# Patient Record
Sex: FEMALE | Race: WHITE
[De-identification: names, ages, dates, MRNs, and addresses within clinical notes are randomized per-mention and may not be internally consistent; named-entity substitution may affect disease eponyms.]

---

## 2019-04-06 ENCOUNTER — HOSPITAL ENCOUNTER (INPATIENT)
Dept: HOSPITAL 97 - ER | Age: 79
LOS: 12 days | Discharge: HOME HEALTH | DRG: 177 | End: 2019-04-18
Attending: FAMILY MEDICINE | Admitting: FAMILY MEDICINE
Payer: COMMERCIAL

## 2019-04-06 VITALS — BODY MASS INDEX: 35.9 KG/M2

## 2019-04-06 DIAGNOSIS — J96.01: ICD-10-CM

## 2019-04-06 DIAGNOSIS — N30.00: ICD-10-CM

## 2019-04-06 DIAGNOSIS — G72.89: ICD-10-CM

## 2019-04-06 DIAGNOSIS — Z74.01: ICD-10-CM

## 2019-04-06 DIAGNOSIS — I27.20: ICD-10-CM

## 2019-04-06 DIAGNOSIS — E11.65: ICD-10-CM

## 2019-04-06 DIAGNOSIS — I11.0: ICD-10-CM

## 2019-04-06 DIAGNOSIS — J69.0: Primary | ICD-10-CM

## 2019-04-06 DIAGNOSIS — Z79.4: ICD-10-CM

## 2019-04-06 DIAGNOSIS — J96.02: ICD-10-CM

## 2019-04-06 DIAGNOSIS — M15.9: ICD-10-CM

## 2019-04-06 DIAGNOSIS — B96.1: ICD-10-CM

## 2019-04-06 DIAGNOSIS — I50.32: ICD-10-CM

## 2019-04-06 DIAGNOSIS — E87.70: ICD-10-CM

## 2019-04-06 DIAGNOSIS — Z66: ICD-10-CM

## 2019-04-06 DIAGNOSIS — E78.2: ICD-10-CM

## 2019-04-06 LAB
ALBUMIN SERPL BCP-MCNC: 3.1 G/DL (ref 3.4–5)
ALP SERPL-CCNC: 76 U/L (ref 45–117)
ALT SERPL W P-5'-P-CCNC: 16 U/L (ref 12–78)
AST SERPL W P-5'-P-CCNC: 16 U/L (ref 15–37)
BUN BLD-MCNC: 26 MG/DL (ref 7–18)
GLUCOSE SERPLBLD-MCNC: 171 MG/DL (ref 74–106)
HCT VFR BLD CALC: 28.5 % (ref 36–45)
INR BLD: 1.22
LYMPHOCYTES # SPEC AUTO: 0.7 K/UL (ref 0.7–4.9)
MAGNESIUM SERPL-MCNC: 1.8 MG/DL (ref 1.8–2.4)
NT-PROBNP SERPL-MCNC: 3869 PG/ML (ref ?–450)
PMV BLD: 9 FL (ref 7.6–11.3)
POTASSIUM SERPL-SCNC: 4.5 MMOL/L (ref 3.5–5.1)
RBC # BLD: 3.4 M/UL (ref 3.86–4.86)
TROPONIN (EMERG DEPT USE ONLY): < 0.02 NG/ML (ref 0–0.04)

## 2019-04-06 PROCEDURE — 87077 CULTURE AEROBIC IDENTIFY: CPT

## 2019-04-06 PROCEDURE — 82805 BLOOD GASES W/O2 SATURATION: CPT

## 2019-04-06 PROCEDURE — 82962 GLUCOSE BLOOD TEST: CPT

## 2019-04-06 PROCEDURE — 96365 THER/PROPH/DIAG IV INF INIT: CPT

## 2019-04-06 PROCEDURE — 5A09557 ASSISTANCE WITH RESPIRATORY VENTILATION, GREATER THAN 96 CONSECUTIVE HOURS, CONTINUOUS POSITIVE AIRWAY PRESSURE: ICD-10-PCS

## 2019-04-06 PROCEDURE — 94640 AIRWAY INHALATION TREATMENT: CPT

## 2019-04-06 PROCEDURE — 87088 URINE BACTERIA CULTURE: CPT

## 2019-04-06 PROCEDURE — 87186 SC STD MICRODIL/AGAR DIL: CPT

## 2019-04-06 PROCEDURE — 85610 PROTHROMBIN TIME: CPT

## 2019-04-06 PROCEDURE — 83735 ASSAY OF MAGNESIUM: CPT

## 2019-04-06 PROCEDURE — 94760 N-INVAS EAR/PLS OXIMETRY 1: CPT

## 2019-04-06 PROCEDURE — 84484 ASSAY OF TROPONIN QUANT: CPT

## 2019-04-06 PROCEDURE — 97166 OT EVAL MOD COMPLEX 45 MIN: CPT

## 2019-04-06 PROCEDURE — 71045 X-RAY EXAM CHEST 1 VIEW: CPT

## 2019-04-06 PROCEDURE — 80048 BASIC METABOLIC PNL TOTAL CA: CPT

## 2019-04-06 PROCEDURE — 93306 TTE W/DOPPLER COMPLETE: CPT

## 2019-04-06 PROCEDURE — 83605 ASSAY OF LACTIC ACID: CPT

## 2019-04-06 PROCEDURE — 90670 PCV13 VACCINE IM: CPT

## 2019-04-06 PROCEDURE — 87804 INFLUENZA ASSAY W/OPTIC: CPT

## 2019-04-06 PROCEDURE — 87040 BLOOD CULTURE FOR BACTERIA: CPT

## 2019-04-06 PROCEDURE — 93005 ELECTROCARDIOGRAM TRACING: CPT

## 2019-04-06 PROCEDURE — 87086 URINE CULTURE/COLONY COUNT: CPT

## 2019-04-06 PROCEDURE — 96366 THER/PROPH/DIAG IV INF ADDON: CPT

## 2019-04-06 PROCEDURE — 99285 EMERGENCY DEPT VISIT HI MDM: CPT

## 2019-04-06 PROCEDURE — 84145 PROCALCITONIN (PCT): CPT

## 2019-04-06 PROCEDURE — 97530 THERAPEUTIC ACTIVITIES: CPT

## 2019-04-06 PROCEDURE — 80053 COMPREHEN METABOLIC PANEL: CPT

## 2019-04-06 PROCEDURE — 96375 TX/PRO/DX INJ NEW DRUG ADDON: CPT

## 2019-04-06 PROCEDURE — 87081 CULTURE SCREEN ONLY: CPT

## 2019-04-06 PROCEDURE — 80076 HEPATIC FUNCTION PANEL: CPT

## 2019-04-06 PROCEDURE — 81015 MICROSCOPIC EXAM OF URINE: CPT

## 2019-04-06 PROCEDURE — 87070 CULTURE OTHR SPECIMN AEROBIC: CPT

## 2019-04-06 PROCEDURE — 83880 ASSAY OF NATRIURETIC PEPTIDE: CPT

## 2019-04-06 PROCEDURE — 94660 CPAP INITIATION&MGMT: CPT

## 2019-04-06 PROCEDURE — 97162 PT EVAL MOD COMPLEX 30 MIN: CPT

## 2019-04-06 PROCEDURE — 85025 COMPLETE CBC W/AUTO DIFF WBC: CPT

## 2019-04-06 PROCEDURE — 36415 COLL VENOUS BLD VENIPUNCTURE: CPT

## 2019-04-06 PROCEDURE — 81003 URINALYSIS AUTO W/O SCOPE: CPT

## 2019-04-06 RX ADMIN — IPRATROPIUM BROMIDE PRN MG: 0.5 SOLUTION RESPIRATORY (INHALATION) at 23:55

## 2019-04-06 RX ADMIN — HUMAN INSULIN SCH UNIT: 100 INJECTION, SOLUTION SUBCUTANEOUS at 21:00

## 2019-04-06 RX ADMIN — Medication SCH ML: at 21:00

## 2019-04-06 NOTE — XMS REPORT
Clinical Summary

 Created on:2019



Patient:Gladys Ramos

Sex:Female

:1940

External Reference #:[id number]





Demographics







 Address  [address]



   [address]



   [place] [address]

 

 Home Phone  [phone]

 

 Mobile Phone  [phone]

 

 Home Phone  [phone]

 

 Mobile Phone  [phone]

 

 Email Address  [email or web address]

 

 Preferred Language  English

 

 Marital Status  

 

 Jewish Affiliation  Unknown

 

 Race  White

 

 Ethnic Group  Not  or 









Author







 Organization  [facility]

 

 Address  [address] [place] [address]









Support







 Name  Relationship  Address  Phone

 

 Ирина Banda  Child  Unavailable  [phone]









Care Team Providers







 Name  Role  Phone

 

 DONATO Dennis DO  Primary Care Provider  [phone]









Allergies







 Active Allergy  Reactions  Severity  Noted Date  Comments

 

 No Known Drug Allergies  Other (See Comments)    2016  







Medications







 Medication  Sig  Dispensed  Refills  Start Date  End Date  Status

 

 blood sugar  Check four  400 strip  10  2017    Active



 diagnostic strips  strips daily          



 (FREESTYLE LITE            



 STRIPS) strip test            



 strips            

 

 lancets 28 gauge  Check 4 times  400 each  10  2017    Active



 misc  daily          

 

 insulin  Inject 30  10 Syringe  1  2017    Active



 glargine-lixisenati  Units under          



 de (SOLIQUA 100/33)  the skin          



 100 unit-33 mcg/mL  daily.          



 insulin pen            

 

 pen needle,  Use once daily  100 each  10  2017    Active



 diabetic (BD            



 INSULIN PEN NEEDLE            



 UF SHORT) 31 gauge            



 x 5/16" needle            

 

 metFORMIN  Take 500 mg by    0      Active



 (GLUCOPHAGE) 500 mg  mouth 3          



 tablet  (three) times          



   a day.          

 

 DULoxetine  Take 20 mg by    0      Active



 (CYMBALTA) 20 MG  mouth daily.          



 capsule            

 

 benazepril  Take 1 tablet  90 tablet  0  2018    Active



 (LOTENSIN) 40 MG  by mouth daily          



 tablet            

 

 carvedilol (COREG)  Take 1 tablet  180 tablet  0  2018    Active



 6.25 MG tablet  by mouth twice          



   a day with          



   meals          

 

 amLODIPine  Take 1 tablet  90 tablet  0  2018    Active



 (NORVASC) 10 mg  by mouth daily          



 tablet  -hold if sbp          



   less than 100          



   and dbp is          



   less than 70          

 

 atorvastatin  Take 1 tablet  90 tablet  0  2018    Active



 (LIPITOR) 10 MG  by mouth daily          



 tablet            

 

 hydrALAZINE  Take 2 tablets  540 tablet  0  2019    Active



 (APRESOLINE) 10 MG  by mouth three          



 tablet  times daily          

 

 benazepril  Take 1 tablet  90 tablet  1  2019    Active



 (LOTENSIN) 40 MG  by mouth daily          



 tablet            

 

 atorvastatin  Take 1 tablet  90 tablet  1  2019    Active



 (LIPITOR) 10 MG  by mouth daily          



 tablet            

 

 acetaminophen-codei  Take 1 tablet  90 tablet  0  2019  Active



 ne (TYLENOL WITH  by mouth every        9  



 CODEINE #3) 300-30  8 hours as          



 mg per tablet  needed for          



   MODERATE PAIN          

 

 carvedilol (COREG)  Take 1 tablet  180 tablet  1  2019    Active



 6.25 MG tablet  by mouth twice          



   a day with          



   meals          

 

 amLODIPine  Take 1 tablet  90 tablet  1  2019    Active



 (NORVASC) 10 mg  by mouth daily          



 tablet  -hold if sbp          



   is less than          



   100 and dbp is          



   less than 70          

 

 gabapentin  Take 1 capsule  270 capsule  1  2019    Active



 (NEURONTIN) 400 mg  by mouth three          



 capsule  times daily          

 

 carvedilol (COREG)  Take 1 tablet  180 tablet  1  2017  
Discontinued



 6.25 MG tablet  (6.25 mg        8  



   total) by          



   mouth 2 (two)          



   times a day          



   with meals.          

 

 amLODIPine  Take 1 tablet  90 tablet  1  2017  Discontinued



 (NORVASC) 10 mg  (10 mg total)        8  



 tablet  by mouth          



   daily. Take 1          



   talbet po QD          



   Hold if SBP is          



   left than 100          



   and DBP is          



   less than 70.          

 

 gabapentin  Take 1 capsule  270 capsule  1  2017  Discontinued



 (NEURONTIN) 400 mg  (400 mg total)        8  



 capsule  by mouth 3          



   (three) times          



   a day.          

 

 hydrALAZINE  Take 2 tablets  540 tablet  1  2017  Discontinued



 (APRESOLINE) 10 MG  (20 mg total)        8  



 tablet  by mouth 3          



   (three) times          



   a day.          

 

 benazepril  Take 1 tablet  90 tablet  1  2017  Discontinued



 (LOTENSIN) 40 MG  (40 mg total)        8  



 tablet  by mouth          



   daily.          

 

 citalopram (CeleXA)  Take 1 tablet  90 tablet  1  2017  
Discontinued



 20 MG tablet  (20 mg total)        8  



   by mouth          



   daily.          

 

 atorvastatin  Take 1 tablet  90 tablet  0  2018  Discontinued



 (LIPITOR) 10 MG  by mouth daily        8  



 tablet            

 

 acetaminophen-codei  Take 1 tablet  90 tablet  0  2018  
Discontinued



 ne (TYLENOL WITH  by mouth every        8  



 CODEINE #3) 300-30  8 hours as          



 mg per tablet  needed for          



   MODERATE PAIN          

 

 nitrofurantoin,  Take 1 capsule  14 capsule  0  2018  10/03/201  
Discontinued



 macrocrystal-monohy  (100 mg total)        8  



 drate, (MACROBID)  by mouth 2          



 100 MG capsule  (two) times a          



   day for 7          



   days.          

 

 ciprofloxacin  Take 1 tablet  6 tablet  0  2018  



 (CIPRO) 500 MG  (500 mg total)        8  



 tablet  by mouth 2          



   (two) times a          



   day for 3          



   days.          

 

 gabapentin  Take 1 capsule  270 capsule  0  2018  Discontinued



 (NEURONTIN) 400 mg  by mouth three        8  



 capsule  times a day          

 

 hydrALAZINE  Take 2 tablets  540 tablet  0  2018  Discontinued



 (APRESOLINE) 10 MG  by mouth three        8  



 tablet  times daily          

 

 acetaminophen-codei  Take 1 tablet  90 tablet  0  2018  
Discontinued



 ne (TYLENOL WITH  by mouth every        8  



 CODEINE #3) 300-30  8 hours as          



 mg per tablet  needed for          



   MODERATE PAIN          

 

 carvedilol (COREG)  Take 1 tablet  180 tablet  0  2018  
Discontinued



 6.25 MG tablet  by mouth twice        8  



   a day with          



   meals          

 

 amLODIPine  Take 1 tablet  90 tablet  0  2018  Discontinued



 (NORVASC) 10 mg  by mouth daily        8  



 tablet  -hold if sbp          



   less than 100          



   and dbp is          



   less than 70          

 

 citalopram (CeleXA)  Take 1 tablet  90 tablet  0  2018  
Discontinued



 20 MG tablet  by mouth daily        8  

 

 benazepril  Take 1 tablet  90 tablet  0  2018  Discontinued



 (LOTENSIN) 40 MG  by mouth daily        8  



 tablet            

 

 atorvastatin  Take 1 tablet  90 tablet  0  2018  Discontinued



 (LIPITOR) 10 MG  by mouth daily        8  



 tablet            

 

 levoFLOXacin  Take 1 tablet  10 tablet  0  2018  



 (LEVAQUIN) 500 MG  (500 mg total)        8  



 tablet  by mouth daily          



   for 7 days.          

 

 DULoxetine  Take 1 capsule  90 capsule  11  2018  Discontinued



 (CYMBALTA) 30 MG  (30 mg total)        8  



 capsule  by mouth          



   daily.          

 

 hydrALAZINE  Take 2 tablets  540 tablet  0  2018  Discontinued



 (APRESOLINE) 10 MG  by mouth three        8  



 tablet  times daily          

 

 gabapentin  Take 1 capsule  270 capsule  0  2018  Discontinued



 (NEURONTIN) 400 mg  by mouth three        8  



 capsule  times a day          

 

 acetaminophen-codei  Take 1 tablet  90 tablet  0  2018  




 ne (TYLENOL WITH  by mouth every        8  



 CODEINE #3) 300-30  8 hours as          



 mg per tablet  needed for          



   MODERATE PAIN          

 

 nitrofurantoin,  Take 1 capsule  14 capsule  0  10/03/2018  10/10/201  



 macrocrystal-monohy  (100 mg total)        8  



 drate, (MACROBID)  by mouth 2          



 100 MG capsule  (two) times a          



   day for 7          



   days.          

 

 atorvastatin  Take 1 tablet  90 tablet  0  2018  Discontinued



 (LIPITOR) 10 MG  by mouth daily        9  



 tablet            

 

 benazepril  Take 1 tablet  90 tablet  0  2018  Discontinued



 (LOTENSIN) 40 MG  by mouth daily        9  



 tablet            

 

 gabapentin  Take 1 capsule  270 capsule  0  2018  Discontinued



 (NEURONTIN) 400 mg  by mouth three        9  



 capsule  times daily          

 

 hydrALAZINE  Take 2 tablets  540 tablet  0  2018  Discontinued



 (APRESOLINE) 10 MG  by mouth three        9  



 tablet  times daily          

 

 acetaminophen-codei  Take 1 tablet  90 tablet  0  2018  




 ne (TYLENOL WITH  by mouth every        8  



 CODEINE #3) 300-30  8 hours as          



 mg per tablet  needed for          



   MODERATE PAIN          

 

 carvedilol (COREG)  Take 1 tablet  180 tablet  0  2018  
Discontinued



 6.25 MG tablet  by mouth twice        9  



   a day with          



   meals          

 

 amLODIPine  Take 1 tablet  90 tablet  0  2018  Discontinued



 (NORVASC) 10 mg  by mouth daily        9  



 tablet  -hold if sbp          



   less than 100          



   and dbp is          



   less than 70          

 

 nitrofurantoin,  Take 1 capsule  14 capsule  0  2019  



 macrocrystal-monohy  (100 mg total)        9  



 drate, (MACROBID)  by mouth 2          



 100 MG capsule  (two) times a          



   day for 7          



   days.          









 Hospital, Clinic, or Other  Ordered Dose  Route  Frequency  Start Date  End 
Date  Status



 Facility Administered            



 Medication            

 

 lidocaine (XYLOCAINE) 10  30 mg  inj  once  2018  Ended



 mg/mL (1 %) injection 30            



 mgIndications:            



 Osteoarthritis of both            



 knees, unspecified            



 osteoarthritis type            

 

 bupivacaine (MARCAINE) 0.5  3 mL  inj  once  2018  Ended



 % (5 mg/mL) injection 3            



 mLIndications:            



 Osteoarthritis of both            



 knees, unspecified            



 osteoarthritis type            

 

 hylan g-f 20 (SYNVISC-ONE)  48 mg  IAtc  once  2018  Ended



 injection 48 mgIndications:            



 Osteoarthritis of both            



 knees, unspecified            



 osteoarthritis type            

 

 hylan g-f 20 (SYNVISC-ONE)  48 mg  IAtc  once  2018  Ended



 injection 48 mgIndications:            



 Osteoarthritis of both            



 knees, unspecified            



 osteoarthritis type            

 

 lidocaine (XYLOCAINE) 10  30 mg  inj  once  2018  Ended



 mg/mL (1 %) injection 30            



 mgIndications:            



 Osteoarthritis of both            



 knees, unspecified            



 osteoarthritis type            

 

 bupivacaine (MARCAINE) 0.5  3 mL  inj  once  2018  Ended



 % (5 mg/mL) injection 3            



 mLIndications:            



 Osteoarthritis of both            



 knees, unspecified            



 osteoarthritis type            







Active Problems







 Problem  Noted Date

 

 Hallucinations  2018

 

 Tremor of both hands  2018

 

 Administrative encounter  2017

 

 Fall  2016

 

 Leg injuries  2016

 

 Abrasion  2016

 

 Cellulitis of left lower extremity  2016

 

 Acute cystitis without hematuria  2016

 

 Urinary tract infection associated with catheterization of urinary tract  

 

 Anxiety  2016

 

 Arthritis of knee  2016

 

 Dysuria  2016

 

 Edema of lower extremity  2016

 

 Hip pain  2016

 

 Hypertension  2016

 

 Knee pain  2016

 

 Neuropathy  2016

 

 Polyuria  2016

 

 Toothache  2016

 

 Type 2 diabetes mellitus with hypoglycemia without coma, with long-term  2016



 current use of insulin  

 

 Unsteady gait  2016

 

 Vitamin D deficiency  2016

 

 Murmur  2016









 Overview: 







 "aortic aneurysm"







Encounters







 Date  Type  Specialty  Care Team  Description

 

 03/15/2019  Orders Only  Family Medicine  Lingamfelter, R  Recurrent UTI



       Juan Carlos, DO  

 

 02/15/2019  Orders Only  Family Medicine  Lingamfelter, R  Recurrent UTI



       Juan Carlos, DO  

 

 2019  Orders Only  Family Medicine  Lingamfelter, R  



       Juan Carlos, DO  

 

 2019  Orders Only  Family Medicine  Lingamfelter, R  



       Juan Carlos, DO  

 

 2019  Telephone  [facility] Medicine  Koki Leggett,  



       N  

 

 2019  Refill  Endocrinology  Aniyah Sanchez MD  

 

 2019  Refill  Family Medicine  Lingamfelter, R  



       Juan Carlos, DO  

 

 2019  Orders Only  Family Medicine  Lingamfelter, R  Recurrent UTI



       Juan Carlos, DO  

 

 2018  Orders Only  Family Medicine  Lingamfelter, R  Recurrent UTI



       Juan Carlos, DO  

 

 2018  Telephone  [facility] Medicine  Koki Leggett,  



       N  

 

 2018  Patient Outreach  Quality  Jaren, Linda  

 

 2018  Orders Only  Family Medicine  Lingamfelter, R  Recurrent UTI



       Juan Carlos, DO  

 

 2018  Refill  Endocrinology  Aniyah Sanchez MD  

 

 2018  Refill  Family Medicine  Lingamfelter, R  



       Juan Carlos, DO  

 

 2018  Refill  Family Medicine  Lingamfelter, R  



       Juan Carlos, DO  

 

 2018  Patient Outreach  Quality  Jaren, Linda  

 

 10/26/2018  Orders Only  Family Medicine  Lingamfelter, R  Recurrent UTI



       Juan Carlos, DO  

 

 10/25/2018  Telephone  Orthopedic Surgery  Renay Vargas MA  

 

 10/17/2018  Orders Only  Family Medicine  Lingamfelter, R  



       Juan Carlos, DO  

 

 10/03/2018  Orders Only  Family Medicine  Lingamfelter, R  



       Juan Carlos, DO  

 

 2018  Orders Only  Family Medicine  Lingamfelter, R  Recurrent UTI



       Juan Carlos, DO  

 

 2018  Telephone  [facility] Medicine  Koki Leggett  



       LVN  

 

 2018  Orders Only  Family Medicine  Lingamfelter, R  



       Juan Carlos, DO  

 

 2018  Telephone  Internal Medicine  Lingamfelter, R  



       Juan Carlos, DO  

 

 2018  Refill  Family Medicine  Lingamfelter, R  



       Juan Carlos, DO  

 

 2018  Telephone  Family Medicine  Leggett, Koki,  



       LVN  

 

 2018  Telephone  Family Medicine  Leggett, Koki,  



       LVN  

 

 08/15/2018  Orders Only  Family Medicine  Lingamfelter, R  Arthralgia of hip,



       Juan Carlos, DO  unspecified laterality



         (Primary Dx)

 

 2018  Telephone  Family Medicine  Leggett, Koki,  



       LVN  

 

 2018  Patient Outreach  Quality  Linda Eastman  

 

 2018  Orders Only  Family Medicine  Lingamfelter, R  Recurrent UTI (
Primary



       Juan Carlos, DO  Dx)

 

 2018  Telephone  Family Medicine  Leggett, Koki,  



       LVN  

 

 2018  Telephone  Family Medicine  Leggett, Koki,  



       LVN  

 

 2018  Patient Outreach  Quality  Adal, Carolann  

 

 2018  Telephone  Family Medicine  Lingamfelter, R  



       Juan Carlos, DO  

 

 2018  Office Visit  Family Medicine  Lingamfelter, R  Hallucinations (
Primary Dx);



       Juan Carlos DO  Tremor of both hands;



         Type 2 diabetes mellitus with hypoglycemia without coma, with long-
term current use of insulin

 

 2018  Patient Outreach  Quality  Caitlyn Lopez RN  

 

 2018  Patient Outreach  Quality  Nadiya Mittal  

 

 2018  Patient Outreach  Quality  Adal, Carolann  

 

 2018  Telephone  Family Medicine  Leggett, Koki,  



       LVN  

 

 2018  Telephone  Internal Medicine  Keiryamfelter, R  



       Juan Carlos, DO  

 

 2018  Telephone  Family Medicine  Leggett, Koki,  



       LVN  

 

 2018  Telephone  Family Medicine  Leggett, Koki,  



       LVN  

 

 2018  Office Visit  Family Medicine  Lingamfelter, R  General weakness (
Primary Dx);



       Juan Carlos, DO  Leg weakness, bilateral

 

 2018  Orders Only  Family Medicine  Lingamfelter, R  



       Juan Carlos, DO  

 

 2018  Orders Only  Family Medicine  Lingamfelter, R  Pain of both hip



       Juan Carlos DO  joints (Primary Dx)

 

 2018  Telephone  Family Medicine  Leggett, Koki,  



       LVN  

 

 2018  Telephone  Family Medicine  Leggett, Koki,  



       LVN  

 

 2018  Orders Only  Family Medicine  Lingamfelter, R  Chronic pain of 
both knees (Primary Dx);



       Juan Carlos DO  Arthritis of knee

 

 2018  Telephone  Internal Medicine  Samm Patel MA  

 

 2018  Telephone  Family Medicine  Lingamfelter, R  



       Juan Carlos, DO  

 

 2018  Refill  Family Medicine  DONATO Dennis  



       Juan Carlos, DO  

 

 2018  Orders Only  Orthopedic Surgery  Renay Vargas,  Pain in both knees
,



       MA  unspecified chronicity



         (Primary Dx)

 

 2018  Telephone  Orthopedic Surgery  Renay Vargas,  



       MA  

 

 2018  [place]  [place]  

 

 2018  Orders Only  Family Medicine  DONATO Dennis  



       Juan Carlos, DO  

 

 2018  Office Visit  Orthopedic Surgery  [place],  Osteoarthritis of 
both knees, unspecified osteoarthritis type (Primary Dx);



       Lalo  Chronic acquired lymphedema;



       MD Geovanni  Venous stasis dermatitis of both lower extremities

 

 2018  Orders Only  Family DONATO Malik  Increased urinary



       Juan Carlos, DO  frequency (Primary Dx)

 

 2018  Telephone  Family Medicine  Koki Leggett,  



       LVN  

 

 2018  Office Visit  Orthopedic Surgery  [place],  Lymphedema (Primary Dx
);



       Lalo  Primary osteoarthritis of both knees



       MD Geovanni  

 

 2018  Hospital Encounter  Radiology  [place],  Pain in both knees,



       Lalo  unspecified chronicity



       MD Geovanni  

 

 2018  Orders Only  Orthopedic Surgery  Renay Vargas,  



       MA  

 

 2018  Orders Only  Family Medicine  DONATO Dennis  Frequency of 
urination



       Juan Carlos, DO  (Primary Dx)

 

 2018  Telephone  Family Medicine  Koki Leggett,  



       LVN  

 

 2018  Office Visit  Orthopedic Surgery  [place],  Pain in both knees, 
unspecified chronicity (Primary Dx);



       Lalo  Primary osteoarthritis of both knees



       MD Geovanni  

 

 2018  Orders Only  Orthopedic Surgery  Renay Vargas,  Pain in both knees
,



       MA  unspecified chronicity



         (Primary Dx)



after 2018



Immunizations







 Name  Dates Previously Given  Next Due

 

 FLUZONE HIGH-DOSE PF  10/06/2016, 10/15/2015  

 

 Influenza (IM) Preservative Free  10/01/2017  







Family History







 Medical History  Relation  Name  Comments

 

 Heart disease  Father    

 

 Hypertension  Father    

 

 Diabetes  Maternal Grandmother    









 Relation  Name  Status  Comments

 

 Father      

 

 Maternal Grandmother      







Social History







 Tobacco Use  Types  Packs/Day  Years Used  Date

 

 Never Smoker        









 Smokeless Tobacco: Never Used      









 Alcohol Use  Drinks/Week  oz/Week  Comments

 

 No      









 Sex Assigned at Birth  Date Recorded

 

 Not on file  









 Job Start Date  Occupation  Industry

 

 Not on file  Not on file  Not on file









 Travel History  Travel Start  Travel End









 No recent travel history available.







Last Filed Vital Signs







 Vital Sign  Reading  Time Taken

 

 Blood Pressure  128/74  2018  1:46 PM CDT

 

 Pulse  95  2018  1:46 PM CDT

 

 Temperature  36.6 C (97.8 F)  2018  1:46 PM CDT

 

 Respiratory Rate  18  2018  1:46 PM CDT

 

 Oxygen Saturation  93%  2018  1:46 PM CDT

 

 Inhaled Oxygen Concentration  -  -

 

 Weight  -  -

 

 Height  160 cm (5' 3")  2018  1:46 PM CDT

 

 Body Mass Index  -  -







Plan of Treatment







 Health Maintenance  Due Date  Last Done  Comments

 

 DIABETIC RETINAL EYE EXAM  1940    

 

 DIABETIC FOOT EXAM  1949    

 

 SHINGLES VACCINES (#1)  1989    

 

 65+ PNEUMOCOCCAL VACCINE (1 of 2 - PCV13)  2004    

 

 PNEUMOCOCCAL POLYSACCHARIDE VACCINE AGE 65  2004    



 AND OVER      

 

 URINE MICROALBUMIN  08/15/2018  08/15/2017  

 

 INFLUENZA VACCINE  2019  10/06/2016, 10/15/2015  







Procedures







 Procedure Name  Priority  Date/Time  Associated Diagnosis  Comments

 

 [place] ARTHROCENTESIS  Routine  2018  9:15  Osteoarthritis of  Results for 
this



 ASPIR&/INJ MAJOR    AM CDT  both knees,  procedure are in



 JT/BURSA W/O US      unspecified  the results



       osteoarthritis type  section.

 

 XR KNEE 1 OR 2 VW  Routine  2018  2:30  Pain in both knees,  Results for 
this



 BILATERAL    PM CDT  unspecified  procedure are in



       chronicity  the results



         section.



after 2018



Results

Large Joint Arthrocentesis (2018  9:15 AM CDT)





 Narrative  Performed At

 

 Lalo Ceron MD 20181:38 PM



  



 Large Joint Arthrocentesis



  



 Consent given by: patient



  



 Timeout: Immediately prior to procedure a time out was called to verify 



  



 the correct patient, procedure, equipment, support staff and site/side 



  



 marked as required 



  



 Supporting Documentation



  



 Indications: pain and joint swelling 



  



 Procedure Details



  



 Preparation: Patient was prepped and draped in the usual sterile fashion



  



 Ultrasound guided: no



  



 Medications provided by specialty pharmacy: yes



  



  



  



 Platelet Rich Plasma Used: no PRP Used Location: knee - Bilateral  



 knee



  



  



  



 Right side:



  



 Needle size: 20 G



  



 Approach: lateral



  



 Right knee medications administered: 2 mL bupivacaine 0.25 % (2.5  



 mg/mL); 



  



 3 mL lidocaine 10 mg/mL (1 %); 48 mg hylan g-f 20 48 mg/6 mL



  



 Aspirate amount: 10 mL



  



 Aspirate: clear and serous



  



 Patient tolerance: patient tolerated the procedure well with no  



 immediate 



  



 complications



  



  



  



  



  



 Left side:



  



 Needle size: 20 G



  



 Approach: lateral



  



 Left knee medications administered: 2 mL bupivacaine 0.25 % (2.5 mg/mL);  



 3 



  



 mL lidocaine 10 mg/mL (1 %); 48 mg hylan g-f 20 48 mg/6 mL



  



 Aspirate amount: 10 mL



  



 Aspirate: clear and serous



  



 Patient tolerance: patient tolerated the procedure well with no  



 immediate 



  



 complications



  



  



  



  



  



   



XR Knee 1 Or 2 Vw Bilateral (2018  2:30 PM CDT)





 Narrative  Performed At

 

 EXAMINATION:XR KNEE 1 OR 2 VW BILATERAL



  [facility]



  



  



 CLINICAL HISTORY:M25.561 Pain in right knee, M25.562 Pain in left  



 knee, BONE PAINKNEE



  



  



  



 COMPARISON:None.



  



  



  



 FINDINGS: The bones are osteopenic. There are extensive arthritic  



 changes with narrowing in the patellofemoral compartment and lateral  



 compartments in particular. Abundant chondrocalcinosis is present. This  



 may be seen in gout, pseudogout as well as 



  



 osteoarthritis. There is a small volume of fluid noted in the  



 suprapatellar bursa There is minimal fluid about the joint. Loose bodies  



 about the joint posteriorly are noted



  



  



  



 IMPRESSION: Severe arthritic changes as described with obliteration of  



 the lateral compartment a small joint effusion is also noted



  



  



  



  



  



  



  



  



  



  



  



  [id number]



  



   









 Procedure Note

 

 Hm Interface, Radiology Results [facility] - 2018  3:50 PM CDT



EXAMINATION:  XR KNEE 1 OR 2 VW BILATERAL



 



 CLINICAL HISTORY:  M25.561 Pain in right knee, M25.562 Pain in left knee, BONE 
PAIN  KNEE



 



 COMPARISON:  None.



 



 FINDINGS:   The bones are osteopenic. There are extensive arthritic changes 
with narrowing in the patellofemoral compartment and lateral compartments in 
particular. Abundant chondrocalcinosis is present. This



 may be seen in gout, pseudogout as well as



 osteoarthritis. There is a small volume of fluid noted in the suprapatellar 
bursa There is minimal fluid about the joint. Loose bodies about the joint 
posteriorly are noted



 



 IMPRESSION: Severe arthritic changes as described with obliteration of the 
lateral compartment a small joint effusion is also noted



 



 



 



 



 



  [id number]









 Performing Organization  Address  City/State/Zipcode  Phone Number

 

 [facility]  [address] [place] [address]  



after 2018



Insurance







 Payer  Benefit Plan / Group  Subscriber ID  Type  Phone  Address

 

 [facility]  xxxxxxxxxxx      

 

 MEDICARE  MEDICARE PART A AND B  xxxxxxxxxx  Medicare    [place], [place]









 Guarantor Name  Account Type  Relation to  Date of Birth  Phone  Billing



     Patient      Address

 

 Gladys Ramos SHEREEN  Personal/Family  Self  1940  [phone]  [address] [place] [address]



         (Home)  [address]







           [id number]







           [place] [address]







Advance Directives

Patient has advance care planning documents on file. For more information, 
please contact:Rodney [id number] [place] [address]

## 2019-04-06 NOTE — EDPHYS
Physician Documentation                                                                           

 [facility]                                                                 

Name: Gladys Ramos                                                                                    

Age: 79 yrs                                                                                       

Sex: Female                                                                                       

: 1940                                                                                   

MRN: [id number]                                                                                   

Arrival Date: 2019                                                                          

Time: 14:39                                                                                       

Account#: [id number]                                                                            

Bed 24                                                                                            

Private MD:                                                                                       

ED Physician Reyes Markham                                                                      

HPI:                                                                                              

                                                                                             

15:32 This 79 yrs old  Female presents to ER via EMS with complaints of Shortness Of pm1 

      Breath.                                                                                     

15:32 The patient has shortness of breath at rest. Onset: The symptoms/episode began/occurred pm1 

      3 day(s) ago. Duration: The symptoms are continuous. The patient's shortness of breath      

      is aggravated by nothing, is alleviated by nothing. Associated signs and symptoms:          

      Pertinent positives: productive cough, sore throat yesterday, Pertinent negatives:          

      chest pain, diaphoresis, dizziness, fever, nausea, vomiting. Severity of symptoms: in       

      the emergency department the symptoms are worse Pain is currently a 0 / 10. The patient     

      has experienced a previous episode, about 1 year ago positive for the flu. The patient      

      has not recently seen a physician.                                                          

15:32 Patient with 70% saturation on room air on EMS arrival. Patient given Solu-Medrol 125   pm1 

      IV, albuterol x 1 in route by EMS .                                                         

15:57 PCP Jeannie in [place].                                                         pm1 

                                                                                                  

Historical:                                                                                       

- Allergies:                                                                                      

15:05 ambien;                                                                                 ca1 

- Home Meds:                                                                                      

15:05 atorvastatin 10 mg Oral tab [Active]; Amlodipine Besylate 10 mg 1 tab daily [Active];   ca1 

      acetaminophen-codeine 300-30 mg Oral tab 1 tab every 8 hrs [Active]; hydralazine 10 mg      

      Oral tab 2 tab 3 times a day [Active]; duloxetine 30 mg oral cpDR 1 cap once daily          

      [Active]; carvedilol 6.25 mg Oral tab 1 tab 2 times per day [Active]; gabapentin 400 mg     

      Oral cap 1 cap 3 times per day [Active]; benazepril 40 mg Oral tab 1 tab once daily         

      [Active]; metformin 500 mg Oral tab 1 tab 3 times a day [Active]; Soliqua 100/33 20         

      units SC once at night [Active];                                                            

- PMHx:                                                                                           

15:05 Diabetes - IDDM; Hyperlipidemia; Hypertension;                                          ca1 

- PSHx:                                                                                           

15:05 Cholecystectomy; Hernia repair;                                                         ca1 

                                                                                                  

- Immunization history:: Flu vaccine is not up to date.                                           

- Social history:: Smoking status: Patient/guardian denies using tobacco.                         

- Ebola Screening: : No symptoms or risks identified at this time.                                

                                                                                                  

                                                                                                  

ROS:                                                                                              

15:20 Constitutional: Negative for fever, chills, and weight loss, Eyes: Negative for injury, pm1 

      pain, redness, and discharge, Neck: Negative for injury, pain, and swelling,                

      Cardiovascular: Negative for chest pain, palpitations, and edema.                           

15:20 Abdomen/GI: Negative for abdominal pain, nausea, vomiting, diarrhea, and constipation,      

      Back: Negative for injury and pain, : Negative for injury, bleeding, discharge, and       

      swelling, MS/Extremity: Negative for injury and deformity, Skin: Negative for injury,       

      rash, and discoloration, Neuro: Negative for headache, weakness, numbness, tingling,        

      and seizure.                                                                                

15:20 ENT: Positive for sore throat, Negative for ear pain, rhinorrhea, sinus congestion,         

      sinus pain, difficulty swallowing, difficulty handling secretions, hoarseness.              

15:20 Respiratory: Positive for cough, shortness of breath, at rest.                              

                                                                                                  

Exam:                                                                                             

15:20 Constitutional:  This is a well developed, well nourished patient who is awake, alert,  pm1 

      and in no acute distress. Head/Face:  Normocephalic, atraumatic. Eyes:  Pupils equal        

      round and reactive to light, extra-ocular motions intact.  Lids and lashes normal.          

      Conjunctiva and sclera are non-icteric and not injected.  Cornea within normal limits.      

      Periorbital areas with no swelling, redness, or edema. ENT:  Nares patent. No nasal         

      discharge, no septal abnormalities noted.  Tympanic membranes are normal and external       

      auditory canals are clear.  Oropharynx with no redness, swelling, or masses, exudates,      

      or evidence of obstruction, uvula midline.  Mucous membranes moist. Neck:  Trachea          

      midline, no thyromegaly or masses palpated, and no cervical lymphadenopathy.  Supple,       

      full range of motion without nuchal rigidity, or vertebral point tenderness.  No            

      Meningismus. Chest/axilla:  Normal chest wall appearance and motion.  Nontender with no     

      deformity.  No lesions are appreciated. Cardiovascular:  Regular rate and rhythm with a     

      normal S1 and S2.  No gallops, murmurs, or rubs.  Normal PMI, no JVD.  No pulse             

      deficits.                                                                                   

15:20 Abdomen/GI:  Soft, non-tender, with normal bowel sounds.  No distension or tympany.  No     

      guarding or rebound.  No evidence of tenderness throughout. Back:  No spinal                

      tenderness.  No costovertebral tenderness.  Full range of motion. Skin:  Warm, dry with     

      normal turgor.  Normal color with no rashes, no lesions, and no evidence of cellulitis.     

      MS/ Extremity:  Pulses equal, no cyanosis.  Neurovascular intact.  Full, normal range       

      of motion.                                                                                  

15:20 Respiratory: the patient does not display signs of respiratory distress,  Respirations:     

      normal, no use of accessory muscles, no pursed lip breathing, no retractions, no            

      shallow respirations, no splinting, no tachypnea, Breath sounds: wheezing: expiratory       

      is heard diffusely.                                                                         

15:20 Neuro: Orientation: is normal, Motor: is normal, moves all fours.                           

                                                                                                  

Vital Signs:                                                                                      

15:05  / 95; Pulse 95; Resp 20 S; Temp 98.1; Pulse Ox 88% on R/A; Weight 95.25 kg;      ca1 

      Height 5 ft. 3 in. (160.02 cm); Pain 0/10;                                                  

15:08  / 61; Pulse 92; Resp 19; Pulse Ox 92% on 4 lpm NC;                               ca1 

15:59  / 64; Pulse 89; Resp 18 S; Pulse Ox 96% on Nebulizer Mask;                       ca1 

16:26  / 51; Pulse 89; Resp 19 S; Pulse Ox 93% on 4 lpm NC;                             ca1 

17:03  / 75; Pulse 89; Resp 17 S; Pulse Ox 93% on 4 lpm NC;                             ca1 

17:30  / 55; Pulse 87; Resp 19; Pulse Ox 97% on 5 lpm NC;                               ca1 

17:30  / 55; Pulse 91; Resp 17 S; Pulse Ox 96% on 5 lpm NC;                             ca1 

18:35  / 60; Pulse 88; Resp 16 S; Pulse Ox 96% on 5 lpm NC;                             ca1 

15:05 Body Mass Index 37.20 (95.25 kg, 160.02 cm)                                             ca1 

                                                                                                  

MDM:                                                                                              

15:08 Patient medically screened.                                                             pm1 

15:44 Data reviewed: vital signs. Data interpreted: Pulse oximetry: on room air 4L(s) per     pm1 

      nasal canula, is 92 %.                                                                      

16:08 Counseling: I had a detailed discussion with the patient and/or guardian regarding: the pm1 

      historical points, exam findings, and any diagnostic results supporting the                 

      discharge/admit diagnosis, lab results, radiology results, the need for further work-up     

      and treatment in the hospital.                                                              

16:58 Physician consultation: Adalberto Brambila MD was contacted at 16:59, regarding admission,    pm1 

      patient's condition, and will see patient in ED.                                            

                                                                                                  

                                                                                             

15:14 Order name: Basic Metabolic Panel; Complete Time: 16:07                                 pm                                                                                             

15:14 Order name: CBC with Diff; Complete Time: 16:08                                         pm                                                                                             

15:14 Order name: LFT's; Complete Time: 16:07                                                 pm1 

                                                                                             

15:14 Order name: Magnesium; Complete Time: 16:07                                             pm1 

                                                                                             

15:14 Order name: NT PRO-BNP; Complete Time: 16:07                                            pm                                                                                             

15:14 Order name: PT-INR; Complete Time: 16:07                                                pm1 

                                                                                             

15:14 Order name: Troponin (emerg Dept Use Only); Complete Time: 16:07                        pm1 

                                                                                             

15:14 Order name: XRAY Chest (1 view); Complete Time: 16:25                                   pm1 

                                                                                             

15:14 Order name: Flu; Complete Time: 16:27                                                   pm1 

                                                                                             

15:14 Order name: Strep; Complete Time: 16:27                                                 pm1 

                                                                                             

15:14 Order name: Blood Culture Adult (2)                                                     pm1 

                                                                                             

15:14 Order name: Procalcitonin; Complete Time: 18:21                                         pm1 

                                                                                             

16:28 Order name: Throat Culture                                                              [facility]

                                                                                             

15:14 Order name: EKG; Complete Time: 15:16                                                   pm                                                                                             

15:14 Order name: Cardiac monitoring; Complete Time: 15:45                                    pm1 

                                                                                             

15:14 Order name: EKG - Nurse/Tech; Complete Time: 15:45                                      pm1 

                                                                                             

15:14 Order name: IV Saline Lock; Complete Time: 15:45                                        pm1 

                                                                                             

15:14 Order name: Labs collected and sent; Complete Time: 15:45                               pm1 

                                                                                             

15:14 Order name: O2 Per Protocol; Complete Time: 15:45                                       pm1 

                                                                                             

15:14 Order name: O2 Sat Monitoring; Complete Time: 15:45                                     pm1 

                                                                                                  

Administered Medications:                                                                         

15:45 Drug: Albuterol 2.5 mg Route: Inhalation;                                               ca1 

15:45 Drug: AtroVENT Aerosol 0.5 mg Route: Inhalation;                                        ca1 

16:05 Drug: Rocephin 1 grams Route: IV; Rate: calculated rate; Site: left antecubital;        ca1 

17:06 Follow up: Response: No adverse reaction; IV Status: Completed infusion; IVP per        ca1 

      pharmacy protocol                                                                           

16:10 Drug: AZITHromycin 500 mg Route: IVPB; Infused Over: 1 hrs; Site: left antecubital;     ca1 

18:37 Follow up: Response: No adverse reaction; IV Status: Completed infusion                 ca1 

16:54 Drug: Lasix 20 mg Route: IVP; Site: left antecubital;                                   mg2 

18:37 Follow up: Response: No adverse reaction                                                ca1 

                                                                                                  

                                                                                                  

Disposition:                                                                                      

19 16:29 Hospitalization ordered by Adalberto Brambila for Inpatient Admission. Preliminary      

  diagnosis are Pneumonia, unspecified organism, Hypoxia.                                         

- Bed requested for Telemetry/MedSurg (Inpatient).                                                

- Status is Inpatient Admission.                                                              ca1 

- Condition is Stable.                                                                            

- Problem is new.                                                                                 

- Symptoms have improved.                                                                         

UTI on Admission? No                                                                              

                                                                                                  

                                                                                                  

                                                                                                  

Addendum:                                                                                         

2019                                                                                        

     07:57 Co-signature as Attending Physician, Reyes Markham MD I agree with the assessment and  c
ha

           plan of care.                                                                          

                                                                                                  

Signatures:                                                                                       

Dispatcher [employer]                           EDMS                                                 

Reyes Markham MD MD cha Marinas, Patrick, NP                    NP   pm1                                                  

Rupa Alvarado Michele, KEVYN                    RN   mg2                                                  

Libby Parisi RN RN   ca1                                                  

                                                                                                  

Corrections: (The following items were deleted from the chart)                                    

                                                                                             

16:59 16:29 Hospitalization Ordered by Adalberto Brambila MD for Inpatient Admission. Preliminary   pm1 

      diagnosis is Pneumonia, unspecified organism. Bed requested for Telemetry/MedSurg           

      (Inpatient). Status is Inpatient Admission. Condition is Stable. Problem is new.            

      Symptoms have improved. UTI on Admission? No. pm1                                           

17:33 16:59 2019 16:29 Hospitalization Ordered by Adalberto Brambila MD for Inpatient         eb  

      Admission. Preliminary diagnosis is Pneumonia, unspecified organism; Hypoxia. Bed           

      requested for Telemetry/MedSurg (Inpatient). Status is Inpatient Admission. Condition       

      is Stable. Problem is new. Symptoms have improved. UTI on Admission? No. pm1                

18:05 17:33 2019 16:29 Hospitalization Ordered by Adalberto Brambila MD for Inpatient         eb  

      Admission. Preliminary diagnosis is Pneumonia, unspecified organism; Hypoxia. Bed           

      requested for Telemetry/MedSurg (Inpatient). Status is Inpatient Admission. Condition       

      is Stable. Problem is new. Symptoms have improved. UTI on Admission? No. eb                 

18:43 18:05 2019 16:29 Hospitalization Ordered by Adalberto Brambila MD for Inpatient         ca1 

      Admission. Preliminary diagnosis is Pneumonia, unspecified organism; Hypoxia. Bed           

      requested for Telemetry/MedSurg (Inpatient). Status is Inpatient Admission. Condition       

      is Stable. Problem is new. Symptoms have improved. UTI on Admission? No. eb                 

                                                                                                  

**************************************************************************************************

## 2019-04-06 NOTE — ER
Nurse's Notes                                                                                     

 [facility]                                                                 

Name: Gladys Ramos                                                                                    

Age: 79 yrs                                                                                       

Sex: Female                                                                                       

: 1940                                                                                   

MRN: [id number]                                                                                   

Arrival Date: 2019                                                                          

Time: 14:39                                                                                       

Account#: [id number]                                                                            

Bed 24                                                                                            

Private MD:                                                                                       

Diagnosis: Pneumonia, unspecified organism;Hypoxia                                                

                                                                                                  

Presentation:                                                                                     

                                                                                             

14:51 Presenting complaint: EMS states: productive Cough and SOB started Thursday. SOB worsen ca1 

      today. SPO2 70% on RA. No Hx of respiratory problem. Transition of care: patient was        

      not received from another setting of care. Onset of symptoms was 2019. Risk       

      Assessment: Do you want to hurt yourself or someone else? Patient reports no desire to      

      harm self or others. Initial Sepsis Screen: Does the patient meet any 2 criteria? No.       

      Patient's initial sepsis screen is negative. Does the patient have a suspected source       

      of infection? Yes: Productive cough/pneumonia. Care prior to arrival: Medication(s)         

      given: Albuterol Neb x 1, Solu- Medrol 125mg IV IV initiated. 18 GA, in the left            

      antecubital area, Glucose check: 187 Oxygen administered. via a non-rebreather mask.        

14:51 Method Of Arrival: EMS: [facility]                                                ca1 

14:51 Acuity: ANATOLY 3                                                                           ca1 

                                                                                                  

Triage Assessment:                                                                                

15:05 General: Appears in no apparent distress. uncomfortable, ill, Behavior is calm,         ca1 

      cooperative, appropriate for age. Pain: Denies pain. Cardiovascular:. Respiratory:          

      Reports shortness of breath cough that is productive, Airway is patent Respiratory          

      effort is even, unlabored, Respiratory pattern is regular, symmetrical, Breath sounds       

      with wheezes bilaterally.                                                                   

                                                                                                  

Historical:                                                                                       

- Allergies:                                                                                      

15:05 ambien;                                                                                 ca1 

- Home Meds:                                                                                      

15:05 atorvastatin 10 mg Oral tab [Active]; Amlodipine Besylate 10 mg 1 tab daily [Active];   ca1 

      acetaminophen-codeine 300-30 mg Oral tab 1 tab every 8 hrs [Active]; hydralazine 10 mg      

      Oral tab 2 tab 3 times a day [Active]; duloxetine 30 mg oral cpDR 1 cap once daily          

      [Active]; carvedilol 6.25 mg Oral tab 1 tab 2 times per day [Active]; gabapentin 400 mg     

      Oral cap 1 cap 3 times per day [Active]; benazepril 40 mg Oral tab 1 tab once daily         

      [Active]; metformin 500 mg Oral tab 1 tab 3 times a day [Active]; Soliqua 100/33 20         

      units SC once at night [Active];                                                            

- PMHx:                                                                                           

15:05 Diabetes - IDDM; Hyperlipidemia; Hypertension;                                          ca1 

- PSHx:                                                                                           

15:05 Cholecystectomy; Hernia repair;                                                         ca1 

                                                                                                  

- Immunization history:: Flu vaccine is not up to date.                                           

- Social history:: Smoking status: Patient/guardian denies using tobacco.                         

- Ebola Screening: : No symptoms or risks identified at this time.                                

                                                                                                  

                                                                                                  

Screening:                                                                                        

15:08 Abuse screen: Denies threats or abuse. Denies injuries from another. Nutritional        ca1 

      screening: No deficits noted. Tuberculosis screening: No symptoms or risk factors           

      identified. Fall Risk IV access (20 points).                                                

                                                                                                  

Assessment:                                                                                       

15:08 General: Appears in no apparent distress. uncomfortable, ill, Behavior is calm,         ca1 

      cooperative, appropriate for age. Pain: Denies pain. Neuro: Level of Consciousness is       

      awake, alert, obeys commands, Oriented to person, place, time, situation.                   

      Cardiovascular: Heart tones S1 S2 present Capillary refill < 3 seconds Patient's skin       

      is warm and dry. Cardiovascular: Edema pitting to left knee, left midcalf, left ankle,      

      left foot, left toes, right knee, right midcalf, right ankle, right foot and right          

      toes. Respiratory: Reports shortness of breath cough that is productive, Airway is          

      patent Respiratory effort is even, unlabored, Respiratory pattern is regular,               

      symmetrical, Breath sounds with wheezes bilaterally. GI: GI: Abdomen is round               

      non-distended, Bowel sounds present X 4 quads. Abd is soft and non tender X 4 quads.        

      : No deficits noted. No signs and/or symptoms were reported regarding the                 

      genitourinary system. EENT: No deficits noted. No signs and/or symptoms were reported       

      regarding the EENT system. Derm: Skin is intact, is healthy with good turgor, Skin is       

      dry, Skin is pale, Skin temperature is warm. Musculoskeletal: Circulation, motion, and      

      sensation intact. Capillary refill < 3 seconds.                                             

15:59 Reassessment: Patient appears in no apparent distress at this time. Patient and/or      ca1 

      family updated on plan of care and expected duration. Pain level reassessed. Patient is     

      alert, oriented x 3, equal unlabored respirations, skin warm/dry/pink.                      

16:26 Reassessment: Patient appears in no apparent distress at this time. Patient is alert,   ca1 

      oriented x 3, equal unlabored respirations, skin warm/dry/pink.                             

17:03 Reassessment: Patient appears in no apparent distress at this time. Patient is alert,   ca1 

      oriented x 3, equal unlabored respirations, skin warm/dry/pink. Family at bedside.          

      Awaiting room assignment.                                                                   

18:00 Reassessment: Patient appears in no apparent distress at this time. Patient and/or      ca1 

      family updated on plan of care and expected duration. Pain level reassessed. Patient is     

      alert, oriented x 3, equal unlabored respirations, skin warm/dry/pink.                      

                                                                                                  

Vital Signs:                                                                                      

15:05  / 95; Pulse 95; Resp 20 S; Temp 98.1; Pulse Ox 88% on R/A; Weight 95.25 kg;      ca1 

      Height 5 ft. 3 in. (160.02 cm); Pain 0/10;                                                  

15:08  / 61; Pulse 92; Resp 19; Pulse Ox 92% on 4 lpm NC;                               ca1 

15:59  / 64; Pulse 89; Resp 18 S; Pulse Ox 96% on Nebulizer Mask;                       ca1 

16:26  / 51; Pulse 89; Resp 19 S; Pulse Ox 93% on 4 lpm NC;                             ca1 

17:03  / 75; Pulse 89; Resp 17 S; Pulse Ox 93% on 4 lpm NC;                             ca1 

17:30  / 55; Pulse 87; Resp 19; Pulse Ox 97% on 5 lpm NC;                               ca1 

17:30  / 55; Pulse 91; Resp 17 S; Pulse Ox 96% on 5 lpm NC;                             ca1 

18:35  / 60; Pulse 88; Resp 16 S; Pulse Ox 96% on 5 lpm NC;                             ca1 

15:05 Body Mass Index 37.20 (95.25 kg, 160.02 cm)                                             ca1 

                                                                                                  

ED Course:                                                                                        

14:39 Patient arrived in ED.                                                                  hb  

14:51 Libby Parisi, RN is Primary Nurse.                                                      ca1 

14:56 Triage completed.                                                                       ca1 

15:05 Arm band placed on right wrist.                                                         ca1 

15:07 Kenn Vences NP is PHCP.                                                           pm1 

15:07 Reyes Markham MD is Attending Physician.                                             pm1 

15:08 Patient has correct armband on for positive identification. Placed in gown. Bed in low  ca1 

      position. Call light in reach. Side rails up X 1. Cardiac monitor on. Pulse ox on. NIBP     

      on. Warm blanket given.                                                                     

15:10 Maintain EMS IV. Dressing intact. Good blood return noted. Site clean \T\ dry. Gauge \T\    ca
1

      site: G18 at LAC.                                                                           

15:35 Initial lab(s) drawn, by me, sent to lab.                                               ca1 

15:37 XRAY Chest (1 view) In Process Unspecified.                                             EDMS

15:40 First set of blood cultures drawn by me.                                                ca1 

16:00 Second set of blood cultures drawn by me.                                               ca1 

16:28 Adalberto Brambila MD is Hospitalizing Provider.                                            pm1 

18:35 No provider procedures requiring assistance completed. Patient admitted, IV remains in  ca1 

      place.                                                                                      

                                                                                                  

Administered Medications:                                                                         

15:45 Drug: Albuterol 2.5 mg Route: Inhalation;                                               ca1 

15:45 Drug: AtroVENT Aerosol 0.5 mg Route: Inhalation;                                        ca1 

16:05 Drug: Rocephin 1 grams Route: IV; Rate: calculated rate; Site: left antecubital;        ca1 

17:06 Follow up: Response: No adverse reaction; IV Status: Completed infusion; IVP per        ca1 

      pharmacy protocol                                                                           

16:10 Drug: AZITHromycin 500 mg Route: IVPB; Infused Over: 1 hrs; Site: left antecubital;     ca1 

18:37 Follow up: Response: No adverse reaction; IV Status: Completed infusion                 ca1 

16:54 Drug: Lasix 20 mg Route: IVP; Site: left antecubital;                                   mg2 

18:37 Follow up: Response: No adverse reaction                                                ca1 

                                                                                                  

                                                                                                  

Outcome:                                                                                          

16:29 Decision to Hospitalize by Provider.                                                    pm1 

18:35 Admitted to Tele accompanied by tech, family with patient, via stretcher, room 408,     ca1 

      with oxygen, with chart, Report called to  Butch Carranza RN                                

18:35 Condition: stable                                                                           

18:35 Instructed on the need for admit.                                                           

18:43 Patient left the ED.                                                                    ca1 

                                                                                                  

Signatures:                                                                                       

Dispatcher [employer]                           EDMS                                                 

Kenn Vences, MONO                    NP   pm1                                                  

Raissa Villanueva, KEVYN                     RN   hb                                                   

Tay Kirk RN                    RN   mg2                                                  

Libby Parisi RN RN   ca1                                                  

                                                                                                  

Corrections: (The following items were deleted from the chart)                                    

15:59 15:08 Derm: Skin is intact, is healthy with good turgor, Skin is pink, warm \T\ dry. ca1  ca1

                                                                                                  

**************************************************************************************************

## 2019-04-06 NOTE — RAD REPORT
EXAM DESCRIPTION:  RAD - Chest Single View - 4/6/2019 3:39 pm

 

CLINICAL HISTORY:  Shortness of breath, cough

 

COMPARISON:  January 2018

 

TECHNIQUE:  AP portable chest image was obtained 1535 hours .

 

FINDINGS:  Lung volumes are low. Interstitial opacification is present. Chronic left hemidiaphragm el
evation is present with left base atelectasis. Bilateral suprahilar parenchymal opacification present
 new from comparison. Heart size within normal limits. Trachea is midline. No pneumothorax or large p
leural effusion. No acute bony abnormality seen. No acute aortic findings suspected.

 

IMPRESSION:  Prominent perihilar vascular and interstitial prominence. Failure/ volume overload is meneses
spected.

## 2019-04-07 LAB
ALBUMIN SERPL BCP-MCNC: 3.1 G/DL (ref 3.4–5)
ALP SERPL-CCNC: 77 U/L (ref 45–117)
ALT SERPL W P-5'-P-CCNC: 14 U/L (ref 12–78)
AST SERPL W P-5'-P-CCNC: 10 U/L (ref 15–37)
BUN BLD-MCNC: 29 MG/DL (ref 7–18)
GLUCOSE SERPLBLD-MCNC: 229 MG/DL (ref 74–106)
HCT VFR BLD CALC: 28.2 % (ref 36–45)
LYMPHOCYTES # SPEC AUTO: 0.5 K/UL (ref 0.7–4.9)
MAGNESIUM SERPL-MCNC: 2 MG/DL (ref 1.8–2.4)
PMV BLD: 8.6 FL (ref 7.6–11.3)
POTASSIUM SERPL-SCNC: 4.2 MMOL/L (ref 3.5–5.1)
RBC # BLD: 3.35 M/UL (ref 3.86–4.86)
UA COMPLETE W REFLEX CULTURE PNL UR: (no result)
UA DIPSTICK W REFLEX MICRO PNL UR: (no result)

## 2019-04-07 RX ADMIN — ACETAMINOPHEN AND CODEINE PHOSPHATE SCH: 300; 30 TABLET ORAL at 09:47

## 2019-04-07 RX ADMIN — ALBUTEROL SULFATE PRN MG: 2.5 SOLUTION RESPIRATORY (INHALATION) at 18:05

## 2019-04-07 RX ADMIN — IPRATROPIUM BROMIDE PRN MG: 0.5 SOLUTION RESPIRATORY (INHALATION) at 05:10

## 2019-04-07 RX ADMIN — ENOXAPARIN SODIUM SCH MG: 40 INJECTION SUBCUTANEOUS at 08:45

## 2019-04-07 RX ADMIN — IPRATROPIUM BROMIDE PRN MG: 0.5 SOLUTION RESPIRATORY (INHALATION) at 08:58

## 2019-04-07 RX ADMIN — Medication SCH: at 21:00

## 2019-04-07 RX ADMIN — Medication SCH ML: at 22:40

## 2019-04-07 RX ADMIN — HYDRALAZINE HYDROCHLORIDE SCH MG: 10 TABLET, FILM COATED ORAL at 22:38

## 2019-04-07 RX ADMIN — ACETAMINOPHEN AND CODEINE PHOSPHATE SCH TAB: 300; 30 TABLET ORAL at 22:56

## 2019-04-07 RX ADMIN — ATORVASTATIN CALCIUM SCH MG: 10 TABLET, FILM COATED ORAL at 22:38

## 2019-04-07 RX ADMIN — HUMAN INSULIN SCH UNIT: 100 INJECTION, SOLUTION SUBCUTANEOUS at 22:39

## 2019-04-07 RX ADMIN — CARVEDILOL SCH MG: 6.25 TABLET, FILM COATED ORAL at 17:01

## 2019-04-07 RX ADMIN — Medication SCH ML: at 08:46

## 2019-04-07 RX ADMIN — HUMAN INSULIN SCH UNIT: 100 INJECTION, SOLUTION SUBCUTANEOUS at 17:00

## 2019-04-07 RX ADMIN — IPRATROPIUM BROMIDE PRN MG: 0.5 SOLUTION RESPIRATORY (INHALATION) at 20:35

## 2019-04-07 RX ADMIN — GABAPENTIN SCH MG: 400 CAPSULE ORAL at 22:38

## 2019-04-07 RX ADMIN — SODIUM CHLORIDE SCH MLS: 0.9 INJECTION, SOLUTION INTRAVENOUS at 09:28

## 2019-04-07 RX ADMIN — GABAPENTIN SCH MG: 400 CAPSULE ORAL at 13:33

## 2019-04-07 RX ADMIN — IPRATROPIUM BROMIDE PRN MG: 0.5 SOLUTION RESPIRATORY (INHALATION) at 18:05

## 2019-04-07 RX ADMIN — HUMAN INSULIN SCH UNIT: 100 INJECTION, SOLUTION SUBCUTANEOUS at 08:46

## 2019-04-07 RX ADMIN — ACETAMINOPHEN AND CODEINE PHOSPHATE SCH: 300; 30 TABLET ORAL at 17:03

## 2019-04-07 RX ADMIN — CEFTRIAXONE SCH MLS: 1 INJECTION, SOLUTION INTRAVENOUS at 22:38

## 2019-04-07 RX ADMIN — ALBUTEROL SULFATE PRN MG: 2.5 SOLUTION RESPIRATORY (INHALATION) at 05:10

## 2019-04-07 RX ADMIN — HYDRALAZINE HYDROCHLORIDE SCH MG: 10 TABLET, FILM COATED ORAL at 13:33

## 2019-04-07 RX ADMIN — ALBUTEROL SULFATE PRN MG: 2.5 SOLUTION RESPIRATORY (INHALATION) at 20:35

## 2019-04-07 RX ADMIN — HUMAN INSULIN SCH UNIT: 100 INJECTION, SOLUTION SUBCUTANEOUS at 11:47

## 2019-04-07 RX ADMIN — CEFTRIAXONE SCH MLS: 1 INJECTION, SOLUTION INTRAVENOUS at 08:45

## 2019-04-07 NOTE — PN
Date of Progress Note:  04/07/2019



Subjective:  Patient is seen and examined.  Chart reviewed and case discussed 
with RN.  The patient states she did not get much sleep last night.  Continues 
to have elevated blood sugar levels.



Medications List:  Reviewed.



Physical Examination:

Vital Signs:  Temperature 98.1, heart rate 102, blood pressure 140/77, 
respirations 20, O2 97% on 2 L via nasal cannula. 

General:  Awake, alert, oriented x3.  Elderly female, obese, BMI 37.2.  Ill-
appearing. 

CV:  S1 and S2.  Sinus tachycardia.  Peripheral pulses weak. 

Respiratory:  Diminished breath sounds.  No wheezing or stridor. 

Gastrointestinal:  Abdomen is soft, nontender, nondistended.  Positive bowel 
sounds. 

Extremities:  No clubbing or cyanosis.  3+ lower extremity edema. 

NEURO:  Cranial nerves 2 through 12 intact grossly.  No focal neurological 
deficit.  The patient does have generalized weakness of the lower extremities. 

SKIN:  Rashes.



Laboratory Data:  Sodium 139, potassium 4.2, chloride 105, CO2 24, BUN 29, 
creatinine 1.07, glucose 229, calcium 8.7, magnesium 2.  WBC 13.7, H and H 8.8, 
28.2, MCV 84.3, MCH 26.4, platelets 342, neutrophils 94%.  Blood cultures 
pending.  Sputum culture is also pending.  Throat culture shows normal upper 
respiratory tract leonardo.



Assessment And Plan:  An 79-year-old female with,

1.   Acute respiratory distress with hypoxia.  The patient now on 2 L via nasal 
cannula, down from 5.  Continue to wean off as tolerated secondary to pneumonia.

2.   Bilateral upper lobe pneumonia, likely aspiration.  Continue IV 
antibiotics.  Follow up on culture results.  WBC count is trending down.  The 
patient is afebrile.  No signs of sepsis.

3.   Volume overload.  No history of congestive heart failure.  We will follow 
up on echocardiogram.  Repeat chest x-ray in a.m.  Monitor I's and O's. 
Diuretics

4.   Essential hypertension, stable, on home medications.

5.   Diabetes mellitus type 2, insulin requiring.  Continue sliding scale 
insulin and monitor Accu-Cheks.  We will start home insulin dose.

6.   Mixed hyperlipidemia.  Continue statin.

7.   Obesity, BMI 37.2.

8.   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  We will 
continue with PT, OT.

9.   Osteoarthritis generalized.  GI and DVT prophylaxis with Lovenox.



Plan:  

1.   Resume home medications.  Follow up on cultures, likely discharge in the 24
-48 hours depending on clinical response.





/RAMYA

DD:  04/07/2019 10:04:54   Voice ID:  [id number]

DT:  04/07/2019 14:50:07   Report ID:  [id number]

MTDRIKI

## 2019-04-07 NOTE — HP
Date of Admission:  2019



Primary Care Physician:  Out of town.



Consultants:  Dr. Flores with Pulmonology.



Code Status:  Full.



History Of Present Illness:  The patient is an 79-year-old female with past 
medical history of diabetes, hypertension, who is bed bound, lives at home, 
comes in with progressive cough, shortness of breath, and wheezing for the past 
2 days.  The patient does report some ill contacts at home.  The patient denies 
any fevers or chills.  The patient's symptoms are constant, moderate, 
progressively worsening.  No chest pain, nausea, vomiting, diarrhea, or 
palpitations.  The patient came into the ER for further evaluation of her 
symptoms.  Her [place]up [place] [address].  Chest x-ray showed 
pneumonia and possible superimposed pulmonary congestion.  The patient was 
hypoxic and was requiring 4 L of oxygen via nasal cannula.  She did receive 
multiple breathing treatments, which improved her condition.  The patient was 
started on IV antibiotics and referred for admission.



Past Medical History:  Hypertension, insulin-dependent diabetes, cardiac 
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



Allergies:  TO AMBIEN.



Medications:  List reviewed.



Surgical History:  Cholecystectomy, , hernia repair, cataract surgery.



Family History:  Father had heart disease and MI.  Mother had kidney disease.



Social History:  The patient denies any tobacco use.  No regular alcohol use or 
illicit drug use.  The patient lives at home, is completely dependent on her 
family for activities of daily living.  She is bed-bound for the past 2 years 
since [place] after a fall.  The patient is unable to transfer to 
wheelchair.



Review of Systems:

An 11-point system reviewed negative except as per HPI.



Physical Examination:

Vital Signs:  Blood pressure 147/95, pulse 85, respirations 20, temperature 98.1
, O2 88% on room air as low as 70% on room air when EMS arrived, currently on 4 
L, saturating 92%. 

General:  Awake, alert, and oriented x3.  Elderly female, ill-appearing, in 
mild respiratory distress, obese. 

HEENT:  Normocephalic, atraumatic.  PERRLA.  EOMI.  Moist mucous membranes.  
Oropharynx is clear.  Conjunctivae anicteric. 

Neck:  Supple, no JVD.  Trachea midline. 

CV:  S1, S2, regular rate and rhythm.  Peripheral pulses are present. 

Respiratory:  Diminished breath sounds, minimal wheezing heard.  No crackles.  
No use of accessory muscles. 

Gastrointestinal:  Abdomen is soft, nontender, nondistended.  Positive bowel 
sounds.  No guarding or rigidity. 

Extremities:  No clubbing or cyanosis.  The patient has 3+ pitting edema 
bilateral lower extremities.  No calf tenderness. 

Skin:  The patient has healing dry ulcer on the left lower extremity from a cat 
scratch. 

Psych:  Mood is okay.  Affect is flat.  Insight and judgment are fair.



Laboratory Data:  Sodium 140, potassium 4.5, chloride 105, CO2 25, BUN 26, 
creatinine 1.11, glucose 171, calcium 8.5, magnesium 1.8.  Troponin less than 
0.02.  BNP 3869, albumin 3.1.  Procalcitonin 0.18.  INR 1.22.  WBC 18.2, H and 
H 928.5, platelets 333, neutrophils 93%.  Influenza screen is negative.  Group 
A strep screen is also negative.



Imaging Studies:  Chest x-ray personally reviewed, shows prominent perihilar, 
vascular, and interstitial prominence.  Failure of volume overload is suspected.



Assessment And Plan:  An 79-year-old female with,

1.   Acute respiratory distress with hypoxia.  The patient currently on 5 L of 
O2 via nasal cannula secondary to volume overload as well as pneumonia.  We 
will place on continuous pulse ox.

2.   Bilateral upper lobe pneumonia, possible aspiration pneumonia.  We will 
place on IV antibiotics and follow up on cultures and obtain sputum cultures.

3.   Volume overload, unclear etiology.  No previous echocardiogram available 
in records.  The patient has no history of CHF.  BNP is elevated.  We will 
obtain echocardiogram to check for any abnormalities related to low ejection 
fraction.  The patient has received Lasix.  We will monitor I's and O's for 
now.  Repeat chest x-ray if not clinically improving.

4.   Hypertension, stable.  We will resume home medications as appropriate.

5.   Diabetes mellitus type 2, insulin requiring.  We will continue on sliding 
scale insulin and monitor glucose levels.

6.   Mixed hyperlipidemia.  Continue statin.

7.   Obesity.

8.   Disuse myopathy.  The patient is bed bound.  Continue with PT and OT.

9.   Osteoarthritis of the knees and hips.



Plan:  Admit the patient to Med-Surg, place as inpatient. 



Length of stay greater than 2 midnights.





GERSON

DD:  2019 18:30:36   Voice ID:  [id number]

[facility]

## 2019-04-07 NOTE — RAD REPORT
EXAM DESCRIPTION:  RAD - Chest Single View - 4/7/2019 6:50 pm

 

CLINICAL HISTORY:  shortness of breath

Chest pain.

 

COMPARISON:  Chest Single View dated 4/6/2019; Chest Single View dated 1/5/2018; Chest Single View da
onofre 1/4/2018; Chest Single View dated 9/30/2017

 

FINDINGS:  Portable technique limits examination quality.

 

Little overall change is seen in the moderate bilateral pulmonary opacities since the recent comparat
tricia study. Chronic left hemidiaphragm elevation persists. Heart size is mildly enlarged. No displaced
 fractures.

 

IMPRESSION:  Stable chest since 04/06/2019 study.

## 2019-04-08 LAB
ALBUMIN SERPL BCP-MCNC: 3.3 G/DL (ref 3.4–5)
ALP SERPL-CCNC: 66 U/L (ref 45–117)
ALT SERPL W P-5'-P-CCNC: 14 U/L (ref 12–78)
AST SERPL W P-5'-P-CCNC: 11 U/L (ref 15–37)
BUN BLD-MCNC: 28 MG/DL (ref 7–18)
COHGB MFR BLDA: 1.3 % (ref 0–1.5)
GLUCOSE SERPLBLD-MCNC: 179 MG/DL (ref 74–106)
HCT VFR BLD CALC: 28.2 % (ref 36–45)
LYMPHOCYTES # SPEC AUTO: 0.7 K/UL (ref 0.7–4.9)
OXYHGB MFR BLDA: 83.7 % (ref 94–97)
PMV BLD: 8.6 FL (ref 7.6–11.3)
POTASSIUM SERPL-SCNC: 3.7 MMOL/L (ref 3.5–5.1)
RBC # BLD: 3.35 M/UL (ref 3.86–4.86)
SAO2 % BLDA: 85.6 % (ref 92–98.5)

## 2019-04-08 RX ADMIN — CEFTRIAXONE SCH MLS: 1 INJECTION, SOLUTION INTRAVENOUS at 10:28

## 2019-04-08 RX ADMIN — Medication SCH ML: at 10:29

## 2019-04-08 RX ADMIN — SODIUM CHLORIDE SCH MLS: 0.9 INJECTION, SOLUTION INTRAVENOUS at 10:27

## 2019-04-08 RX ADMIN — CEFTRIAXONE SCH MLS: 1 INJECTION, SOLUTION INTRAVENOUS at 22:21

## 2019-04-08 RX ADMIN — ACETAMINOPHEN AND CODEINE PHOSPHATE SCH: 300; 30 TABLET ORAL at 18:00

## 2019-04-08 RX ADMIN — CARVEDILOL SCH MG: 6.25 TABLET, FILM COATED ORAL at 05:34

## 2019-04-08 RX ADMIN — HUMAN INSULIN SCH: 100 INJECTION, SOLUTION SUBCUTANEOUS at 21:00

## 2019-04-08 RX ADMIN — Medication SCH ML: at 21:00

## 2019-04-08 RX ADMIN — HUMAN INSULIN SCH UNIT: 100 INJECTION, SOLUTION SUBCUTANEOUS at 11:30

## 2019-04-08 RX ADMIN — HYDRALAZINE HYDROCHLORIDE PRN MG: 20 INJECTION INTRAMUSCULAR; INTRAVENOUS at 05:34

## 2019-04-08 RX ADMIN — BENAZEPRIL HYDROCHLORIDE SCH MG: 20 TABLET, FILM COATED ORAL at 10:28

## 2019-04-08 RX ADMIN — SPIRONOLACTONE SCH MG: 25 TABLET, FILM COATED ORAL at 22:21

## 2019-04-08 RX ADMIN — IPRATROPIUM BROMIDE PRN MG: 0.5 SOLUTION RESPIRATORY (INHALATION) at 00:00

## 2019-04-08 RX ADMIN — AMLODIPINE BESYLATE SCH MG: 10 TABLET ORAL at 10:29

## 2019-04-08 RX ADMIN — DULOXETINE HYDROCHLORIDE SCH MG: 30 CAPSULE, DELAYED RELEASE ORAL at 10:28

## 2019-04-08 RX ADMIN — GABAPENTIN SCH MG: 400 CAPSULE ORAL at 22:21

## 2019-04-08 RX ADMIN — Medication SCH UNITS: at 22:22

## 2019-04-08 RX ADMIN — IPRATROPIUM BROMIDE PRN MG: 0.5 SOLUTION RESPIRATORY (INHALATION) at 14:12

## 2019-04-08 RX ADMIN — HYDRALAZINE HYDROCHLORIDE SCH MG: 10 TABLET, FILM COATED ORAL at 22:21

## 2019-04-08 RX ADMIN — ALBUTEROL SULFATE PRN MG: 2.5 SOLUTION RESPIRATORY (INHALATION) at 06:05

## 2019-04-08 RX ADMIN — GABAPENTIN SCH MG: 400 CAPSULE ORAL at 12:56

## 2019-04-08 RX ADMIN — ACETAMINOPHEN AND CODEINE PHOSPHATE SCH: 300; 30 TABLET ORAL at 10:00

## 2019-04-08 RX ADMIN — HUMAN INSULIN SCH: 100 INJECTION, SOLUTION SUBCUTANEOUS at 16:30

## 2019-04-08 RX ADMIN — CARVEDILOL SCH MG: 6.25 TABLET, FILM COATED ORAL at 16:45

## 2019-04-08 RX ADMIN — HYDRALAZINE HYDROCHLORIDE SCH MG: 10 TABLET, FILM COATED ORAL at 12:56

## 2019-04-08 RX ADMIN — GABAPENTIN SCH MG: 400 CAPSULE ORAL at 10:29

## 2019-04-08 RX ADMIN — HUMAN INSULIN SCH: 100 INJECTION, SOLUTION SUBCUTANEOUS at 07:30

## 2019-04-08 RX ADMIN — ATORVASTATIN CALCIUM SCH MG: 10 TABLET, FILM COATED ORAL at 22:21

## 2019-04-08 RX ADMIN — SPIRONOLACTONE SCH MG: 25 TABLET, FILM COATED ORAL at 12:53

## 2019-04-08 RX ADMIN — ENOXAPARIN SODIUM SCH MG: 40 INJECTION SUBCUTANEOUS at 10:28

## 2019-04-08 RX ADMIN — HYDRALAZINE HYDROCHLORIDE SCH MG: 10 TABLET, FILM COATED ORAL at 10:28

## 2019-04-08 NOTE — P.CNS
Date of Consult: 19


Reason for Consult: Respiratory failure


Chief Complaint: Shortness of breath


History of Present Illness: 





Patient is 79 years of age developed acute onset of shortness of breath about a 

week ago as some lower extremity edema no prior history of pulmonary complaints 

she does have a cardiac history denies any fever chills cough sputum in mom 

became acutely worse ended up in the ER was hypoxic hypercapnic and placed on 

BiPAP patient has hypertension diabetes


Allergies





zolpidem [From Ambien] Allergy (Verified 10/01/17 03:02)


 Unknown





Home Medications: 








Amlodipine [Norvasc*] 10 mg PO DAILY #30 tab 10/10/17 


Atorvastatin Calcium [Lipitor*] 10 mg PO BEDTIME #30 tab 10/10/17 


Carvedilol [Coreg*] 6.25 mg PO BID 6AM 6PM #60 tab 10/10/17 


Hydralazine [Apresoline*] 20 mg PO TID #180 tab 10/10/17 


Benazepril HCl [Lotensin] 40 mg PO DAILY 19 


Codeine/APAP [Tylenol #3*] 1 tab PO Q8HP PRN 19 


Duloxetine HCl [Cymbalta] 30 mg PO DAILY 19 


Gabapentin [Neurontin*] 400 mg PO TID 19 


Insulin Glargine/Lixisenatide [Soliqua 100 Unit-33 Mcg/ml Pen] 20 units SQ 

BEDTIME 19 


Metformin HCl [Glucophage] 500 mg PO TID 19 








- Past Medical/Surgical History


Diabetic: Yes


-: HTN


-: IDDM


-: Cardiac aneurysm


-: Status post fall with severe osteoarthritis of the knees and hips


-: cholecystectomy


-: 


-: hernia repair


-: cataract surgery





- Family History


  ** Father


Medical History: Heart disease


Notes: MI





  ** Mother


Medical History: Kidney disease





- Social History


Smoking Status: Unknown if ever smoked


Alcohol use: No


CD- Drugs: No


Caffeine use: Yes


Place of Residence: Home





Review of Systems


10-point ROS is otherwise unremarkable


General: Weakness


Respiratory: Shortness of Breath


Cardiovascular: Edema





Physical Examination














Temp Pulse Resp BP Pulse Ox


 


 98.6 F   108 H  20   177/80 H  89 L


 


 19 08:00  19 10:29  19 08:00  19 10:29  04/08/19 08:00








General: Alert, Oriented x3


HEENT: Atraumatic


Neck: Supple


Respiratory: Crackles/rales, Expiratory wheezes


Cardiovascular: Regular rate/rhythm, Normal S1 S2, Edema (Mild edema)


Gastrointestinal: Normal bowel sounds, Soft and benign





- Problems


(1) Respiratory failure


Current Visit: Yes   Status: Acute   


Plan: 


Patient is 79 years of age admitted with acute respiratory failure hypoxia 

hypercapnia white count is elevated BNP elevated pro calcitonin level negative 

patient's chest x-rays abnormal diffuse bilateral changes possible pulmonary 

edema echocardiogram ordered continue with Lasix doubt sepsis antibiotics can 

be stopped blood pressure is little elevated I have added low-dose 

spironolactone patient is on ACE-inhibitor continue to monitor her chemistries


Qualifiers: 


   Chronicity: acute

## 2019-04-08 NOTE — ECHO
HEIGHT: 5 ft 3 in   WEIGHT: 210 lb 0 oz   DATE OF STUDY: 04/08/19   REFER DR: Adalberto Brambila MD

2-DIMENSIONAL: YES

     M.MODE: YES

 DOPPLER: YES

COLOR FLOW: YES



                    TDS:  YES

PORTABLE: 

 DEFINITY:  

BUBBLE STUDY: 





DIAGNOSIS:  CONGESTIVE HEART FAILURE



CARDIAC HISTORY:  

CATHERIZATION: 

SURGERY: 

PROSTHETIC VALVE: 

PACEMAKER: 





MEASUREMENTS (cm)

    DIASTOLIC (NORMALS)                 SYSTOLIC (NORMALS)









2 DIMENSIONAL ASSESSMENT:

RIGHT ATRIUM:                   NORMAL

LEFT ATRIUM:       DILATED



RIGHT VENTRICLE:            NORMAL

LEFT VENTRICLE: NORMAL



TRICUSPID VALVE:             NORMAL

MITRAL VALVE:     MITRAL ANNULAR CALCIFICATION



PULMONIC VALVE:             NORMAL

AORTIC VALVE:     NORMAL



PERICARDIAL EFFUSION: NONE

AORTIC ROOT:      NORMAL





LEFT VENTRICULAR WALL MOTION:     NORMAL



DOPPLER/COLOR FLOW:     MILD AORITC REGURGITATION, MITRAL REGURGITATION AND TRICUSPID 
REGURGITATION.  MODERATE PULMONARY HYPERTENSION.  ESTIMATED RIGHT VENTRICULAR SYSTOLIC 
PRESSURE 50 mmHg. 



COMMENTS:      NORMAL LEFT VENTRICULAR EJECTION FRACTION.  DILATED LEFT ATRIUM.  MITRAL 
ANNULAR CALCIFICATION.  MILD AORITC REGURGITATION, MITRAL REGURGITATION AND TRICUSPID 
REGURGITATION.  MODERATE PULMONARY HYPERTENSION.



TECHNOLOGIST:   LONNIE IQBAL

## 2019-04-08 NOTE — PN
Date of Progress Note:  04/08/2019



Subjective:  Patient is seen and examined.  Chart reviewed and case discussed with RN and Dr. Terrance pisano.  The patient has had some respiratory distress overnight as well as this morning.



Medications:  List reviewed.



Physical Examination:

Vital Signs:  Temperature 98.6, heart rate 108, blood pressure 177/80, respirations 20, O2 89% on 4 L
 via nasal cannula. 

General:  Awake, alert, and oriented x3.  Elderly female, ill-appearing, in moderate respiratory dist
ress.  Obese, BMI 37.2. 

CV:  S1 and S2.  Sinus tachycardia.  Peripheral pulses present. Respiratory:  Diminished breath sound
s.  Some crackles heard.  No wheezing.  The patient is tachypneic.  Use of accessory muscles present.
 

Gastrointestinal:  Abdomen is soft, nontender, nondistended.  Positive bowel sounds.  No guarding or 
rigidity. 

Extremities:  No clubbing or cyanosis.  2+ pedal edema. 

Neuro:  Nonfocal.  The patient does have generalized weakness overall.



Laboratory Data:  Sodium 142, potassium 3.7, chloride 104, CO2 28, BUN 28, creatinine 1.03, glucose 1
79, calcium 9.  WBC 13.8, H and H 8.7 and 28.2, platelets 383, neutrophils 85%.  Blood cultures, no g
rowth to date.  Urine culture growing out 3+ gram-negative rods.



Assessment And Plan:  An 79-year-old female with;

1.Acute respiratory failure with hypoxia.  Patient requiring 4 L of oxygen via nasal cannula, likely
 secondary to pneumonia and congestive heart failure.

2.Bilateral upper lobe pneumonia, possible aspiration.  Continue with IV antibiotics.  Appreciate Dr Amor Flores's input.  WBC is trending down.  No signs of sepsis.  Currently afebrile.  Cultures are ne
gative to date.

3.Congestive heart failure unknown EF, likely diastolic.  We will obtain echocardiogram.  Monitor I'
s and O's, strict fluid restriction.  We will continue with diuretics.

4.Urinary tract infection, acute cystitis without hematuria.  Urine cultures growing out gram-negati
ve rods.  The patient is currently covered with Rocephin.

5.Essential hypertension, stable on home medications.

6.Diabetes mellitus type 2, insulin requiring.  We will continue sliding scale insulin and monitor b
lood glucose levels.

7.Mixed hyperlipidemia, continue statin.

8.Obesity, BMI 37.

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  Continue PT and OT.

10.Generalized osteoarthritis.

11.Deep vein thrombosis prophylaxis with Lovenox.



Plan:  Obtain echocardiogram.  Adjust diuretics.





/RAMYA

DD:  04/08/2019 12:02:03Voice ID:  [id number]

DT:  04/08/2019 17:12:20Report ID:  [id number]

## 2019-04-09 LAB
ALBUMIN SERPL BCP-MCNC: 2.9 G/DL (ref 3.4–5)
ALP SERPL-CCNC: 54 U/L (ref 45–117)
ALT SERPL W P-5'-P-CCNC: 12 U/L (ref 12–78)
AST SERPL W P-5'-P-CCNC: 10 U/L (ref 15–37)
BUN BLD-MCNC: 29 MG/DL (ref 7–18)
GLUCOSE SERPLBLD-MCNC: 147 MG/DL (ref 74–106)
HCT VFR BLD CALC: 25.6 % (ref 36–45)
LYMPHOCYTES # SPEC AUTO: 0.7 K/UL (ref 0.7–4.9)
PMV BLD: 8.5 FL (ref 7.6–11.3)
POTASSIUM SERPL-SCNC: 3.6 MMOL/L (ref 3.5–5.1)
RBC # BLD: 3.07 M/UL (ref 3.86–4.86)

## 2019-04-09 RX ADMIN — IPRATROPIUM BROMIDE PRN MG: 0.5 SOLUTION RESPIRATORY (INHALATION) at 08:33

## 2019-04-09 RX ADMIN — HYDRALAZINE HYDROCHLORIDE SCH MG: 10 TABLET, FILM COATED ORAL at 13:05

## 2019-04-09 RX ADMIN — ALBUTEROL SULFATE PRN MG: 2.5 SOLUTION RESPIRATORY (INHALATION) at 13:44

## 2019-04-09 RX ADMIN — SPIRONOLACTONE SCH MG: 25 TABLET, FILM COATED ORAL at 20:28

## 2019-04-09 RX ADMIN — ACETAMINOPHEN AND CODEINE PHOSPHATE SCH: 300; 30 TABLET ORAL at 02:00

## 2019-04-09 RX ADMIN — HUMAN INSULIN SCH: 100 INJECTION, SOLUTION SUBCUTANEOUS at 11:30

## 2019-04-09 RX ADMIN — CARVEDILOL SCH MG: 6.25 TABLET, FILM COATED ORAL at 06:09

## 2019-04-09 RX ADMIN — SODIUM CHLORIDE SCH MLS: 0.9 INJECTION, SOLUTION INTRAVENOUS at 08:02

## 2019-04-09 RX ADMIN — HYDRALAZINE HYDROCHLORIDE SCH MG: 10 TABLET, FILM COATED ORAL at 20:28

## 2019-04-09 RX ADMIN — ALBUTEROL SULFATE PRN MG: 2.5 SOLUTION RESPIRATORY (INHALATION) at 08:32

## 2019-04-09 RX ADMIN — CARVEDILOL SCH MG: 6.25 TABLET, FILM COATED ORAL at 17:17

## 2019-04-09 RX ADMIN — ACETAMINOPHEN AND CODEINE PHOSPHATE SCH TAB: 300; 30 TABLET ORAL at 06:09

## 2019-04-09 RX ADMIN — Medication SCH UNITS: at 20:29

## 2019-04-09 RX ADMIN — IPRATROPIUM BROMIDE PRN MG: 0.5 SOLUTION RESPIRATORY (INHALATION) at 13:44

## 2019-04-09 RX ADMIN — AMLODIPINE BESYLATE SCH MG: 10 TABLET ORAL at 07:59

## 2019-04-09 RX ADMIN — GABAPENTIN SCH MG: 400 CAPSULE ORAL at 13:06

## 2019-04-09 RX ADMIN — BENAZEPRIL HYDROCHLORIDE SCH MG: 20 TABLET, FILM COATED ORAL at 08:18

## 2019-04-09 RX ADMIN — GABAPENTIN SCH MG: 400 CAPSULE ORAL at 08:00

## 2019-04-09 RX ADMIN — ENOXAPARIN SODIUM SCH MG: 40 INJECTION SUBCUTANEOUS at 08:02

## 2019-04-09 RX ADMIN — HUMAN INSULIN SCH UNIT: 100 INJECTION, SOLUTION SUBCUTANEOUS at 20:28

## 2019-04-09 RX ADMIN — ACETAMINOPHEN AND CODEINE PHOSPHATE SCH TAB: 300; 30 TABLET ORAL at 17:17

## 2019-04-09 RX ADMIN — ATORVASTATIN CALCIUM SCH MG: 10 TABLET, FILM COATED ORAL at 20:27

## 2019-04-09 RX ADMIN — HYDRALAZINE HYDROCHLORIDE SCH MG: 10 TABLET, FILM COATED ORAL at 07:58

## 2019-04-09 RX ADMIN — CEFTRIAXONE SCH MLS: 1 INJECTION, SOLUTION INTRAVENOUS at 20:27

## 2019-04-09 RX ADMIN — CEFTRIAXONE SCH MLS: 1 INJECTION, SOLUTION INTRAVENOUS at 08:18

## 2019-04-09 RX ADMIN — Medication SCH ML: at 20:28

## 2019-04-09 RX ADMIN — Medication SCH ML: at 08:03

## 2019-04-09 RX ADMIN — SPIRONOLACTONE SCH MG: 25 TABLET, FILM COATED ORAL at 07:59

## 2019-04-09 RX ADMIN — HUMAN INSULIN SCH: 100 INJECTION, SOLUTION SUBCUTANEOUS at 16:30

## 2019-04-09 RX ADMIN — HUMAN INSULIN SCH: 100 INJECTION, SOLUTION SUBCUTANEOUS at 07:30

## 2019-04-09 RX ADMIN — GABAPENTIN SCH MG: 400 CAPSULE ORAL at 20:27

## 2019-04-09 RX ADMIN — DULOXETINE HYDROCHLORIDE SCH MG: 30 CAPSULE, DELAYED RELEASE ORAL at 07:59

## 2019-04-09 NOTE — P.PN
Subjective


Date of Service: 04/09/19


Chief Complaint: Shortness of breath





 Patient is seen and examined at bedside, son at bedside.  Chart reviewed and 

case discussed with nursing staff.  


The patient reports improved breathing.











Review of Systems


10-point ROS is otherwise unremarkable





Physical Examination





- Vital Signs


Temperature: 97.7 F


Blood Pressure: 128/56


Pulse: 82


Respirations: 19


Pulse Ox (%): 99





- Physical Exam


General: Alert, Oriented x3, Mild distress, Other (ill appearing, elderly )


HEENT: Atraumatic, PERRLA, EOMI


Neck: Supple, JVD not distended


Respiratory: Diminished, Crackles/rales


Cardiovascular: Regular rate/rhythm, Normal S1 S2


Gastrointestinal: Normal bowel sounds, No tenderness


Musculoskeletal: No tenderness


Integumentary: No rashes


Neurological: Normal speech, Normal tone, Normal affect


Lymphatics: No axilla or inguinal lymphadenopathy





Assessment And Plan





- Plan


 An 79-year-old female with;





Acute respiratory failure with hypoxia.  


Patient requiring 4 L of oxygen via nasal cannula, likely secondary to 

pneumonia and congestive heart failure.





Bilateral upper lobe pneumonia, possible aspiration.  


Continue with IV antibiotics.  Appreciate Dr. Flores's input.  WBC is 

trending down.  No signs of sepsis.  Currently afebrile.  Cultures are negative 

to date.





Congestive heart failure with preserved EF, diastolic.  


ECHO with normal EF, dilated atria, moderate pulmonary hypertension. 


Monitor I's and O's, strict fluid restriction.  We will continue with diuretics.





Urinary tract infection, acute cystitis without hematuria.  


Urine cultures pending.  Continue Rocephin and we will adjust as needed





Essential hypertension, 


stable on home medications.





Diabetes mellitus type 2, insulin requiring.  


We will continue sliding scale insulin and monitor blood glucose levels.





Mixed hyperlipidemia


continue statin.





Obesity, BMI 37.





Disuse myopathy.  


The patient is currently bed-bound.  Continue PT and OT.





Generalized osteoarthritis.





DVT prophylaxis: Lovenox.





Plan:  Continue diuretics, pending symptomatic improvement.

## 2019-04-09 NOTE — EKG
Test Date:    2019-04-06               Test Time:    15:52:59

Technician:   MOSHE                                    

                                                     

MEASUREMENT RESULTS:                                       

Intervals:                                           

Rate:         89                                     

WY:           210                                    

QRSD:         100                                    

QT:           376                                    

QTc:          457                                    

Axis:                                                

P:            46                                     

WY:           210                                    

QRS:          48                                     

T:            117                                    

                                                     

INTERPRETIVE STATEMENTS:                                       

                                                     

Sinus rhythm with 1st degree AV block with premature atrial complexes

Inferior-posterior infarct, age undetermined

Abnormal ECG

Compared to ECG 09/30/2017 20:33:18

Atrial premature complex(es) now present

Myocardial infarct finding now present

Sinus arrhythmia no longer present



Electronically Signed On 04-07-19 10:50:16 CDT by Adis Keen

## 2019-04-09 NOTE — EKG
Test Date:    2019-04-06               Test Time:    20:31:31

Technician:   RT                                     

                                                     

MEASUREMENT RESULTS:                                       

Intervals:                                           

Rate:         92                                     

ID:           [id number]                                    

QRSD:         94                                     

QT:           390                                    

QTc:          482                                    

Axis:                                                

P:            6                                      

ID:           [id number]                                    

QRS:          47                                     

T:            78                                     

                                                     

INTERPRETIVE STATEMENTS:                                       

                                                     

Sinus rhythm with 1st degree AV block with premature atrial complexes

Nonspecific T wave abnormality

Prolonged QT

Abnormal ECG

Compared to ECG 04/06/2019 15:52:59

T-wave abnormality now present

Prolonged QT interval now present

Myocardial infarct finding no longer present



Electronically Signed On 04-08-19 10:53:33 CDT by Adis Keen

## 2019-04-10 LAB
BUN BLD-MCNC: 29 MG/DL (ref 7–18)
GLUCOSE SERPLBLD-MCNC: 149 MG/DL (ref 74–106)
POTASSIUM SERPL-SCNC: 3.9 MMOL/L (ref 3.5–5.1)

## 2019-04-10 RX ADMIN — GABAPENTIN SCH MG: 400 CAPSULE ORAL at 09:36

## 2019-04-10 RX ADMIN — GABAPENTIN SCH MG: 400 CAPSULE ORAL at 14:32

## 2019-04-10 RX ADMIN — ACETAMINOPHEN AND CODEINE PHOSPHATE SCH TAB: 300; 30 TABLET ORAL at 00:28

## 2019-04-10 RX ADMIN — HUMAN INSULIN SCH: 100 INJECTION, SOLUTION SUBCUTANEOUS at 11:30

## 2019-04-10 RX ADMIN — ALBUTEROL SULFATE PRN MG: 2.5 SOLUTION RESPIRATORY (INHALATION) at 13:04

## 2019-04-10 RX ADMIN — Medication SCH UNITS: at 21:32

## 2019-04-10 RX ADMIN — ACETAMINOPHEN AND CODEINE PHOSPHATE SCH TAB: 300; 30 TABLET ORAL at 09:37

## 2019-04-10 RX ADMIN — SPIRONOLACTONE SCH MG: 25 TABLET, FILM COATED ORAL at 09:35

## 2019-04-10 RX ADMIN — HYDRALAZINE HYDROCHLORIDE SCH MG: 10 TABLET, FILM COATED ORAL at 21:05

## 2019-04-10 RX ADMIN — ATORVASTATIN CALCIUM SCH MG: 10 TABLET, FILM COATED ORAL at 21:05

## 2019-04-10 RX ADMIN — Medication SCH ML: at 21:00

## 2019-04-10 RX ADMIN — Medication SCH ML: at 09:38

## 2019-04-10 RX ADMIN — CARVEDILOL SCH MG: 6.25 TABLET, FILM COATED ORAL at 05:17

## 2019-04-10 RX ADMIN — AMLODIPINE BESYLATE SCH MG: 10 TABLET ORAL at 09:36

## 2019-04-10 RX ADMIN — HYDRALAZINE HYDROCHLORIDE SCH MG: 10 TABLET, FILM COATED ORAL at 09:38

## 2019-04-10 RX ADMIN — SPIRONOLACTONE SCH MG: 25 TABLET, FILM COATED ORAL at 21:05

## 2019-04-10 RX ADMIN — ENOXAPARIN SODIUM SCH MG: 40 INJECTION SUBCUTANEOUS at 09:38

## 2019-04-10 RX ADMIN — HUMAN INSULIN SCH: 100 INJECTION, SOLUTION SUBCUTANEOUS at 07:30

## 2019-04-10 RX ADMIN — CEFTRIAXONE SCH MLS: 1 INJECTION, SOLUTION INTRAVENOUS at 09:38

## 2019-04-10 RX ADMIN — ALBUTEROL SULFATE PRN MG: 2.5 SOLUTION RESPIRATORY (INHALATION) at 08:18

## 2019-04-10 RX ADMIN — CARVEDILOL SCH MG: 6.25 TABLET, FILM COATED ORAL at 17:00

## 2019-04-10 RX ADMIN — BENAZEPRIL HYDROCHLORIDE SCH MG: 20 TABLET, FILM COATED ORAL at 09:36

## 2019-04-10 RX ADMIN — GABAPENTIN SCH MG: 400 CAPSULE ORAL at 21:05

## 2019-04-10 RX ADMIN — DULOXETINE HYDROCHLORIDE SCH MG: 30 CAPSULE, DELAYED RELEASE ORAL at 09:36

## 2019-04-10 RX ADMIN — ACETAMINOPHEN AND CODEINE PHOSPHATE SCH TAB: 300; 30 TABLET ORAL at 17:00

## 2019-04-10 RX ADMIN — HUMAN INSULIN SCH: 100 INJECTION, SOLUTION SUBCUTANEOUS at 21:00

## 2019-04-10 RX ADMIN — HUMAN INSULIN SCH: 100 INJECTION, SOLUTION SUBCUTANEOUS at 16:30

## 2019-04-10 RX ADMIN — HYDRALAZINE HYDROCHLORIDE SCH MG: 10 TABLET, FILM COATED ORAL at 14:32

## 2019-04-10 NOTE — P.PN
Subjective


Date of Service: 04/10/19


Chief Complaint: Shortness of breath


Subjective: Improving





 Patient is seen and examined at bedside, son at bedside.  Chart reviewed and 

case discussed with nursing staff.  


The patient reports improved breathing.











Review of Systems


10-point ROS is otherwise unremarkable





Physical Examination





- Vital Signs


Temperature: 97.4 F


Blood Pressure: 145/68


Pulse: 85


Respirations: 20


Pulse Ox (%): 92





- Physical Exam


General: Alert, In no apparent distress


HEENT: Atraumatic, PERRLA, EOMI


Neck: Supple, JVD not distended


Respiratory: Clear to auscultation bilaterally, Normal air movement


Cardiovascular: Regular rate/rhythm, Normal S1 S2


Gastrointestinal: Normal bowel sounds, No tenderness


Musculoskeletal: No tenderness


Integumentary: No rashes


Neurological: Normal speech, Normal tone, Normal affect


Lymphatics: No axilla or inguinal lymphadenopathy





Assessment And Plan





- Plan


 An 79-year-old female with;





Acute respiratory failure with hypoxia.  


Patient requiring 4 L of oxygen via nasal cannula, likely secondary to 

pneumonia and congestive heart failure.


This still requires intermittent BiPAP.





Bilateral upper lobe pneumonia, possible aspiration.  


Antibiotics adjusted.  Azithromycin discontinued.  Appreciate Dr. Flores's 

input.  No signs of sepsis.  Currently afebrile.  Cultures are negative to date.





Congestive heart failure with preserved EF, diastolic.  


ECHO with normal EF, dilated atria, moderate pulmonary hypertension. 


Monitor I's and O's, strict fluid restriction.  We will continue with diuretics.





Urinary tract infection, acute cystitis without hematuria.  


Urine cultures with Klebsiella pneumonia.  Will start patient on Levaquin oral, 

discontinue IV antibiotics.  





Essential hypertension, 


stable on home medications.





Diabetes mellitus type 2, insulin requiring.  


We will continue sliding scale insulin and monitor blood glucose levels.





Mixed hyperlipidemia


continue statin.





Obesity, BMI 37.





Disuse myopathy.  


The patient is currently bed-bound.  Continue PT and OT.





Generalized osteoarthritis.





DVT prophylaxis: Lovenox.





Plan:  Continue diuretics, pending symptomatic improvement.

## 2019-04-11 LAB
ALBUMIN SERPL BCP-MCNC: 3 G/DL (ref 3.4–5)
ALP SERPL-CCNC: 54 U/L (ref 45–117)
ALT SERPL W P-5'-P-CCNC: 11 U/L (ref 12–78)
AST SERPL W P-5'-P-CCNC: 7 U/L (ref 15–37)
BUN BLD-MCNC: 26 MG/DL (ref 7–18)
GLUCOSE SERPLBLD-MCNC: 151 MG/DL (ref 74–106)
HCT VFR BLD CALC: 26.9 % (ref 36–45)
LYMPHOCYTES # SPEC AUTO: 0.7 K/UL (ref 0.7–4.9)
PMV BLD: 7.7 FL (ref 7.6–11.3)
POTASSIUM SERPL-SCNC: 4.4 MMOL/L (ref 3.5–5.1)
RBC # BLD: 3.23 M/UL (ref 3.86–4.86)

## 2019-04-11 RX ADMIN — IPRATROPIUM BROMIDE PRN MG: 0.5 SOLUTION RESPIRATORY (INHALATION) at 13:44

## 2019-04-11 RX ADMIN — CARVEDILOL SCH MG: 6.25 TABLET, FILM COATED ORAL at 07:15

## 2019-04-11 RX ADMIN — Medication SCH ML: at 22:10

## 2019-04-11 RX ADMIN — ATORVASTATIN CALCIUM SCH MG: 10 TABLET, FILM COATED ORAL at 21:00

## 2019-04-11 RX ADMIN — HUMAN INSULIN SCH: 100 INJECTION, SOLUTION SUBCUTANEOUS at 16:30

## 2019-04-11 RX ADMIN — SPIRONOLACTONE SCH MG: 25 TABLET, FILM COATED ORAL at 09:02

## 2019-04-11 RX ADMIN — HYDRALAZINE HYDROCHLORIDE SCH MG: 10 TABLET, FILM COATED ORAL at 13:35

## 2019-04-11 RX ADMIN — AMLODIPINE BESYLATE SCH MG: 10 TABLET ORAL at 08:51

## 2019-04-11 RX ADMIN — HYDRALAZINE HYDROCHLORIDE SCH MG: 10 TABLET, FILM COATED ORAL at 08:51

## 2019-04-11 RX ADMIN — BENAZEPRIL HYDROCHLORIDE SCH MG: 20 TABLET, FILM COATED ORAL at 08:50

## 2019-04-11 RX ADMIN — ENOXAPARIN SODIUM SCH MG: 40 INJECTION SUBCUTANEOUS at 08:51

## 2019-04-11 RX ADMIN — HUMAN INSULIN SCH: 100 INJECTION, SOLUTION SUBCUTANEOUS at 07:30

## 2019-04-11 RX ADMIN — CARVEDILOL SCH MG: 6.25 TABLET, FILM COATED ORAL at 17:35

## 2019-04-11 RX ADMIN — ACETAMINOPHEN AND CODEINE PHOSPHATE SCH TAB: 300; 30 TABLET ORAL at 03:43

## 2019-04-11 RX ADMIN — DULOXETINE HYDROCHLORIDE SCH MG: 30 CAPSULE, DELAYED RELEASE ORAL at 08:51

## 2019-04-11 RX ADMIN — IPRATROPIUM BROMIDE PRN MG: 0.5 SOLUTION RESPIRATORY (INHALATION) at 06:31

## 2019-04-11 RX ADMIN — Medication SCH ML: at 08:52

## 2019-04-11 RX ADMIN — ALBUTEROL SULFATE PRN MG: 2.5 SOLUTION RESPIRATORY (INHALATION) at 13:44

## 2019-04-11 RX ADMIN — Medication SCH UNITS: at 21:00

## 2019-04-11 RX ADMIN — HUMAN INSULIN SCH: 100 INJECTION, SOLUTION SUBCUTANEOUS at 21:00

## 2019-04-11 RX ADMIN — ALBUTEROL SULFATE PRN MG: 2.5 SOLUTION RESPIRATORY (INHALATION) at 06:31

## 2019-04-11 RX ADMIN — ACETAMINOPHEN AND CODEINE PHOSPHATE SCH TAB: 300; 30 TABLET ORAL at 17:35

## 2019-04-11 RX ADMIN — GABAPENTIN SCH MG: 400 CAPSULE ORAL at 22:09

## 2019-04-11 RX ADMIN — GABAPENTIN SCH MG: 400 CAPSULE ORAL at 13:35

## 2019-04-11 RX ADMIN — HUMAN INSULIN SCH: 100 INJECTION, SOLUTION SUBCUTANEOUS at 11:30

## 2019-04-11 RX ADMIN — GABAPENTIN SCH MG: 400 CAPSULE ORAL at 08:51

## 2019-04-11 RX ADMIN — ACETAMINOPHEN AND CODEINE PHOSPHATE SCH TAB: 300; 30 TABLET ORAL at 10:29

## 2019-04-11 RX ADMIN — SPIRONOLACTONE SCH MG: 25 TABLET, FILM COATED ORAL at 21:00

## 2019-04-11 RX ADMIN — HYDRALAZINE HYDROCHLORIDE SCH MG: 10 TABLET, FILM COATED ORAL at 21:00

## 2019-04-11 NOTE — P.PN
Subjective


Date of Service: 04/11/19


Chief Complaint: Shortness of breath





 Patient is seen and examined at bedside, son at bedside.  Chart reviewed and 

case discussed with nursing staff.  


Continues to require Bi PAP overnight











Review of Systems


10-point ROS is otherwise unremarkable





Physical Examination





- Vital Signs


Temperature: 97.2 F


Blood Pressure: 120/60


Pulse: 89


Respirations: 20


Pulse Ox (%): 91





- Physical Exam


General: Alert, In no apparent distress, Oriented x3, Other (Weak, ill-appearing

)


Respiratory: Diminished, Crackles/rales, Expiratory wheezes


Cardiovascular: Regular rate/rhythm, Normal S1 S2





- Studies


Microbiology Data (last 24 hrs): 








04/06/19 16:00   Blood  - Blood   Aerobic Blood Culture - Final


                            No growth in 5 days.


04/06/19 16:00   Blood  - Blood   Anaerobic Blood Culture - Final


                            No growth in 5 days.


04/06/19 15:40   Blood  - Blood   Aerobic Blood Culture - Final


                            No growth in 5 days.


04/06/19 15:40   Blood  - Blood   Anaerobic Blood Culture - Final


                            No growth in 5 days.








Assessment And Plan





- Plan


 An 79-year-old female with;





Acute respiratory failure with hypoxia.  


Patient requiring 4-5 L of oxygen via nasal cannula and Venti mask, likely 

secondary to pneumonia and congestive heart failure.


This still requires intermittent BiPAP.





Bilateral upper lobe pneumonia, possible aspiration.  


Antibiotics adjusted.  Azithromycin discontinued.  Appreciate Dr. Flores's 

input.  No signs of sepsis.  Currently afebrile.  Cultures are negative to date.





Congestive heart failure with preserved EF, diastolic.  


ECHO with normal EF, dilated atria, moderate pulmonary hypertension. 


Monitor I's and O's, strict fluid restriction.  We will continue with diuretics.





Urinary tract infection, acute cystitis without hematuria.  


Urine cultures with Klebsiella pneumonia.  Will start patient on Levaquin oral, 

discontinue IV antibiotics.  





Essential hypertension, 


stable on home medications.





Diabetes mellitus type 2, insulin requiring.  


We will continue sliding scale insulin and monitor blood glucose levels.





Mixed hyperlipidemia


continue statin.





Obesity, BMI 37.





Disuse myopathy.  


The patient is currently bed-bound.  Continue PT and OT.





Generalized osteoarthritis.





DVT prophylaxis: Lovenox.





Plan:  Continue diuretics, pending symptomatic improvement.

## 2019-04-12 LAB
COHGB MFR BLDA: 1.4 % (ref 0–1.5)
OXYHGB MFR BLDA: 87.2 % (ref 94–97)
SAO2 % BLDA: 89.1 % (ref 92–98.5)

## 2019-04-12 RX ADMIN — SPIRONOLACTONE SCH MG: 25 TABLET, FILM COATED ORAL at 09:32

## 2019-04-12 RX ADMIN — HUMAN INSULIN SCH: 100 INJECTION, SOLUTION SUBCUTANEOUS at 11:30

## 2019-04-12 RX ADMIN — HUMAN INSULIN SCH UNIT: 100 INJECTION, SOLUTION SUBCUTANEOUS at 21:22

## 2019-04-12 RX ADMIN — ACETAMINOPHEN AND CODEINE PHOSPHATE SCH TAB: 300; 30 TABLET ORAL at 09:26

## 2019-04-12 RX ADMIN — IPRATROPIUM BROMIDE SCH MG: 0.5 SOLUTION RESPIRATORY (INHALATION) at 20:15

## 2019-04-12 RX ADMIN — HYDRALAZINE HYDROCHLORIDE SCH MG: 10 TABLET, FILM COATED ORAL at 14:24

## 2019-04-12 RX ADMIN — DULOXETINE HYDROCHLORIDE SCH MG: 30 CAPSULE, DELAYED RELEASE ORAL at 09:26

## 2019-04-12 RX ADMIN — HYDRALAZINE HYDROCHLORIDE SCH MG: 10 TABLET, FILM COATED ORAL at 09:26

## 2019-04-12 RX ADMIN — GABAPENTIN SCH MG: 400 CAPSULE ORAL at 14:24

## 2019-04-12 RX ADMIN — ARFORMOTEROL TARTRATE SCH MCG: 15 SOLUTION RESPIRATORY (INHALATION) at 20:15

## 2019-04-12 RX ADMIN — ENOXAPARIN SODIUM SCH MG: 40 INJECTION SUBCUTANEOUS at 09:25

## 2019-04-12 RX ADMIN — ATORVASTATIN CALCIUM SCH MG: 10 TABLET, FILM COATED ORAL at 21:21

## 2019-04-12 RX ADMIN — CARVEDILOL SCH MG: 6.25 TABLET, FILM COATED ORAL at 05:41

## 2019-04-12 RX ADMIN — Medication SCH ML: at 09:32

## 2019-04-12 RX ADMIN — HUMAN INSULIN SCH: 100 INJECTION, SOLUTION SUBCUTANEOUS at 07:30

## 2019-04-12 RX ADMIN — ACETAMINOPHEN AND CODEINE PHOSPHATE SCH TAB: 300; 30 TABLET ORAL at 17:06

## 2019-04-12 RX ADMIN — BENAZEPRIL HYDROCHLORIDE SCH MG: 20 TABLET, FILM COATED ORAL at 09:26

## 2019-04-12 RX ADMIN — HYDRALAZINE HYDROCHLORIDE SCH MG: 10 TABLET, FILM COATED ORAL at 21:21

## 2019-04-12 RX ADMIN — IPRATROPIUM BROMIDE SCH MG: 0.5 SOLUTION RESPIRATORY (INHALATION) at 12:46

## 2019-04-12 RX ADMIN — ARFORMOTEROL TARTRATE SCH MCG: 15 SOLUTION RESPIRATORY (INHALATION) at 12:46

## 2019-04-12 RX ADMIN — SPIRONOLACTONE SCH MG: 25 TABLET, FILM COATED ORAL at 21:21

## 2019-04-12 RX ADMIN — Medication SCH UNITS: at 21:22

## 2019-04-12 RX ADMIN — HUMAN INSULIN SCH: 100 INJECTION, SOLUTION SUBCUTANEOUS at 16:30

## 2019-04-12 RX ADMIN — AMLODIPINE BESYLATE SCH MG: 10 TABLET ORAL at 09:26

## 2019-04-12 RX ADMIN — GABAPENTIN SCH MG: 400 CAPSULE ORAL at 21:21

## 2019-04-12 RX ADMIN — CARVEDILOL SCH MG: 6.25 TABLET, FILM COATED ORAL at 17:09

## 2019-04-12 RX ADMIN — GABAPENTIN SCH MG: 400 CAPSULE ORAL at 09:27

## 2019-04-12 RX ADMIN — ACETAMINOPHEN AND CODEINE PHOSPHATE SCH TAB: 300; 30 TABLET ORAL at 02:02

## 2019-04-12 RX ADMIN — Medication SCH ML: at 21:23

## 2019-04-12 RX ADMIN — HYDRALAZINE HYDROCHLORIDE PRN MG: 20 INJECTION INTRAMUSCULAR; INTRAVENOUS at 17:09

## 2019-04-12 NOTE — P.PN
Subjective


Date of Service: 04/12/19


Chief Complaint: Shortness of breath


Subjective: No new changes





 Patient is seen and examined at bedside, son at bedside.  Chart reviewed and 

case discussed with nursing staff.  


Continues to require Bi PAP overnight.














Review of Systems


10-point ROS is otherwise unremarkable





Physical Examination





- Vital Signs


Temperature: 97.2 F


Blood Pressure: 120/60


Pulse: 89


Respirations: 20


Pulse Ox (%): 91





- Physical Exam


General: Alert, In no apparent distress, Oriented x3, Other (Ill-appearing)


HEENT: Atraumatic, PERRLA, EOMI


Neck: Supple, JVD not distended


Respiratory: Diminished, Crackles/rales, Expiratory wheezes


Cardiovascular: Regular rate/rhythm, Normal S1 S2


Gastrointestinal: Normal bowel sounds, No tenderness


Musculoskeletal: No tenderness


Integumentary: No rashes


Neurological: Normal speech, Normal tone, Normal affect





- Studies


Microbiology Data (last 24 hrs): 








04/06/19 16:00   Blood  - Blood   Aerobic Blood Culture - Final


                            No growth in 5 days.


04/06/19 16:00   Blood  - Blood   Anaerobic Blood Culture - Final


                            No growth in 5 days.


04/06/19 15:40   Blood  - Blood   Aerobic Blood Culture - Final


                            No growth in 5 days.


04/06/19 15:40   Blood  - Blood   Anaerobic Blood Culture - Final


                            No growth in 5 days.








Assessment And Plan





- Plan


 An 79-year-old female with;





Acute respiratory failure with hypoxia.  


Patient requiring 4-5 L of oxygen via nasal cannula and Venti mask, likely 

secondary to pneumonia and congestive heart failure.  We are unable to wean 

patient off of BiPAP.  Will discuss with pulmonology for further 

recommendations.


This still requires intermittent BiPAP.





Bilateral upper lobe pneumonia, possible aspiration.  


Antibiotics discontinued by pulmonology.  Appreciate Dr. Flores's input.  No 

signs of sepsis.  Currently afebrile.  Cultures are negative to date.





Congestive heart failure with preserved EF, diastolic.  


ECHO with normal EF, dilated atria, moderate pulmonary hypertension. 


Monitor I's and O's, strict fluid restriction.  We will continue with diuretics.





Urinary tract infection, acute cystitis without hematuria.  


Urine cultures with Klebsiella pneumonia.  Finished course of oral Levaquin.





Essential hypertension, 


stable on home medications.





Diabetes mellitus type 2, insulin requiring.  


We will continue sliding scale insulin and monitor blood glucose levels.





Mixed hyperlipidemia


continue statin.





Obesity, BMI 37.





Disuse myopathy.  


The patient is currently bed-bound, which is baseline for her.  Continue PT and 

OT.





Generalized osteoarthritis.





DVT prophylaxis: Lovenox.


Could status:  I did start discussion regarding code status with patient and 

son at bedside.  Patient stated she would like to think about it and will let 

me know.  She will continue to be full code until she says otherwise.





Plan:  Continue diuretics, pending symptomatic improvement.  





- Code Status/Comfort Care


Code Status Assessed: Yes (Patient contemplating and deciding still.)

## 2019-04-12 NOTE — P.PN
Subjective


Date of Service: 04/12/19


Chief Complaint: Shortness of breath





No change patient is still complaining of shortness of breath requiring high 

concentration of oxygen previous ABG shows hypoxemia with hypercapnia denies 

any symptoms of sepsis slight cough but no fever chills or sputum production no 

chest pain





Review of Systems


General: Weakness


Respiratory: Cough, Shortness of Breath





Physical Examination





- Vital Signs


Temperature: 97.1 F


Blood Pressure: 144/64


Pulse: 75


Respirations: 13


Pulse Ox (%): 95





- Physical Exam


General: Alert, Oriented x3


Neck: Supple


Respiratory: Clear to auscultation bilaterally


Cardiovascular: No edema, Normal pulses





- Studies


Microbiology Data (last 24 hrs): 








04/06/19 16:00   Blood  - Blood   Aerobic Blood Culture - Final


                            No growth in 5 days.


04/06/19 16:00   Blood  - Blood   Anaerobic Blood Culture - Final


                            No growth in 5 days.


04/06/19 15:40   Blood  - Blood   Aerobic Blood Culture - Final


                            No growth in 5 days.


04/06/19 15:40   Blood  - Blood   Anaerobic Blood Culture - Final


                            No growth in 5 days.








Assessment & Plan





- Problems (Diagnosis)


(1) Respiratory failure


Current Visit: Yes   Status: Acute   


Plan: 


Patient admitted with respiratory failure this not improving still short of 

breath requiring high concentrations of oxygen and BiPAP Klebsiella isolated in 

the urine patient was treated with antibiotics white count is now normal normal 

renal function ordered another x-ray ABGs trial of some steroids not sure why 

she is so hypoxic is probably underlying chronic hypoxemia and hypercapnia 

trial of maximum bronchodilator therapy with the Brovana and Atrovent


Qualifiers: 


   Chronicity: acute

## 2019-04-12 NOTE — RAD REPORT
EXAM DESCRIPTION:  RAD - Chest Single View - 4/12/2019 12:29 pm

 

CLINICAL HISTORY:  Respiratory failure

 

COMPARISON:  April 7

 

TECHNIQUE:  AP portable chest image was obtained 1226 hours .

 

FINDINGS:  Lungs are clear. Lung volumes are very low, reduced even further from the April 7 imaging.
 Bilateral pleural and parenchymal opacification are present. Most of the progressive appearance is d
ue to the shallow inspiration. Increasing right base infiltrate or progressive failure/ volume overlo
ad not excluded.

 

No pneumothorax. No acute bony abnormality seen. No acute aortic findings suspected.

 

IMPRESSION:  Worsening interstitial and alveolar opacification in both lung fields.

 

Progression is at least partially due to more shallow inspiratory effort. True infiltrate or edema in
 the right base is suspected in the patient may have an overall new or progressive failure/ volume ov
erload.

## 2019-04-13 RX ADMIN — CARVEDILOL SCH MG: 6.25 TABLET, FILM COATED ORAL at 06:27

## 2019-04-13 RX ADMIN — ACETAMINOPHEN AND CODEINE PHOSPHATE SCH TAB: 300; 30 TABLET ORAL at 02:50

## 2019-04-13 RX ADMIN — IPRATROPIUM BROMIDE SCH MG: 0.5 SOLUTION RESPIRATORY (INHALATION) at 13:55

## 2019-04-13 RX ADMIN — Medication SCH UNITS: at 21:48

## 2019-04-13 RX ADMIN — GABAPENTIN SCH MG: 400 CAPSULE ORAL at 21:47

## 2019-04-13 RX ADMIN — IPRATROPIUM BROMIDE SCH MG: 0.5 SOLUTION RESPIRATORY (INHALATION) at 08:00

## 2019-04-13 RX ADMIN — BENAZEPRIL HYDROCHLORIDE SCH MG: 20 TABLET, FILM COATED ORAL at 09:11

## 2019-04-13 RX ADMIN — AMLODIPINE BESYLATE SCH MG: 10 TABLET ORAL at 09:15

## 2019-04-13 RX ADMIN — SPIRONOLACTONE SCH MG: 25 TABLET, FILM COATED ORAL at 09:13

## 2019-04-13 RX ADMIN — HYDRALAZINE HYDROCHLORIDE SCH MG: 10 TABLET, FILM COATED ORAL at 14:08

## 2019-04-13 RX ADMIN — HUMAN INSULIN SCH UNIT: 100 INJECTION, SOLUTION SUBCUTANEOUS at 21:49

## 2019-04-13 RX ADMIN — GUAIFENESIN SCH MG: 600 TABLET, EXTENDED RELEASE ORAL at 18:50

## 2019-04-13 RX ADMIN — ENOXAPARIN SODIUM SCH MG: 40 INJECTION SUBCUTANEOUS at 09:11

## 2019-04-13 RX ADMIN — ARFORMOTEROL TARTRATE SCH MCG: 15 SOLUTION RESPIRATORY (INHALATION) at 08:00

## 2019-04-13 RX ADMIN — SPIRONOLACTONE SCH MG: 25 TABLET, FILM COATED ORAL at 21:48

## 2019-04-13 RX ADMIN — HUMAN INSULIN SCH UNIT: 100 INJECTION, SOLUTION SUBCUTANEOUS at 16:33

## 2019-04-13 RX ADMIN — IPRATROPIUM BROMIDE SCH MG: 0.5 SOLUTION RESPIRATORY (INHALATION) at 20:45

## 2019-04-13 RX ADMIN — DULOXETINE HYDROCHLORIDE SCH MG: 30 CAPSULE, DELAYED RELEASE ORAL at 09:14

## 2019-04-13 RX ADMIN — GABAPENTIN SCH MG: 400 CAPSULE ORAL at 09:14

## 2019-04-13 RX ADMIN — IPRATROPIUM BROMIDE SCH MG: 0.5 SOLUTION RESPIRATORY (INHALATION) at 02:10

## 2019-04-13 RX ADMIN — CARVEDILOL SCH MG: 6.25 TABLET, FILM COATED ORAL at 17:21

## 2019-04-13 RX ADMIN — ATORVASTATIN CALCIUM SCH MG: 10 TABLET, FILM COATED ORAL at 21:48

## 2019-04-13 RX ADMIN — Medication SCH ML: at 21:00

## 2019-04-13 RX ADMIN — GABAPENTIN SCH MG: 400 CAPSULE ORAL at 14:08

## 2019-04-13 RX ADMIN — ARFORMOTEROL TARTRATE SCH MCG: 15 SOLUTION RESPIRATORY (INHALATION) at 20:45

## 2019-04-13 RX ADMIN — Medication SCH ML: at 09:28

## 2019-04-13 RX ADMIN — HYDRALAZINE HYDROCHLORIDE SCH MG: 10 TABLET, FILM COATED ORAL at 09:12

## 2019-04-13 RX ADMIN — HYDRALAZINE HYDROCHLORIDE SCH MG: 10 TABLET, FILM COATED ORAL at 21:48

## 2019-04-13 RX ADMIN — HUMAN INSULIN SCH UNIT: 100 INJECTION, SOLUTION SUBCUTANEOUS at 11:37

## 2019-04-13 RX ADMIN — HUMAN INSULIN SCH: 100 INJECTION, SOLUTION SUBCUTANEOUS at 07:30

## 2019-04-13 NOTE — P.PN
Subjective


Date of Service: 04/13/19


Chief Complaint: Respiratory failure


Subjective: Improving





 Patient is seen and examined at bedside, son at bedside.  Chart reviewed and 

case discussed with nursing staff.  


Continues to require Bi PAP overnight.


Reports improvement after starting steroids and breathing treatments.


States she feels better today














Review of Systems


10-point ROS is otherwise unremarkable





Physical Examination





- Vital Signs


Temperature: 96.9 F


Blood Pressure: 173/72


Pulse: 88


Respirations: 16


Pulse Ox (%): 97





- Physical Exam


General: Alert, In no apparent distress, Oriented x3


HEENT: Atraumatic, PERRLA, EOMI


Neck: Supple, JVD not distended


Respiratory: Diminished, Dull, Crackles/rales


Cardiovascular: Regular rate/rhythm, Normal S1 S2


Gastrointestinal: Normal bowel sounds, No tenderness


Musculoskeletal: No tenderness


Integumentary: No rashes


Neurological: Normal speech, Normal tone, Normal affect


Lymphatics: No axilla or inguinal lymphadenopathy





Assessment And Plan





- Plan


 An 79-year-old female with;





Acute respiratory failure with hypoxia.  


Patient requiring 4-5 L of oxygen via nasal cannula and Venti mask, likely 

secondary to pneumonia and congestive heart failure.  We are unable to wean 

patient off of BiPAP.  Continue steroids and breathing treatments.  Pulmonology 

consulted, recommendations appreciated.





Bilateral upper lobe pneumonia, possible aspiration.  


Antibiotics discontinued by pulmonology.  Appreciate Dr. Flores's input.  No 

signs of sepsis.  Currently afebrile.  Cultures are negative to date.





Congestive heart failure with preserved EF, diastolic.  


ECHO with normal EF, dilated atria, moderate pulmonary hypertension. 


Monitor I's and O's, strict fluid restriction.  We will continue with diuretics.





Urinary tract infection, acute cystitis without hematuria.  


Urine cultures with Klebsiella pneumonia.  Finished course of oral Levaquin.





Essential hypertension, 


stable on home medications.





Diabetes mellitus type 2, insulin requiring.  


We will continue sliding scale insulin and monitor blood glucose levels.





Mixed hyperlipidemia


continue statin.





Obesity, BMI 37.





Disuse myopathy.  


The patient is currently bed-bound, which is baseline for her.  Continue PT and 

OT.





Generalized osteoarthritis.





DVT prophylaxis: Lovenox.


Code status:  Discussed with the daughter and patient at bedside.  Patient 

would like to be a DNR.  Converted to DNR in hospital.  Social work to meet 

with patient on Monday regarding all out-of-hospital DNR.





Plan:  Continue diuretics, pending symptomatic improvement.  





- Code Status/Comfort Care


Code Status Assessed: Yes


Code Status: Do Not Resuscitate

## 2019-04-13 NOTE — P.PN
Subjective


Date of Service: 04/13/19


Chief Complaint: Respiratory failure


Patient is doing much better shortness of breath has improved today probably 

has underlying obstructive airways disease and responded well to 

bronchodilators patient does not ambulate





Review of Systems


General: Weakness


Respiratory: Shortness of Breath





Physical Examination





- Vital Signs


Temperature: 97.1 F


Blood Pressure: 130/63


Pulse: 81


Respirations: 12


Pulse Ox (%): 98





- Physical Exam


General: Alert, Oriented x3


HEENT: Atraumatic


Neck: Supple


Respiratory: Clear to auscultation bilaterally, Diminished


Cardiovascular: No edema, Regular rate/rhythm





Assessment & Plan





- Problems (Diagnosis)


(1) Respiratory failure


Current Visit: Yes   Status: Acute   


Plan: 


Patient is doing much better on bronchodilators and steroids check on nasal 

cannula oxygen titrate sat to 90% continue with present bronchodilator therapy 

she may have underlying obstructive airways disease that has gone undiagnosed 

patient lives by herself vital signs stable possible discharge 1 or 2 days 

continue with low-dose prednisone 10 mg twice a day added Advair or trilogy for 

Symbicort continue with a trial of long-acting bronchodilator evaluate for home 

O2


Qualifiers: 


   Chronicity: acute

## 2019-04-14 LAB
ALBUMIN SERPL BCP-MCNC: 3.4 G/DL (ref 3.4–5)
ALP SERPL-CCNC: 58 U/L (ref 45–117)
ALT SERPL W P-5'-P-CCNC: 16 U/L (ref 12–78)
ANISOCYTOSIS BLD QL: SLIGHT
AST SERPL W P-5'-P-CCNC: 10 U/L (ref 15–37)
BUN BLD-MCNC: 30 MG/DL (ref 7–18)
GLUCOSE SERPLBLD-MCNC: 256 MG/DL (ref 74–106)
HCT VFR BLD CALC: 27 % (ref 36–45)
HYPOCHROMIA BLD QL: (no result)
LYMPHOCYTES # SPEC AUTO: 0.5 K/UL (ref 0.7–4.9)
MORPHOLOGY BLD-IMP: (no result)
PMV BLD: 8 FL (ref 7.6–11.3)
POTASSIUM SERPL-SCNC: 4.2 MMOL/L (ref 3.5–5.1)
RBC # BLD: 3.27 M/UL (ref 3.86–4.86)

## 2019-04-14 RX ADMIN — CARVEDILOL SCH MG: 6.25 TABLET, FILM COATED ORAL at 05:47

## 2019-04-14 RX ADMIN — HUMAN INSULIN SCH UNIT: 100 INJECTION, SOLUTION SUBCUTANEOUS at 12:04

## 2019-04-14 RX ADMIN — ARFORMOTEROL TARTRATE SCH MCG: 15 SOLUTION RESPIRATORY (INHALATION) at 19:53

## 2019-04-14 RX ADMIN — HUMAN INSULIN SCH: 100 INJECTION, SOLUTION SUBCUTANEOUS at 07:30

## 2019-04-14 RX ADMIN — HYDRALAZINE HYDROCHLORIDE SCH MG: 10 TABLET, FILM COATED ORAL at 14:10

## 2019-04-14 RX ADMIN — ARFORMOTEROL TARTRATE SCH MCG: 15 SOLUTION RESPIRATORY (INHALATION) at 08:02

## 2019-04-14 RX ADMIN — HYDRALAZINE HYDROCHLORIDE SCH MG: 10 TABLET, FILM COATED ORAL at 22:26

## 2019-04-14 RX ADMIN — DULOXETINE HYDROCHLORIDE SCH MG: 30 CAPSULE, DELAYED RELEASE ORAL at 08:49

## 2019-04-14 RX ADMIN — IPRATROPIUM BROMIDE SCH MG: 0.5 SOLUTION RESPIRATORY (INHALATION) at 13:37

## 2019-04-14 RX ADMIN — HYDRALAZINE HYDROCHLORIDE SCH MG: 10 TABLET, FILM COATED ORAL at 08:50

## 2019-04-14 RX ADMIN — ATORVASTATIN CALCIUM SCH MG: 10 TABLET, FILM COATED ORAL at 22:26

## 2019-04-14 RX ADMIN — SPIRONOLACTONE SCH MG: 25 TABLET, FILM COATED ORAL at 22:25

## 2019-04-14 RX ADMIN — Medication SCH ML: at 08:53

## 2019-04-14 RX ADMIN — AMLODIPINE BESYLATE SCH MG: 10 TABLET ORAL at 08:49

## 2019-04-14 RX ADMIN — GABAPENTIN SCH MG: 400 CAPSULE ORAL at 14:10

## 2019-04-14 RX ADMIN — IPRATROPIUM BROMIDE SCH MG: 0.5 SOLUTION RESPIRATORY (INHALATION) at 19:53

## 2019-04-14 RX ADMIN — SPIRONOLACTONE SCH MG: 25 TABLET, FILM COATED ORAL at 08:49

## 2019-04-14 RX ADMIN — Medication SCH UNITS: at 22:27

## 2019-04-14 RX ADMIN — GUAIFENESIN SCH MG: 600 TABLET, EXTENDED RELEASE ORAL at 22:31

## 2019-04-14 RX ADMIN — TEMAZEPAM PRN MG: 15 CAPSULE ORAL at 22:26

## 2019-04-14 RX ADMIN — IPRATROPIUM BROMIDE SCH MG: 0.5 SOLUTION RESPIRATORY (INHALATION) at 08:02

## 2019-04-14 RX ADMIN — Medication SCH ML: at 22:27

## 2019-04-14 RX ADMIN — ENOXAPARIN SODIUM SCH MG: 40 INJECTION SUBCUTANEOUS at 08:48

## 2019-04-14 RX ADMIN — HUMAN INSULIN SCH UNIT: 100 INJECTION, SOLUTION SUBCUTANEOUS at 17:02

## 2019-04-14 RX ADMIN — GUAIFENESIN SCH MG: 600 TABLET, EXTENDED RELEASE ORAL at 08:48

## 2019-04-14 RX ADMIN — GABAPENTIN SCH MG: 400 CAPSULE ORAL at 08:49

## 2019-04-14 RX ADMIN — BENAZEPRIL HYDROCHLORIDE SCH MG: 20 TABLET, FILM COATED ORAL at 08:50

## 2019-04-14 RX ADMIN — IPRATROPIUM BROMIDE SCH MG: 0.5 SOLUTION RESPIRATORY (INHALATION) at 01:20

## 2019-04-14 RX ADMIN — CARVEDILOL SCH MG: 6.25 TABLET, FILM COATED ORAL at 17:02

## 2019-04-14 RX ADMIN — GABAPENTIN SCH MG: 400 CAPSULE ORAL at 22:26

## 2019-04-14 RX ADMIN — HUMAN INSULIN SCH UNIT: 100 INJECTION, SOLUTION SUBCUTANEOUS at 22:28

## 2019-04-14 NOTE — P.PN
Subjective


Date of Service: 04/14/19


Chief Complaint: Respiratory failure


Subjective: Tolerating diet, Improving





 Patient is seen and examined at bedside, son at bedside.  Chart reviewed and 

case discussed with nursing staff.  


She did not require Bi PAP overnight.  On 3 L nasal cannula this morning, doing 

well


States she feels better today














Review of Systems


10-point ROS is otherwise unremarkable





Physical Examination





- Vital Signs


Temperature: 96.9 F


Blood Pressure: 173/72


Pulse: 88


Respirations: 16


Pulse Ox (%): 97





- Physical Exam


General: Alert, In no apparent distress, Oriented x3


HEENT: Atraumatic, PERRLA, EOMI


Neck: Supple, JVD not distended


Respiratory: Dull, Crackles/rales


Cardiovascular: Regular rate/rhythm, Normal S1 S2


Gastrointestinal: Normal bowel sounds, No tenderness


Musculoskeletal: No tenderness


Integumentary: No rashes


Neurological: Normal speech, Normal tone, Normal affect


Lymphatics: No axilla or inguinal lymphadenopathy





Assessment And Plan





- Plan


 An 79-year-old female with;





Acute respiratory failure with hypoxia.  


Patient currently on 3 L oxygen via nasal cannula, tolerating well.  Continue 

to wean as tolerated.  She did not require BiPAP overnight.  We will Continue 

steroids and breathing treatments.  Pulmonology consulted, recommendations 

appreciated.





Bilateral upper lobe pneumonia, possible aspiration.  


Antibiotics discontinued by pulmonology.  Appreciate Dr. Flores's input.  No 

signs of sepsis.  Currently afebrile.  Cultures are negative to date.





Congestive heart failure with preserved EF, diastolic.  


ECHO with normal EF, dilated atria, moderate pulmonary hypertension. 


Monitor I's and O's, strict fluid restriction.  We will continue with diuretics.





Urinary tract infection, acute cystitis without hematuria.  


Urine cultures with Klebsiella pneumonia.  Finished course of oral Levaquin.





Essential hypertension, 


stable on home medications.





Diabetes mellitus type 2, insulin requiring.  


We will continue sliding scale insulin and monitor blood glucose levels.





Mixed hyperlipidemia


continue statin.





Obesity, BMI 37.





Disuse myopathy.  


The patient is currently bed-bound, which is baseline for her.  Continue PT and 

OT.





Generalized osteoarthritis.





DVT prophylaxis: Lovenox.


Code status:  Discussed with the daughter and patient at bedside.  Patient 

would like to be a DNR.  Converted to DNR in hospital.  Social work to meet 

with patient on Monday regarding all out-of-hospital DNR.





Plan:  Continue diuretics steroids and breathing treatments. pending 

symptomatic improvement.  Possible discharge home in the next 24-48 hr if 

continues to remain on nasal cannula.  If she continues require oxygen, we will 

need to set up home oxygen.

## 2019-04-15 LAB
ALBUMIN SERPL BCP-MCNC: 3.4 G/DL (ref 3.4–5)
ALP SERPL-CCNC: 56 U/L (ref 45–117)
ALT SERPL W P-5'-P-CCNC: 15 U/L (ref 12–78)
AST SERPL W P-5'-P-CCNC: 9 U/L (ref 15–37)
BUN BLD-MCNC: 34 MG/DL (ref 7–18)
GLUCOSE SERPLBLD-MCNC: 209 MG/DL (ref 74–106)
HCT VFR BLD CALC: 26.7 % (ref 36–45)
LYMPHOCYTES # SPEC AUTO: 0.6 K/UL (ref 0.7–4.9)
PMV BLD: 8.3 FL (ref 7.6–11.3)
POTASSIUM SERPL-SCNC: 4.1 MMOL/L (ref 3.5–5.1)
RBC # BLD: 3.24 M/UL (ref 3.86–4.86)

## 2019-04-15 RX ADMIN — SPIRONOLACTONE SCH MG: 25 TABLET, FILM COATED ORAL at 20:52

## 2019-04-15 RX ADMIN — GABAPENTIN SCH MG: 400 CAPSULE ORAL at 08:53

## 2019-04-15 RX ADMIN — HYDRALAZINE HYDROCHLORIDE SCH MG: 10 TABLET, FILM COATED ORAL at 08:52

## 2019-04-15 RX ADMIN — IPRATROPIUM BROMIDE SCH MG: 0.5 SOLUTION RESPIRATORY (INHALATION) at 08:36

## 2019-04-15 RX ADMIN — ARFORMOTEROL TARTRATE SCH MCG: 15 SOLUTION RESPIRATORY (INHALATION) at 08:36

## 2019-04-15 RX ADMIN — Medication SCH UNITS: at 20:57

## 2019-04-15 RX ADMIN — DULOXETINE HYDROCHLORIDE SCH MG: 30 CAPSULE, DELAYED RELEASE ORAL at 08:52

## 2019-04-15 RX ADMIN — BENAZEPRIL HYDROCHLORIDE SCH MG: 20 TABLET, FILM COATED ORAL at 08:52

## 2019-04-15 RX ADMIN — Medication SCH ML: at 08:53

## 2019-04-15 RX ADMIN — HYDRALAZINE HYDROCHLORIDE SCH MG: 10 TABLET, FILM COATED ORAL at 21:02

## 2019-04-15 RX ADMIN — IPRATROPIUM BROMIDE SCH MG: 0.5 SOLUTION RESPIRATORY (INHALATION) at 14:24

## 2019-04-15 RX ADMIN — IPRATROPIUM BROMIDE SCH MG: 0.5 SOLUTION RESPIRATORY (INHALATION) at 02:30

## 2019-04-15 RX ADMIN — CARVEDILOL SCH MG: 6.25 TABLET, FILM COATED ORAL at 17:23

## 2019-04-15 RX ADMIN — SPIRONOLACTONE SCH MG: 25 TABLET, FILM COATED ORAL at 08:52

## 2019-04-15 RX ADMIN — ARFORMOTEROL TARTRATE SCH MCG: 15 SOLUTION RESPIRATORY (INHALATION) at 19:38

## 2019-04-15 RX ADMIN — HUMAN INSULIN SCH UNIT: 100 INJECTION, SOLUTION SUBCUTANEOUS at 08:53

## 2019-04-15 RX ADMIN — ATORVASTATIN CALCIUM SCH MG: 10 TABLET, FILM COATED ORAL at 20:52

## 2019-04-15 RX ADMIN — CARVEDILOL SCH MG: 6.25 TABLET, FILM COATED ORAL at 05:46

## 2019-04-15 RX ADMIN — HUMAN INSULIN SCH UNIT: 100 INJECTION, SOLUTION SUBCUTANEOUS at 11:58

## 2019-04-15 RX ADMIN — Medication SCH ML: at 21:02

## 2019-04-15 RX ADMIN — ALBUTEROL SULFATE PRN MG: 2.5 SOLUTION RESPIRATORY (INHALATION) at 19:38

## 2019-04-15 RX ADMIN — ENOXAPARIN SODIUM SCH MG: 40 INJECTION SUBCUTANEOUS at 08:51

## 2019-04-15 RX ADMIN — GUAIFENESIN SCH MG: 600 TABLET, EXTENDED RELEASE ORAL at 08:53

## 2019-04-15 RX ADMIN — HYDRALAZINE HYDROCHLORIDE SCH MG: 10 TABLET, FILM COATED ORAL at 13:23

## 2019-04-15 RX ADMIN — AMLODIPINE BESYLATE SCH MG: 10 TABLET ORAL at 08:52

## 2019-04-15 RX ADMIN — HUMAN INSULIN SCH UNIT: 100 INJECTION, SOLUTION SUBCUTANEOUS at 17:23

## 2019-04-15 RX ADMIN — TEMAZEPAM PRN MG: 15 CAPSULE ORAL at 22:12

## 2019-04-15 RX ADMIN — IPRATROPIUM BROMIDE SCH MG: 0.5 SOLUTION RESPIRATORY (INHALATION) at 19:41

## 2019-04-15 RX ADMIN — GABAPENTIN SCH MG: 400 CAPSULE ORAL at 20:52

## 2019-04-15 RX ADMIN — GUAIFENESIN SCH MG: 600 TABLET, EXTENDED RELEASE ORAL at 20:52

## 2019-04-15 RX ADMIN — GABAPENTIN SCH MG: 400 CAPSULE ORAL at 13:23

## 2019-04-15 RX ADMIN — HUMAN INSULIN SCH UNIT: 100 INJECTION, SOLUTION SUBCUTANEOUS at 22:21

## 2019-04-15 NOTE — P.PN
Subjective


Date of Service: 04/15/19


Chief Complaint: Respiratory failure


Subjective: Improving





 Patient is seen and examined at bedside, son at bedside.  Chart reviewed and 

case discussed with nursing staff.  


She did not require Bi PAP overnight.  On 2 L nasal cannula this morning, doing 

well


States she feels better today














Review of Systems


10-point ROS is otherwise unremarkable





Physical Examination





- Vital Signs


Temperature: 96.8 F


Blood Pressure: 112/56


Pulse: 60


Respirations: 18


Pulse Ox (%): 94





- Physical Exam


General: Alert, In no apparent distress


HEENT: Atraumatic, PERRLA, EOMI


Neck: Supple, JVD not distended


Respiratory: Dull


Cardiovascular: Regular rate/rhythm, Normal S1 S2


Gastrointestinal: Normal bowel sounds, No tenderness


Musculoskeletal: No tenderness


Integumentary: No rashes


Neurological: Normal speech, Normal tone, Normal affect


Lymphatics: No axilla or inguinal lymphadenopathy





Assessment And Plan





- Plan


 An 79-year-old female with;





Acute respiratory failure with hypoxia.  


Patient currently on 2 L oxygen via nasal cannula, tolerating well.  Continue 

to wean as tolerated.  She did not require BiPAP overnight.  We will Continue 

steroids and breathing treatments.  Pulmonology consulted, recommendations 

appreciated.


Room air status ordered, pending.  Patient may need to be set up for home 

oxygen.





Bilateral upper lobe pneumonia, possible aspiration.  


Antibiotics discontinued by pulmonology.  Appreciate Dr. Flores's input.  No 

signs of sepsis.  Currently afebrile.  Cultures are negative to date.





Congestive heart failure with preserved EF, diastolic.  


ECHO with normal EF, dilated atria, moderate pulmonary hypertension. 


Monitor I's and O's, strict fluid restriction.  We will continue with diuretics.





Urinary tract infection, acute cystitis without hematuria.  


Urine cultures with Klebsiella pneumonia.  Finished course of oral Levaquin.





Essential hypertension, 


stable on home medications.





Diabetes mellitus type 2, insulin requiring.  


We will continue sliding scale insulin and monitor blood glucose levels.





Mixed hyperlipidemia


continue statin.





Obesity, BMI 37.





Disuse myopathy.  


The patient is currently bed-bound, which is baseline for her.  Continue PT and 

OT.





Generalized osteoarthritis.





DVT prophylaxis: Lovenox.


Code status:  Discussed with the daughter and patient at bedside.  Patient 

would like to be a DNR.  Converted to DNR in hospital.  Social work to meet 

with patient on Monday regarding all out-of-hospital DNR.





Plan:  Continue diuretics steroids and breathing treatments. pending 

symptomatic improvement.  Possible discharge home in the next 24-48 hr, we will 

need to set up home oxygen.  Social work aware

## 2019-04-16 LAB
ALBUMIN SERPL BCP-MCNC: 3.5 G/DL (ref 3.4–5)
ALP SERPL-CCNC: 58 U/L (ref 45–117)
ALT SERPL W P-5'-P-CCNC: 16 U/L (ref 12–78)
AST SERPL W P-5'-P-CCNC: 10 U/L (ref 15–37)
BUN BLD-MCNC: 42 MG/DL (ref 7–18)
GLUCOSE SERPLBLD-MCNC: 245 MG/DL (ref 74–106)
HCT VFR BLD CALC: 27 % (ref 36–45)
LYMPHOCYTES # SPEC AUTO: 0.6 K/UL (ref 0.7–4.9)
PMV BLD: 8.7 FL (ref 7.6–11.3)
POTASSIUM SERPL-SCNC: 4.1 MMOL/L (ref 3.5–5.1)
RBC # BLD: 3.27 M/UL (ref 3.86–4.86)

## 2019-04-16 RX ADMIN — SPIRONOLACTONE SCH MG: 25 TABLET, FILM COATED ORAL at 08:17

## 2019-04-16 RX ADMIN — ARFORMOTEROL TARTRATE SCH MCG: 15 SOLUTION RESPIRATORY (INHALATION) at 20:32

## 2019-04-16 RX ADMIN — BENAZEPRIL HYDROCHLORIDE SCH MG: 20 TABLET, FILM COATED ORAL at 08:18

## 2019-04-16 RX ADMIN — ENOXAPARIN SODIUM SCH MG: 40 INJECTION SUBCUTANEOUS at 08:18

## 2019-04-16 RX ADMIN — Medication SCH UNITS: at 21:20

## 2019-04-16 RX ADMIN — GABAPENTIN SCH MG: 400 CAPSULE ORAL at 08:18

## 2019-04-16 RX ADMIN — ARFORMOTEROL TARTRATE SCH MCG: 15 SOLUTION RESPIRATORY (INHALATION) at 07:40

## 2019-04-16 RX ADMIN — IPRATROPIUM BROMIDE SCH MG: 0.5 SOLUTION RESPIRATORY (INHALATION) at 07:41

## 2019-04-16 RX ADMIN — GUAIFENESIN SCH MG: 600 TABLET, EXTENDED RELEASE ORAL at 08:18

## 2019-04-16 RX ADMIN — CARVEDILOL SCH MG: 6.25 TABLET, FILM COATED ORAL at 05:21

## 2019-04-16 RX ADMIN — HYDRALAZINE HYDROCHLORIDE PRN MG: 20 INJECTION INTRAMUSCULAR; INTRAVENOUS at 05:12

## 2019-04-16 RX ADMIN — IPRATROPIUM BROMIDE SCH: 0.5 SOLUTION RESPIRATORY (INHALATION) at 20:00

## 2019-04-16 RX ADMIN — AMLODIPINE BESYLATE SCH MG: 10 TABLET ORAL at 08:18

## 2019-04-16 RX ADMIN — HUMAN INSULIN SCH UNIT: 100 INJECTION, SOLUTION SUBCUTANEOUS at 08:16

## 2019-04-16 RX ADMIN — HUMAN INSULIN SCH UNIT: 100 INJECTION, SOLUTION SUBCUTANEOUS at 21:19

## 2019-04-16 RX ADMIN — IPRATROPIUM BROMIDE SCH MG: 0.5 SOLUTION RESPIRATORY (INHALATION) at 12:50

## 2019-04-16 RX ADMIN — GABAPENTIN SCH MG: 400 CAPSULE ORAL at 21:20

## 2019-04-16 RX ADMIN — CARVEDILOL SCH MG: 6.25 TABLET, FILM COATED ORAL at 17:02

## 2019-04-16 RX ADMIN — ATORVASTATIN CALCIUM SCH MG: 10 TABLET, FILM COATED ORAL at 21:20

## 2019-04-16 RX ADMIN — DULOXETINE HYDROCHLORIDE SCH MG: 30 CAPSULE, DELAYED RELEASE ORAL at 08:17

## 2019-04-16 RX ADMIN — IPRATROPIUM BROMIDE SCH MG: 0.5 SOLUTION RESPIRATORY (INHALATION) at 01:13

## 2019-04-16 RX ADMIN — HUMAN INSULIN SCH UNIT: 100 INJECTION, SOLUTION SUBCUTANEOUS at 16:32

## 2019-04-16 RX ADMIN — HYDRALAZINE HYDROCHLORIDE SCH MG: 10 TABLET, FILM COATED ORAL at 08:17

## 2019-04-16 RX ADMIN — HYDRALAZINE HYDROCHLORIDE SCH MG: 10 TABLET, FILM COATED ORAL at 21:21

## 2019-04-16 RX ADMIN — HUMAN INSULIN SCH UNIT: 100 INJECTION, SOLUTION SUBCUTANEOUS at 12:32

## 2019-04-16 RX ADMIN — Medication SCH ML: at 08:16

## 2019-04-16 RX ADMIN — GUAIFENESIN SCH MG: 600 TABLET, EXTENDED RELEASE ORAL at 21:20

## 2019-04-16 RX ADMIN — ALBUTEROL SULFATE PRN MG: 2.5 SOLUTION RESPIRATORY (INHALATION) at 01:11

## 2019-04-16 RX ADMIN — SPIRONOLACTONE SCH MG: 25 TABLET, FILM COATED ORAL at 21:20

## 2019-04-16 RX ADMIN — GABAPENTIN SCH MG: 400 CAPSULE ORAL at 13:17

## 2019-04-16 RX ADMIN — TEMAZEPAM PRN MG: 15 CAPSULE ORAL at 21:20

## 2019-04-16 RX ADMIN — HYDRALAZINE HYDROCHLORIDE SCH MG: 10 TABLET, FILM COATED ORAL at 13:17

## 2019-04-16 NOTE — PN
Date of Progress Note:  04/16/2019



Subjective:  The patient is seen and examined.  Chart reviewed and case 
discussed with RN.  The patient is still on supplemental oxygen.  No acute 
events overnight.  States she does not like the BiPAP.



Medications:  List reviewed.



Code Status:  Do not resuscitate.



Physical Examination:

Vital Signs:  Temperature 97, heart rate 85, blood pressure 166/73, 
respirations 20, O2 94% on 2 L via nasal cannula. 

General:  Awake, alert, oriented x3.  Elderly female, obese.  BMI 36. 

CV:  S1, S2.  Regular rate and rhythm.  Peripheral pulses present. 

Respiratory:  Diminished breath sounds.  No wheezing or stridor. 

Gastrointestinal:  Abdomen is soft, nontender, nondistended.  Positive bowel 
sounds. 

Extremities:  No clubbing, cyanosis.  Minimal pedal edema. 

Neuro:  Cranial nerves 2 through 12 intact grossly.  No focal neurological 
deficit.  Speech is normal.  The patient does have generalized weakness of the 
lower extremities.



Laboratory Data:  Sodium 143, potassium 4.1, chloride 105, CO2 of 31, BUN 42, 
creatinine 1, glucose 245, lactate 0.7, calcium 9.3, albumin is 3.5.  WBC 12.5, 
H and H 8.4 and 27, platelets 367, neutrophils 90%.  Blood cultures, no growth.
  Final urine culture growing out Klebsiella pneumonia.



Assessment And Plan:  An 79-year-old female with;

1.   Acute respiratory failure with hypoxia secondary to pneumonia, likely 
aspiration.  Continue to wean off oxygen as tolerated.  The patient does not 
like the BiPAP.  Pulmonology on board.  The patient will need home O2.

2.   Bilateral upper lobe pneumonia, likely aspiration.  Antibiotics 
discontinued by Pulmonology.  The patient is flagging for sepsis; however, 
lactate is negative, afebrile.  Culture is also negative to date.

3.   Congestive heart failure with preserved ejection fraction, chronic 
diastolic.  The patient does have moderate pulmonary hypertension on the echo, 
normal EF, dilated atria.  Continue fluid restriction and monitor I's and O's.  
Continue diuretics.  Stable. Currently requiring supplemental O2.

4.   Acute cystitis without hematuria secondary to Klebsiella pneumonia.  The 
patient was treated with a course of Levaquin.

5.   Essential hypertension, stable.

6.   Diabetes mellitus type 2, insulin requiring with hyperglycemia.  We will 
continue sliding scale insulin and monitor blood glucose levels.

7.   Mixed hyperlipidemia.  Continue statin.

8.   Obesity.  BMI 36.2.

9.   Disuse myopathy.  The patient is bed bound for past several years.  
Continue PT and OT.

10.   Generalized osteoarthritis.

11.   Deep venous thrombosis prophylaxis with Lovenox.



Plan:  Discharge once home oxygen set up.





GERSON

DD:  04/16/2019 11:21:18   Voice ID:  [id number]

DT:  04/16/2019 16:47:06   Report ID:  [id number]

ANGELI

## 2019-04-17 LAB
ALBUMIN SERPL BCP-MCNC: 3.4 G/DL (ref 3.4–5)
ALP SERPL-CCNC: 54 U/L (ref 45–117)
ALT SERPL W P-5'-P-CCNC: 20 U/L (ref 12–78)
AST SERPL W P-5'-P-CCNC: 14 U/L (ref 15–37)
BUN BLD-MCNC: 47 MG/DL (ref 7–18)
GLUCOSE SERPLBLD-MCNC: 185 MG/DL (ref 74–106)
HCT VFR BLD CALC: 26 % (ref 36–45)
LYMPHOCYTES # SPEC AUTO: 0.8 K/UL (ref 0.7–4.9)
PMV BLD: 8.6 FL (ref 7.6–11.3)
POTASSIUM SERPL-SCNC: 3.9 MMOL/L (ref 3.5–5.1)
RBC # BLD: 3.14 M/UL (ref 3.86–4.86)

## 2019-04-17 RX ADMIN — Medication SCH ML: at 08:39

## 2019-04-17 RX ADMIN — IPRATROPIUM BROMIDE SCH MG: 0.5 SOLUTION RESPIRATORY (INHALATION) at 06:28

## 2019-04-17 RX ADMIN — IPRATROPIUM BROMIDE SCH MG: 0.5 SOLUTION RESPIRATORY (INHALATION) at 20:00

## 2019-04-17 RX ADMIN — GUAIFENESIN SCH MG: 600 TABLET, EXTENDED RELEASE ORAL at 08:40

## 2019-04-17 RX ADMIN — CARVEDILOL SCH MG: 6.25 TABLET, FILM COATED ORAL at 06:27

## 2019-04-17 RX ADMIN — BENAZEPRIL HYDROCHLORIDE SCH MG: 20 TABLET, FILM COATED ORAL at 08:40

## 2019-04-17 RX ADMIN — ARFORMOTEROL TARTRATE SCH MCG: 15 SOLUTION RESPIRATORY (INHALATION) at 20:00

## 2019-04-17 RX ADMIN — HYDRALAZINE HYDROCHLORIDE SCH MG: 10 TABLET, FILM COATED ORAL at 20:37

## 2019-04-17 RX ADMIN — HUMAN INSULIN SCH UNIT: 100 INJECTION, SOLUTION SUBCUTANEOUS at 08:39

## 2019-04-17 RX ADMIN — IPRATROPIUM BROMIDE SCH MG: 0.5 SOLUTION RESPIRATORY (INHALATION) at 14:28

## 2019-04-17 RX ADMIN — SPIRONOLACTONE SCH MG: 25 TABLET, FILM COATED ORAL at 20:37

## 2019-04-17 RX ADMIN — GABAPENTIN SCH MG: 400 CAPSULE ORAL at 08:40

## 2019-04-17 RX ADMIN — HYDRALAZINE HYDROCHLORIDE SCH MG: 10 TABLET, FILM COATED ORAL at 13:35

## 2019-04-17 RX ADMIN — GABAPENTIN SCH MG: 400 CAPSULE ORAL at 20:38

## 2019-04-17 RX ADMIN — HUMAN INSULIN SCH UNIT: 100 INJECTION, SOLUTION SUBCUTANEOUS at 17:12

## 2019-04-17 RX ADMIN — ATORVASTATIN CALCIUM SCH MG: 10 TABLET, FILM COATED ORAL at 20:38

## 2019-04-17 RX ADMIN — HUMAN INSULIN SCH UNIT: 100 INJECTION, SOLUTION SUBCUTANEOUS at 12:25

## 2019-04-17 RX ADMIN — Medication SCH ML: at 00:28

## 2019-04-17 RX ADMIN — ARFORMOTEROL TARTRATE SCH MCG: 15 SOLUTION RESPIRATORY (INHALATION) at 06:28

## 2019-04-17 RX ADMIN — SPIRONOLACTONE SCH MG: 25 TABLET, FILM COATED ORAL at 08:41

## 2019-04-17 RX ADMIN — ENOXAPARIN SODIUM SCH MG: 40 INJECTION SUBCUTANEOUS at 08:39

## 2019-04-17 RX ADMIN — Medication SCH ML: at 20:39

## 2019-04-17 RX ADMIN — AMLODIPINE BESYLATE SCH MG: 10 TABLET ORAL at 08:40

## 2019-04-17 RX ADMIN — GUAIFENESIN SCH MG: 600 TABLET, EXTENDED RELEASE ORAL at 20:37

## 2019-04-17 RX ADMIN — HYDRALAZINE HYDROCHLORIDE SCH MG: 10 TABLET, FILM COATED ORAL at 08:40

## 2019-04-17 RX ADMIN — GABAPENTIN SCH MG: 400 CAPSULE ORAL at 13:35

## 2019-04-17 RX ADMIN — HUMAN INSULIN SCH UNIT: 100 INJECTION, SOLUTION SUBCUTANEOUS at 20:38

## 2019-04-17 RX ADMIN — CARVEDILOL SCH MG: 6.25 TABLET, FILM COATED ORAL at 17:12

## 2019-04-17 RX ADMIN — IPRATROPIUM BROMIDE SCH MG: 0.5 SOLUTION RESPIRATORY (INHALATION) at 02:47

## 2019-04-17 RX ADMIN — Medication SCH UNITS: at 20:43

## 2019-04-17 RX ADMIN — DULOXETINE HYDROCHLORIDE SCH MG: 30 CAPSULE, DELAYED RELEASE ORAL at 08:42

## 2019-04-17 NOTE — PN
Date of Progress Note:  04/17/2019



Patient is seen and examined.  Chart reviewed and case discussed with RN.  Family at the bedside.  Tr
eatment plan explained, all questions answered.  Case also discussed with Dr. Flores.



Medications:  List reviewed.



Physical Examination:

Vital Signs:  Temperature 97, heart rate 81, blood pressure 147/65, respirations 16, O2 of 95% on 3 L
 via nasal cannula. 

General:  Awake, alert, oriented x3.  Elderly female, obese, BMI 36. 

CV:  S1, S2.  Regular rate and rhythm.  Peripheral pulses present. 

Respiratory:  Moving air well bilaterally.  No wheezing. 

Gastrointestinal:  Abdomen is soft, nontender, nondistended.  Positive bowel sounds.  No guarding or 
rigidity. 

Extremities:  No clubbing, cyanosis.  The patient does have 1+ edema of the left thigh. 

Neuro:  Nonfocal.



Laboratory Data:  Sodium 145, potassium 3.9, chloride 106, CO2 of 34, BUN 47, creatinine 0.87, glucos
e 185, calcium 9.2.  WBC 10.1, H and H 8.3 and 26, platelets 300, neutrophils 85%.  Urine culture evelina
wing out Klebsiella pneumoniae.



Assessment And Plan:  An 79-year-old female with:

1.Acute respiratory failure with hypoxia secondary to aspiration pneumonia versus congestive heart f
ailure, improving, no longer requiring BiPAP.  Appreciate Pulmonology input.  Currently on 3 L via na
sal cannula.

2.Bilateral upper lobe pneumonia, likely aspiration.  Antibiotics discontinued by Pulmonology.  The 
patient no longer having any cough or sputum production.  Cultures negative.

3.Congestive heart failure, diastolic, chronic.  Continue monitoring I's and O's and strict fluid re
striction.  Continue diuretics.  The patient is still requiring supplemental O2.  Will need to be set
 up with home oxygen.

4.Acute cystitis without hematuria secondary to Klebsiella.  The patient completed course of Levaqui
n.

5.Essential hypertension, stable.

6.Diabetes mellitus, type 2, insulin requiring with hyperglycemia.  We will continue on sliding scal
e insulin and monitor blood glucose levels.

7.Mixed hyperlipidemia, continue statin.

8.Obesity, body mass index of 36.2.

9.Disuse myopathy.  The patient is bedbound for the past several years.  We will continue PT/OT.

10.Generalized osteoarthritis, stable.

11.DVT prophylaxis with Lovenox.



Plan:  Discharge home once O2 has been set up.





SA/MODL

DD:  04/17/2019 15:13:16Voice ID:  [id number]

DT:  04/17/2019 20:21:19Report ID:  [id number]

## 2019-04-18 VITALS — TEMPERATURE: 97.1 F | SYSTOLIC BLOOD PRESSURE: 156 MMHG | DIASTOLIC BLOOD PRESSURE: 59 MMHG

## 2019-04-18 VITALS — OXYGEN SATURATION: 92 %

## 2019-04-18 RX ADMIN — GABAPENTIN SCH MG: 400 CAPSULE ORAL at 13:28

## 2019-04-18 RX ADMIN — GUAIFENESIN SCH MG: 600 TABLET, EXTENDED RELEASE ORAL at 08:34

## 2019-04-18 RX ADMIN — GUAIFENESIN SCH MG: 600 TABLET, EXTENDED RELEASE ORAL at 20:52

## 2019-04-18 RX ADMIN — SPIRONOLACTONE SCH MG: 25 TABLET, FILM COATED ORAL at 08:34

## 2019-04-18 RX ADMIN — ENOXAPARIN SODIUM SCH MG: 40 INJECTION SUBCUTANEOUS at 08:33

## 2019-04-18 RX ADMIN — DULOXETINE HYDROCHLORIDE SCH MG: 30 CAPSULE, DELAYED RELEASE ORAL at 08:34

## 2019-04-18 RX ADMIN — CARVEDILOL SCH MG: 6.25 TABLET, FILM COATED ORAL at 05:28

## 2019-04-18 RX ADMIN — IPRATROPIUM BROMIDE SCH MG: 0.5 SOLUTION RESPIRATORY (INHALATION) at 13:50

## 2019-04-18 RX ADMIN — Medication SCH ML: at 20:54

## 2019-04-18 RX ADMIN — Medication SCH UNITS: at 20:53

## 2019-04-18 RX ADMIN — HYDRALAZINE HYDROCHLORIDE SCH MG: 10 TABLET, FILM COATED ORAL at 13:28

## 2019-04-18 RX ADMIN — AMLODIPINE BESYLATE SCH MG: 10 TABLET ORAL at 08:34

## 2019-04-18 RX ADMIN — GABAPENTIN SCH MG: 400 CAPSULE ORAL at 20:52

## 2019-04-18 RX ADMIN — HYDRALAZINE HYDROCHLORIDE SCH MG: 10 TABLET, FILM COATED ORAL at 20:52

## 2019-04-18 RX ADMIN — ARFORMOTEROL TARTRATE SCH MCG: 15 SOLUTION RESPIRATORY (INHALATION) at 07:50

## 2019-04-18 RX ADMIN — HUMAN INSULIN SCH UNIT: 100 INJECTION, SOLUTION SUBCUTANEOUS at 11:55

## 2019-04-18 RX ADMIN — HYDRALAZINE HYDROCHLORIDE SCH MG: 10 TABLET, FILM COATED ORAL at 08:35

## 2019-04-18 RX ADMIN — Medication SCH ML: at 08:32

## 2019-04-18 RX ADMIN — HUMAN INSULIN SCH UNIT: 100 INJECTION, SOLUTION SUBCUTANEOUS at 08:32

## 2019-04-18 RX ADMIN — HUMAN INSULIN SCH UNIT: 100 INJECTION, SOLUTION SUBCUTANEOUS at 17:20

## 2019-04-18 RX ADMIN — GABAPENTIN SCH MG: 400 CAPSULE ORAL at 08:35

## 2019-04-18 RX ADMIN — HUMAN INSULIN SCH UNIT: 100 INJECTION, SOLUTION SUBCUTANEOUS at 20:53

## 2019-04-18 RX ADMIN — CARVEDILOL SCH MG: 6.25 TABLET, FILM COATED ORAL at 17:19

## 2019-04-18 RX ADMIN — ARFORMOTEROL TARTRATE SCH MCG: 15 SOLUTION RESPIRATORY (INHALATION) at 20:00

## 2019-04-18 RX ADMIN — BENAZEPRIL HYDROCHLORIDE SCH MG: 20 TABLET, FILM COATED ORAL at 08:34

## 2019-04-18 RX ADMIN — IPRATROPIUM BROMIDE SCH MG: 0.5 SOLUTION RESPIRATORY (INHALATION) at 01:25

## 2019-04-18 RX ADMIN — IPRATROPIUM BROMIDE SCH MG: 0.5 SOLUTION RESPIRATORY (INHALATION) at 07:50

## 2019-04-18 RX ADMIN — ATORVASTATIN CALCIUM SCH MG: 10 TABLET, FILM COATED ORAL at 20:52

## 2019-04-18 RX ADMIN — SPIRONOLACTONE SCH MG: 25 TABLET, FILM COATED ORAL at 20:52

## 2019-04-18 RX ADMIN — IPRATROPIUM BROMIDE SCH MG: 0.5 SOLUTION RESPIRATORY (INHALATION) at 20:00

## 2019-04-18 NOTE — HP
Date of Admission:  04/06/2019



Addendum:  



Chief Complaint:  Shortness of breath.





/RAMYA

DD:  04/18/2019 12:42:37Voice ID:  [id number]
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
## 2019-06-13 ENCOUNTER — HOSPITAL ENCOUNTER (EMERGENCY)
Dept: HOSPITAL 97 - ER | Age: 79
Discharge: HOME | End: 2019-06-13
Payer: COMMERCIAL

## 2019-06-13 VITALS — OXYGEN SATURATION: 95 %

## 2019-06-13 VITALS — TEMPERATURE: 98 F

## 2019-06-13 VITALS — DIASTOLIC BLOOD PRESSURE: 70 MMHG | SYSTOLIC BLOOD PRESSURE: 180 MMHG

## 2019-06-13 DIAGNOSIS — E78.5: ICD-10-CM

## 2019-06-13 DIAGNOSIS — N39.0: Primary | ICD-10-CM

## 2019-06-13 DIAGNOSIS — Z79.4: ICD-10-CM

## 2019-06-13 DIAGNOSIS — I50.9: ICD-10-CM

## 2019-06-13 DIAGNOSIS — I11.0: ICD-10-CM

## 2019-06-13 DIAGNOSIS — E11.9: ICD-10-CM

## 2019-06-13 LAB
ALBUMIN SERPL BCP-MCNC: 3 G/DL (ref 3.4–5)
ALP SERPL-CCNC: 68 U/L (ref 45–117)
ALT SERPL W P-5'-P-CCNC: 8 U/L (ref 12–78)
AST SERPL W P-5'-P-CCNC: 8 U/L (ref 15–37)
BUN BLD-MCNC: 16 MG/DL (ref 7–18)
GLUCOSE SERPLBLD-MCNC: 146 MG/DL (ref 74–106)
HCT VFR BLD CALC: 33.6 % (ref 36–45)
LIPASE SERPL-CCNC: 31 U/L (ref 73–393)
LYMPHOCYTES # SPEC AUTO: 1.3 K/UL (ref 0.7–4.9)
PMV BLD: 9.4 FL (ref 7.6–11.3)
POTASSIUM SERPL-SCNC: 3.8 MMOL/L (ref 3.5–5.1)
RBC # BLD: 3.93 M/UL (ref 3.86–4.86)
UA COMPLETE W REFLEX CULTURE PNL UR: (no result)

## 2019-06-13 PROCEDURE — 76377 3D RENDER W/INTRP POSTPROCES: CPT

## 2019-06-13 PROCEDURE — 96365 THER/PROPH/DIAG IV INF INIT: CPT

## 2019-06-13 PROCEDURE — 81003 URINALYSIS AUTO W/O SCOPE: CPT

## 2019-06-13 PROCEDURE — 80076 HEPATIC FUNCTION PANEL: CPT

## 2019-06-13 PROCEDURE — 84145 PROCALCITONIN (PCT): CPT

## 2019-06-13 PROCEDURE — 74176 CT ABD & PELVIS W/O CONTRAST: CPT

## 2019-06-13 PROCEDURE — 87088 URINE BACTERIA CULTURE: CPT

## 2019-06-13 PROCEDURE — 87040 BLOOD CULTURE FOR BACTERIA: CPT

## 2019-06-13 PROCEDURE — 81015 MICROSCOPIC EXAM OF URINE: CPT

## 2019-06-13 PROCEDURE — 87077 CULTURE AEROBIC IDENTIFY: CPT

## 2019-06-13 PROCEDURE — 96375 TX/PRO/DX INJ NEW DRUG ADDON: CPT

## 2019-06-13 PROCEDURE — 85025 COMPLETE CBC W/AUTO DIFF WBC: CPT

## 2019-06-13 PROCEDURE — 99284 EMERGENCY DEPT VISIT MOD MDM: CPT

## 2019-06-13 PROCEDURE — 83605 ASSAY OF LACTIC ACID: CPT

## 2019-06-13 PROCEDURE — 36415 COLL VENOUS BLD VENIPUNCTURE: CPT

## 2019-06-13 PROCEDURE — 83690 ASSAY OF LIPASE: CPT

## 2019-06-13 PROCEDURE — 87086 URINE CULTURE/COLONY COUNT: CPT

## 2019-06-13 PROCEDURE — 51702 INSERT TEMP BLADDER CATH: CPT

## 2019-06-13 PROCEDURE — 87186 SC STD MICRODIL/AGAR DIL: CPT

## 2019-06-13 PROCEDURE — 80048 BASIC METABOLIC PNL TOTAL CA: CPT

## 2019-06-13 PROCEDURE — 93005 ELECTROCARDIOGRAM TRACING: CPT

## 2019-06-13 NOTE — EDPHYS
Physician Documentation                                                                           

 [facility]                                                                 

Name: Gladys Ramos                                                                                    

Age: 79 yrs                                                                                       

Sex: Female                                                                                       

: 1940                                                                                   

MRN: [id number]                                                                                   

Arrival Date: 2019                                                                          

Time: 16:32                                                                                       

Account#: [id number]                                                                            

Bed 6                                                                                             

Private MD:                                                                                       

ED Physician Reyes Markham                                                                      

HPI:                                                                                              

                                                                                             

17:05 This 79 yrs old  Female presents to ER via EMS with complaints of UTI.         cp  

17:05 The patient presents with urinary symptoms, dysuria, hematuria. Onset: The              cp  

      symptoms/episode began/occurred 1 month(s) ago. Associated signs and symptoms:              

      Pertinent negatives: constipation, fever, vomiting. Severity of symptoms: in the            

      emergency department the symptoms are unchanged, despite home interventions. Patient        

      reports urine sample was submitted to office of PCP but patient was never treated due       

      to sample getting lost at lab.                                                              

                                                                                                  

Historical:                                                                                       

- Allergies:                                                                                      

16:43 ambien;                                                                                 mg2 

- Home Meds:                                                                                      

17:26 acetaminophen-codeine 300-30 mg Oral tab 1 tab every 8 hrs [Active]; atorvastatin 10 mg mg2 

      Oral tab [Active]; benazepril 40 mg Oral tab 1 tab once daily [Active]; duloxetine 30       

      mg Oral cpDR 1 cap once daily [Active]; gabapentin 400 mg Oral cap 1 cap 3 times per        

      day [Active]; hydralazine 10 mg Oral tab 2 tab 3 TIMES A DAY [Active]; metformin 500 mg     

      Oral tab 1 tab 3 TIMES A DAY [Active]; Soliqua 100/33 20 units SC once at night             

      [Active]; spironolactone 25 mg Oral tab 1 tab 2 times per day [Active]; Cystex Plus         

      (methenamine-sodium salicylate) oral oral three times a day [Active];                       

- PMHx:                                                                                           

16:43 Diabetes - IDDM; Hyperlipidemia; Hypertension; AAA; CHF; OA;                            mg2 

- PSHx:                                                                                           

16:43 Cholecystectomy; Hernia repair;                                                         mg2 

                                                                                                  

- Immunization history:: Flu vaccine status is unknown.                                           

- Social history:: Smoking status: Patient/guardian denies using tobacco,                         

  Patient/guardian denies using alcohol, street drugs, IV drugs.                                  

- Ebola Screening: : No symptoms or risks identified at this time.                                

                                                                                                  

                                                                                                  

ROS:                                                                                              

17:10 Constitutional: Negative for body aches, chills, fever, poor PO intake.                 cp  

17:10 Eyes: Negative for injury, pain, redness, and discharge.                                cp  

17:10 Cardiovascular: Negative for chest pain, palpitations.                                      

17:10 Respiratory: Negative for cough, shortness of breath, wheezing.                             

17:10 Abdomen/GI: Positive for abdominal pain, of the right lower quadrant and left lower         

      quadrant, Negative for vomiting, diarrhea, constipation.                                    

17:10 : Positive for hematuria, burning with urination.                                         

17:10 Neuro: Negative for altered mental status, headache, weakness.                              

17:10 All other systems are negative.                                                             

                                                                                                  

Exam:                                                                                             

16:45 ECG was reviewed by the Attending Physician.                                            cp  

17:20 Constitutional: The patient appears in no acute distress, alert, awake,                 cp  

      non-diaphoretic, non-toxic, well developed, well nourished.                                 

17:20 Head/Face:  Normocephalic, atraumatic.                                                  cp  

17:20 Eyes: Periorbital structures: appear normal, Conjunctiva: normal, no exudate, no            

      injection, Sclera: no appreciated abnormality, Lids and lashes: appear normal,              

      bilaterally.                                                                                

17:20 ENT: External ear(s): are unremarkable, Nose: is normal, Mouth: Lips: moist, Oral           

      mucosa: pink and intact, moist, Posterior pharynx: is normal, airway is patent, no          

      erythema, no exudate.                                                                       

17:20 Chest/axilla: Inspection: normal, Palpation: is normal, no crepitus, no tenderness.         

17:20 Cardiovascular: Rate: normal, Rhythm: regular.                                              

17:20 Respiratory: the patient does not display signs of respiratory distress,  Respirations:     

      normal, no use of accessory muscles, no retractions, no splinting, no tachypnea,            

      labored breathing, is not present, Breath sounds: are clear throughout, no decreased        

      breath sounds, no stridor, no wheezing.                                                     

17:20 Abdomen/GI: Inspection: abdomen appears normal, Bowel sounds: active, all quadrants,        

      Palpation: soft, in all quadrants, moderate abdominal tenderness, in the right lower        

      quadrant and left lower quadrant, involuntary guarding, is not appreciated.                 

17:20 Back: pain, is absent.                                                                      

17:20 Neuro: Orientation: to person, place \T\ time. Mentation: is normal.                        

                                                                                                  

Vital Signs:                                                                                      

16:32  / 73; Pulse 89; Resp 14; Temp 98.1(O); Pulse Ox 95% on R/A; Weight 91.63 kg;     hb  

      Height 5 ft. 3 in. (160.02 cm); Pain 10/10;                                                 

18:00  / 88; Pulse 88; Resp 17; Temp 98; Pulse Ox 95% on R/A; Pain 0/10;                mg2 

18:00 Pulse 81; Resp 18; Temp 98; Pulse Ox 95% on R/A; Pain 0/10;                             mg2 

20:43  / 70; Pulse 80; Resp 17; Temp 98; Pulse Ox 95% on R/A; Pain 0/10;                mg2 

16:32 Body Mass Index 35.78 (91.63 kg, 160.02 cm)                                             hb  

                                                                                                  

MDM:                                                                                              

17:45 Patient medically screened.                                                             cp  

19:35 Data reviewed: vital signs, nurses notes, lab test result(s), radiologic studies, CT    cp  

      scan.                                                                                       

19:35 Counseling: I had a detailed discussion with the patient and/or guardian regarding: the cp  

      historical points, exam findings, and any diagnostic results supporting the                 

      discharge/admit diagnosis, lab results, radiology results, to return to the emergency       

      department if symptoms worsen or persist or if there are any questions or concerns that     

      arise at home. Response to treatment: the patient's symptoms have markedly improved         

      after treatment, and as a result, I will discharge patient.                                 

                                                                                                  

                                                                                             

17:23 Order name: Basic Metabolic Panel; Complete Time: 17:54                                 EDMS

                                                                                             

17:54 Interpretation: Normal except: ; GLUC 146; GFR 82.                                cp  

                                                                                             

17:23 Order name: Liver (Hepatic) Function; Complete Time: 17:54                              EDMS

                                                                                             

17:55 Interpretation: Normal except: AST 8; ALT 8; ALB 3.0; GLOB 4.2; A/G 0.7.                cp  

                                                                                             

17:23 Order name: Lipase; Complete Time: 17:54                                                EDMS

06                                                                                             

17:23 Order name: Creatinine (Radiology Only); Complete Time: 17:54                           EDMS

                                                                                             

17:24 Order name: CBC with Automated Diff; Complete Time: 17:54                               EDMS

                                                                                             

17:55 Interpretation: Normal except: WBC 12.7; HGB 10.6; HCT 33.6; MCHC 31.6; RDW 19.5; JONAS%  cp  

      83.5; LYM% 10.2; NEUT A 10.6.                                                               

                                                                                             

17:54 Order name: Procalcitonin; Complete Time: 19:07                                         cp  

                                                                                             

17:54 Order name: Lactate; Complete Time: 19:07                                               cp  

                                                                                             

17:54 Order name: Blood Culture Adult (2)                                                       

                                                                                             

17:01 Order name: IV Saline Lock; Complete Time: 17:01                                        mg2 

                                                                                             

17:01 Order name: Labs collected and sent; Complete Time: 17:01                               mg2 

                                                                                             

17:48 Order name: Cath; Complete Time: 17:48                                                  mg2 

                                                                                             

17:57 Order name: Urine Culture                                                                 

                                                                                             

17:58 Order name: Urine Dipstick--Ancillary (enter results); Complete Time: 19:07             ms  

                                                                                             

19:23 Interpretation: Normal except: UKET 1+; UBLD 3+; UPH 8.5; UPROT 3+; UESTR 3+.           cp  

                                                                                             

17:58 Order name: CT Stone Protocol; Complete Time: 19:07                                     cp  

                                                                                             

18:02 Order name: Urine Microscopic Only; Complete Time: 19:07                                EDMS

                                                                                             

19:23 Interpretation: Abnormal: UWBC >50; URBC TNTC; UBACT 20-50; SQEPI 5-10.                   

                                                                                             

18:17 Order name: EKG Electrocardiogram                                                       EDMS

                                                                                             

19:07 Order name: PO challenge; Complete Time: 19:19                                          cp  

                                                                                                  

EC:45 Rate is 89 beats/min. Rhythm is regular. AR interval is normal. QRS interval is normal. cp  

      QT interval is normal. Interpreted by me. Reviewed by me.                                   

                                                                                                  

Administered Medications:                                                                         

18:26 Drug: fentaNYL (PF) 25 mcg Route: IVP; Site: right forearm;                             mg2 

18:59 Follow up: Response: No adverse reaction; Marked relief of symptoms; Pain is decreased  mg2 

18:46 Drug: NS 0.9% 250 ml Route: IV; Rate: bolus; Site: right forearm;                       mg2 

19:00 Follow up: Response: No adverse reaction; IV Status: Completed infusion; IV Intake:     mg2 

      250ml                                                                                       

18:59 Drug: NS 0.9% 1000 ml Route: IV; Rate: 75 ml/hr; Site: right forearm;                   mg2 

20:15 Follow up: Response: No adverse reaction; IV Status: Order to discontinue infusion; IV  mg2 

      Intake: 150ml                                                                               

19:20 Drug: Rocephin 1 grams Route: IV; Rate: bolus; Site: right forearm;                     mg2 

20:15 Follow up: Response: No adverse reaction; IV Status: Completed infusion                 mg2 

20:16 Drug: LevaQUIN 500 mg Route: PO;                                                        mg2 

20:16 Follow up: Response: No adverse reaction; Medication administered at discharge.         mg2 

                                                                                                  

                                                                                                  

Disposition:                                                                                      

19 19:37 Discharged to Home. Impression: Urinary tract infection, site not specified.       

- Condition is Stable.                                                                            

- Discharge Instructions: Urinary Tract Infection, Adult.                                         

- Prescriptions for Levaquin 500 mg Oral Tablet - take 9 tablet by ORAL route once                

  daily for 10 days continue taking evening of 2019; 9 tablet.                               

- Medication Reconciliation Form, Thank You Letter, Antibiotic Education, Prescription            

  Opioid Use, SBAR form form.                                                                     

- Follow up: Private Physician; When: 2 - 3 days; Reason: Worsening of condition.                 

- Problem is new.                                                                                 

- Symptoms have improved.                                                                         

                                                                                                  

                                                                                                  

                                                                                                  

Addendum:                                                                                         

2019                                                                                        

     09:31 Co-signature as Attending Physician, Reyes Markham MD I agree with the assessment and  c
ha

           plan of care.                                                                          

                                                                                                  

Signatures:                                                                                       

Dispatcher [employer]                           Reyes Szymanski MD MD cha Page, Corey, PA                         PA   Tay Mccarthy, KEVYN                    RN   mg2                                                  

                                                                                                  

Corrections: (The following items were deleted from the chart)                                    

                                                                                             

18:00 17:55 BASIC METABOLIC PANEL+C.LAB.BRZ ordered. [facility]

18:00 17:55 HEPATIC FUNCTION+C.LAB.BRZ ordered. [facility]

18:00 17:55 LIPASE+C.LAB.BRZ ordered. [facility]

18:03 17:55 Creatinine for Radiology+C.LAB.BRZ ordered. [facility]

18:03 17:59 UA MICROSCOPIC+U.LAB.BRZ ordered. [facility]

19:27 17:55 CBC+H.LAB.BRZ ordered. [facility]

20:46 19:37 2019 19:37 Discharged to Home. Impression: Urinary tract infection, site    mg2 

      not specified. Condition is Stable. Forms are Medication Reconciliation Form, Thank You     

      Letter, Antibiotic Education, Prescription Opioid Use. Follow up: Private Physician;        

      When: 2 - 3 days; Reason: Worsening of condition. Problem is new. Symptoms have             

      improved. cp                                                                                

                                                                                                  

**************************************************************************************************

## 2019-06-13 NOTE — ER
Nurse's Notes                                                                                     

 [facility]                                                                 

Name: Gladys Ramos                                                                                    

Age: 79 yrs                                                                                       

Sex: Female                                                                                       

: 1940                                                                                   

MRN: [id number]                                                                                   

Arrival Date: 2019                                                                          

Time: 16:32                                                                                       

Account#: [id number]                                                                            

Bed 6                                                                                             

Private MD:                                                                                       

Diagnosis: Urinary tract infection, site not specified                                            

                                                                                                  

Presentation:                                                                                     

                                                                                             

16:35 Presenting complaint: EMS states: patient has been having UTI for a month now and they  mg2 

      cant figure out what antibiotic to give her. she has been having vaginal                    

      bleeding/hematuria since last night and she had 3-4 diaper change today.                    

      BP-170/90,irreuglar pulse, afebrile, BGL-197 mg/dl and saturating well at room air.         

      Transition of care: patient was not received from another setting of care. Onset of         

      symptoms was 2019. Risk Assessment: Do you want to hurt yourself or someone        

      else? Patient reports no desire to harm self or others. Initial Sepsis Screen: Does the     

      patient meet any 2 criteria? No. Patient's initial sepsis screen is negative. Does the      

      patient have a suspected source of infection? No. Patient's initial sepsis screen is        

      negative. Care prior to arrival: None.                                                      

16:35 Method Of Arrival: EMS: [facility]                                                mg2 

16:35 Acuity: ANATOLY 3                                                                           mg2 

                                                                                                  

Historical:                                                                                       

- Allergies:                                                                                      

16:43 ambien;                                                                                 mg2 

- Home Meds:                                                                                      

17:26 acetaminophen-codeine 300-30 mg Oral tab 1 tab every 8 hrs [Active]; atorvastatin 10 mg mg2 

      Oral tab [Active]; benazepril 40 mg Oral tab 1 tab once daily [Active]; duloxetine 30       

      mg Oral cpDR 1 cap once daily [Active]; gabapentin 400 mg Oral cap 1 cap 3 times per        

      day [Active]; hydralazine 10 mg Oral tab 2 tab 3 TIMES A DAY [Active]; metformin 500 mg     

      Oral tab 1 tab 3 TIMES A DAY [Active]; Soliqua 100/33 20 units SC once at night             

      [Active]; spironolactone 25 mg Oral tab 1 tab 2 times per day [Active]; Cystex Plus         

      (methenamine-sodium salicylate) oral oral three times a day [Active];                       

- PMHx:                                                                                           

16:43 Diabetes - IDDM; Hyperlipidemia; Hypertension; AAA; CHF; OA;                            mg2 

- PSHx:                                                                                           

16:43 Cholecystectomy; Hernia repair;                                                         mg2 

                                                                                                  

- Immunization history:: Flu vaccine status is unknown.                                           

- Social history:: Smoking status: Patient/guardian denies using tobacco,                         

  Patient/guardian denies using alcohol, street drugs, IV drugs.                                  

- Ebola Screening: : No symptoms or risks identified at this time.                                

                                                                                                  

                                                                                                  

Screenin:44 Abuse screen: Denies threats or abuse. Denies injuries from another. Nutritional        mg2 

      screening: No deficits noted. Tuberculosis screening: No symptoms or risk factors           

      identified. Fall Risk None identified.                                                      

                                                                                                  

Assessment:                                                                                       

17:08 General: Appears in no apparent distress. comfortable, Behavior is calm, cooperative.   mg2 

      Pain: Complains of pain in suprapubic area Pain does not radiate. Pain currently is 10      

      out of 10 on a pain scale. Quality of pain is described as aching, Pain began               

      gradually, 1 month ago. Neuro: Level of Consciousness is awake, alert, obeys commands,      

      Oriented to person, place, time, situation. Cardiovascular: Capillary refill < 3            

      seconds Patient's skin is warm and dry. Respiratory: Airway is patent Respiratory           

      effort is even, unlabored, Respiratory pattern is regular, symmetrical. GI: No signs        

      and/or symptoms were reported involving the gastrointestinal system. : Reports pain       

      in suprapubic area hematuria. EENT: No signs and/or symptoms were reported regarding        

      the EENT system. Derm: Skin is intact, is healthy with good turgor, Skin is pink, warm      

      \T\ dry. normal. Musculoskeletal: Circulation, motion, and sensation intact. Capillary      

      refill < 3 seconds.                                                                         

18:26 Reassessment: patient sent to ct via stretcher.                                         mg2 

19:22 Reassessment: Patient appears in no apparent distress at this time. Patient and/or      mg2 

      family updated on plan of care and expected duration. Pain level reassessed. Patient is     

      alert, oriented x 3, equal unlabored respirations, skin warm/dry/pink.                      

20:22 Reassessment: patient up for discharge. just waiting for the ems to come and pick her   mg2 

      up.                                                                                         

20:43 Reassessment: justa calzada came and pick her up. patient was stable, no complaints      mg2 

      noted, GCS 15/15.                                                                           

                                                                                                  

Vital Signs:                                                                                      

16:32  / 73; Pulse 89; Resp 14; Temp 98.1(O); Pulse Ox 95% on R/A; Weight 91.63 kg;     hb  

      Height 5 ft. 3 in. (160.02 cm); Pain 10/10;                                                 

18:00  / 88; Pulse 88; Resp 17; Temp 98; Pulse Ox 95% on R/A; Pain 0/10;                mg2 

18:00 Pulse 81; Resp 18; Temp 98; Pulse Ox 95% on R/A; Pain 0/10;                             mg2 

20:43  / 70; Pulse 80; Resp 17; Temp 98; Pulse Ox 95% on R/A; Pain 0/10;                mg2 

16:32 Body Mass Index 35.78 (91.63 kg, 160.02 cm)                                             hb  

                                                                                                  

ED Course:                                                                                        

16:32 Patient arrived in ED.                                                                  hb  

16:33 Arm band placed on.                                                                     hb  

16:35 Tay Kirk, RN is Primary Nurse.                                                  mg2 

16:38 Triage completed.                                                                       mg2 

17:08 No provider procedures requiring assistance completed. Inserted saline lock: 20 gauge   mg2 

      in right forearm, using aseptic technique. Blood collected.                                 

17:10 Patient has correct armband on for positive identification. Door closed. Warm blanket   mg2 

      given.                                                                                      

17:45 Reyes Nguyen PA is PHCP.                                                                cp  

17:45 Reyes Markham MD is Attending Physician.                                             cp  

17:48 Straight cath inserted, using sterile technique, Returned bloody urine. Patient         mg2 

      tolerated well.                                                                             

18:32 CT Stone Protocol In Process Unspecified.                                               EDMS

20:44 IV discontinued, intact, bleeding controlled, No redness/swelling at site. Pressure     mg2 

      dressing applied.                                                                           

                                                                                                  

Administered Medications:                                                                         

18:26 Drug: fentaNYL (PF) 25 mcg Route: IVP; Site: right forearm;                             mg2 

18:59 Follow up: Response: No adverse reaction; Marked relief of symptoms; Pain is decreased  mg2 

18:46 Drug: NS 0.9% 250 ml Route: IV; Rate: bolus; Site: right forearm;                       mg2 

19:00 Follow up: Response: No adverse reaction; IV Status: Completed infusion; IV Intake:     mg2 

      250ml                                                                                       

18:59 Drug: NS 0.9% 1000 ml Route: IV; Rate: 75 ml/hr; Site: right forearm;                   mg2 

20:15 Follow up: Response: No adverse reaction; IV Status: Order to discontinue infusion; IV  mg2 

      Intake: 150ml                                                                               

19:20 Drug: Rocephin 1 grams Route: IV; Rate: bolus; Site: right forearm;                     mg2 

20:15 Follow up: Response: No adverse reaction; IV Status: Completed infusion                 mg2 

20:16 Drug: LevaQUIN 500 mg Route: PO;                                                        mg2 

20:16 Follow up: Response: No adverse reaction; Medication administered at discharge.         mg2 

                                                                                                  

                                                                                                  

Intake:                                                                                           

19:00 IV: 250ml; Total: 250ml.                                                                mg2 

20:15 IV: 150ml; Total: 400ml.                                                                mg2 

                                                                                                  

Outcome:                                                                                          

19:37 Discharge ordered by MD.                                                                cp  

20:23 Condition: stable                                                                       mg2 

20:23 Discharge instructions given to patient, family, Instructed on discharge instructions,      

      follow up and referral plans. medication usage, Demonstrated understanding of               

      instructions, follow-up care, medications, Prescriptions given X 1.                         

20:44 Discharged to home via ambulance.                                                       mg2 

20:46 Patient left the ED.                                                                    mg2 

                                                                                                  

Signatures:                                                                                       

Dispatcher [employer]                           EDMS                                                 

Reyes Nguyen PA PA   cp                                                   

Raissa Villanueva RN                     RN                                                      

Tay Kirk RN                    RN   mg2                                                  

                                                                                                  

Corrections: (The following items were deleted from the chart)                                    

16:35 16:32  / 73; Pulse 89bpm; Resp 14bpm; Pulse Ox 95% RA; Temp 98.1F Oral; hb        hb  

20:23 20:22 Discharged to home mg2                                                            mg2 

20:46 20:43  / 70; Pulse 80bpm; Resp 8bpm; Pulse Ox 95% RA; Temp 98F; Pain 0/10; mg2    mg2 

                                                                                                  

**************************************************************************************************

## 2019-06-13 NOTE — XMS REPORT
Clinical Summary

 Created on:2019



Patient:Gladys Ramos

Sex:Female

:1940

External Reference #:[id number]





Demographics







 Address  [address] [place] [address]

 

 Home Phone  [phone]

 

 Mobile Phone  [phone]

 

 Home Phone  [phone]

 

 Mobile Phone  [phone]

 

 Email Address  [email or web address]

 

 Preferred Language  English

 

 Marital Status  

 

 [employer] Affiliation  Unknown

 

 Race  White

 

 Ethnic Group  Not  or 









Author







 Organization  [facility]

 

 Address  [address] [place] [address]









Support







 Name  Relationship  Address  Phone

 

 Ирина Banda  Child  Unavailable  [phone]









Care Team Providers







 Name  Role  Phone

 

 DONATO Dennis DO  Primary Care Provider  [phone]









Allergies







 Active Allergy  Reactions  Severity  Noted Date  Comments

 

 No Known Drug Allergies  Other (See Comments)    2016  







Medications







 Medication  Sig  Dispensed  Refills  Start Date  End Date  Status

 

 blood sugar  Check four  400 strip  10  2017    Active



 diagnostic strips  strips daily          



 (FREESTYLE LITE            



 STRIPS) strip test            



 strips            

 

 lancets 28 gauge  Check 4 times  400 each  10  2017    Active



 misc  daily          

 

 pen needle,  Use once daily  100 each  10  2017    Active



 diabetic (BD            



 INSULIN PEN NEEDLE            



 UF SHORT) 31 gauge            



 x 5/16" needle            

 

 amLODIPine  Take 1 tablet  90 tablet  0  2018    Active



 (NORVASC) 10 mg  by mouth daily          



 tablet  -hold if sbp          



   less than 100          



   and dbp is          



   less than 70          

 

 benazepril  Take 1 tablet  90 tablet  1  2019    Active



 (LOTENSIN) 40 MG  by mouth daily          



 tablet            

 

 atorvastatin  Take 1 tablet  90 tablet  1  2019    Active



 (LIPITOR) 10 MG  by mouth daily          



 tablet            

 

 carvedilol (COREG)  Take 1 tablet  180 tablet  1  2019    Active



 6.25 MG tablet  by mouth twice          



   a day with          



   meals          

 

 predniSONE  Take 10 mg by    0      Active



 (DELTASONE) 10 mg  mouth 2 (two)          



 tablet  times a day.          

 

 amLODIPine  Take 1 tablet  90 tablet  1  2019    Active



 (NORVASC) 10 mg  by mouth daily          



 tablet  -hold if sbp          



   is less than          



   100 and dbp is          



   less than 70          

 

 atorvastatin  Take 1 tablet  90 tablet  1  2019    Active



 (LIPITOR) 10 MG  (10 mg total)          



 tablet  by mouth          



   daily.          

 

 benazepril  Take 1 tablet  90 tablet  1  2019    Active



 (LOTENSIN) 40 MG  (40 mg total)          



 tablet  by mouth          



   daily.          

 

 carvedilol (COREG)  Take 1 tablet  180 tablet  1  2019    Active



 6.25 MG tablet  (6.25 mg          



   total) by          



   mouth 2 (two)          



   times a day          



   with meals.          

 

 gabapentin  Take 1 capsule  270 capsule  1  2019    Active



 (NEURONTIN) 400 mg  (400 mg total)          



 capsule  by mouth 3          



   (three) times          



   a day.          

 

 hydrALAZINE  Take 2 tablets  540 tablet  0  2019    Active



 (APRESOLINE) 10 MG  (20 mg total)          



 tablet  by mouth 3          



   (three) times          



   a day.          

 

 DULoxetine  Take 1 capsule  90 capsule  1  2019    Active



 (CYMBALTA) 20 MG  (20 mg total)          



 capsule  by mouth          



   daily.          

 

 insulin  Inject 30  10 Syringe  1  2019    Active



 glargine-lixisenati  Units under          



 de (SOLIQUA 100/33)  the skin          



 100 unit-33 mcg/mL  daily.          



 insulin pen            

 

 metFORMIN  Take 1 tablet  270 tablet  1  2019    Active



 (GLUCOPHAGE) 500 mg  (500 mg total)          



 tablet  by mouth 3          



   (three) times          



   a day.          

 

 zinc oxide 20 %  Apply  56.7 g  1  2019  Active



 ointment  topically as        0  



   needed for          



   irritation.          

 

 soft lens  20 mL daily.  300 mL  2  2019    Active



 rinse,store            



 solution (SALINE            



 SOLUTION) solution            

 

 spironolactone  Take 1 tablet  180 tablet  11  2019  Active



 (ALDACTONE) 25 MG  (25 mg total)        0  



 tablet  by mouth 2          



   (two) times a          



   day.          

 

 hydrocolloid  Apply 1  60 each  3  2019    Active



 dressing (DUODERM  Package          



 CGF BORDER  topically 2          



 DRESSING) 4 X 4 "  (two) times a          



 bandage  day.          

 

 amoxicillin  Take 1 capsule  30 capsule  0  2019  Active



 (AMOXIL) 500 MG  (500 mg total)        9  



 capsule  by mouth 3          



   (three) times          



   a day for 10          



   days.          

 

 insulin  Inject 30  10 Syringe  1  2017  Discontinued



 glargine-lixisenati  Units under        9  



 de (SOLIQUA 100/33)  the skin          



 100 unit-33 mcg/mL  daily.          



 insulin pen            

 

 nitrofurantoin,  Take 1 capsule  14 capsule  0  2018  10/03/201  
Discontinued



 macrocrystal-monohy  (100 mg total)        8  



 drate, (MACROBID)  by mouth 2          



 100 MG capsule  (two) times a          



   day for 7          



   days.          

 

 gabapentin  Take 1 capsule  270 capsule  0  2018  Discontinued



 (NEURONTIN) 400 mg  by mouth three        8  



 capsule  times a day          

 

 hydrALAZINE  Take 2 tablets  540 tablet  0  2018  Discontinued



 (APRESOLINE) 10 MG  by mouth three        8  



 tablet  times daily          

 

 acetaminophen-codei  Take 1 tablet  90 tablet  0  2018  
Discontinued



 ne (TYLENOL WITH  by mouth every        8  



 CODEINE #3) 300-30  8 hours as          



 mg per tablet  needed for          



   MODERATE PAIN          

 

 carvedilol (COREG)  Take 1 tablet  180 tablet  0  2018  
Discontinued



 6.25 MG tablet  by mouth twice        8  



   a day with          



   meals          

 

 amLODIPine  Take 1 tablet  90 tablet  0  2018  Discontinued



 (NORVASC) 10 mg  by mouth daily        8  



 tablet  -hold if sbp          



   less than 100          



   and dbp is          



   less than 70          

 

 citalopram (CeleXA)  Take 1 tablet  90 tablet  0  2018  
Discontinued



 20 MG tablet  by mouth daily        8  

 

 benazepril  Take 1 tablet  90 tablet  0  2018  Discontinued



 (LOTENSIN) 40 MG  by mouth daily        8  



 tablet            

 

 atorvastatin  Take 1 tablet  90 tablet  0  2018  Discontinued



 (LIPITOR) 10 MG  by mouth daily        8  



 tablet            

 

 levoFLOXacin  Take 1 tablet  10 tablet  0  2018  



 (LEVAQUIN) 500 MG  (500 mg total)        8  



 tablet  by mouth daily          



   for 7 days.          

 

 DULoxetine  Take 1 capsule  90 capsule  11  2018  Discontinued



 (CYMBALTA) 30 MG  (30 mg total)        8  



 capsule  by mouth          



   daily.          

 

 metFORMIN  Take 500 mg by    0      Discontinued



 (GLUCOPHAGE) 500 mg  mouth 3        9  



 tablet  (three) times          



   a day.          

 

 DULoxetine  Take 20 mg by    0      Discontinued



 (CYMBALTA) 20 MG  mouth daily.        9  



 capsule            

 

 hydrALAZINE  Take 2 tablets  540 tablet  0  2018  Discontinued



 (APRESOLINE) 10 MG  by mouth three        8  



 tablet  times daily          

 

 benazepril  Take 1 tablet  90 tablet  0  2018  Discontinued



 (LOTENSIN) 40 MG  by mouth daily        9  



 tablet            

 

 carvedilol (COREG)  Take 1 tablet  180 tablet  0  2018  
Discontinued



 6.25 MG tablet  by mouth twice        9  



   a day with          



   meals          

 

 gabapentin  Take 1 capsule  270 capsule  0  2018  Discontinued



 (NEURONTIN) 400 mg  by mouth three        8  



 capsule  times a day          

 

 acetaminophen-codei  Take 1 tablet  90 tablet  0  2018  




 ne (TYLENOL WITH  by mouth every        8  



 CODEINE #3) 300-30  8 hours as          



 mg per tablet  needed for          



   MODERATE PAIN          

 

 atorvastatin  Take 1 tablet  90 tablet  0  2018  Discontinued



 (LIPITOR) 10 MG  by mouth daily        9  



 tablet            

 

 nitrofurantoin,  Take 1 capsule  14 capsule  0  10/03/2018  10/10/201  



 macrocrystal-monohy  (100 mg total)        8  



 drate, (MACROBID)  by mouth 2          



 100 MG capsule  (two) times a          



   day for 7          



   days.          

 

 atorvastatin  Take 1 tablet  90 tablet  0  2018  Discontinued



 (LIPITOR) 10 MG  by mouth daily        9  



 tablet            

 

 benazepril  Take 1 tablet  90 tablet  0  2018  Discontinued



 (LOTENSIN) 40 MG  by mouth daily        9  



 tablet            

 

 gabapentin  Take 1 capsule  270 capsule  0  2018  Discontinued



 (NEURONTIN) 400 mg  by mouth three        9  



 capsule  times daily          

 

 hydrALAZINE  Take 2 tablets  540 tablet  0  2018  Discontinued



 (APRESOLINE) 10 MG  by mouth three        9  



 tablet  times daily          

 

 acetaminophen-codei  Take 1 tablet  90 tablet  0  2018  




 ne (TYLENOL WITH  by mouth every        8  



 CODEINE #3) 300-30  8 hours as          



 mg per tablet  needed for          



   MODERATE PAIN          

 

 carvedilol (COREG)  Take 1 tablet  180 tablet  0  2018  
Discontinued



 6.25 MG tablet  by mouth twice        9  



   a day with          



   meals          

 

 amLODIPine  Take 1 tablet  90 tablet  0  2018  Discontinued



 (NORVASC) 10 mg  by mouth daily        9  



 tablet  -hold if sbp          



   less than 100          



   and dbp is          



   less than 70          

 

 hydrALAZINE  Take 2 tablets  540 tablet  0  2019  Discontinued



 (APRESOLINE) 10 MG  by mouth three        9  



 tablet  times daily          

 

 acetaminophen-codei  Take 1 tablet  90 tablet  0  2019  
Discontinued



 ne (TYLENOL WITH  by mouth every        9  



 CODEINE #3) 300-30  8 hours as          



 mg per tablet  needed for          



   MODERATE PAIN          

 

 amLODIPine  Take 1 tablet  90 tablet  1  2019  Discontinued



 (NORVASC) 10 mg  by mouth daily        9  



 tablet  -hold if sbp          



   is less than          



   100 and dbp is          



   less than 70          

 

 gabapentin  Take 1 capsule  270 capsule  1  2019  Discontinued



 (NEURONTIN) 400 mg  by mouth three        9  



 capsule  times daily          

 

 nitrofurantoin,  Take 1 capsule  14 capsule  0  2019  



 macrocrystal-monohy  (100 mg total)        9  



 drate, (MACROBID)  by mouth 2          



 100 MG capsule  (two) times a          



   day for 7          



   days.          

 

 SPIRONOLACTONE ORAL  Take 25 mg by    0      Discontinued



   mouth 2 (two)        9  



   times a day.          

 

 acetaminophen-codei  Take 1 tablet  90 tablet  0  2019  




 ne (TYLENOL WITH  by mouth every        9  



 CODEINE #3) 300-30  8 hours as          



 mg per tablet  needed for          



   MODERATE PAIN          

 

 spironolactone  Take 1 tablet  60 tablet  11  2019  
Discontinued



 (ALDACTONE) 25 MG  (25 mg total)        9  



 tablet  by mouth 2          



   (two) times a          



   day.          







Active Problems







 Problem  Noted Date

 

 Does not transfer from bed to wheelchair  2019

 

 Hallucinations  2018

 

 Tremor of both hands  2018

 

 Administrative encounter  2017

 

 Fall  2016

 

 Leg injuries  2016

 

 Abrasion  2016

 

 Cellulitis of left lower extremity  2016

 

 Acute cystitis without hematuria  2016

 

 Urinary tract infection associated with catheterization of urinary tract  

 

 Anxiety  2016

 

 Arthritis of knee  2016

 

 Dysuria  2016

 

 Edema of lower extremity  2016

 

 Hip pain  2016

 

 Hypertension  2016

 

 Knee pain  2016

 

 Neuropathy  2016

 

 Polyuria  2016

 

 Toothache  2016

 

 Type 2 diabetes mellitus with hypoglycemia without coma, with long-term  2016



 current use of insulin  

 

 Unsteady gait  2016

 

 Vitamin D deficiency  2016

 

 Murmur  2016









 Overview: 







 "aortic aneurysm"







Encounters







 Date  Type  Specialty  Care Team  Description

 

 2019  Telephone  Family Medicine  LeggettKoki lovett,  



       LVN  

 

 2019  Orders Only  Family Medicine  Lingamfelter, R  



       Juan Carlos, DO  

 

 2019  Telephone  Family Medicine  Leggett, Koki,  



       LVN  

 

 2019  Orders Only  Family Medicine  Lingamfelter, R  Recurrent UTI



       Juan Carlos, DO  

 

 2019  Patient Outreach  Linda Mistry  

 

 2019  Orders Only  Family Medicine  Lingamfelter, R  Recurrent UTI (
Primary



       Juan Carlos, DO  Dx)

 

 2019  Telephone  Family Medicine  Leggett, Koki,  



       LVN  

 

 2019  Telephone  Family Medicine  Keiryamfelter, R  



       Juan Carlos, DO  

 

 2019  Telephone  Family Medicine  Leggett, Koki,  



       LVN  

 

 2019  Orders Only  Family Medicine  Lingamfelter, R  



       Juan Carlos, DO  

 

 2019  Telephone  Family Medicine  Leggett, Koki,  



       LVN  

 

 2019  Telephone  Family Medicine  Leggett, Koki,  



       LVN  

 

 05/10/2019  Orders Only  Family Medicine  Lingamfelter, R  Recurrent UTI



       Juan Carlos, DO  

 

 2019  Telephone  Internal Medicine  Samm Patel MA  

 

 2019  Orders Only  Family Medicine  Lingamfelter, R  



       Juan Carlos, DO  

 

 2019  Telephone  Family Medicine  Leggett, Koki,  



       LVN  

 

 2019  Office Visit  Family Medicine  Lingamfelter, R  Congestive heart 
failure, unspecified HF chronicity, unspecified heart failure type (HCC) (
Primary Dx);



       Juan Carlos, DO  Type 2 diabetes mellitus with hypoglycemia without coma, with 
long-term current use of insulin (HCC);



         Shortness of breath;



         Depression, unspecified depression type;



         Unsteady gait

 

 2019  Telephone  Family Medicine  Leggett, Koki,  



       LVN  

 

 2019  Orders Only  Family Medicine  Lingamfelter, R  Recurrent UTI



       Juan Carlos, DO  

 

 03/15/2019  Orders Only  Family Medicine  Lingamfelter, R  Recurrent UTI



       Juan Carlos, DO  

 

 02/15/2019  Orders Only  Family Medicine  Lingamfelter, R  Recurrent UTI



       Juan Carlos, DO  

 

 2019  Orders Only  Family Medicine  Lingamfelter, R  



       Juan Carlos, DO  

 

 2019  Orders Only  Family Medicine  Lingamfelter, R  



       Juan Carlos, DO  

 

 2019  Telephone  Family Medicine  Koki Leggett,  



       LVN  

 

 2019  Refill  Endocrinology  Aniyah Sanchez MD  

 

 2019  Refill  Family Medicine  Lingamfelter, R  



       Juan Carlos, DO  

 

 2019  Orders Only  Family Medicine  Lingamfelter, R  Recurrent UTI



       Juan Carlos, DO  

 

 2018  Orders Only  Family Medicine  Lingamfelter, R  Recurrent UTI



       Juan Carlos, DO  

 

 2018  Telephone  Family Medicine  LeggettKoki lovett,  



       LVN  

 

 2018  Patient Outreach  Quality  [place], Linda  

 

 2018  Orders Only  Family Medicine  Lingamfelter, R  Recurrent UTI



       Juan Carlos, DO  

 

 2018  Refill  Endocrinology  Aniyah Sanchez MD  

 

 2018  Refill  Family Medicine  Lingamfelter, R  



       Juan Carlos, DO  

 

 2018  Refill  Family Medicine  Lingamfelter, R  



       Juan Carlos, DO  

 

 2018  Patient Outreach  Quality  Jaren, Linda  

 

 10/26/2018  Orders Only  Family Medicine  Lingamfelter, R  Recurrent UTI



       Juan Carlos, DO  

 

 10/25/2018  Telephone  Orthopedic Surgery  Renay Vargas MA  

 

 10/17/2018  Orders Only  Family Medicine  Lingamfelter, R  



       Juan Carlos, DO  

 

 10/03/2018  Orders Only  Family Medicine  Lingamfelter, R  



       Juan Carlos, DO  

 

 2018  Orders Only  Family Medicine  Lingamfelter, R  Recurrent UTI



       Juan Carlos, DO  

 

 2018  Telephone  Family Medicine  LeggettPatito lovettKoki,  



       LVN  

 

 2018  Orders Only  Family Medicine  Lingamfelter, R  



       Juan Carlos, DO  

 

 2018  Telephone  Internal Medicine  Lingamfelter, R  



       Juan Carlos, DO  

 

 2018  Refill  Family Medicine  Lingamfelter, R  



       Juan Carlos, DO  

 

 2018  Telephone  Family Medicine  Patito Leggettricia,  



       LVN  

 

 2018  Telephone  Family Medicine  Koki Leggett,  



       LVN  

 

 08/15/2018  Orders Only  Family Medicine  Lingamfelter, R  Arthralgia of hip,



       Juan Carlos, DO  unspecified laterality



         (Primary Dx)

 

 2018  Telephone  Family Medicine  Koki Leggett,  



       LVN  

 

 2018  Patient Outreach  Quality  Linda Eastman  

 

 2018  Orders Only  Family Medicine  DONATO Dennis  Recurrent UTI (
Primary



       Juan Carlos, DO  Dx)

 

 2018  Telephone  Family Medicine  Rajesh Leggettia,  



       LVN  

 

 2018  Telephone  Family Medicine  LeggettKoki lovett,  



       LVN  

 

 2018  Patient Outreach  Quality  Adal, Carolann  

 

 2018  Telephone  Family Medicine  Jeannie R  



       Juan Carlos, DO  

 

 2018  Office Visit  Family Medicine  DONATO Dennis  Hallucinations (
Primary Dx);



       DO Juan Carlos  Tremor of both hands;



         Type 2 diabetes mellitus with hypoglycemia without coma, with long-
term current use of insulin

 

 2018  Patient Outreach  Quality  Caitlyn Lopez RN  

 

 2018  Patient Outreach  Quality  Nadiya Mittal  

 

 2018  Patient Outreach  Quality  Carolann Galeas  

 

 2018  Telephone  Family Medicine  Koki Leggett,  



       LVN  

 

 2018  Telephone  Internal Medicine  DONATO Dennis, DO  

 

 2018  Telephone  Family Medicine  Patito Leggettricia,  



       LVN  

 

 2018  Telephone  Family Medicine  Koki Leggett,  



       LVN  

 

 2018  Office Visit  Family Medicine  DONATO Dennis  General weakness (
Primary Dx);



       DO Juan Carlos  Leg weakness, bilateral

 

 2018  Orders Only  Family Medicine  Rachelfedereck R  



       Juan Carlos, DO  

 

 2018  Orders Only  Family Medicine  Jeannie R  Pain of both hip



       DO Juan Carlos  joints (Primary Dx)



after 2018



Immunizations







 Name  Dates Previously Given  Next Due

 

 FLUZONE HIGH-DOSE PF  10/06/2016, 10/15/2015  

 

 Influenza (IM) Preservative Free  10/01/2017  







Family History







 Medical History  Relation  Name  Comments

 

 Heart disease  Father    

 

 Hypertension  Father    

 

 Diabetes  Maternal Grandmother    









 Relation  Name  Status  Comments

 

 Father      

 

 Maternal Grandmother      







Social History







 Tobacco Use  Types  Packs/Day  Years Used  Date

 

 Never Smoker        









 Smokeless Tobacco: Never Used      









 Alcohol Use  Drinks/Week  oz/Week  Comments

 

 No      









 Sex Assigned at Birth  Date Recorded

 

 Not on file  









 Job Start Date  Occupation  Industry

 

 Not on file  Not on file  Not on file









 Travel History  Travel Start  Travel End









 No recent travel history available.







Last Filed Vital Signs







 Vital Sign  Reading  Time Taken

 

 Blood Pressure  120/73  2019  3:40 PM CDT

 

 Pulse  76  2019  3:40 PM CDT

 

 Temperature  36.8 C (98.2 F)  2019  3:40 PM CDT

 

 Respiratory Rate  18  2018  1:46 PM CDT

 

 Oxygen Saturation  98%  2019  3:40 PM CDT

 

 Inhaled Oxygen Concentration  -  -

 

 Weight  91.6 kg (202 lb)  2019  3:40 PM CDT

 

 Height  160 cm (5' 3")  2019  3:40 PM CDT

 

 Body Mass Index  35.78  2019  3:40 PM CDT







Plan of Treatment







 Health Maintenance  Due Date  Last Done  Comments

 

 DIABETIC RETINAL EYE EXAM  1940    

 

 DIABETIC FOOT EXAM  1949    

 

 SHINGLES VACCINES (#1)  1989    

 

 65+ PNEUMOCOCCAL VACCINE (1 of 2 - PCV13)  2004    

 

 URINE MICROALBUMIN  08/15/2018  08/15/2017  

 

 INFLUENZA VACCINE  2019  10/06/2016, 10/15/2015  







Results

Not on fileafter 2018



Insurance







 Payer  Benefit Plan /  Subscriber ID  Effective Dates  Phone  Address  Type



   Group          

 

 [facility]  xxxxxxxxxxx  2004-Present      [employer]

 

 MEDICARE  MEDICARE PART A  xxxxxxxxxx  2004-Present    [place], [place]  
Medicare



   AND B          









 Guarantor Name  Account Type  Relation to  Date of Birth  Phone  Billing



     Patient      Address

 

 Gladys Ramos  Personal/Family  Self  1940  [phone]  [address] [place] [address]







         ([place])  [place] [address]







Advance Directives

Patient has advance care planning documents on file. For more information, 
please contact:[place] [address]

## 2019-06-14 NOTE — EKG
Test Date:    2019-06-13               Test Time:    16:38:02

Technician:   ELVIA                                     

                                                     

MEASUREMENT RESULTS:                                       

Intervals:                                           

Rate:         89                                     

DC:           200                                    

QRSD:         90                                     

QT:           388                                    

QTc:          472                                    

Axis:                                                

P:            90                                     

DC:           200                                    

QRS:          54                                     

T:            65                                     

                                                     

INTERPRETIVE STATEMENTS:                                       

                                                     

Sinus rhythm with premature atrial complexes

Otherwise normal ECG

Compared to ECG 04/06/2019 20:31:31

First degree AV block no longer present

T-wave abnormality no longer present

Prolonged QT interval no longer present



Electronically Signed On 06-14-19 08:03:13 CDT by Adis Keen

## 2019-06-18 ENCOUNTER — HOSPITAL ENCOUNTER (INPATIENT)
Dept: HOSPITAL 97 - ER | Age: 79
LOS: 4 days | Discharge: HOME HEALTH | DRG: 689 | End: 2019-06-22
Attending: FAMILY MEDICINE | Admitting: INTERNAL MEDICINE
Payer: COMMERCIAL

## 2019-06-18 VITALS — BODY MASS INDEX: 32.8 KG/M2

## 2019-06-18 DIAGNOSIS — I10: ICD-10-CM

## 2019-06-18 DIAGNOSIS — E83.42: ICD-10-CM

## 2019-06-18 DIAGNOSIS — J44.9: ICD-10-CM

## 2019-06-18 DIAGNOSIS — Z79.4: ICD-10-CM

## 2019-06-18 DIAGNOSIS — E66.9: ICD-10-CM

## 2019-06-18 DIAGNOSIS — E78.2: ICD-10-CM

## 2019-06-18 DIAGNOSIS — E11.9: ICD-10-CM

## 2019-06-18 DIAGNOSIS — G92: ICD-10-CM

## 2019-06-18 DIAGNOSIS — B95.2: ICD-10-CM

## 2019-06-18 DIAGNOSIS — N39.0: Primary | ICD-10-CM

## 2019-06-18 LAB
ALBUMIN SERPL BCP-MCNC: 2.8 G/DL (ref 3.4–5)
ALP SERPL-CCNC: 58 U/L (ref 45–117)
ALT SERPL W P-5'-P-CCNC: 8 U/L (ref 12–78)
AST SERPL W P-5'-P-CCNC: 7 U/L (ref 15–37)
BUN BLD-MCNC: 29 MG/DL (ref 7–18)
CKMB CREATINE KINASE MB: 1.1 NG/ML (ref 0.3–3.6)
GLUCOSE SERPLBLD-MCNC: 108 MG/DL (ref 74–106)
HCT VFR BLD CALC: 29.9 % (ref 36–45)
INR BLD: 1.11
LIPASE SERPL-CCNC: 48 U/L (ref 73–393)
LYMPHOCYTES # SPEC AUTO: 1.1 K/UL (ref 0.7–4.9)
PMV BLD: 8.8 FL (ref 7.6–11.3)
POTASSIUM SERPL-SCNC: 4.5 MMOL/L (ref 3.5–5.1)
RBC # BLD: 3.43 M/UL (ref 3.86–4.86)
TROPONIN (EMERG DEPT USE ONLY): < 0.02 NG/ML (ref 0–0.04)
UA COMPLETE W REFLEX CULTURE PNL UR: (no result)
URINE COARSE GRANULAR CASTS: (no result) /LPF

## 2019-06-18 PROCEDURE — 93005 ELECTROCARDIOGRAM TRACING: CPT

## 2019-06-18 PROCEDURE — 71045 X-RAY EXAM CHEST 1 VIEW: CPT

## 2019-06-18 PROCEDURE — 82553 CREATINE MB FRACTION: CPT

## 2019-06-18 PROCEDURE — 87086 URINE CULTURE/COLONY COUNT: CPT

## 2019-06-18 PROCEDURE — 87186 SC STD MICRODIL/AGAR DIL: CPT

## 2019-06-18 PROCEDURE — 96365 THER/PROPH/DIAG IV INF INIT: CPT

## 2019-06-18 PROCEDURE — 70450 CT HEAD/BRAIN W/O DYE: CPT

## 2019-06-18 PROCEDURE — 85730 THROMBOPLASTIN TIME PARTIAL: CPT

## 2019-06-18 PROCEDURE — 80048 BASIC METABOLIC PNL TOTAL CA: CPT

## 2019-06-18 PROCEDURE — 96366 THER/PROPH/DIAG IV INF ADDON: CPT

## 2019-06-18 PROCEDURE — 96361 HYDRATE IV INFUSION ADD-ON: CPT

## 2019-06-18 PROCEDURE — 81003 URINALYSIS AUTO W/O SCOPE: CPT

## 2019-06-18 PROCEDURE — 83605 ASSAY OF LACTIC ACID: CPT

## 2019-06-18 PROCEDURE — 99285 EMERGENCY DEPT VISIT HI MDM: CPT

## 2019-06-18 PROCEDURE — 87040 BLOOD CULTURE FOR BACTERIA: CPT

## 2019-06-18 PROCEDURE — 80076 HEPATIC FUNCTION PANEL: CPT

## 2019-06-18 PROCEDURE — 82565 ASSAY OF CREATININE: CPT

## 2019-06-18 PROCEDURE — 87088 URINE BACTERIA CULTURE: CPT

## 2019-06-18 PROCEDURE — 82550 ASSAY OF CK (CPK): CPT

## 2019-06-18 PROCEDURE — 83690 ASSAY OF LIPASE: CPT

## 2019-06-18 PROCEDURE — 85610 PROTHROMBIN TIME: CPT

## 2019-06-18 PROCEDURE — 96375 TX/PRO/DX INJ NEW DRUG ADDON: CPT

## 2019-06-18 PROCEDURE — 84145 PROCALCITONIN (PCT): CPT

## 2019-06-18 PROCEDURE — 81015 MICROSCOPIC EXAM OF URINE: CPT

## 2019-06-18 PROCEDURE — 82962 GLUCOSE BLOOD TEST: CPT

## 2019-06-18 PROCEDURE — 80202 ASSAY OF VANCOMYCIN: CPT

## 2019-06-18 PROCEDURE — 94760 N-INVAS EAR/PLS OXIMETRY 1: CPT

## 2019-06-18 PROCEDURE — 85025 COMPLETE CBC W/AUTO DIFF WBC: CPT

## 2019-06-18 PROCEDURE — 36415 COLL VENOUS BLD VENIPUNCTURE: CPT

## 2019-06-18 PROCEDURE — 84484 ASSAY OF TROPONIN QUANT: CPT

## 2019-06-18 PROCEDURE — 83735 ASSAY OF MAGNESIUM: CPT

## 2019-06-18 PROCEDURE — 87077 CULTURE AEROBIC IDENTIFY: CPT

## 2019-06-18 NOTE — XMS REPORT
Clinical Summary

 Created on:2019



Patient:Gladys Ramos

Sex:Female

:1940

External Reference #:[id number]





Demographics







 Address  [address] [place] [address]

 

 Home Phone  [phone]

 

 Mobile Phone  [phone]

 

 Home Phone  [phone]

 

 Mobile Phone  [phone]

 

 Email Address  [email or web address]

 

 Preferred Language  English

 

 Marital Status  

 

 Christian Affiliation  Unknown

 

 Race  White

 

 Ethnic Group  Not  or 









Author







 Organization  [facility]

 

 Address  [address] [place] [address]









Support







 Name  Relationship  Address  Phone

 

 Ирина Banda  Child  Unavailable  [phone]









Care Team Providers







 Name  Role  Phone

 

 DONATO Dennis DO  Primary Care Provider  [phone]









Allergies







 Active Allergy  Reactions  Severity  Noted Date  Comments

 

 No Known Drug Allergies  Other (See Comments)    2016  







Medications







 Medication  Sig  Dispensed  Refills  Start Date  End Date  Status

 

 blood sugar  Check four  400 strip  10  2017    Active



 diagnostic strips  strips daily          



 (FREESTYLE LITE            



 STRIPS) strip test            



 strips            

 

 lancets 28 gauge  Check 4 times  400 each  10  2017    Active



 misc  daily          

 

 pen needle,  Use once daily  100 each  10  2017    Active



 diabetic (BD            



 INSULIN PEN NEEDLE            



 UF SHORT) 31 gauge            



 x 5/16" needle            

 

 amLODIPine  Take 1 tablet  90 tablet  0  2018    Active



 (NORVASC) 10 mg  by mouth daily          



 tablet  -hold if sbp          



   less than 100          



   and dbp is          



   less than 70          

 

 benazepril  Take 1 tablet  90 tablet  1  2019    Active



 (LOTENSIN) 40 MG  by mouth daily          



 tablet            

 

 atorvastatin  Take 1 tablet  90 tablet  1  2019    Active



 (LIPITOR) 10 MG  by mouth daily          



 tablet            

 

 carvedilol (COREG)  Take 1 tablet  180 tablet  1  2019    Active



 6.25 MG tablet  by mouth twice          



   a day with          



   meals          

 

 predniSONE  Take 10 mg by    0      Active



 (DELTASONE) 10 mg  mouth 2 (two)          



 tablet  times a day.          

 

 amLODIPine  Take 1 tablet  90 tablet  1  2019    Active



 (NORVASC) 10 mg  by mouth daily          



 tablet  -hold if sbp          



   is less than          



   100 and dbp is          



   less than 70          

 

 atorvastatin  Take 1 tablet  90 tablet  1  2019    Active



 (LIPITOR) 10 MG  (10 mg total)          



 tablet  by mouth          



   daily.          

 

 benazepril  Take 1 tablet  90 tablet  1  2019    Active



 (LOTENSIN) 40 MG  (40 mg total)          



 tablet  by mouth          



   daily.          

 

 carvedilol (COREG)  Take 1 tablet  180 tablet  1  2019    Active



 6.25 MG tablet  (6.25 mg          



   total) by          



   mouth 2 (two)          



   times a day          



   with meals.          

 

 gabapentin  Take 1 capsule  270 capsule  1  2019    Active



 (NEURONTIN) 400 mg  (400 mg total)          



 capsule  by mouth 3          



   (three) times          



   a day.          

 

 hydrALAZINE  Take 2 tablets  540 tablet  0  2019    Active



 (APRESOLINE) 10 MG  (20 mg total)          



 tablet  by mouth 3          



   (three) times          



   a day.          

 

 DULoxetine  Take 1 capsule  90 capsule  1  2019    Active



 (CYMBALTA) 20 MG  (20 mg total)          



 capsule  by mouth          



   daily.          

 

 insulin  Inject 30  10 Syringe  1  2019    Active



 glargine-lixisenati  Units under          



 de (SOLIQUA 100/33)  the skin          



 100 unit-33 mcg/mL  daily.          



 insulin pen            

 

 metFORMIN  Take 1 tablet  270 tablet  1  2019    Active



 (GLUCOPHAGE) 500 mg  (500 mg total)          



 tablet  by mouth 3          



   (three) times          



   a day.          

 

 zinc oxide 20 %  Apply  56.7 g  1  2019  Active



 ointment  topically as        0  



   needed for          



   irritation.          

 

 soft lens  20 mL daily.  300 mL  2  2019    Active



 rinse,store            



 solution (SALINE            



 SOLUTION) solution            

 

 spironolactone  Take 1 tablet  180 tablet  11  2019  Active



 (ALDACTONE) 25 MG  (25 mg total)        0  



 tablet  by mouth 2          



   (two) times a          



   day.          

 

 hydrocolloid  Apply 1  60 each  3  2019    Active



 dressing (DUODERM  Package          



 CGF BORDER  topically 2          



 DRESSING) 4 X 4 "  (two) times a          



 bandage  day.          

 

 amoxicillin  Take 1 capsule  30 capsule  0  2019  Active



 (AMOXIL) 500 MG  (500 mg total)        9  



 capsule  by mouth 3          



   (three) times          



   a day for 10          



   days.          

 

 insulin  Inject 30  10 Syringe  1  2017  Discontinued



 glargine-lixisenati  Units under        9  



 de (SOLIQUA 100/33)  the skin          



 100 unit-33 mcg/mL  daily.          



 insulin pen            

 

 nitrofurantoin,  Take 1 capsule  14 capsule  0  2018  10/03/201  
Discontinued



 macrocrystal-monohy  (100 mg total)        8  



 drate, (MACROBID)  by mouth 2          



 100 MG capsule  (two) times a          



   day for 7          



   days.          

 

 gabapentin  Take 1 capsule  270 capsule  0  2018  Discontinued



 (NEURONTIN) 400 mg  by mouth three        8  



 capsule  times a day          

 

 hydrALAZINE  Take 2 tablets  540 tablet  0  2018  Discontinued



 (APRESOLINE) 10 MG  by mouth three        8  



 tablet  times daily          

 

 acetaminophen-codei  Take 1 tablet  90 tablet  0  2018  
Discontinued



 ne (TYLENOL WITH  by mouth every        8  



 CODEINE #3) 300-30  8 hours as          



 mg per tablet  needed for          



   MODERATE PAIN          

 

 carvedilol (COREG)  Take 1 tablet  180 tablet  0  2018  
Discontinued



 6.25 MG tablet  by mouth twice        8  



   a day with          



   meals          

 

 amLODIPine  Take 1 tablet  90 tablet  0  2018  Discontinued



 (NORVASC) 10 mg  by mouth daily        8  



 tablet  -hold if sbp          



   less than 100          



   and dbp is          



   less than 70          

 

 citalopram (CeleXA)  Take 1 tablet  90 tablet  0  2018  
Discontinued



 20 MG tablet  by mouth daily        8  

 

 benazepril  Take 1 tablet  90 tablet  0  2018  Discontinued



 (LOTENSIN) 40 MG  by mouth daily        8  



 tablet            

 

 atorvastatin  Take 1 tablet  90 tablet  0  2018  Discontinued



 (LIPITOR) 10 MG  by mouth daily        8  



 tablet            

 

 levoFLOXacin  Take 1 tablet  10 tablet  0  2018  



 (LEVAQUIN) 500 MG  (500 mg total)        8  



 tablet  by mouth daily          



   for 7 days.          

 

 DULoxetine  Take 1 capsule  90 capsule  11  2018  Discontinued



 (CYMBALTA) 30 MG  (30 mg total)        8  



 capsule  by mouth          



   daily.          

 

 metFORMIN  Take 500 mg by    0      Discontinued



 (GLUCOPHAGE) 500 mg  mouth 3        9  



 tablet  (three) times          



   a day.          

 

 DULoxetine  Take 20 mg by    0      Discontinued



 (CYMBALTA) 20 MG  mouth daily.        9  



 capsule            

 

 hydrALAZINE  Take 2 tablets  540 tablet  0  2018  Discontinued



 (APRESOLINE) 10 MG  by mouth three        8  



 tablet  times daily          

 

 benazepril  Take 1 tablet  90 tablet  0  2018  Discontinued



 (LOTENSIN) 40 MG  by mouth daily        9  



 tablet            

 

 carvedilol (COREG)  Take 1 tablet  180 tablet  0  2018  
Discontinued



 6.25 MG tablet  by mouth twice        9  



   a day with          



   meals          

 

 gabapentin  Take 1 capsule  270 capsule  0  2018  Discontinued



 (NEURONTIN) 400 mg  by mouth three        8  



 capsule  times a day          

 

 acetaminophen-codei  Take 1 tablet  90 tablet  0  2018  




 ne (TYLENOL WITH  by mouth every        8  



 CODEINE #3) 300-30  8 hours as          



 mg per tablet  needed for          



   MODERATE PAIN          

 

 atorvastatin  Take 1 tablet  90 tablet  0  2018  Discontinued



 (LIPITOR) 10 MG  by mouth daily        9  



 tablet            

 

 nitrofurantoin,  Take 1 capsule  14 capsule  0  10/03/2018  10/10/201  



 macrocrystal-monohy  (100 mg total)        8  



 drate, (MACROBID)  by mouth 2          



 100 MG capsule  (two) times a          



   day for 7          



   days.          

 

 atorvastatin  Take 1 tablet  90 tablet  0  2018  Discontinued



 (LIPITOR) 10 MG  by mouth daily        9  



 tablet            

 

 benazepril  Take 1 tablet  90 tablet  0  2018  Discontinued



 (LOTENSIN) 40 MG  by mouth daily        9  



 tablet            

 

 gabapentin  Take 1 capsule  270 capsule  0  2018  Discontinued



 (NEURONTIN) 400 mg  by mouth three        9  



 capsule  times daily          

 

 hydrALAZINE  Take 2 tablets  540 tablet  0  2018  Discontinued



 (APRESOLINE) 10 MG  by mouth three        9  



 tablet  times daily          

 

 acetaminophen-codei  Take 1 tablet  90 tablet  0  2018  




 ne (TYLENOL WITH  by mouth every        8  



 CODEINE #3) 300-30  8 hours as          



 mg per tablet  needed for          



   MODERATE PAIN          

 

 carvedilol (COREG)  Take 1 tablet  180 tablet  0  2018  
Discontinued



 6.25 MG tablet  by mouth twice        9  



   a day with          



   meals          

 

 amLODIPine  Take 1 tablet  90 tablet  0  2018  Discontinued



 (NORVASC) 10 mg  by mouth daily        9  



 tablet  -hold if sbp          



   less than 100          



   and dbp is          



   less than 70          

 

 hydrALAZINE  Take 2 tablets  540 tablet  0  2019  Discontinued



 (APRESOLINE) 10 MG  by mouth three        9  



 tablet  times daily          

 

 acetaminophen-codei  Take 1 tablet  90 tablet  0  2019  
Discontinued



 ne (TYLENOL WITH  by mouth every        9  



 CODEINE #3) 300-30  8 hours as          



 mg per tablet  needed for          



   MODERATE PAIN          

 

 amLODIPine  Take 1 tablet  90 tablet  1  2019  Discontinued



 (NORVASC) 10 mg  by mouth daily        9  



 tablet  -hold if sbp          



   is less than          



   100 and dbp is          



   less than 70          

 

 gabapentin  Take 1 capsule  270 capsule  1  2019  Discontinued



 (NEURONTIN) 400 mg  by mouth three        9  



 capsule  times daily          

 

 nitrofurantoin,  Take 1 capsule  14 capsule  0  2019  



 macrocrystal-monohy  (100 mg total)        9  



 drate, (MACROBID)  by mouth 2          



 100 MG capsule  (two) times a          



   day for 7          



   days.          

 

 SPIRONOLACTONE ORAL  Take 25 mg by    0      Discontinued



   mouth 2 (two)        9  



   times a day.          

 

 acetaminophen-codei  Take 1 tablet  90 tablet  0  2019  




 ne (TYLENOL WITH  by mouth every        9  



 CODEINE #3) 300-30  8 hours as          



 mg per tablet  needed for          



   MODERATE PAIN          

 

 spironolactone  Take 1 tablet  60 tablet  11  2019  
Discontinued



 (ALDACTONE) 25 MG  (25 mg total)        9  



 tablet  by mouth 2          



   (two) times a          



   day.          







Active Problems







 Problem  Noted Date

 

 Does not transfer from bed to wheelchair  2019

 

 Hallucinations  2018

 

 Tremor of both hands  2018

 

 Administrative encounter  2017

 

 Fall  2016

 

 Leg injuries  2016

 

 Abrasion  2016

 

 Cellulitis of left lower extremity  2016

 

 Acute cystitis without hematuria  2016

 

 Urinary tract infection associated with catheterization of urinary tract  

 

 Anxiety  2016

 

 Arthritis of knee  2016

 

 Dysuria  2016

 

 Edema of lower extremity  2016

 

 Hip pain  2016

 

 Hypertension  2016

 

 Knee pain  2016

 

 Neuropathy  2016

 

 Polyuria  2016

 

 Toothache  2016

 

 Type 2 diabetes mellitus with hypoglycemia without coma, with long-term  2016



 current use of insulin  

 

 Unsteady gait  2016

 

 Vitamin D deficiency  2016

 

 Murmur  2016









 Overview: 







 "aortic aneurysm"







Encounters







 Date  Type  Specialty  Care Team  Description

 

 2019  Telephone  Family Medicine  Leggett, Koki,  



       LVN  

 

 2019  Telephone  Family Medicine  Leggett, Koki,  



       LVN  

 

 2019  Orders Only  Family Medicine  Lingamfelter, R  



       Juan Carlos, DO  

 

 2019  Telephone  Family Medicine  Leggett, Koki,  



       LVN  

 

 2019  Orders Only  Family Medicine  Lingamfelter, R  Recurrent UTI



       Juan Carlos, DO  

 

 2019  Patient Outreach  Linda Mistry  

 

 2019  Orders Only  Family Medicine  Lingamfelter, R  Recurrent UTI (
Primary



       Juan Carlos, DO  Dx)

 

 2019  Telephone  Family Medicine  Leggett, Koki,  



       LVN  

 

 2019  Telephone  Family Medicine  Lingamfelter, R  



       Juan Carlos, DO  

 

 2019  Telephone  Family Medicine  Leggett, Koki,  



       LVN  

 

 2019  Orders Only  Family Medicine  Lingamfelter, R  



       Juan Carlos, DO  

 

 2019  Telephone  Family Medicine  Leggett, Koki,  



       LVN  

 

 2019  Telephone  Family Medicine  Leggett, Koki,  



       LVN  

 

 05/10/2019  Orders Only  Family Medicine  Lingamfelter, R  Recurrent UTI



       Juan Carlos, DO  

 

 2019  Telephone  Internal Medicine  Samm Patel MA  

 

 2019  Orders Only  Family Medicine  Lingamfelter, R  



       Juan Carlos, DO  

 

 2019  Telephone  Family Medicine  Leggett, Koki,  



       LVN  

 

 2019  Office Visit  Family Medicine  Lingamfelter, R  Congestive heart 
failure, unspecified HF chronicity, unspecified heart failure type (HCC) (
Primary Dx);



       Juan Carlos, DO  Type 2 diabetes mellitus with hypoglycemia without coma, with 
long-term current use of insulin (HCC);



         Shortness of breath;



         Depression, unspecified depression type;



         Unsteady gait

 

 2019  Telephone  Family Medicine  Leggett, Koki,  



       LVN  

 

 2019  Orders Only  Family Medicine  Lingamfelter, R  Recurrent UTI



       Juan Carlos, DO  

 

 03/15/2019  Orders Only  Family Medicine  Lingamfelter, R  Recurrent UTI



       Juan Carlos, DO  

 

 02/15/2019  Orders Only  Family Medicine  Lingamfelter, R  Recurrent UTI



       Juan Carlos, DO  

 

 2019  Orders Only  Family Medicine  Lingamfelter, R  



       Juan Carlos, DO  

 

 2019  Orders Only  Family Medicine  Lingamfelter, R  



       Juan Carlos, DO  

 

 2019  Telephone  Family Medicine  Koki Leggett,  



       LVN  

 

 2019  Refill  Endocrinology  Aniyah Sanchez MD  

 

 2019  Refill  Family Medicine  Lingamfelter, R  



       Juan Carlos, DO  

 

 2019  Orders Only  Family Medicine  Lingamfelter, R  Recurrent UTI



       Juan Carlos, DO  

 

 2018  Orders Only  Family Medicine  Lingamfelter, R  Recurrent UTI



       Juan Carlos, DO  

 

 2018  Telephone  Family Medicine  Koki Leggett,  



       LVN  

 

 2018  Patient Outreach  Quality  [place], Linda  

 

 2018  Orders Only  Family Medicine  Lingamfelter, R  Recurrent UTI



       Juan Carlos, DO  

 

 2018  Refill  Endocrinology  Aniyah Sanchez MD  

 

 2018  Refill  Family Medicine  Lingamfelter, R  



       Juan Carlos, DO  

 

 2018  Refill  Family Medicine  Lingamfelter, R  



       Juan Carlos, DO  

 

 2018  Patient Outreach  Quality  Jaren, Linda  

 

 10/26/2018  Orders Only  Family Medicine  Lingamfelter, R  Recurrent UTI



       Juan Carlos, DO  

 

 10/25/2018  Telephone  Orthopedic Surgery  Renay Vargas MA  

 

 10/17/2018  Orders Only  Family Medicine  Lingamfelter, R  



       Juan Carlos, DO  

 

 10/03/2018  Orders Only  Family Medicine  Lingamfelter, R  



       Juan Carlos, DO  

 

 2018  Orders Only  Family Medicine  Lingamfelter, R  Recurrent UTI



       Juan Carlos, DO  

 

 2018  Telephone  Family Medicine  Koki Leggett,  



       LVN  

 

 2018  Orders Only  Family Medicine  Lingamfelter, R  



       Juan Carlos, DO  

 

 2018  Telephone  Internal Medicine  Lingamfelter, R  



       Juan Carlos, DO  

 

 2018  Refill  Family Medicine  Lingamfelter, R  



       Juan Carlos, DO  

 

 2018  Telephone  Family Medicine  Koki Leggett,  



       LVN  

 

 2018  Telephone  Family Medicine  Koki Leggett,  



       LVN  

 

 08/15/2018  Orders Only  Family Medicine  Jeannie R  Arthralgia of hip,



       DO Juan Carlos  unspecified laterality



         (Primary Dx)

 

 2018  Telephone  Family Medicine  Koki Leggett,  



       LVN  

 

 2018  Patient Outreach  Quality  Linda Eastman  

 

 2018  Orders Only  Family Medicine  Jeannie R  Recurrent UTI (
Primary



       Juan Carlos, DO  Dx)

 

 2018  Telephone  Family Medicine  Koki Leggett,  



       LVN  

 

 2018  Telephone  Family Medicine  Koki Leggett,  



       N  

 

 2018  Patient Outreach  Quality  Adal, Carolann  

 

 2018  Telephone  Family Medicine  DONATO Dennis,   

 

 2018  Office Visit  Family Medicine  DONATO Dennis  Hallucinations (
Primary Dx);



       DO Juan Carlos  Tremor of both hands;



         Type 2 diabetes mellitus with hypoglycemia without coma, with long-
term current use of insulin

 

 2018  Patient Outreach  Quality  Caitlyn Lopez RN  

 

 2018  Patient Outreach  Quality  Nadiya Mittal  

 

 2018  Patient Outreach  Quality  Adal, Carolann  

 

 2018  Telephone  Family Medicine  Koki Leggett,  



       N  

 

 2018  Telephone  Internal Medicine  DONATO Dennis,   

 

 2018  Telephone  Family Medicine  Koki Leggett,  



       N  

 

 2018  Telephone  Family Medicine  Koki Leggett,  



       N  

 

 2018  Office Visit  Family Medicine  DONATO Dennis  General weakness (
Primary Dx);



       DO Juan Carlos  Leg weakness, bilateral

 

 2018  Orders Only  Family Medicine  DONATO Dennis DO  



after 2018



Immunizations







 Name  Dates Previously Given  Next Due

 

 FLUZONE HIGH-DOSE PF  10/06/2016, 10/15/2015  

 

 Influenza (IM) Preservative Free  10/01/2017  







Family History







 Medical History  Relation  Name  Comments

 

 Heart disease  Father    

 

 Hypertension  Father    

 

 Diabetes  Maternal Grandmother    









 Relation  Name  Status  Comments

 

 Father      

 

 Maternal Grandmother      







Social History







 Tobacco Use  Types  Packs/Day  Years Used  Date

 

 Never Smoker        









 Smokeless Tobacco: Never Used      









 Alcohol Use  Drinks/Week  oz/Week  Comments

 

 No      









 Sex Assigned at Birth  Date Recorded

 

 Not on file  









 Job Start Date  Occupation  Industry

 

 Not on file  Not on file  Not on file









 Travel History  Travel Start  Travel End









 No recent travel history available.







Last Filed Vital Signs







 Vital Sign  Reading  Time Taken

 

 Blood Pressure  120/73  2019  3:40 PM CDT

 

 Pulse  76  2019  3:40 PM CDT

 

 Temperature  36.8 C (98.2 F)  2019  3:40 PM CDT

 

 Respiratory Rate  18  2018  1:46 PM CDT

 

 Oxygen Saturation  98%  2019  3:40 PM CDT

 

 Inhaled Oxygen Concentration  -  -

 

 Weight  91.6 kg (202 lb)  2019  3:40 PM CDT

 

 Height  160 cm (5' 3")  2019  3:40 PM CDT

 

 Body Mass Index  35.78  2019  3:40 PM CDT







Plan of Treatment







 Health Maintenance  Due Date  Last Done  Comments

 

 DIABETIC RETINAL EYE EXAM  1940    

 

 DIABETIC FOOT EXAM  1949    

 

 SHINGLES VACCINES (#1)  1989    

 

 65+ PNEUMOCOCCAL VACCINE (1 of 2 - PCV13)  2004    

 

 URINE MICROALBUMIN  08/15/2018  08/15/2017  

 

 INFLUENZA VACCINE  2019  10/06/2016, 10/15/2015  







Results

Not on fileafter 2018



Insurance







 Payer  Benefit Plan /  Subscriber ID  Effective Dates  Phone  Address  Type



   Group          

 

 [facility]  xxxxxxxxxxx  2004-Present      [facility]

 

 MEDICARE  MEDICARE PART A  xxxxxxxxxx  2004-Present    [place], [place]  
Medicare



   AND B          









 Guarantor Name  Account Type  Relation to  Date of Birth  Phone  Billing



     Patient      Address

 

 Gladys Ramos  Personal/Family  Self  1940  [phone]  [address] [place] [address]







         ([place])  [place] [address]







Advance Directives

Patient has advance care planning documents on file. For more information, 
please contact:[place] [address]

## 2019-06-18 NOTE — RAD REPORT
EXAM DESCRIPTION:  RAD - Chest Single View - 6/18/2019 7:36 pm

 

CLINICAL HISTORY:  AMS/UTI

Chest pain.

 

COMPARISON:  Chest Single View dated 4/12/2019; Chest Single View dated 4/7/2019; Chest Single View d
ated 4/6/2019; Chest Single View dated 1/5/2018

 

FINDINGS:  Portable technique limits examination quality.

 

The lungs are underinflated with an elevated left hemidiaphragm. No focal infiltrate seen. The heart 
is normal in size. No displaced fractures.

## 2019-06-18 NOTE — ER
Nurse's Notes                                                                                     

 [facility]                                                                 

Name: Gladys Ramos                                                                                    

Age: 79 yrs                                                                                       

Sex: Female                                                                                       

: 1940                                                                                   

MRN: [id number]                                                                                   

Arrival Date: 2019                                                                          

Time: 17:37                                                                                       

Account#: [id number]                                                                            

Bed 6                                                                                             

Private MD:                                                                                       

Diagnosis: Delirium due to known physiological condition;Urinary tract infection, site not        

  specified                                                                                       

                                                                                                  

Presentation:                                                                                     

                                                                                             

17:40 Presenting complaint: Child states: She was here and dx with a UTI and given levaquin,  la1 

      the culture showed it was not sensitive and she was acting all crazy while taking it.       

      Today she was changed to amoxicillin but she is just not getting better, not able to do     

      the things she is usually does and we are very concerned about her. Transition of care:     

      patient was not received from another setting of care. Onset of symptoms was 2019. Risk Assessment: Do you want to hurt yourself or someone else? Patient reports no     

      desire to harm self or others. Initial Sepsis Screen: Does the patient meet any 2           

      criteria? No. Patient's initial sepsis screen is negative. Does the patient have a          

      suspected source of infection? No. Patient's initial sepsis screen is negative. Care        

      prior to arrival: IV initiated. 20 GA, in the left forearm.                                 

17:40 Method Of Arrival: EMS: [facility]                                                la1 

17:40 Acuity: ANATOLY 3                                                                           la1 

                                                                                                  

Historical:                                                                                       

- Allergies:                                                                                      

17:45 ambien;                                                                                 la1 

- Home Meds:                                                                                      

17:46 acetaminophen-codeine 300-30 mg Oral tab 1 tab every 8 hrs [Active]; atorvastatin 10 mg la1 

      Oral tab [Active]; benazepril 40 mg Oral tab 1 tab once daily [Active]; Cystex Plus         

      (methenamine-sodium salicylate) Oral three times a day [Active]; duloxetine 30 mg Oral      

      cpDR 1 cap once daily [Active]; gabapentin 400 mg Oral cap 1 cap 3 times per day            

      [Active]; hydralazine 10 mg Oral tab 2 tab 3 TIMES A DAY [Active]; metformin 500 mg         

      Oral tab 1 tab 3 TIMES A DAY [Active]; Soliqua 100/33 20 units SC once at night             

      [Active]; spironolactone 25 mg Oral tab 1 tab 2 times per day [Active];                     

- PMHx:                                                                                           

17:45 AAA; CHF; Diabetes - IDDM; Hyperlipidemia; Hypertension; OA;                            la1 

                                                                                                  

- Immunization history:: Adult Immunizations up to date.                                          

- Social history:: Smoking status: unknown.                                                       

- Ebola Screening: : No symptoms or risks identified at this time.                                

                                                                                                  

                                                                                                  

Screenin:49 Abuse screen: Denies threats or abuse. Nutritional screening: No deficits noted.        la1 

      Tuberculosis screening: No symptoms or risk factors identified. Fall Risk Fall in past      

      12 months (25 points). No secondary diagnosis (0 pts). IV access (20 points).               

      Ambulatory Aid- None/Bed Rest/Nurse Assist (0 pts). Gait- Weak (10 pts.). Mental            

      Status- Overestimates/Forgets Limitations (15 pts.). Total Vásquez Fall Scale indicates       

      High Risk Score (45 or more points). Family Present and informed to notify staff if the     

      need to leave the bedside.                                                                  

                                                                                                  

Assessment:                                                                                       

17:47 Reassessment: family reports that patient is unable to complete daily tasks that she    la1 

      usually can and has been saying things that don't make sense. General: Appears in no        

      apparent distress. Behavior is calm, cooperative. Pain: Denies pain. Neuro: Level of        

      Consciousness is awake, obeys commands, lethargic, Oriented to person, place, time,         

      situation. Cardiovascular: Capillary refill < 3 seconds Patient's skin is warm and dry.     

      Respiratory: Airway is patent Respiratory effort is even, unlabored, Respiratory            

      pattern is regular, symmetrical. GI: No signs and/or symptoms were reported involving       

      the gastrointestinal system. : Reports blood in urine.                                    

20:18 Reassessment: Patient appears in no apparent distress at this time. No changes from     la1 

      previously documented assessment. Patient and/or family updated on plan of care and         

      expected duration. Pain level reassessed.                                                   

21:38 Reassessment: Patient appears in no apparent distress at this time. No changes from     la1 

      previously documented assessment. Patient and/or family updated on plan of care and         

      expected duration. Pain level reassessed. Pt oriented x4, but having hallucinations,        

      seeing cats in the room etc.                                                                

22:12 General: Appears in no apparent distress. Behavior is calm, cooperative. Pain: Denies   ea  

      pain. Neuro: Level of Consciousness is awake, obeys commands, confused, Oriented to         

      person, place, time, situation. Cardiovascular: Patient's skin is warm and dry.             

      Respiratory: Airway is patent Respiratory effort is even, unlabored, Respiratory            

      pattern is regular, symmetrical. GI: No signs and/or symptoms were reported involving       

      the gastrointestinal system.                                                                

22:20 Reassessment: Patient and/or family updated on plan of care and expected duration. Pain ea  

      level reassessed. Patient is alert, oriented x 3, equal unlabored respirations, skin        

      warm/dry/pink. Pt reports she rather stay in her gown rather than put on the hospital       

      gown.                                                                                       

23:14 Reassessment: Report called to receiving nurse on fourth floor.                         ea  

23:16 Reassessment: Patient and/or family updated on plan of care and expected duration. Pain ea  

      level reassessed. Patient is alert, oriented x 3, equal unlabored respirations, skin        

      warm/dry/pink. Pt continues to hallucinate. "there is a cat on top of the fridge".          

23:39 Reassessment: Patient and/or family updated on plan of care and expected duration. Pain ea  

      level reassessed. Patient is alert, oriented x 3, equal unlabored respirations, skin        

      warm/dry/pink. Pt admitted to fourth floor, pt taken via stretcher per tech, pt             

      tolerating well.                                                                            

                                                                                                  

Vital Signs:                                                                                      

17:43  / 53; Pulse 86; Resp 16; Temp 97.4(O); Pulse Ox 93% on R/A;                      la1 

19:47  / 94; Pulse 87; Resp 16; Pulse Ox 94% ;                                          la1 

20:18  / 91; Pulse 90; Resp 16; Pulse Ox 95% on 2 lpm NC;                               la1 

21:37  / 57; Pulse 86; Resp 16; Pulse Ox 98% on 2 lpm NC;                               la1 

22:13  / 64; Pulse 88; Resp 18; Pulse Ox 100% on 2 lpm NC;                              ea  

23:19  / 58; Pulse 90; Resp 18; Temp 97.6(TE); Pulse Ox 99% on 2 lpm NC;                ea  

                                                                                                  

ED Course:                                                                                        

17:37 Patient arrived in ED.                                                                  ms  

17:39 Isaiah Gomez MD is Attending Physician.                                              kdr 

17:40 Edgar Ashraf, KEVYN is Primary Nurse.                                                       la1 

17:42 Triage completed.                                                                       la1 

17:42 Arm band placed on left wrist.                                                          la1 

17:50 Bed in low position. Call light in reach. Side rails up X 1. Pulse ox on. NIBP on.      la1 

17:50 Maintain EMS IV. IV Changed dressing on left antecubital.                               la1 

18:34 Radiology exam delayed due to NURSE IN ROOM.                                            ml  

19:15 Attending Physician role handed off by Isaiah Gomez MD                                 

19:15 Jovan Currie MD is Attending Physician.                                              gs  

19:36 Chest Single View XRAY In Process Unspecified.                                          EDMS

21:16 CT Head Brain wo Cont In Process Unspecified.                                           EDMS

21:56 Marivel Mccarthy MD is Hospitalizing Provider.                                          gs  

22:13 No provider procedures requiring assistance completed. Patient admitted, IV remains in  ea  

      place.                                                                                      

                                                                                                  

Administered Medications:                                                                         

20:00 Drug: Rocephin - (cefTRIAXone) 1 grams Route: IVPB; Infused Over: 30 mins; Site: left   la1 

      antecubital;                                                                                

22:00 Follow up: Response: No adverse reaction                                                ea  

22:00 Follow up: Response: No adverse reaction; IV Status: Completed infusion                 ea  

20:00 Drug: fentaNYL (PF) 50 mcg Route: IVP; Site: left antecubital;                          la1 

22:00 Follow up: Response: No adverse reaction; Pain is decreased                             ea  

21:00 Drug: NS 0.9% 1000 ml Route: IV; Rate: 1 bolus; Site: left antecubital;                 la1 

23:22 Follow up: Response: No adverse reaction; IV Status: Completed infusion; IV Intake:     ea  

      1000ml                                                                                      

                                                                                                  

                                                                                                  

Intake:                                                                                           

23:22 IV: 1000ml; Total: 1000ml.                                                              ea  

                                                                                                  

Outcome:                                                                                          

21:59 Decision to Hospitalize by Provider.                                                    gs  

23:12 Admitted to Med/surg accompanied by tech, via stretcher, room 415, with chart, Report   ea  

      called to  Receiving nurse on fourth floor                                                  

23:12 Condition: stable                                                                           

23:12 Instructed on the need for admit, Demonstrated understanding of instructions.               

23:41 Patient left the ED.                                                                    ea  

                                                                                                  

Signatures:                                                                                       

Dispatcher [employer]                           EDMS                                                 

Isaiah Gomez MD MD kdr Solis, Maria ms Lopez, Melissa ml Attema, Lee, RN                         RN   [facility]                                                  

Mitzy Newsome RN RN   ea                                                   

Jovan Currie MD MD                                                      

                                                                                                  

**************************************************************************************************

## 2019-06-18 NOTE — P.HP
Certification for Inpatient


Patient admitted to: Inpatient


With expected LOS: >2 Midnights


Practitioner: I am a practitioner with admitting privileges, knowledge of 

patient current condition, hospital course, and medical plan of care.


Services: Services provided to patient in accordance with Admission 

requirements found in Title 42 Section 412.3 of the Code of Federal Regulations





Patient History


Date of Service: 19


Reason for admission: complicated UTI


History of Present Illness: 





Ms Ramos is an 79 years old woman who was recently admitted to the hospital due 

to COPD exacerbation/CHF, then about 5 days ago, she started with dysuria, came 

to ED, was diagnosed with UTI and sent home with oral Levaquin. Despite this 

treatment, she did not improve her symptoms, in fact got worse. She start with 

hallucinations, no fever but chills. Today lab work remarkable for leukocytosis

, normal lactate and procalcitonin. UA significantly abnormal. Urine culture 

from last visit to ER was positive for Enterococcus Faecalis resistant to 

Levaquin. At my encounter, the patient was afebrile, confused, unable to 

provide history.


Allergies





zolpidem [From Ambien] Allergy (Verified 10/01/17 03:02)


 Unknown





Home medications list reviewed: Yes


Home Medications: 








Amlodipine [Norvasc*] 10 mg PO DAILY #30 tab 10/10/17 


Atorvastatin Calcium [Lipitor*] 10 mg PO BEDTIME #30 tab 10/10/17 


Carvedilol [Coreg*] 6.25 mg PO BID 6AM 6PM #60 tab 10/10/17 


Hydralazine [Apresoline*] 20 mg PO TID #180 tab 10/10/17 


Benazepril HCl [Lotensin] 40 mg PO DAILY 19 


Codeine/APAP [Tylenol #3*] 1 tab PO Q8HP PRN 19 


Duloxetine HCl [Cymbalta] 30 mg PO DAILY 19 


Gabapentin [Neurontin*] 400 mg PO TID 19 


Insulin Glargine/Lixisenatide [Soliqua 100 Unit-33 Mcg/ml Pen] 20 units SQ 

BEDTIME 19 


Metformin HCl [Glucophage] 500 mg PO TID 19 


Fluticasone/Salmeterol [Advair 250-50 Diskus] 1 each IH BID #60 blst.w.dev  


Spironolactone [Aldactone*] 25 mg PO BID #60 tab 19 


predniSONE [Deltasone*] 10 mg PO BID #14 tab 19 








- Past Medical/Surgical History


Diabetic: Yes


-: HTN


-: IDDM


-: Cardiac aneurysm


-: Status post fall with severe osteoarthritis of the knees and hips


-: cholecystectomy


-: 


-: hernia repair


-: cataract surgery





- Family History


  ** Father


-: Heart disease


Notes: MI





  ** Mother


-: Kidney disease





- Social History


Alcohol use: No


CD- Drugs: No


Caffeine use: Yes


Place of Residence: Home





Review of Systems


10-point ROS is otherwise unremarkable





Physical Examination





- Physical Exam


General: Alert, In no apparent distress, Confused


HEENT: Atraumatic, PERRLA, Mucous membr. moist/pink, EOMI, Sclerae nonicteric


Neck: Supple, 2+ carotid pulse no bruit, No LAD, Without JVD or thyroid 

abnormality


Respiratory: Clear to auscultation bilaterally, Normal air movement


Cardiovascular: Regular rate/rhythm, Normal S1 S2


Gastrointestinal: Normal bowel sounds, No tenderness


Musculoskeletal: No tenderness


Integumentary: No rashes


Neurological: Normal speech, Normal strength at 5/5 x4 extr, Normal tone, 

Normal affect


Lymphatics: No axilla or inguinal lymphadenopathy





- Studies


Laboratory Data (last 24 hrs)





19 18:53: PT 13.1 H, INR 1.11, APTT 32.1


19 18:53: WBC 11.2 H, Hgb 9.1 L, Hct 29.9 L, Plt Count 326


19 18:53: Sodium 138, Potassium 4.5, BUN 29 H, Creatinine 1.18, Glucose 

108 H, Total Bilirubin 0.2, AST 7 L, ALT 8 L, Alkaline Phosphatase 58, Lipase 

48 L








Assessment and Plan





- Problems (Diagnosis)


(1) Complicated UTI (urinary tract infection)


Current Visit: Yes   Status: Acute   





(2) Acute encephalopathy


Current Visit: Yes   Status: Acute   





(3) Diabetes mellitus


Onset Date: 11/30/15   Current Visit: No   Status: Chronic   


Qualifiers: 


   Diabetes mellitus type: type 2   Diabetes mellitus long term insulin use: 

with long term use   Diabetes mellitus complication status: without 

complication   Qualified Code(s): E11.9 - Type 2 diabetes mellitus without 

complications; Z79.4 - Long term (current) use of insulin   





(4) Hypertension


Onset Date: 18   Current Visit: No   Status: Chronic   


Qualifiers: 


   Hypertension type: essential hypertension   Qualified Code(s): I10 - 

Essential (primary) hypertension   





(5) Obesity (BMI 30.0-34.9)


Onset Date: 18   Current Visit: No   Status: Chronic   





- Plan








Will admit the patient to the hospital to start IV antibiotics due to failed 

outpatient therapy. Will start IV Rocephin, IV fluids, fall precaution. F/U new 

urine culture and lab works in AM.





- Advance Directives


Does patient have a Living Will: Yes


Does patient have a Durable POA for Healthcare: No





- Code Status/Comfort Care


Code Status Assessed: Yes


Code Status: Full Code

## 2019-06-18 NOTE — EDPHYS
Physician Documentation                                                                           

 [facility]                                                                 

Name: Gladys Ramos                                                                                    

Age: 79 yrs                                                                                       

Sex: Female                                                                                       

: 1940                                                                                   

MRN: [id number]                                                                                   

Arrival Date: 2019                                                                          

Time: 17:37                                                                                       

Account#: [id number]                                                                            

Bed 6                                                                                             

Private MD:                                                                                       

ED Physician Jovan Currie                                                                       

HPI:                                                                                              

                                                                                             

21:29 This 79 yrs old  Female presents to ER via EMS with complaints of Urinary      gs  

      Problem.                                                                                    

21:29 The patient presents with confusion. Onset: The symptoms/episode began/occurred         gs  

      yesterday. Possible causes: sepsis, the patient has a known UTI history. Associated         

      signs and symptoms: Pertinent negatives: abdominal pain, agitation, ataxia. The patient     

      has experienced a previous episode. The patient has been recently seen by a physician:      

      the patient's primary care provider.                                                        

                                                                                                  

Historical:                                                                                       

- Allergies:                                                                                      

17:45 ambien;                                                                                 la1 

- Home Meds:                                                                                      

17:46 acetaminophen-codeine 300-30 mg Oral tab 1 tab every 8 hrs [Active]; atorvastatin 10 mg la1 

      Oral tab [Active]; benazepril 40 mg Oral tab 1 tab once daily [Active]; Cystex Plus         

      (methenamine-sodium salicylate) Oral three times a day [Active]; duloxetine 30 mg Oral      

      cpDR 1 cap once daily [Active]; gabapentin 400 mg Oral cap 1 cap 3 times per day            

      [Active]; hydralazine 10 mg Oral tab 2 tab 3 TIMES A DAY [Active]; metformin 500 mg         

      Oral tab 1 tab 3 TIMES A DAY [Active]; Soliqua 100/33 20 units SC once at night             

      [Active]; spironolactone 25 mg Oral tab 1 tab 2 times per day [Active];                     

- PMHx:                                                                                           

17:45 AAA; CHF; Diabetes - IDDM; Hyperlipidemia; Hypertension; OA;                            la1 

                                                                                                  

- Immunization history:: Adult Immunizations up to date.                                          

- Social history:: Smoking status: unknown.                                                       

- Ebola Screening: : No symptoms or risks identified at this time.                                

                                                                                                  

                                                                                                  

ROS:                                                                                              

21:29 Unable to obtain ROS due to baseline dementia.                                          gs  

                                                                                             

13:05 Constitutional:  m                                                                      kdr 

                                                                                                  

Exam:                                                                                             

                                                                                             

21:29 Head/Face:  Normocephalic, atraumatic. Eyes:  Pupils equal round and reactive to light, gs  

      extra-ocular motions intact.  Lids and lashes normal.  Conjunctiva and sclera are           

      non-icteric and not injected.  Cornea within normal limits.  Periorbital areas with no      

      swelling, redness, or edema. ENT:  Nares patent. No nasal discharge, no septal              

      abnormalities noted.  Tympanic membranes are normal and external auditory canals are        

      clear.  Oropharynx with no redness, swelling, or masses, exudates, or evidence of           

      obstruction, uvula midline.  Mucous membranes moist. Neck:  Trachea midline, no             

      thyromegaly or masses palpated, and no cervical lymphadenopathy.  Supple, full range of     

      motion without nuchal rigidity, or vertebral point tenderness.  No Meningismus.             

      Chest/axilla:  Normal chest wall appearance and motion.  Nontender with no deformity.       

      No lesions are appreciated.                                                                 

      Respiratory:  Lungs have equal breath sounds bilaterally, clear to auscultation and         

      percussion.  No rales, rhonchi or wheezes noted.  No increased work of breathing, no        

      retractions or nasal flaring. Abdomen/GI:  Soft, non-tender, with normal bowel sounds.      

      No distension or tympany.  No guarding or rebound.  No evidence of tenderness               

      throughout. Back:  No spinal tenderness.  No costovertebral tenderness.  Full range of      

      motion.                                                                                     

      Constitutional: The patient appears alert, awake.                                           

      Cardiovascular: Rate: tachycardic, Rhythm: regular, Pulses: no pulse deficits are           

      appreciated, Heart sounds: murmur, systolic.                                                

      ECG was reviewed by the Attending Physician.                                                

21:55 MS/ Extremity:  Pulses equal, no cyanosis.  Neurovascular intact.  Full, normal range   gs  

      of motion.                                                                                  

21:55 Neuro: Mentation: confused, Cranial nerves: CN II- XII are normal as tested, Motor:         

      moves all fours, Sensation: no obvious gross deficits.                                      

                                                                                                  

Vital Signs:                                                                                      

17:43  / 53; Pulse 86; Resp 16; Temp 97.4(O); Pulse Ox 93% on R/A;                      la1 

19:47  / 94; Pulse 87; Resp 16; Pulse Ox 94% ;                                          la1 

20:18  / 91; Pulse 90; Resp 16; Pulse Ox 95% on 2 lpm NC;                               la1 

21:37  / 57; Pulse 86; Resp 16; Pulse Ox 98% on 2 lpm NC;                               la1 

22:13  / 64; Pulse 88; Resp 18; Pulse Ox 100% on 2 lpm NC;                              ea  

23:19  / 58; Pulse 90; Resp 18; Temp 97.6(TE); Pulse Ox 99% on 2 lpm NC;                ea  

                                                                                                  

MDM:                                                                                              

19:15 Patient medically screened.                                                             gs  

21:55 Differential Diagnosis: CVA, electrolyte abnormality, sepsis, UTI. Data reviewed: vital gs  

      signs, nurses notes, old medical records, lab test result(s), EKG, radiologic studies.      

      Counseling: I had a detailed discussion with the patient and/or guardian regarding: the     

      historical points, exam findings, and any diagnostic results supporting the                 

      discharge/admit diagnosis, lab results, radiology results, the need for further work-up     

      and treatment in the hospital. Response to treatment: the patient's symptoms have           

      mildly improved after treatment, and as a result, I will admit patient.                     

                                                                                                  

                                                                                             

18:21 Order name: Basic Metabolic Panel; Complete Time: 20:35                                 [facility] 

                                                                                             

18:21 Order name: Blood Culture Adult (2)                                                     kdr 

                                                                                             

18:21 Order name: CBC with Diff; Complete Time: 19:16                                         kdr 

                                                                                             

18:21 Order name: Ckmb; Complete Time: 20:35                                                  kdr 

                                                                                             

18:21 Order name: CPK; Complete Time: 20:35                                                   kdr 

                                                                                             

18:21 Order name: Lactate; Complete Time: 20:35                                               kdr 

                                                                                             

18:21 Order name: LFT's; Complete Time: 20:35                                                 kdr 

                                                                                             

18:21 Order name: Lipase; Complete Time: 20:35                                                kdr 

                                                                                             

18:21 Order name: Procalcitonin; Complete Time: 20:35                                         kdr 

                                                                                             

18:21 Order name: Protime (+inr); Complete Time: 19:16                                        kdr 

                                                                                             

18:21 Order name: Ptt, Activated; Complete Time: 19:16                                        kdr 

                                                                                             

18:21 Order name: Troponin (emerg Dept Use Only); Complete Time: 20:35                        kdr 

                                                                                             

18:21 Order name: Urine Microscopic Only; Complete Time: 19:16                                kdr 

                                                                                             

18:43 Order name: Urine Dipstick--Ancillary (enter results); Complete Time: 19:16             bd  

                                                                                             

17:59 Order name: Urine Dipstick-Ancillary (obtain specimen); Complete Time: 19:29            kdr 

                                                                                             

18:21 Order name: Chest Single View XRAY; Complete Time: 20:35                                kdr 

                                                                                             

18:21 Order name: Accucheck; Complete Time: 19:29                                             kdr 

                                                                                             

18:21 Order name: Cardiac monitoring; Complete Time: 19:29                                    kdr 

                                                                                             

18:21 Order name: EKG - Nurse/Tech; Complete Time: 19:29                                      kdr 

18                                                                                             

18:21 Order name: IV Saline Lock - Large Bore; Complete Time: 19:29                           kdr 

18                                                                                             

18:21 Order name: Labs collected and sent; Complete Time: 19:29                               kdr 

                                                                                             

18:21 Order name: O2 Per Protocol; Complete Time: 19:29                                       kdr 

                                                                                             

18:21 Order name: O2 Sat Monitoring; Complete Time: 19:30                                     kdr 

                                                                                             

19:16 Order name: Urine Culture                                                               EDMS

                                                                                             

20:38 Order name: CT Head Brain wo Cont                                                       kdr 

                                                                                                  

EC:29 Rate is 88 beats/min. Rhythm is regular. MN interval is prolonged. QRS interval is      gs  

      normal. QT interval is normal. T waves are Flattened. Clinical impression: NSR w/           

      Non-specific ST/T Changes. Interpreted by me.                                               

                                                                                                  

Administered Medications:                                                                         

20:00 Drug: Rocephin - (cefTRIAXone) 1 grams Route: IVPB; Infused Over: 30 mins; Site: left   la1 

      antecubital;                                                                                

22:00 Follow up: Response: No adverse reaction                                                ea  

22:00 Follow up: Response: No adverse reaction; IV Status: Completed infusion                 ea  

20:00 Drug: fentaNYL (PF) 50 mcg Route: IVP; Site: left antecubital;                          la1 

22:00 Follow up: Response: No adverse reaction; Pain is decreased                             ea  

21:00 Drug: NS 0.9% 1000 ml Route: IV; Rate: 1 bolus; Site: left antecubital;                 la1 

23:22 Follow up: Response: No adverse reaction; IV Status: Completed infusion; IV Intake:     ea  

      1000ml                                                                                      

                                                                                                  

                                                                                                  

Disposition:                                                                                      

21:55 Critical Care:.                                                                         gs  

                                                                                                  

Disposition:                                                                                      

19 21:59 Hospitalization ordered by Marivel Mccarthy for Inpatient Admission. Preliminary    

  diagnosis are Delirium due to known physiological condition, Urinary tract                      

  infection, site not specified.                                                                  

- Bed requested for Telemetry/MedSurg (Inpatient).                                                

- Status is Inpatient Admission.                                                              ea  

- Condition is Stable.                                                                            

- Problem is new.                                                                                 

- Symptoms have improved.                                                                         

UTI on Admission? Yes                                                                             

                                                                                                  

                                                                                                  

                                                                                                  

Critical care time excluding procedures:                                                          

21:55 Critical care time: Bedside Care: 10 minutes, Consultation: 10 minutes, Family          gs  

      Intervention: 10 minutes. Total time: 30 minutes                                            

                                                                                                  

Signatures:                                                                                       

Dispatcher [employer]                           Isaiah Velázquez MD MD kdr Attema, Lee, RN                         RN   laIrma Ha RN RN                                                      

Mitzy Newsome RN RN ea Starr, Gregory, MD MD gs                                                   

                                                                                                  

Corrections: (The following items were deleted from the chart)                                    

22:25 21:59 Hospitalization Ordered by Marivel Mccarthy MD for Inpatient Admission. Preliminary cg  

      diagnosis is Delirium due to known physiological condition; Urinary tract infection,        

      site not specified. Bed requested for Telemetry/MedSurg (Inpatient). Status is              

      Inpatient Admission. Condition is Stable. Problem is new. Symptoms have improved. UTI       

      on Admission? Yes.                                                                        

23:41 22:25 2019 21:59 Hospitalization Ordered by Marivel Mccarthy MD for Inpatient       ea  

      Admission. Preliminary diagnosis is Delirium due to known physiological condition;          

      Urinary tract infection, site not specified. Bed requested for Telemetry/MedSurg            

      (Inpatient). Status is Inpatient Admission. Condition is Stable. Problem is new.            

      Symptoms have improved. UTI on Admission? Yes. cg                                           

                                                                                                  

**************************************************************************************************

## 2019-06-19 LAB
BUN BLD-MCNC: 28 MG/DL (ref 7–18)
GLUCOSE SERPLBLD-MCNC: 85 MG/DL (ref 74–106)
HCT VFR BLD CALC: 27.8 % (ref 36–45)
LYMPHOCYTES # SPEC AUTO: 1.4 K/UL (ref 0.7–4.9)
MAGNESIUM SERPL-MCNC: 1.3 MG/DL (ref 1.8–2.4)
PMV BLD: 9.1 FL (ref 7.6–11.3)
POTASSIUM SERPL-SCNC: 4.3 MMOL/L (ref 3.5–5.1)
RBC # BLD: 3.19 M/UL (ref 3.86–4.86)

## 2019-06-19 RX ADMIN — HUMAN INSULIN SCH: 100 INJECTION, SOLUTION SUBCUTANEOUS at 11:30

## 2019-06-19 RX ADMIN — ENOXAPARIN SODIUM SCH MG: 40 INJECTION SUBCUTANEOUS at 08:28

## 2019-06-19 RX ADMIN — SODIUM CHLORIDE SCH MLS: 0.9 INJECTION, SOLUTION INTRAVENOUS at 20:34

## 2019-06-19 RX ADMIN — SPIRONOLACTONE SCH MG: 25 TABLET, FILM COATED ORAL at 20:33

## 2019-06-19 RX ADMIN — Medication SCH ML: at 08:30

## 2019-06-19 RX ADMIN — HUMAN INSULIN SCH: 100 INJECTION, SOLUTION SUBCUTANEOUS at 07:30

## 2019-06-19 RX ADMIN — HUMAN INSULIN SCH: 100 INJECTION, SOLUTION SUBCUTANEOUS at 16:30

## 2019-06-19 RX ADMIN — GABAPENTIN SCH MG: 400 CAPSULE ORAL at 13:13

## 2019-06-19 RX ADMIN — CEFTRIAXONE SCH MLS: 1 INJECTION, SOLUTION INTRAVENOUS at 08:29

## 2019-06-19 RX ADMIN — HYDRALAZINE HYDROCHLORIDE SCH MG: 10 TABLET, FILM COATED ORAL at 13:13

## 2019-06-19 RX ADMIN — SODIUM CHLORIDE SCH MLS: 0.9 INJECTION, SOLUTION INTRAVENOUS at 00:42

## 2019-06-19 RX ADMIN — HYDRALAZINE HYDROCHLORIDE SCH MG: 10 TABLET, FILM COATED ORAL at 20:34

## 2019-06-19 RX ADMIN — GABAPENTIN SCH MG: 400 CAPSULE ORAL at 20:33

## 2019-06-19 RX ADMIN — Medication SCH ML: at 20:41

## 2019-06-19 RX ADMIN — ACETAMINOPHEN PRN MG: 500 TABLET, FILM COATED ORAL at 05:07

## 2019-06-19 RX ADMIN — HUMAN INSULIN SCH: 100 INJECTION, SOLUTION SUBCUTANEOUS at 20:35

## 2019-06-19 RX ADMIN — SODIUM CHLORIDE SCH MLS: 0.9 INJECTION, SOLUTION INTRAVENOUS at 11:21

## 2019-06-19 RX ADMIN — ATORVASTATIN CALCIUM SCH MG: 10 TABLET, FILM COATED ORAL at 20:34

## 2019-06-19 NOTE — EKG
Test Date:    2019-06-18               Test Time:    19:36:38

Technician:   YUMIKO                                     

                                                     

MEASUREMENT RESULTS:                                       

Intervals:                                           

Rate:         88                                     

WI:           214                                    

QRSD:         84                                     

QT:           386                                    

QTc:          467                                    

Axis:                                                

P:            10                                     

WI:           214                                    

QRS:          50                                     

T:            58                                     

                                                     

INTERPRETIVE STATEMENTS:                                       

                                                     

Sinus rhythm with 1st degree AV block

Otherwise normal ECG

Compared to ECG 06/13/2019 16:38:02

First degree AV block now present

Atrial premature complex(es) no longer present



Electronically Signed On 06-19-19 07:54:49 CDT by Adis Keen

## 2019-06-19 NOTE — RAD REPORT
EXAM DESCRIPTION:  CT HEAD WITHOUT IV CONTRAST

 

CLINICAL HISTORY:  CONFUSED.

 

COMPARISON:  None.

 

TECHNIQUE:  CT scan of the brain without   IV contrast. This exam was performed according to our depa
rtmental dose-optimization program, which includes automated exposure control, adjustment of the mA a
nd/or kV according to patient size and/or use of iterative reconstruction technique.

 

FINDINGS:  Diffuse involutional changes are present. There are scattered areas of hypoattenuation wit
hin the periventricular white matter, which likely represent chronic microvascular ischemia. No evide
nce of acute infarction, intracranial hemorrhage, extra-axial fluid collection, or midline shift. No 
air-fluid levels are seen in the paranasal sinuses to suggest acute sinusitis. No depressed skull fra
cture.

 

IMPRESSION:  1.   No acute intracranial findings.

2.   Senescent changes with chronic microvascular ischemia.

 

Electronically signed by:   Burton Meraz MD   6/18/2019 9:30 PM CDT Workstation: [id number]

 

 

 

Due to temporary technical issues with the PACS/Fluency reporting system, reports are being signed by
 the in house radiologist as a courtesy to ensure prompt reporting. The interpreting radiologist is f
ully responsible for the content of the report.

## 2019-06-19 NOTE — P.PN
Subjective


Date of Service: 06/19/19


Chief Complaint: complicated UTI





Patient seen and examined at bedside with RN.  Chart reviewed.  Case discussed 

with family member at bedside.  Overnight no complaints to offer.  Family 

member at bedside does mention the patient has been having some visual 

hallucination overnight however this morning patient does seem to be alert and 

oriented x3.





Review of Systems


10-point ROS is otherwise unremarkable





Physical Examination





- Vital Signs


Temperature: 97.9 F


Blood Pressure: 132/60


Pulse: 86


Respirations: 19


Pulse Ox (%): 97





- Physical Exam


General: Alert, In no apparent distress, Oriented x3


Respiratory: Normal air movement, Expiratory wheezes


Cardiovascular: Regular rate/rhythm, Normal S1 S2


Gastrointestinal: Normal bowel sounds, Soft and benign, No tenderness


Integumentary: Rash(es), Skin breakdown


Neurological: Normal speech, Normal tone, Normal affect


Lymphatics: No axilla or inguinal lymphadenopathy





- Studies


Laboratory Data (last 24 hrs)





06/18/19 18:53: PT 13.1 H, INR 1.11, APTT 32.1


06/18/19 18:53: WBC 11.2 H, Hgb 9.1 L, Hct 29.9 L, Plt Count 326


06/18/19 18:53: Sodium 138, Potassium 4.5, BUN 29 H, Creatinine 1.18, Glucose 

108 H, Total Bilirubin 0.2, AST 7 L, ALT 8 L, Alkaline Phosphatase 58, Lipase 

48 L





Medications List Reviewed: Yes





Assessment And Plan





- Current Problems (Diagnosis)


(1) Acute encephalopathy


Current Visit: Yes   Status: Acute   


Plan: 


Acute toxic encephalopathy most likely secondary to urinary tract infection


-family with concerns of hallucination are minimally overnight


-this morning patient is alert and oriented x3 negative for any hallucination


-head CT initially negative for any acute abnormality


-will monitor patient closely








(2) Complicated UTI (urinary tract infection)


Current Visit: Yes   Status: Acute   


Plan: 


Complicated UTI with failed outpatient therapy


-patient failed therapy with oral Levaquin.  Urine culture from 5 days ago 

positive for enterococcus bacillus sensitive only to vancomycin and ampicillin


-urine culture recollected at this time.  Pending report


-started on IV Rocephin will continue that here in the hospital








(3) Hyperlipidemia


Onset Date: 11/30/15   Current Visit: No   Status: Chronic   


Qualifiers: 


   Hyperlipidemia type: mixed hyperlipidemia   Qualified Code(s): E78.2 - Mixed 

hyperlipidemia   





(4) Diabetes mellitus


Onset Date: 11/30/15   Current Visit: No   Status: Chronic   


Plan: 


Insulin sliding scale along with Accu-Cheks


Qualifiers: 


   Diabetes mellitus type: type 2   Diabetes mellitus long term insulin use: 

with long term use   Diabetes mellitus complication status: without 

complication   Qualified Code(s): E11.9 - Type 2 diabetes mellitus without 

complications; Z79.4 - Long term (current) use of insulin   





(5) Hypertension


Onset Date: 01/05/18   Current Visit: No   Status: Chronic   


Plan: 


Stable at this time will continue to monitor closely.  Restarted on home 

medication


Qualifiers: 


   Hypertension type: essential hypertension   Qualified Code(s): I10 - 

Essential (primary) hypertension   





(6) Obesity (BMI 30.0-34.9)


Onset Date: 01/05/18   Current Visit: No   Status: Chronic   





(7) Hypomagnesemia


Onset Date: 01/14/15   Current Visit: No   Status: Resolved   


Plan: 


Replace per protocol








- Plan





Pending clinical improvement at this time.  Will continue with IV antibiotics.  

Follow up with urine culture.


Discharge Plan: Home


Plan to discharge in: Greater than 2 days





- Code Status/Comfort Care


Code Status Assessed: Yes


Critical Care: No

## 2019-06-20 PROCEDURE — 02HV33Z INSERTION OF INFUSION DEVICE INTO SUPERIOR VENA CAVA, PERCUTANEOUS APPROACH: ICD-10-PCS

## 2019-06-20 RX ADMIN — SODIUM CHLORIDE SCH MLS: 0.9 INJECTION, SOLUTION INTRAVENOUS at 06:22

## 2019-06-20 RX ADMIN — HYDRALAZINE HYDROCHLORIDE SCH MG: 10 TABLET, FILM COATED ORAL at 15:47

## 2019-06-20 RX ADMIN — HUMAN INSULIN SCH: 100 INJECTION, SOLUTION SUBCUTANEOUS at 16:30

## 2019-06-20 RX ADMIN — BENAZEPRIL HYDROCHLORIDE SCH MG: 20 TABLET, FILM COATED ORAL at 08:38

## 2019-06-20 RX ADMIN — HUMAN INSULIN SCH: 100 INJECTION, SOLUTION SUBCUTANEOUS at 21:00

## 2019-06-20 RX ADMIN — GABAPENTIN SCH MG: 400 CAPSULE ORAL at 08:38

## 2019-06-20 RX ADMIN — SPIRONOLACTONE SCH MG: 25 TABLET, FILM COATED ORAL at 22:04

## 2019-06-20 RX ADMIN — ENOXAPARIN SODIUM SCH MG: 40 INJECTION SUBCUTANEOUS at 08:37

## 2019-06-20 RX ADMIN — CEFTRIAXONE SCH MLS: 1 INJECTION, SOLUTION INTRAVENOUS at 08:38

## 2019-06-20 RX ADMIN — HUMAN INSULIN SCH: 100 INJECTION, SOLUTION SUBCUTANEOUS at 07:30

## 2019-06-20 RX ADMIN — GABAPENTIN SCH MG: 400 CAPSULE ORAL at 22:04

## 2019-06-20 RX ADMIN — DULOXETINE HYDROCHLORIDE SCH MG: 30 CAPSULE, DELAYED RELEASE ORAL at 08:38

## 2019-06-20 RX ADMIN — HYDRALAZINE HYDROCHLORIDE SCH MG: 10 TABLET, FILM COATED ORAL at 22:04

## 2019-06-20 RX ADMIN — Medication SCH ML: at 22:05

## 2019-06-20 RX ADMIN — GABAPENTIN SCH MG: 400 CAPSULE ORAL at 15:47

## 2019-06-20 RX ADMIN — SPIRONOLACTONE SCH MG: 25 TABLET, FILM COATED ORAL at 08:38

## 2019-06-20 RX ADMIN — ACETAMINOPHEN PRN MG: 500 TABLET, FILM COATED ORAL at 22:47

## 2019-06-20 RX ADMIN — HYDRALAZINE HYDROCHLORIDE SCH MG: 10 TABLET, FILM COATED ORAL at 08:39

## 2019-06-20 RX ADMIN — ATORVASTATIN CALCIUM SCH MG: 10 TABLET, FILM COATED ORAL at 22:04

## 2019-06-20 RX ADMIN — HUMAN INSULIN SCH: 100 INJECTION, SOLUTION SUBCUTANEOUS at 11:30

## 2019-06-20 RX ADMIN — Medication SCH ML: at 08:40

## 2019-06-20 NOTE — P.PN
Subjective


Date of Service: 06/20/19


Chief Complaint: complicated UTI





Patient seen and examined at bedside with RN.  Chart reviewed.  Case discussed 

with family member at bedside.  Overnight no complaints to offer.  Currently 

alert and oriented x3.  Did have 1 episode of visual hallucination last night.  

However this morning patient is doing well overall.





Review of Systems


10-point ROS is otherwise unremarkable





Physical Examination





- Vital Signs


Temperature: 97.2 F


Blood Pressure: 138/52


Pulse: 88


Respirations: 20


Pulse Ox (%): 98





- Physical Exam


General: Alert, In no apparent distress


Respiratory: Clear to auscultation bilaterally, Normal air movement


Cardiovascular: Regular rate/rhythm, Normal S1 S2


Gastrointestinal: Normal bowel sounds, No tenderness


Musculoskeletal: No tenderness


Integumentary: Rash(es), Skin breakdown


Neurological: Normal speech, Normal tone, Normal affect


Lymphatics: No axilla or inguinal lymphadenopathy





- Studies


Medications List Reviewed: Yes





Assessment And Plan





- Current Problems (Diagnosis)


(1) Acute encephalopathy


Current Visit: Yes   Status: Acute   


Plan: 


Acute toxic encephalopathy most likely secondary to urinary tract infection


-family with concerns of hallucination overnight here in the hospital. 


-this morning patient is alert and oriented x3 negative for any hallucination


-head CT initially negative for any acute abnormality


-patient may be having sun down effect versus may have underlying dementia that 

has not been diagnosed here with primary care doctor








(2) Complicated UTI (urinary tract infection)


Current Visit: Yes   Status: Acute   


Plan: 


Complicated UTI with failed outpatient therapy


-patient failed therapy with oral Levaquin.  Urine culture from 5 days ago 

positive for enterococcus bacillus sensitive only to vancomycin and ampicillin


-urine culture recollected care and growing mixed leonardo however seems to be a 

contaminant


-Will treat patient with IV vancomycin at this time for enterococcus bacillus 

for 2 weeks


-PICC line placed


-case management consulted for home health versus nursing home placement








(3) Hyperlipidemia


Onset Date: 11/30/15   Current Visit: No   Status: Chronic   


Qualifiers: 


   Hyperlipidemia type: mixed hyperlipidemia   Qualified Code(s): E78.2 - Mixed 

hyperlipidemia   





(4) Diabetes mellitus


Onset Date: 11/30/15   Current Visit: No   Status: Chronic   


Plan: 


Insulin sliding scale along with Accu-Cheks


Qualifiers: 


   Diabetes mellitus type: type 2   Diabetes mellitus long term insulin use: 

with long term use   Diabetes mellitus complication status: without 

complication   Qualified Code(s): E11.9 - Type 2 diabetes mellitus without 

complications; Z79.4 - Long term (current) use of insulin   





(5) Hypertension


Onset Date: 01/05/18   Current Visit: No   Status: Chronic   


Plan: 


Stable at this time will continue to monitor closely.  Restarted on home 

medication


Qualifiers: 


   Hypertension type: essential hypertension   Qualified Code(s): I10 - 

Essential (primary) hypertension   





(6) Obesity (BMI 30.0-34.9)


Onset Date: 01/05/18   Current Visit: No   Status: Chronic   





(7) Hypomagnesemia


Onset Date: 01/14/15   Current Visit: No   Status: Resolved   


Plan: 


Replace per protocol








- Plan





Pending clinical improvement at this time.  Now on IV vancomycin for 2 weeks 

for UTI.  Case management consulted to arrange for home health versus nursing 

home placement.  PICC line ordered today. 


Discharge Plan: Home


Plan to discharge in: 48 Hours





- Code Status/Comfort Care


Code Status Assessed: Yes


Critical Care: No

## 2019-06-21 RX ADMIN — HUMAN INSULIN SCH: 100 INJECTION, SOLUTION SUBCUTANEOUS at 11:30

## 2019-06-21 RX ADMIN — HUMAN INSULIN SCH: 100 INJECTION, SOLUTION SUBCUTANEOUS at 07:30

## 2019-06-21 RX ADMIN — GABAPENTIN SCH MG: 400 CAPSULE ORAL at 15:24

## 2019-06-21 RX ADMIN — HUMAN INSULIN SCH: 100 INJECTION, SOLUTION SUBCUTANEOUS at 21:00

## 2019-06-21 RX ADMIN — ATORVASTATIN CALCIUM SCH MG: 10 TABLET, FILM COATED ORAL at 20:15

## 2019-06-21 RX ADMIN — GABAPENTIN SCH MG: 400 CAPSULE ORAL at 20:14

## 2019-06-21 RX ADMIN — SPIRONOLACTONE SCH MG: 25 TABLET, FILM COATED ORAL at 20:14

## 2019-06-21 RX ADMIN — HYDRALAZINE HYDROCHLORIDE SCH MG: 10 TABLET, FILM COATED ORAL at 09:27

## 2019-06-21 RX ADMIN — BENAZEPRIL HYDROCHLORIDE SCH MG: 20 TABLET, FILM COATED ORAL at 09:33

## 2019-06-21 RX ADMIN — HYDRALAZINE HYDROCHLORIDE SCH MG: 10 TABLET, FILM COATED ORAL at 15:24

## 2019-06-21 RX ADMIN — SPIRONOLACTONE SCH MG: 25 TABLET, FILM COATED ORAL at 09:27

## 2019-06-21 RX ADMIN — Medication SCH ML: at 09:29

## 2019-06-21 RX ADMIN — HYDRALAZINE HYDROCHLORIDE SCH MG: 10 TABLET, FILM COATED ORAL at 20:14

## 2019-06-21 RX ADMIN — GABAPENTIN SCH MG: 400 CAPSULE ORAL at 09:27

## 2019-06-21 RX ADMIN — DULOXETINE HYDROCHLORIDE SCH MG: 30 CAPSULE, DELAYED RELEASE ORAL at 09:28

## 2019-06-21 RX ADMIN — ENOXAPARIN SODIUM SCH MG: 40 INJECTION SUBCUTANEOUS at 09:28

## 2019-06-21 RX ADMIN — HUMAN INSULIN SCH: 100 INJECTION, SOLUTION SUBCUTANEOUS at 16:30

## 2019-06-21 RX ADMIN — VANCOMYCIN HYDROCHLORIDE SCH MLS: 1 INJECTION, POWDER, FOR SOLUTION INTRAVENOUS at 12:22

## 2019-06-21 RX ADMIN — Medication SCH ML: at 20:15

## 2019-06-21 NOTE — RAD REPORT
EXAM DESCRIPTION:     XR Chest, 1 View

 

CLINICAL HISTORY:  The patient is 79 years old and is Female; PICC Placement

 

TECHNIQUE:  Frontal view of the chest.

 

COMPARISON:  No relevant prior studies available.

 

FINDINGS:  LUNGS:   There are low lung volumes.   Bibasilar opacities are noted.

   PLEURAL SPACE:   Minimal blunting of bilateral costophrenic angles is present.   No pneumothorax.

   HEART:   Unremarkable.   No cardiomegaly.

   MEDIASTINUM:   Unremarkable.

   BONES/JOINTS:   There are degenerative changes of the bones.

   TUBES, LINES AND DEVICES:   A right upper extremity PICC is present with the tip in the region of 
the SVC.

 

IMPRESSION:  1.   A right upper extremity PICC is present with the tip in the region of the SVC.

2.   Findings suggestive of bibasilar atelectasis and likely small effusions.

 

Electronically signed by:   Maya Wei MD   6/21/2019 12:24 AM CDT Workstation: [id number]

 

 

 

 

Due to temporary technical issues with the PACS/Fluency reporting system, reports are being signed by
 the in house radiologist as a courtesy to ensure prompt reporting. The interpreting radiologist is f
ully responsible for the content of the report.

## 2019-06-22 VITALS — TEMPERATURE: 97.9 F | DIASTOLIC BLOOD PRESSURE: 80 MMHG | SYSTOLIC BLOOD PRESSURE: 154 MMHG

## 2019-06-22 VITALS — OXYGEN SATURATION: 93 %

## 2019-06-22 RX ADMIN — HYDRALAZINE HYDROCHLORIDE SCH MG: 10 TABLET, FILM COATED ORAL at 13:09

## 2019-06-22 RX ADMIN — BENAZEPRIL HYDROCHLORIDE SCH MG: 20 TABLET, FILM COATED ORAL at 07:55

## 2019-06-22 RX ADMIN — GABAPENTIN SCH MG: 400 CAPSULE ORAL at 07:54

## 2019-06-22 RX ADMIN — HUMAN INSULIN SCH: 100 INJECTION, SOLUTION SUBCUTANEOUS at 11:19

## 2019-06-22 RX ADMIN — ENOXAPARIN SODIUM SCH MG: 40 INJECTION SUBCUTANEOUS at 07:54

## 2019-06-22 RX ADMIN — SPIRONOLACTONE SCH MG: 25 TABLET, FILM COATED ORAL at 07:55

## 2019-06-22 RX ADMIN — Medication SCH ML: at 07:54

## 2019-06-22 RX ADMIN — HYDRALAZINE HYDROCHLORIDE SCH MG: 10 TABLET, FILM COATED ORAL at 07:55

## 2019-06-22 RX ADMIN — VANCOMYCIN HYDROCHLORIDE SCH MLS: 1 INJECTION, POWDER, FOR SOLUTION INTRAVENOUS at 11:59

## 2019-06-22 RX ADMIN — HUMAN INSULIN SCH: 100 INJECTION, SOLUTION SUBCUTANEOUS at 07:30

## 2019-06-22 RX ADMIN — GABAPENTIN SCH MG: 400 CAPSULE ORAL at 13:09

## 2019-06-22 RX ADMIN — DULOXETINE HYDROCHLORIDE SCH MG: 30 CAPSULE, DELAYED RELEASE ORAL at 07:54

## 2019-06-22 NOTE — P.DS
Admission Date: 06/18/19


Discharge Date: 06/22/19


Disposition: ROUTINE DISCHARGE


Discharge Condition: GOOD


Reason for Admission: complicated UTI


Consultations: 





Non





- Problems


(1) Acute encephalopathy


Current Visit: Yes   Status: Acute   





(2) Complicated UTI (urinary tract infection)


Current Visit: Yes   Status: Acute   





(3) Hyperlipidemia


Onset Date: 11/30/15   Current Visit: No   Status: Chronic   


Qualifiers: 


   Hyperlipidemia type: mixed hyperlipidemia   Qualified Code(s): E78.2 - Mixed 

hyperlipidemia   





(4) Diabetes mellitus


Onset Date: 11/30/15   Current Visit: No   Status: Chronic   


Qualifiers: 


   Diabetes mellitus type: type 2   Diabetes mellitus long term insulin use: 

with long term use   Diabetes mellitus complication status: without 

complication   Qualified Code(s): E11.9 - Type 2 diabetes mellitus without 

complications; Z79.4 - Long term (current) use of insulin   





(5) Hypertension


Onset Date: 01/05/18   Current Visit: No   Status: Chronic   


Qualifiers: 


   Hypertension type: essential hypertension   Qualified Code(s): I10 - 

Essential (primary) hypertension   





(6) Obesity (BMI 30.0-34.9)


Onset Date: 01/05/18   Current Visit: No   Status: Chronic   





(7) Hypomagnesemia


Onset Date: 01/14/15   Current Visit: No   Status: Resolved   


Brief History of Present Illness: 





jammie Ramos is an 79 years old woman who was recently admitted to the hospital due to 

COPD exacerbation/CHF, then about 5 days ago, she started with dysuria, came to 

ED, was diagnosed with UTI and sent home with oral Levaquin. Despite this 

treatment, she did not improve her symptoms, in fact got worse. She start with 

hallucinations, no fever but chills. Today lab work remarkable for leukocytosis

, normal lactate and procalcitonin. UA significantly abnormal. Urine culture 

from last visit to ER was positive for Enterococcus Faecalis resistant to 

Levaquin. At my encounter, the patient was afebrile, confused, unable to 

provide history.


Hospital Course: 





Overall during the hospital stay patient remained stable





Patient was initially admitted to the hospital for failed outpatient therapy 

complicated UTI.  Patient had urine culture done here in the hospital.  Was 

started on IV antibiotics.  Patient did well overall.  At that time patient's 

urine culture was positive for enterococcus which was sensitive to IV 

vancomycin only.  At that time.  Home health was arranged for patient to 

receive IV antibiotics at home.  Once patient was doing well overall her acute 

encephalopathy had resolved along with her hypomagnesemia she was discharged 

home under stable condition was asked to continue her IV vancomycin for total 

14 days with [facility] home health.  Patient will follow up with her primary care 

provider as well.  Vanc trough will be followed up by her primary care provider 

is well.


Vital Signs/Physical Exam: 














Temp Pulse Resp BP Pulse Ox


 


 97.4 F   93 H  19   176/79 H  94 


 


 06/22/19 08:00  06/22/19 08:00  06/22/19 08:00  06/22/19 08:00  06/22/19 08:00








General: Alert, In no apparent distress


Respiratory: Clear to auscultation bilaterally, Normal air movement


Cardiovascular: Regular rate/rhythm, Normal S1 S2


Gastrointestinal: Normal bowel sounds, No tenderness


Musculoskeletal: No tenderness


Integumentary: No rashes, Skin breakdown, Tenderness/swelling


Neurological: Normal speech, Normal tone, Normal affect


Lymphatics: No axilla or inguinal lymphadenopathy


Laboratory Data at Discharge: 














WBC  10.8 K/uL (4.3-10.9)   06/19/19  05:14    


 


Hgb  8.9 g/dL (12.0-15.0)  L  06/19/19  05:14    


 


Hct  27.8 % (36.0-45.0)  L  06/19/19  05:14    


 


Plt Count  309 K/uL (152-406)   06/19/19  05:14    


 


PT  13.1 SECONDS (9.5-12.5)  H  06/18/19  18:53    


 


INR  1.11   06/18/19  18:53    


 


APTT  32.1 SECONDS (24.3-36.9)   06/18/19  18:53    


 


Sodium  140 mmol/L (136-145)   06/19/19  05:14    


 


Potassium  4.3 mmol/L (3.5-5.1)   06/19/19  05:14    


 


BUN  28 mg/dL (7-18)  H  06/19/19  05:14    


 


Creatinine  1.06 mg/dL (0.55-1.3)   06/19/19  05:14    


 


Glucose  85 mg/dL ()   06/19/19  05:14    


 


Magnesium  2.0 mg/dL (1.8-2.4)  D 06/19/19  14:10    


 


Total Bilirubin  0.2 mg/dL (0.2-1.0)   06/18/19  18:53    


 


AST  7 U/L (15-37)  L  06/18/19  18:53    


 


ALT  8 U/L (12-78)  L  06/18/19  18:53    


 


Alkaline Phosphatase  58 U/L ()   06/18/19  18:53    


 


Lipase  48 U/L ()  L  06/18/19  18:53    








Home Medications: 








Atorvastatin Calcium [Lipitor*] 10 mg PO BEDTIME #30 tab 10/10/17 


Hydralazine [Apresoline*] 20 mg PO TID #180 tab 10/10/17 


Benazepril HCl [Lotensin] 40 mg PO DAILY 04/06/19 


Duloxetine HCl [Cymbalta] 30 mg PO DAILY 04/06/19 


Gabapentin [Neurontin*] 400 mg PO TID 04/06/19 


Insulin Glargine/Lixisenatide [Soliqua 100 Unit-33 Mcg/ml Pen] 20 units SQ 

BEDTIME 04/06/19 


Metformin HCl [Glucophage] 500 mg PO TID 04/06/19 


Spironolactone [Aldactone*] 25 mg PO BID #60 tab 04/18/19 


Acetaminophen- Codeine 300-30mg 1 tab PO Q8H 06/19/19 


Cystex Plus Methamine Sodium Salicylate 1 tab PO TID 06/19/19 


Vancomycin [Vancocin*] 1 gm IV Q24H #14 vial 06/22/19 





New Medications: 


Vancomycin [Vancocin*] 1 gm IV Q24H #14 vial


Diet: Regular


Activity: Ad saskia


Followup: 


Noe Collazo,  [ACTIVE - CAN ADMIT] -  (call to schedule appointment)

## 2019-07-02 ENCOUNTER — HOSPITAL ENCOUNTER (EMERGENCY)
Dept: HOSPITAL 97 - ER | Age: 79
Discharge: HOME | End: 2019-07-02
Payer: COMMERCIAL

## 2019-07-02 DIAGNOSIS — I50.9: ICD-10-CM

## 2019-07-02 DIAGNOSIS — N39.0: Primary | ICD-10-CM

## 2019-07-02 DIAGNOSIS — I10: ICD-10-CM

## 2019-07-02 DIAGNOSIS — Z88.1: ICD-10-CM

## 2019-07-02 DIAGNOSIS — E78.5: ICD-10-CM

## 2019-07-02 DIAGNOSIS — E11.9: ICD-10-CM

## 2019-07-02 DIAGNOSIS — Z88.8: ICD-10-CM

## 2019-07-02 DIAGNOSIS — E83.42: ICD-10-CM

## 2019-07-02 LAB
BUN BLD-MCNC: 14 MG/DL (ref 7–18)
GLUCOSE SERPLBLD-MCNC: 144 MG/DL (ref 74–106)
HCT VFR BLD CALC: 30 % (ref 36–45)
LYMPHOCYTES # SPEC AUTO: 1.1 K/UL (ref 0.7–4.9)
MAGNESIUM SERPL-MCNC: 1.5 MG/DL (ref 1.8–2.4)
PMV BLD: 8.9 FL (ref 7.6–11.3)
POTASSIUM SERPL-SCNC: 4.5 MMOL/L (ref 3.5–5.1)
RBC # BLD: 3.44 M/UL (ref 3.86–4.86)
UA COMPLETE W REFLEX CULTURE PNL UR: (no result)

## 2019-07-02 PROCEDURE — 85025 COMPLETE CBC W/AUTO DIFF WBC: CPT

## 2019-07-02 PROCEDURE — 86900 BLOOD TYPING SEROLOGIC ABO: CPT

## 2019-07-02 PROCEDURE — 51702 INSERT TEMP BLADDER CATH: CPT

## 2019-07-02 PROCEDURE — 83735 ASSAY OF MAGNESIUM: CPT

## 2019-07-02 PROCEDURE — 36415 COLL VENOUS BLD VENIPUNCTURE: CPT

## 2019-07-02 PROCEDURE — 71045 X-RAY EXAM CHEST 1 VIEW: CPT

## 2019-07-02 PROCEDURE — 99284 EMERGENCY DEPT VISIT MOD MDM: CPT

## 2019-07-02 PROCEDURE — 87088 URINE BACTERIA CULTURE: CPT

## 2019-07-02 PROCEDURE — 96365 THER/PROPH/DIAG IV INF INIT: CPT

## 2019-07-02 PROCEDURE — 81003 URINALYSIS AUTO W/O SCOPE: CPT

## 2019-07-02 PROCEDURE — 87040 BLOOD CULTURE FOR BACTERIA: CPT

## 2019-07-02 PROCEDURE — 86901 BLOOD TYPING SEROLOGIC RH(D): CPT

## 2019-07-02 PROCEDURE — 86850 RBC ANTIBODY SCREEN: CPT

## 2019-07-02 PROCEDURE — 93005 ELECTROCARDIOGRAM TRACING: CPT

## 2019-07-02 PROCEDURE — 93970 EXTREMITY STUDY: CPT

## 2019-07-02 PROCEDURE — 87086 URINE CULTURE/COLONY COUNT: CPT

## 2019-07-02 PROCEDURE — 80048 BASIC METABOLIC PNL TOTAL CA: CPT

## 2019-07-02 PROCEDURE — 81015 MICROSCOPIC EXAM OF URINE: CPT

## 2019-07-02 NOTE — RAD REPORT
EXAM DESCRIPTION:  RAD - Chest Single View - 7/2/2019 5:35 pm

 

CLINICAL HISTORY:  Anemia, shortness of breath

 

COMPARISON:  June 20

 

TECHNIQUE:  AP portable chest image was obtained 1725 hours .

 

FINDINGS:  Lung volumes are low. PICC line remains in place. Left hemidiaphragm elevation again noted
. Pleural and parenchymal opacification in each lung base similar to comparison. Heart size is normal
 range. No pneumothorax. No acute bony abnormality seen. No acute aortic findings suspected.

 

IMPRESSION:  Bilateral lung base pleural and parenchymal opacification similar to June 20.

## 2019-07-02 NOTE — EDPHYS
Physician Documentation                                                                           

 [facility]                                                                 

Name: Gladys Ramos                                                                                    

Age: 79 yrs                                                                                       

Sex: Female                                                                                       

: 1940                                                                                   

MRN: [id number]                                                                                   

Arrival Date: 2019                                                                          

Time: 16:17                                                                                       

Account#: [id number]                                                                            

Bed 15                                                                                            

Private MD:                                                                                       

ED Physician Earline Jiménez                                                                    

HPI:                                                                                              

                                                                                             

17:00 This 79 yrs old  Female presents to ER via EMS with complaints of Abnormal Lab snw 

      Results.                                                                                    

17:00 Pt had Vanco levels and labs done per PCP in another city. Labs done yest and pt was    snw 

      notified today of low H/H and need to come to ED. Onset: The symptoms/episode               

      began/occurred gradually, and became worse. Severity of symptoms: At their worst the        

      symptoms were very mild. It is unknown whether or not the patient has had similar           

      symptoms in the past. as noted.                                                             

                                                                                                  

Historical:                                                                                       

- Allergies:                                                                                      

16:28 ambien;                                                                                 ca1 

16:28 Levaquin;                                                                               ca1 

- Home Meds:                                                                                      

16:28 vancomycin intravenous intravenous [Active]; acetaminophen-codeine 300-30 mg Oral tab 1 ca1 

      tab every 8 hrs [Active]; atorvastatin 10 mg Oral tab [Active]; benazepril 40 mg Oral       

      tab 1 tab once daily [Active]; Cystex Plus (methenamine-sodium salicylate) Oral three       

      times a day [Active]; duloxetine 30 mg Oral cpDR 1 cap once daily [Active]; gabapentin      

      400 mg Oral cap 1 cap 3 times per day [Active]; hydralazine 10 mg Oral tab 2 tab 3          

      TIMES A DAY [Active]; metformin 500 mg Oral tab 1 tab 3 TIMES A DAY [Active]; Soliqua       

      100/33 20 units SC once at night [Active]; spironolactone 25 mg Oral tab 1 tab 2 times      

      per day [Active];                                                                           

- PMHx:                                                                                           

16:28 AAA; CHF; Diabetes - IDDM; Hyperlipidemia; Hypertension; OA;                            ca1 

- PSHx:                                                                                           

16:28 Hernia repair; Cholecystectomy;                                                         ca1 

                                                                                                  

- Immunization history:: Pneumococcal vaccine is up to date, Flu vaccine is not up to             

  date. It has been more than one year since last vaccine.                                        

- Social history:: Smoking status: Patient/guardian denies using tobacco.                         

- Ebola Screening: : Patient negative for fever greater than or equal to 101.5 degrees            

  Fahrenheit, and additional compatible Ebola Virus Disease symptoms Patient denies               

  exposure to infectious person Patient denies travel to an Ebola-affected area in the            

  21 days before illness onset.                                                                   

                                                                                                  

                                                                                                  

ROS:                                                                                              

16:58 Eyes: Negative for injury, pain, redness, and discharge, ENT: Negative for injury,      snw 

      pain, and discharge, Neck: Negative for injury, pain, and swelling, Cardiovascular:         

      Negative for chest pain, palpitations, positive for lower extremity and abdominal           

      edema, Respiratory: Negative for shortness of breath, cough, wheezing, and pleuritic        

      chest pain.                                                                                 

16:58 Back: Negative for injury and pain, : Negative for injury, bleeding, discharge, and       

      swelling, MS/Extremity: Negative for injury and deformity, Skin: Negative for injury,       

      rash, and discoloration, Neuro: Negative for headache, weakness, numbness, tingling,        

      and seizure.                                                                                

16:58 Constitutional: Positive for malaise, lower ext edema.                                      

16:58 Abdomen/GI: Positive for abdominal distension.                                              

                                                                                                  

Exam:                                                                                             

16:56 Head/Face:  Normocephalic, atraumatic. Eyes:  Pupils equal round and reactive to light, snw 

      extra-ocular motions intact.  Lids and lashes normal.  Conjunctiva and sclera are pale,     

      non-icteric, and not injected.  Cornea within normal limits.  Periorbital areas with no     

      swelling, redness, or edema. ENT:  Nares patent. No nasal discharge, no septal              

      abnormalities noted.  Tympanic membranes are normal and external auditory canals are        

      clear.  Oropharynx with no redness, swelling, or masses, exudates, or evidence of           

      obstruction, uvula midline.  Mucous membranes moist. Chest/axilla:  Normal chest wall       

      appearance and motion.  Nontender with no deformity.  No lesions are appreciated.           

16:56 Respiratory:  Lungs have equal breath sounds bilaterally, clear to auscultation and         

      percussion.  No rales, rhonchi or wheezes noted.  No increased work of breathing, no        

      retractions or nasal flaring.                                                               

16:56 Back:  No spinal tenderness.  No costovertebral tenderness.  Full range of motion. MS/      

      Extremity:  Pulses equal, no cyanosis.  Neurovascular intact.  Full, normal range of        

      motion. Neuro:  Awake and alert, GCS 15, oriented to person, place, time, and               

      situation.  Cranial nerves II-XII grossly intact.  Motor strength 5/5 in all                

      extremities.  Sensory grossly intact.  Cerebellar exam normal.  Normal gait. Psych:         

      Awake, alert, with orientation to person, place and time.  Behavior, mood, and affect       

      are within normal limits.                                                                   

16:56 Constitutional: The patient appears alert, awake, pale.                                     

16:56 Cardiovascular: Rate: normal, Rhythm: irregular, Pulses: no pulse deficits are              

      appreciated, Heart sounds: murmur, systolic, heard in the mitral area, Edema: 2+ edema      

      to level of left ankle, left foot, right ankle and right foot.                              

16:56 Abdomen/GI: Inspection: distension, that is mild, Bowel sounds: normal, Palpation:          

      abdomen is soft and non-tender.                                                             

16:56 Skin: Appearance: Color: pale, Temperature: warm, Moisture: normal moisture, petechiae,     

      not noted, swelling, noted on the right leg and left leg, mild erythema to bilateral        

      shin areas, left lower ext with scabbing from old skin graft..                              

17:00 ECG was reviewed by the Attending Physician.                                            snw 

                                                                                                  

Vital Signs:                                                                                      

16:28  / 75; Pulse 90; Resp 16 S; Temp 98.7(O); Pulse Ox 96% on R/A; Weight 91.63 kg    ca1 

      (R); Height 5 ft. 3 in. (160.02 cm); Pain 0/10;                                             

17:38  / 72; Pulse 85; Resp 17; Temp 98.5(O); Pulse Ox 97% on R/A;                      mh5 

18:16  / 79; Pulse 84; Resp 16 S; Pulse Ox 96% on R/A;                                  ca1 

19:39  / 77; Pulse 79; Resp 17; Temp 98.5; Pulse Ox 96% on R/A;                         rv  

16:28 Body Mass Index 35.78 (91.63 kg, 160.02 cm)                                             ca1 

                                                                                                  

MDM:                                                                                              

16:25 Patient medically screened.                                                             snw 

19:09 Data reviewed: vital signs, nurses notes. Data interpreted: Pulse oximetry: on room air snw 

      is 96 %. Interpretation: acceptable. Counseling: I had a detailed discussion with the       

      patient and/or guardian regarding: the historical points, exam findings, and any            

      diagnostic results supporting the discharge/admit diagnosis, the presence of at least       

      one elevated blood pressure reading (>120/80) during this emergency department visit,       

      lab results, radiology results, the need for outpatient follow up, to return to the         

      emergency department if symptoms worsen or persist or if there are any questions or         

      concerns that arise at home. Special discussion: I have referred the patient to see his     

      PCP for further evaluation of high blood pressure. Based on the history and exam            

      findings, there is no indication for further emergent testing or inpatient evaluation.      

      I discussed with the patient/guardian the need to see the primary care provider for         

      further evaluation of the symptoms.                                                         

                                                                                                  

                                                                                             

16:33 Order name: Urine Microscopic Only; Complete Time: 18:00                                snw 

                                                                                             

16:33 Order name: Urine Culture                                                               snw 

                                                                                             

16:33 Order name: TS; Complete Time: 17:49                                                    snw 

                                                                                             

16:33 Order name: CBC with Diff; Complete Time: 17:29                                         snw 

                                                                                             

16:33 Order name: Chem 7; Complete Time: 17:48                                                snw 

                                                                                             

16:33 Order name: Blood Culture Adult (2)                                                     snw 

                                                                                             

16:33 Order name: Cath; Complete Time: 17:26                                                  snw 

                                                                                             

16:33 Order name: SL; Complete Time: 17:01                                                    snw 

                                                                                             

16:33 Order name: Magnesium; Complete Time: 17:48                                             snw 

                                                                                             

16:47 Order name: EKG; Complete Time: 16:47                                                   snw 

                                                                                             

16:47 Order name: US Extremity Venous W Compression Gerhard; Complete Time: 17:49                 snw 

                                                                                             

16:47 Order name: Chest Single View XRAY; Complete Time: 18:06                                snw 

                                                                                             

17:30 Order name: Urine Dipstick--Ancillary (enter results); Complete Time: 19:23             em1 

                                                                                             

16:47 Order name: EKG - Nurse/Tech; Complete Time: 17:25                                      snw 

                                                                                                  

Administered Medications:                                                                         

18:00 Drug: Magnesium Sulfate 1 grams Route: IVPB; Infused Over: 1 hrs; Site: left            ca1 

      antecubital;                                                                                

19:02 Follow up: Response: No adverse reaction; IV Status: Completed infusion                 ca1 

                                                                                                  

                                                                                                  

Disposition:                                                                                      

21:23 Co-signature as Attending Physician, Earline Jiménez MD.                               ma2 

                                                                                                  

Disposition:                                                                                      

19 19:08 Discharged to Home. Impression: Urinary tract infection, site not specified -      

  tx in progress, Hypomagnesemia.                                                                 

- Condition is Stable.                                                                            

- Discharge Instructions: Edema, Hypomagnesemia, Acute Urinary Retention, Female,                 

  Urinary Tract Infection, Adult, Rehydration, Elderly.                                           

                                                                                                  

- Medication Reconciliation Form, Thank You Letter, Antibiotic Education, Prescription            

  Opioid Use form.                                                                                

- Follow up: Private Physician; When: 2 - 3 days; Reason: Recheck today's complaints,             

  Continuance of care, Re-evaluation by your physician. Follow up: Emergency                      

  Department; When: As needed; Reason: Worsening of condition.                                    

                                                                                                  

                                                                                                  

                                                                                                  

Signatures:                                                                                       

Dispatcher [employer]                           EDMS                                                 

Gregoria Vo, EDWIGE-C                 FNP-Csnw                                                  

Earline Jiménez MD MD   ma2                                                  

Leo Dueñas, RN                    RN   rv                                                   

Acob, Libby RN                        RN   ca1                                                  

                                                                                                  

Corrections: (The following items were deleted from the chart)                                    

21:13 19:08 2019 19:08 Discharged to Home. Impression: Urinary tract infection, site    rv  

      not specified - tx in progress; Hypomagnesemia. Condition is Stable. Forms are              

      Medication Reconciliation Form, Thank You Letter, Antibiotic Education, Prescription        

      Opioid Use. Follow up: Private Physician; When: 2 - 3 days; Reason: Recheck today's         

      complaints, Continuance of care, Re-evaluation by your physician. Follow up: Emergency      

      Department; When: As needed; Reason: Worsening of condition. snw                            

                                                                                                  

**************************************************************************************************

## 2019-07-02 NOTE — ER
Nurse's Notes                                                                                     

 [facility]                                                                 

Name: Gladys Ramos                                                                                    

Age: 79 yrs                                                                                       

Sex: Female                                                                                       

: 1940                                                                                   

MRN: [id number]                                                                                   

Arrival Date: 2019                                                                          

Time: 16:17                                                                                       

Account#: [id number]                                                                            

Bed 15                                                                                            

Private MD:                                                                                       

Diagnosis: Urinary tract infection, site not specified-tx in progress;Hypomagnesemia              

                                                                                                  

Presentation:                                                                                     

                                                                                             

16:17 Presenting complaint: EMS states: Blood was drawn from PICC line yesterday with results ca1 

      of: Hgb 7.8, Hct 25.7, and RBC 2.92. PT is bed bound due to a fall some time ago. Pt is     

      well taken care of by daughter and son-in-law. PICC line is used for antibiotic             

      treatment for UTI. Pt's SBP is at 200s. Last /70 manually taken. Transition of        

      care: patient was not received from another setting of care. Onset of symptoms was 2019. Risk Assessment: Do you want to hurt yourself or someone else? Patient            

      reports no desire to harm self or others. Initial Sepsis Screen: Does the patient meet      

      any 2 criteria? No. Patient's initial sepsis screen is negative. Does the patient have      

      a suspected source of infection? No. Patient's initial sepsis screen is negative. Care      

      prior to arrival: Glucose check: 130.                                                       

16:17 Method Of Arrival: EMS: [facility]                                                ca1 

16:17 Acuity: ANATOLY 3                                                                           ca1 

                                                                                                  

Triage Assessment:                                                                                

16:28 General: Appears in no apparent distress. comfortable, Behavior is calm, cooperative,   ca1 

      appropriate for age. Pain: Denies pain.                                                     

                                                                                                  

Historical:                                                                                       

- Allergies:                                                                                      

16:28 ambien;                                                                                 ca1 

16:28 Levaquin;                                                                               ca1 

- Home Meds:                                                                                      

16:28 vancomycin intravenous intravenous [Active]; acetaminophen-codeine 300-30 mg Oral tab 1 ca1 

      tab every 8 hrs [Active]; atorvastatin 10 mg Oral tab [Active]; benazepril 40 mg Oral       

      tab 1 tab once daily [Active]; Cystex Plus (methenamine-sodium salicylate) Oral three       

      times a day [Active]; duloxetine 30 mg Oral cpDR 1 cap once daily [Active]; gabapentin      

      400 mg Oral cap 1 cap 3 times per day [Active]; hydralazine 10 mg Oral tab 2 tab 3          

      TIMES A DAY [Active]; metformin 500 mg Oral tab 1 tab 3 TIMES A DAY [Active]; Soliqua       

      100/33 20 units SC once at night [Active]; spironolactone 25 mg Oral tab 1 tab 2 times      

      per day [Active];                                                                           

- PMHx:                                                                                           

16:28 AAA; CHF; Diabetes - IDDM; Hyperlipidemia; Hypertension; OA;                            ca1 

- PSHx:                                                                                           

16:28 Hernia repair; Cholecystectomy;                                                         ca1 

                                                                                                  

- Immunization history:: Pneumococcal vaccine is up to date, Flu vaccine is not up to             

  date. It has been more than one year since last vaccine.                                        

- Social history:: Smoking status: Patient/guardian denies using tobacco.                         

- Ebola Screening: : Patient negative for fever greater than or equal to 101.5 degrees            

  Fahrenheit, and additional compatible Ebola Virus Disease symptoms Patient denies               

  exposure to infectious person Patient denies travel to an Ebola-affected area in the            

  21 days before illness onset.                                                                   

                                                                                                  

                                                                                                  

Screenin:30 Abuse screen: Denies threats or abuse. Denies injuries from another. Nutritional        ca1 

      screening: No deficits noted. Tuberculosis screening: No symptoms or risk factors           

      identified. Fall Risk IV access (20 points). Ambulatory Aid- None/Bed Rest/Nurse Assist     

      (0 pts).                                                                                    

                                                                                                  

Assessment:                                                                                       

16:30 General: Appears in no apparent distress. comfortable, Behavior is calm, cooperative,   ca1 

      appropriate for age. Pain: Denies pain. Neuro: Level of Consciousness is awake, alert,      

      obeys commands, Oriented to person, place, time, situation. Cardiovascular: Heart tones     

      S1 S2 present Capillary refill < 3 seconds Patient's skin is warm and dry. Respiratory:     

      Airway is patent Respiratory effort is even, unlabored, Respiratory pattern is regular,     

      symmetrical, Breath sounds are clear bilaterally. GI: Abdomen is round non-distended,       

      Bowel sounds present X 4 quads. Abd is soft and non tender X 4 quads. : No deficits       

      noted. No signs and/or symptoms were reported regarding the genitourinary system. EENT:     

      No deficits noted. No signs and/or symptoms were reported regarding the EENT system.        

      Derm: Skin is intact, is healthy with good turgor, Skin is pink, warm \T\ dry.              

      Musculoskeletal: Circulation, motion, and sensation intact. Capillary refill < 3            

      seconds, Range of motion: limited in left hip, left knee, left ankle, right hip, right      

      knee and right ankle.                                                                       

17:30 Reassessment: PCP's Fax number: [phone]. Daughter states, PCP wants to be faxed    ca1 

      with diagnostic tests results.                                                              

17:30 Reassessment: Patient appears in no apparent distress at this time. Patient and/or      ca1 

      family updated on plan of care and expected duration. Pain level reassessed. Patient is     

      alert, oriented x 3, equal unlabored respirations, skin warm/dry/pink.                      

18:15 Reassessment: Patient appears in no apparent distress at this time. Patient and/or      ca1 

      family updated on plan of care and expected duration. Pain level reassessed. Patient is     

      alert, oriented x 3, equal unlabored respirations, skin warm/dry/pink. Family at            

      bedside.                                                                                    

19:15 Reassessment: Gregoria NP talked to the patient and family at bedside. status and plan of rv  

      care explained. patient and family agreed.                                                  

19:39 Reassessment: awaiting transport for the patient.                                       rv  

                                                                                                  

Vital Signs:                                                                                      

16:28  / 75; Pulse 90; Resp 16 S; Temp 98.7(O); Pulse Ox 96% on R/A; Weight 91.63 kg    ca1 

      (R); Height 5 ft. 3 in. (160.02 cm); Pain 0/10;                                             

17:38  / 72; Pulse 85; Resp 17; Temp 98.5(O); Pulse Ox 97% on R/A;                      mh5 

18:16  / 79; Pulse 84; Resp 16 S; Pulse Ox 96% on R/A;                                  ca1 

19:39  / 77; Pulse 79; Resp 17; Temp 98.5; Pulse Ox 96% on R/A;                         rv  

16:28 Body Mass Index 35.78 (91.63 kg, 160.02 cm)                                             ca1 

                                                                                                  

ED Course:                                                                                        

16:17 Patient arrived in ED.                                                                  ca1 

16:22 Triage completed.                                                                       ca1 

16:23 Gregoria Vo FNP-C is PHCP.                                                        snw 

16:23 Earline Jiménez MD is Attending Physician.                                           snw 

16:28 Arm band placed on right wrist.                                                         ca1 

16:30 Patient has correct armband on for positive identification. Placed in gown. Bed in low  ca1 

      position. Call light in reach. Side rails up X2. Pulse ox on. NIBP on. Warm blanket         

      given.                                                                                      

16:46 Acob, Libby, RN is Primary Nurse.                                                      ca1 

16:57 No provider procedures requiring assistance completed. Inserted saline lock: 20 gauge   ca1 

      in left antecubital area, using aseptic technique. Blood collected.                         

16:57 First set of blood cultures drawn by me, Second set of blood cultures drawn by me.      ca1 

17:11 EKG done, by EKG tech. reviewed by Gregoria PINEDO.                               at1 

17:30 Straight cath inserted, using sterile technique, 16 Fr. Specimen obtained. Returned     ca1 

      clear yellow urine. Patient tolerated well.                                                 

17:35 Chest Single View XRAY In Process Unspecified.                                          EDMS

17:41 US Extremity Venous W Compression Gerhard In Process Unspecified.                           EDMS

17:47 Second set of blood cultures drawn by me.                                               ca1 

21:13 IV discontinued, intact, bleeding controlled, No redness/swelling at site. Pressure     rv  

      dressing applied.                                                                           

                                                                                                  

Administered Medications:                                                                         

18:00 Drug: Magnesium Sulfate 1 grams Route: IVPB; Infused Over: 1 hrs; Site: left            ca1 

      antecubital;                                                                                

19:02 Follow up: Response: No adverse reaction; IV Status: Completed infusion                 ca1 

                                                                                                  

                                                                                                  

Output:                                                                                           

17:30 Urine: 600ml (Straight Cath); Total: 600ml.                                             ca1 

                                                                                                  

Outcome:                                                                                          

19:08 Discharge ordered by MD. dodd 

19:43 Condition: good                                                                         rv  

21:12 Discharged to home via ambulance.                                                       rv  

21:12 Discharge instructions given to patient, family, Instructed on discharge instructions,      

      follow up and referral plans. Demonstrated understanding of instructions, follow-up         

      care.                                                                                       

21:13 Patient left the ED.                                                                    rv  

                                                                                                  

Signatures:                                                                                       

Dispatcher [employer]                           EDGregoria Andrews, SHAHIDA                 FNP-Csnw                                                  

Karol Vargas, EKG Tech              EKG [facility]                                                  

Cherry Bonner                              [facility]                                                  

Leo Dueñas, RN                    RN   rv                                                   

Libby Parisi RN                        RN   ca1                                                  

                                                                                                  

Corrections: (The following items were deleted from the chart)                                    

18:10 17:30 First set of blood cultures drawn by me, Second set of blood cultures drawn by    ca1 

      me, ca1                                                                                     

                                                                                                  

**************************************************************************************************

## 2019-07-02 NOTE — RAD REPORT
EXAM DESCRIPTION:  US - Extrem Venous W Compress Gerhard - 7/2/2019 5:41 pm

 

CLINICAL HISTORY:  Bilateral leg pain and swelling

 

COMPARISON:  None.

 

TECHNIQUE:  Real-time sonographic evaluation of the bilateral lower extremity common femoral, superfi
cial femoral, popliteal and posterior tibial veins was performed.

 

FINDINGS:  Normal compressibility, flow augmentation, phasic flow and spontaneous flow are identified
 in the left and right lower extremity common femoral, superficial femoral, popliteal and posterior t
ibial veins. No intraluminal filling defects seen.

 

IMPRESSION:  No DVT in either lower extremity.

## 2019-07-02 NOTE — XMS REPORT
Clinical Summary

 Created on:2019



Patient:Gladys Ramos

Sex:Female

:1940

External Reference #:[id number]





Demographics







 Address  [address] [place] [address]

 

 Home Phone  [phone]

 

 Mobile Phone  [phone]

 

 Home Phone  [phone]

 

 Mobile Phone  [phone]

 

 Email Address  [email or web address]

 

 Preferred Language  English

 

 Marital Status  

 

 Restorationist Affiliation  Unknown

 

 Race  White

 

 Ethnic Group  Not  or 









Author







 Organization  [facility]

 

 Address  [address] [place] [address]









Support







 Name  Relationship  Address  Phone

 

 Ирина Banda  Child  Unavailable  [phone]









Care Team Providers







 Name  Role  Phone

 

 DONATO Dennis DO  Primary Care Provider  [phone]









Allergies







 Active Allergy  Reactions  Severity  Noted Date  Comments

 

 No Known Drug Allergies  Other (See Comments)    2016  







Medications







 Medication  Sig  Dispensed  Refills  Start Date  End Date  Status

 

 blood sugar  Check four  400 strip  10  2017    Active



 diagnostic strips  strips daily          



 (FREESTYLE LITE            



 STRIPS) strip test            



 strips            

 

 lancets 28 gauge  Check 4 times  400 each  10  2017    Active



 misc  daily          

 

 pen needle,  Use once daily  100 each  10  2017    Active



 diabetic (BD            



 INSULIN PEN NEEDLE            



 UF SHORT) 31 gauge            



 x 5/16" needle            

 

 amLODIPine  Take 1 tablet  90 tablet  0  2018    Active



 (NORVASC) 10 mg  by mouth daily          



 tablet  -hold if sbp          



   less than 100          



   and dbp is          



   less than 70          

 

 benazepril  Take 1 tablet  90 tablet  1  2019    Active



 (LOTENSIN) 40 MG  by mouth daily          



 tablet            

 

 atorvastatin  Take 1 tablet  90 tablet  1  2019    Active



 (LIPITOR) 10 MG  by mouth daily          



 tablet            

 

 carvedilol (COREG)  Take 1 tablet  180 tablet  1  2019    Active



 6.25 MG tablet  by mouth twice          



   a day with          



   meals          

 

 predniSONE  Take 10 mg by    0      Active



 (DELTASONE) 10 mg  mouth 2 (two)          



 tablet  times a day.          

 

 amLODIPine  Take 1 tablet  90 tablet  1  2019    Active



 (NORVASC) 10 mg  by mouth daily          



 tablet  -hold if sbp          



   is less than          



   100 and dbp is          



   less than 70          

 

 atorvastatin  Take 1 tablet  90 tablet  1  2019    Active



 (LIPITOR) 10 MG  (10 mg total)          



 tablet  by mouth          



   daily.          

 

 benazepril  Take 1 tablet  90 tablet  1  2019    Active



 (LOTENSIN) 40 MG  (40 mg total)          



 tablet  by mouth          



   daily.          

 

 carvedilol (COREG)  Take 1 tablet  180 tablet  1  2019    Active



 6.25 MG tablet  (6.25 mg          



   total) by          



   mouth 2 (two)          



   times a day          



   with meals.          

 

 gabapentin  Take 1 capsule  270 capsule  1  2019    Active



 (NEURONTIN) 400 mg  (400 mg total)          



 capsule  by mouth 3          



   (three) times          



   a day.          

 

 hydrALAZINE  Take 2 tablets  540 tablet  0  2019    Active



 (APRESOLINE) 10 MG  (20 mg total)          



 tablet  by mouth 3          



   (three) times          



   a day.          

 

 DULoxetine  Take 1 capsule  90 capsule  1  2019    Active



 (CYMBALTA) 20 MG  (20 mg total)          



 capsule  by mouth          



   daily.          

 

 insulin  Inject 30  10 Syringe  1  2019    Active



 glargine-lixisenati  Units under          



 de (SOLIQUA 100/33)  the skin          



 100 unit-33 mcg/mL  daily.          



 insulin pen            

 

 metFORMIN  Take 1 tablet  270 tablet  1  2019    Active



 (GLUCOPHAGE) 500 mg  (500 mg total)          



 tablet  by mouth 3          



   (three) times          



   a day.          

 

 zinc oxide 20 %  Apply  56.7 g  1  2019  Active



 ointment  topically as        0  



   needed for          



   irritation.          

 

 soft lens  20 mL daily.  300 mL  2  2019    Active



 rinse,store            



 solution (SALINE            



 SOLUTION) solution            

 

 spironolactone  Take 1 tablet  180 tablet  11  2019  Active



 (ALDACTONE) 25 MG  (25 mg total)        0  



 tablet  by mouth 2          



   (two) times a          



   day.          

 

 hydrocolloid  Apply 1  60 each  3  2019    Active



 dressing (DUODERM  Package          



 CGF BORDER  topically 2          



 DRESSING) 4 X 4 "  (two) times a          



 bandage  day.          

 

 insulin  Inject 30  10 Syringe  1  2017  Discontinued



 glargine-lixisenati  Units under        9  



 de (SOLIQUA 100/33)  the skin          



 100 unit-33 mcg/mL  daily.          



 insulin pen            

 

 nitrofurantoin,  Take 1 capsule  14 capsule  0  2018  10/03/201  
Discontinued



 macrocrystal-monohy  (100 mg total)        8  



 drate, (MACROBID)  by mouth 2          



 100 MG capsule  (two) times a          



   day for 7          



   days.          

 

 gabapentin  Take 1 capsule  270 capsule  0  2018  Discontinued



 (NEURONTIN) 400 mg  by mouth three        8  



 capsule  times a day          

 

 hydrALAZINE  Take 2 tablets  540 tablet  0  2018  Discontinued



 (APRESOLINE) 10 MG  by mouth three        8  



 tablet  times daily          

 

 acetaminophen-codei  Take 1 tablet  90 tablet  0  2018  
Discontinued



 ne (TYLENOL WITH  by mouth every        8  



 CODEINE #3) 300-30  8 hours as          



 mg per tablet  needed for          



   MODERATE PAIN          

 

 carvedilol (COREG)  Take 1 tablet  180 tablet  0  2018  
Discontinued



 6.25 MG tablet  by mouth twice        8  



   a day with          



   meals          

 

 amLODIPine  Take 1 tablet  90 tablet  0  2018  Discontinued



 (NORVASC) 10 mg  by mouth daily        8  



 tablet  -hold if sbp          



   less than 100          



   and dbp is          



   less than 70          

 

 citalopram (CeleXA)  Take 1 tablet  90 tablet  0  2018  
Discontinued



 20 MG tablet  by mouth daily        8  

 

 benazepril  Take 1 tablet  90 tablet  0  2018  Discontinued



 (LOTENSIN) 40 MG  by mouth daily        8  



 tablet            

 

 atorvastatin  Take 1 tablet  90 tablet  0  2018  Discontinued



 (LIPITOR) 10 MG  by mouth daily        8  



 tablet            

 

 metFORMIN  Take 500 mg by    0      Discontinued



 (GLUCOPHAGE) 500 mg  mouth 3        9  



 tablet  (three) times          



   a day.          

 

 DULoxetine  Take 20 mg by    0      Discontinued



 (CYMBALTA) 20 MG  mouth daily.        9  



 capsule            

 

 hydrALAZINE  Take 2 tablets  540 tablet  0  2018  Discontinued



 (APRESOLINE) 10 MG  by mouth three        8  



 tablet  times daily          

 

 benazepril  Take 1 tablet  90 tablet  0  2018  Discontinued



 (LOTENSIN) 40 MG  by mouth daily        9  



 tablet            

 

 carvedilol (COREG)  Take 1 tablet  180 tablet  0  2018  
Discontinued



 6.25 MG tablet  by mouth twice        9  



   a day with          



   meals          

 

 gabapentin  Take 1 capsule  270 capsule  0  2018  Discontinued



 (NEURONTIN) 400 mg  by mouth three        8  



 capsule  times a day          

 

 acetaminophen-codei  Take 1 tablet  90 tablet  0  2018  




 ne (TYLENOL WITH  by mouth every        8  



 CODEINE #3) 300-30  8 hours as          



 mg per tablet  needed for          



   MODERATE PAIN          

 

 atorvastatin  Take 1 tablet  90 tablet  0  2018  Discontinued



 (LIPITOR) 10 MG  by mouth daily        9  



 tablet            

 

 nitrofurantoin,  Take 1 capsule  14 capsule  0  10/03/2018  10/10/201  



 macrocrystal-monohy  (100 mg total)        8  



 drate, (MACROBID)  by mouth 2          



 100 MG capsule  (two) times a          



   day for 7          



   days.          

 

 atorvastatin  Take 1 tablet  90 tablet  0  2018  Discontinued



 (LIPITOR) 10 MG  by mouth daily        9  



 tablet            

 

 benazepril  Take 1 tablet  90 tablet  0  2018  Discontinued



 (LOTENSIN) 40 MG  by mouth daily        9  



 tablet            

 

 gabapentin  Take 1 capsule  270 capsule  0  2018  Discontinued



 (NEURONTIN) 400 mg  by mouth three        9  



 capsule  times daily          

 

 hydrALAZINE  Take 2 tablets  540 tablet  0  2018  Discontinued



 (APRESOLINE) 10 MG  by mouth three        9  



 tablet  times daily          

 

 acetaminophen-codei  Take 1 tablet  90 tablet  0  2018  




 ne (TYLENOL WITH  by mouth every        8  



 CODEINE #3) 300-30  8 hours as          



 mg per tablet  needed for          



   MODERATE PAIN          

 

 carvedilol (COREG)  Take 1 tablet  180 tablet  0  2018  
Discontinued



 6.25 MG tablet  by mouth twice        9  



   a day with          



   meals          

 

 amLODIPine  Take 1 tablet  90 tablet  0  2018  Discontinued



 (NORVASC) 10 mg  by mouth daily        9  



 tablet  -hold if sbp          



   less than 100          



   and dbp is          



   less than 70          

 

 hydrALAZINE  Take 2 tablets  540 tablet  0  2019  Discontinued



 (APRESOLINE) 10 MG  by mouth three        9  



 tablet  times daily          

 

 acetaminophen-codei  Take 1 tablet  90 tablet  0  2019  
Discontinued



 ne (TYLENOL WITH  by mouth every        9  



 CODEINE #3) 300-30  8 hours as          



 mg per tablet  needed for          



   MODERATE PAIN          

 

 amLODIPine  Take 1 tablet  90 tablet  1  2019  Discontinued



 (NORVASC) 10 mg  by mouth daily        9  



 tablet  -hold if sbp          



   is less than          



   100 and dbp is          



   less than 70          

 

 gabapentin  Take 1 capsule  270 capsule  1  2019  Discontinued



 (NEURONTIN) 400 mg  by mouth three        9  



 capsule  times daily          

 

 nitrofurantoin,  Take 1 capsule  14 capsule  0  2019  



 macrocrystal-monohy  (100 mg total)        9  



 drate, (MACROBID)  by mouth 2          



 100 MG capsule  (two) times a          



   day for 7          



   days.          

 

 SPIRONOLACTONE ORAL  Take 25 mg by    0      Discontinued



   mouth 2 (two)        9  



   times a day.          

 

 acetaminophen-codei  Take 1 tablet  90 tablet  0  2019  




 ne (TYLENOL WITH  by mouth every        9  



 CODEINE #3) 300-30  8 hours as          



 mg per tablet  needed for          



   MODERATE PAIN          

 

 spironolactone  Take 1 tablet  60 tablet  11  2019  
Discontinued



 (ALDACTONE) 25 MG  (25 mg total)        9  



 tablet  by mouth 2          



   (two) times a          



   day.          

 

 amoxicillin  Take 1 capsule  30 capsule  0  2019  



 (AMOXIL) 500 MG  (500 mg total)        9  



 capsule  by mouth 3          



   (three) times          



   a day for 10          



   days.          







Active Problems







 Problem  Noted Date

 

 Does not transfer from bed to wheelchair  2019

 

 Hallucinations  2018

 

 Tremor of both hands  2018

 

 Administrative encounter  2017

 

 Fall  2016

 

 Leg injuries  2016

 

 Abrasion  2016

 

 Cellulitis of left lower extremity  2016

 

 Acute cystitis without hematuria  2016

 

 Urinary tract infection associated with catheterization of urinary tract  

 

 Anxiety  2016

 

 Arthritis of knee  2016

 

 Dysuria  2016

 

 Edema of lower extremity  2016

 

 Hip pain  2016

 

 Hypertension  2016

 

 Knee pain  2016

 

 Neuropathy  2016

 

 Polyuria  2016

 

 Toothache  2016

 

 Type 2 diabetes mellitus with hypoglycemia without coma, with long-term  2016



 current use of insulin  

 

 Unsteady gait  2016

 

 Vitamin D deficiency  2016

 

 Murmur  2016









 Overview: 







 "aortic aneurysm"







Encounters







 Date  Type  Specialty  Care Team  Description

 

 2019  Telephone  Family Medicine  Lingamfelter, R  



       Juan Carlos, DO  

 

 2019  Telephone  Internal Medicine  Samm Patel, MA  

 

 2019  Telephone  Family Medicine  Lingamfelter, R  



       Juan Carlos, DO  

 

 2019  Telephone  Family Medicine  Leggett, Koki,  



       LVN  

 

 2019  Patient Outreach  Quality  Jaren, Linda  

 

 2019  Telephone  Internal Medicine  Samm Patel, MA  

 

 2019  Telephone  Family Medicine  Leggett, Koki,  



       LVN  

 

 2019  Telephone  Family Medicine  Leggett, Koki,  



       LVN  

 

 2019  Telephone  Family Medicine  Leggett, Koki,  



       LVN  

 

 2019  Orders Only  Family Medicine  Lingamfelter, R  



       Juan Carlos, DO  

 

 2019  Telephone  Family Medicine  Leggett, Koki,  



       LVN  

 

 2019  Orders Only  Family Medicine  Lingamfelter, R  Recurrent UTI



       Juan Carlos, DO  

 

 2019  Patient Outreach  Quality  [facility]  

 

 2019  Orders Only  Family Medicine  Lingamfelter, R  Recurrent UTI (
Primary



       Juan Carlos, DO  Dx)

 

 2019  Telephone  Family Medicine  Leggett, Koki,  



       LVN  

 

 2019  Telephone  Family Medicine  Lingamfelter, R  



       Juan Carlos, DO  

 

 2019  Telephone  Family Medicine  Leggett, Koki,  



       LVN  

 

 2019  Orders Only  Family Medicine  Lingamfelter, R  



       Juan Carlos, DO  

 

 2019  Telephone  Family Medicine  Leggett, Koki,  



       LVN  

 

 2019  Telephone  Family Medicine  Leggett, Koki,  



       LVN  

 

 05/10/2019  Orders Only  Family Medicine  Lingamfelter, R  Recurrent UTI



       Juan Carlos, DO  

 

 2019  Telephone  Internal Medicine  Samm Patel, MA  

 

 2019  Orders Only  Family Medicine  Lingamfelter, R  



       Juan Carlos, DO  

 

 2019  Telephone  Family Medicine  Leggett, Koki,  



       LVN  

 

 2019  Office Visit  Family Medicine  Lingamfelter, R  Congestive heart 
failure, unspecified HF chronicity, unspecified heart failure type (HCC) (
Primary Dx);



       Juan Carlos, DO  Type 2 diabetes mellitus with hypoglycemia without coma, with 
long-term current use of insulin (HCC);



         Shortness of breath;



         Depression, unspecified depression type;



         Unsteady gait

 

 2019  Telephone  Family Medicine  Rajesh Leggettia,  



       LVN  

 

 2019  Orders Only  Family Medicine  Lingamfelter, R  Recurrent UTI



       Juan Carlos, DO  

 

 03/15/2019  Orders Only  Family Medicine  Lingamfelter, R  Recurrent UTI



       Juan Carlos, DO  

 

 02/15/2019  Orders Only  Family Medicine  Lingamfelter, R  Recurrent UTI



       Juan Carlos, DO  

 

 2019  Orders Only  Family Medicine  Lingamfelter, R  



       Juan Carlos, DO  

 

 2019  Orders Only  Family Medicine  Lingamfelter, R  



       Juan Carlos, DO  

 

 2019  Telephone  Family Medicine  Koki Leggett,  



       LVN  

 

 2019  Refill  Endocrinology  Aniyah Sanchez MD  

 

 2019  Refill  Family Medicine  Lingamfelter, R  



       Juan Carlos, DO  

 

 2019  Orders Only  Family Medicine  Lingamfelter, R  Recurrent UTI



       Juan Carlos, DO  

 

 2018  Orders Only  Family Medicine  Lingamfelter, R  Recurrent UTI



       Juan Carlos, DO  

 

 2018  Telephone  Family Medicine  Koki Leggett,  



       LVN  

 

 2018  Patient Outreach  Quality  Jaren, Linda  

 

 2018  Orders Only  Family Medicine  Lingamfelter, R  Recurrent UTI



       Juan Carlos, DO  

 

 2018  Refill  Endocrinology  Aniyah Sanchez MD  

 

 2018  Refill  Family Medicine  Lingamfelter, R  



       Juan Carlos, DO  

 

 2018  Refill  Family Medicine  Lingamfelter, R  



       Juan Carlos, DO  

 

 2018  Patient Outreach  Quality  Jaren, Linda  

 

 10/26/2018  Orders Only  Family Medicine  Lingamfelter, R  Recurrent UTI



       Juan Carlos, DO  

 

 10/25/2018  Telephone  Orthopedic Surgery  Renay Vargas MA  

 

 10/17/2018  Orders Only  Family Medicine  Lingamfelter, R  



       Juan Carlos, DO  

 

 10/03/2018  Orders Only  Family Medicine  Lingamfelter, R  



       Juan Carlos, DO  

 

 2018  Orders Only  Family Medicine  Lingamfelter, R  Recurrent UTI



       Juan Carlos, DO  

 

 2018  Telephone  Family Medicine  Koki Leggett,  



       LVN  

 

 2018  Orders Only  Family Medicine  Lingamfelter, R  



       Juan Carlos, DO  

 

 2018  Telephone  Internal Medicine  Lingamfelter, R  



       Juan Carlos, DO  

 

 2018  Refill  Family Medicine  Lingamfelter, R  



       Juan Carlos, DO  

 

 2018  Telephone  Family Medicine  Leggett Koki,  



       LVN  

 

 2018  Telephone  Family Medicine  Leggett, Koki,  



       LVN  

 

 08/15/2018  Orders Only  Family Medicine  DONATO Dennis  Arthralgia of hip,



       DO Juan Carlos  unspecified laterality



         (Primary Dx)

 

 2018  Telephone  Family Medicine  Leggett, Koki,  



       LVN  

 

 2018  Patient Outreach  Quality  Linda Eastman  

 

 2018  Orders Only  Family Medicine  DONATO Dennis  Recurrent UTI (
Primary



       Juan Carlos, DO  Dx)

 

 2018  Telephone  Family Medicine  Leggett, Koki,  



       LVN  

 

 2018  Telephone  Family Medicine  Leggett Koki,  



       LVN  

 

 2018  Patient Outreach  Quality  Carolann Galeas  

 

 2018  Telephone  Family Medicine  DONATO Dennis,   

 

 2018  Office Visit  Family Medicine  DONATO Dennis  Hallucinations (
Primary Dx);



       DO Juan Carlos  Tremor of both hands;



         Type 2 diabetes mellitus with hypoglycemia without coma, with long-
term current use of insulin

 

 2018  Patient Outreach  Quality  Caitlyn Lopez RN  

 

 2018  Patient Outreach  Quality  Nadiya Mittal  

 

 2018  Patient Outreach  Quality  Carolann Galeas  

 

 2018  Telephone  Family Medicine  LeggettKoki lovett,  



       LVN  

 

 2018  Telephone  Internal Medicine  DONATO Dennis, DO  

 

 2018  Telephone  Family Medicine  LeggettPatito lovettKoki,  



       LVN  



after 2018



Immunizations







 Name  Dates Previously Given  Next Due

 

 FLUZONE HIGH-DOSE PF  10/06/2016, 10/15/2015  

 

 Influenza (IM) Preservative Free  10/01/2017  







Family History







 Medical History  Relation  Name  Comments

 

 Heart disease  Father    

 

 Hypertension  Father    

 

 Diabetes  Maternal Grandmother    









 Relation  Name  Status  Comments

 

 Father      

 

 Maternal Grandmother      







Social History







 Tobacco Use  Types  Packs/Day  Years Used  Date

 

 Never Smoker        









 Smokeless Tobacco: Never Used      









 Alcohol Use  Drinks/Week  oz/Week  Comments

 

 No      









 Sex Assigned at Birth  Date Recorded

 

 Not on file  









 Job Start Date  Occupation  Industry

 

 Not on file  Not on file  Not on file









 Travel History  Travel Start  Travel End









 No recent travel history available.







Last Filed Vital Signs







 Vital Sign  Reading  Time Taken

 

 Blood Pressure  120/73  2019  3:40 PM CDT

 

 Pulse  76  2019  3:40 PM CDT

 

 Temperature  36.8 C (98.2 F)  2019  3:40 PM CDT

 

 Respiratory Rate  18  2018  1:46 PM CDT

 

 Oxygen Saturation  98%  2019  3:40 PM CDT

 

 Inhaled Oxygen Concentration  -  -

 

 Weight  91.6 kg (202 lb)  2019  3:40 PM CDT

 

 Height  160 cm (5' 3")  2019  3:40 PM CDT

 

 Body Mass Index  35.78  2019  3:40 PM CDT







Plan of Treatment







 Health Maintenance  Due Date  Last Done  Comments

 

 DIABETIC RETINAL EYE EXAM  1940    

 

 DIABETIC FOOT EXAM  1949    

 

 SHINGLES VACCINES (#1)  1989    

 

 65+ PNEUMOCOCCAL VACCINE (1 of 2 - PCV13)  2004    

 

 URINE MICROALBUMIN  08/15/2018  08/15/2017  

 

 INFLUENZA VACCINE  2019  10/06/2016, 10/15/2015  







Results

Not on fileafter 2018



Insurance







 Payer  Benefit Plan /  Subscriber ID  Effective Dates  Phone  Address  Type



   Group          

 

 [facility]  xxxxxxxxxxx  2004-Present      [employer]

 

 MEDICARE  MEDICARE PART A  xxxxxxxxxx  2004-Present    [place], [place]  
Medicare



   AND B          









 Guarantor Name  Account Type  Relation to  Date of Birth  Phone  Billing



     Patient      Address

 

 RachelGladys  Personal/Family  Self  1940  [phone]  [address]







         ([place])  [place] [address]







Advance Directives

Patient has advance care planning documents on file. For more information, 
please contact:[place] [address]

## 2019-07-03 VITALS — DIASTOLIC BLOOD PRESSURE: 77 MMHG | TEMPERATURE: 98.5 F | OXYGEN SATURATION: 96 % | SYSTOLIC BLOOD PRESSURE: 168 MMHG

## 2019-07-03 NOTE — EKG
Test Date:    2019-07-02               Test Time:    17:02:45

Technician:   ELVIA                                     

                                                     

MEASUREMENT RESULTS:                                       

Intervals:                                           

Rate:         90                                     

PA:           202                                    

QRSD:         86                                     

QT:           374                                    

QTc:          457                                    

Axis:                                                

P:            32                                     

PA:           202                                    

QRS:          60                                     

T:            63                                     

                                                     

INTERPRETIVE STATEMENTS:                                       

                                                     

Sinus rhythm with premature atrial complexes

Otherwise normal ECG

Compared to ECG 06/18/2019 19:36:38

Atrial premature complex(es) now present

First degree AV block no longer present



Electronically Signed On 07-03-19 06:53:09 CDT by Lewis Deras

## 2019-11-02 ENCOUNTER — HOSPITAL ENCOUNTER (INPATIENT)
Dept: HOSPITAL 97 - ER | Age: 79
LOS: 10 days | Discharge: HOME HEALTH | DRG: 659 | End: 2019-11-12
Attending: FAMILY MEDICINE | Admitting: HOSPITALIST
Payer: COMMERCIAL

## 2019-11-02 VITALS — BODY MASS INDEX: 32.2 KG/M2

## 2019-11-02 DIAGNOSIS — D50.9: ICD-10-CM

## 2019-11-02 DIAGNOSIS — K59.00: ICD-10-CM

## 2019-11-02 DIAGNOSIS — I13.2: ICD-10-CM

## 2019-11-02 DIAGNOSIS — M19.90: ICD-10-CM

## 2019-11-02 DIAGNOSIS — N13.2: ICD-10-CM

## 2019-11-02 DIAGNOSIS — J98.11: ICD-10-CM

## 2019-11-02 DIAGNOSIS — N18.6: ICD-10-CM

## 2019-11-02 DIAGNOSIS — E86.0: ICD-10-CM

## 2019-11-02 DIAGNOSIS — R33.9: ICD-10-CM

## 2019-11-02 DIAGNOSIS — E83.42: ICD-10-CM

## 2019-11-02 DIAGNOSIS — G89.29: ICD-10-CM

## 2019-11-02 DIAGNOSIS — G62.9: ICD-10-CM

## 2019-11-02 DIAGNOSIS — Z23: ICD-10-CM

## 2019-11-02 DIAGNOSIS — E11.22: ICD-10-CM

## 2019-11-02 DIAGNOSIS — Z99.2: ICD-10-CM

## 2019-11-02 DIAGNOSIS — F32.9: ICD-10-CM

## 2019-11-02 DIAGNOSIS — N39.0: ICD-10-CM

## 2019-11-02 DIAGNOSIS — E78.5: ICD-10-CM

## 2019-11-02 DIAGNOSIS — G92: ICD-10-CM

## 2019-11-02 DIAGNOSIS — N17.0: Primary | ICD-10-CM

## 2019-11-02 DIAGNOSIS — E87.5: ICD-10-CM

## 2019-11-02 DIAGNOSIS — E87.6: ICD-10-CM

## 2019-11-02 DIAGNOSIS — I50.33: ICD-10-CM

## 2019-11-02 DIAGNOSIS — E66.9: ICD-10-CM

## 2019-11-02 LAB
ALBUMIN SERPL BCP-MCNC: 3.1 G/DL (ref 3.4–5)
ALP SERPL-CCNC: 66 U/L (ref 45–117)
ALT SERPL W P-5'-P-CCNC: 11 U/L (ref 12–78)
AST SERPL W P-5'-P-CCNC: 10 U/L (ref 15–37)
BUN BLD-MCNC: 124 MG/DL (ref 7–18)
GLUCOSE SERPLBLD-MCNC: 91 MG/DL (ref 74–106)
HCT VFR BLD CALC: 31.6 % (ref 36–45)
INR BLD: 1.04
LIPASE SERPL-CCNC: 61 U/L (ref 73–393)
LYMPHOCYTES # SPEC AUTO: 1 K/UL (ref 0.7–4.9)
MAGNESIUM SERPL-MCNC: 1.6 MG/DL (ref 1.8–2.4)
NT-PROBNP SERPL-MCNC: 4404 PG/ML (ref ?–450)
PMV BLD: 10.3 FL (ref 7.6–11.3)
POTASSIUM SERPL-SCNC: 6.3 MMOL/L (ref 3.5–5.1)
RBC # BLD: 3.54 M/UL (ref 3.86–4.86)
TROPONIN (EMERG DEPT USE ONLY): < 0.02 NG/ML (ref 0–0.04)

## 2019-11-02 PROCEDURE — 76770 US EXAM ABDO BACK WALL COMP: CPT

## 2019-11-02 PROCEDURE — 84466 ASSAY OF TRANSFERRIN: CPT

## 2019-11-02 PROCEDURE — 84165 PROTEIN E-PHORESIS SERUM: CPT

## 2019-11-02 PROCEDURE — 76377 3D RENDER W/INTRP POSTPROCES: CPT

## 2019-11-02 PROCEDURE — 87070 CULTURE OTHR SPECIMN AEROBIC: CPT

## 2019-11-02 PROCEDURE — 83036 HEMOGLOBIN GLYCOSYLATED A1C: CPT

## 2019-11-02 PROCEDURE — 36415 COLL VENOUS BLD VENIPUNCTURE: CPT

## 2019-11-02 PROCEDURE — 93005 ELECTROCARDIOGRAM TRACING: CPT

## 2019-11-02 PROCEDURE — 83735 ASSAY OF MAGNESIUM: CPT

## 2019-11-02 PROCEDURE — 87389 HIV-1 AG W/HIV-1&-2 AB AG IA: CPT

## 2019-11-02 PROCEDURE — 83540 ASSAY OF IRON: CPT

## 2019-11-02 PROCEDURE — 87205 SMEAR GRAM STAIN: CPT

## 2019-11-02 PROCEDURE — 87086 URINE CULTURE/COLONY COUNT: CPT

## 2019-11-02 PROCEDURE — 83880 ASSAY OF NATRIURETIC PEPTIDE: CPT

## 2019-11-02 PROCEDURE — 84145 PROCALCITONIN (PCT): CPT

## 2019-11-02 PROCEDURE — 81003 URINALYSIS AUTO W/O SCOPE: CPT

## 2019-11-02 PROCEDURE — 84443 ASSAY THYROID STIM HORMONE: CPT

## 2019-11-02 PROCEDURE — 74018 RADEX ABDOMEN 1 VIEW: CPT

## 2019-11-02 PROCEDURE — 85027 COMPLETE CBC AUTOMATED: CPT

## 2019-11-02 PROCEDURE — 96366 THER/PROPH/DIAG IV INF ADDON: CPT

## 2019-11-02 PROCEDURE — 82728 ASSAY OF FERRITIN: CPT

## 2019-11-02 PROCEDURE — 83605 ASSAY OF LACTIC ACID: CPT

## 2019-11-02 PROCEDURE — 84484 ASSAY OF TROPONIN QUANT: CPT

## 2019-11-02 PROCEDURE — 86705 HEP B CORE ANTIBODY IGM: CPT

## 2019-11-02 PROCEDURE — 80053 COMPREHEN METABOLIC PANEL: CPT

## 2019-11-02 PROCEDURE — 74450 X-RAY URETHRA/BLADDER: CPT

## 2019-11-02 PROCEDURE — 70450 CT HEAD/BRAIN W/O DYE: CPT

## 2019-11-02 PROCEDURE — 97166 OT EVAL MOD COMPLEX 45 MIN: CPT

## 2019-11-02 PROCEDURE — 71045 X-RAY EXAM CHEST 1 VIEW: CPT

## 2019-11-02 PROCEDURE — 86160 COMPLEMENT ANTIGEN: CPT

## 2019-11-02 PROCEDURE — 78708 K FLOW/FUNCT IMAGE W/DRUG: CPT

## 2019-11-02 PROCEDURE — 87340 HEPATITIS B SURFACE AG IA: CPT

## 2019-11-02 PROCEDURE — 81015 MICROSCOPIC EXAM OF URINE: CPT

## 2019-11-02 PROCEDURE — 84156 ASSAY OF PROTEIN URINE: CPT

## 2019-11-02 PROCEDURE — 82607 VITAMIN B-12: CPT

## 2019-11-02 PROCEDURE — 90471 IMMUNIZATION ADMIN: CPT

## 2019-11-02 PROCEDURE — 99285 EMERGENCY DEPT VISIT HI MDM: CPT

## 2019-11-02 PROCEDURE — 85025 COMPLETE CBC W/AUTO DIFF WBC: CPT

## 2019-11-02 PROCEDURE — 84132 ASSAY OF SERUM POTASSIUM: CPT

## 2019-11-02 PROCEDURE — 86335 IMMUNFIX E-PHORSIS/URINE/CSF: CPT

## 2019-11-02 PROCEDURE — 86021 WBC ANTIBODY IDENTIFICATION: CPT

## 2019-11-02 PROCEDURE — 86706 HEP B SURFACE ANTIBODY: CPT

## 2019-11-02 PROCEDURE — 84100 ASSAY OF PHOSPHORUS: CPT

## 2019-11-02 PROCEDURE — 86803 HEPATITIS C AB TEST: CPT

## 2019-11-02 PROCEDURE — 82570 ASSAY OF URINE CREATININE: CPT

## 2019-11-02 PROCEDURE — 86334 IMMUNOFIX E-PHORESIS SERUM: CPT

## 2019-11-02 PROCEDURE — 80076 HEPATIC FUNCTION PANEL: CPT

## 2019-11-02 PROCEDURE — 71250 CT THORAX DX C-: CPT

## 2019-11-02 PROCEDURE — 87088 URINE BACTERIA CULTURE: CPT

## 2019-11-02 PROCEDURE — 82947 ASSAY GLUCOSE BLOOD QUANT: CPT

## 2019-11-02 PROCEDURE — 74176 CT ABD & PELVIS W/O CONTRAST: CPT

## 2019-11-02 PROCEDURE — 83970 ASSAY OF PARATHORMONE: CPT

## 2019-11-02 PROCEDURE — 93306 TTE W/DOPPLER COMPLETE: CPT

## 2019-11-02 PROCEDURE — 82533 TOTAL CORTISOL: CPT

## 2019-11-02 PROCEDURE — 51610 INJECTION FOR BLADDER X-RAY: CPT

## 2019-11-02 PROCEDURE — 94640 AIRWAY INHALATION TREATMENT: CPT

## 2019-11-02 PROCEDURE — 96361 HYDRATE IV INFUSION ADD-ON: CPT

## 2019-11-02 PROCEDURE — 51702 INSERT TEMP BLADDER CATH: CPT

## 2019-11-02 PROCEDURE — 86038 ANTINUCLEAR ANTIBODIES: CPT

## 2019-11-02 PROCEDURE — 80048 BASIC METABOLIC PNL TOTAL CA: CPT

## 2019-11-02 PROCEDURE — 85610 PROTHROMBIN TIME: CPT

## 2019-11-02 PROCEDURE — 83520 IMMUNOASSAY QUANT NOS NONAB: CPT

## 2019-11-02 PROCEDURE — 96375 TX/PRO/DX INJ NEW DRUG ADDON: CPT

## 2019-11-02 PROCEDURE — 96365 THER/PROPH/DIAG IV INF INIT: CPT

## 2019-11-02 PROCEDURE — 83690 ASSAY OF LIPASE: CPT

## 2019-11-02 NOTE — ER
Nurse's Notes                                                                                     

 [facility]                                                                 

Name: Gladys Ramos                                                                                    

Age: 79 yrs                                                                                       

Sex: Female                                                                                       

: 1940                                                                                   

MRN: [id number]                                                                                   

Arrival Date: 2019                                                                          

Time: 20:54                                                                                       

Account#: [id number]                                                                            

Bed 2                                                                                             

Private MD:                                                                                       

Diagnosis: Altered mental status, unspecified;Weakness;Urinary tract infection, site not          

  specified;Acute kidney failure;Hypokalemia;Hydronephrosis with ureteral stricture,              

  not elsewhere classified;Constipation                                                           

                                                                                                  

Presentation:                                                                                     

                                                                                             

20:43 Presenting complaint: EMS states: that they were toned for pt with low blood sugar.     fc  

      Upon their arrival BS noted to be 86. Daughter told them that pt has not urinated since     

      yesterday. Pt is mostly bedbound. Having some nausea and vomiting since being on            

      ambulance. Transition of care: patient was not received from another setting of care.       

      Onset of symptoms was 2019. Risk Assessment: Do you want to hurt yourself      

      or someone else? Patient reports no desire to harm self or others. Initial Sepsis           

      Screen: Does the patient meet any 2 criteria? No. Patient's initial sepsis screen is        

      negative. Does the patient have a suspected source of infection? No. Patient's initial      

      sepsis screen is negative. Care prior to arrival: Glucose check: 86.                        

20:43 Method Of Arrival: EMS: [facility]                                                  

20:43 Acuity: ANATOLY 3                                                                           fc  

                                                                                                  

Historical:                                                                                       

- Allergies:                                                                                      

21:04 ambien;                                                                                 fc  

21:04 Levaquin;                                                                               fc  

- Home Meds:                                                                                      

21:33 carvedilol 12.5 mg oral tab 1 tab 2 times per day [Active]; hydralazine 10 mg Oral tab  fc  

      2 tab 3 TIMES A DAY [Active]; benazepril 40 mg Oral tab 1 tab once daily [Active];          

      gabapentin 400 mg Oral cap 1 cap 3 times per day [Active]; metformin 500 mg Oral tab 1      

      tab 3 TIMES A DAY [Active]; furosemide 40 mg Oral tab 1 tab 2 times per day [Active];       

      atorvastatin 10 mg Oral tab 1 tab once daily [Active]; duloxetine 20 mg oral cpDR 1 cap     

      daily [Active]; acetaminophen-codeine 300-30 mg Oral tab 1 tab q 8 hrs prn [Active];        

      ferrous sulfate 325 mg (65 mg iron) Oral tab daily [Active];                                

- PMHx:                                                                                           

21:04 AAA; CHF; Diabetes - IDDM; Hyperlipidemia; Hypertension; OA; Pneumonia;                 fc  

- PSHx:                                                                                           

21:04 Hernia repair; Cholecystectomy; ;                                              fc  

                                                                                                  

- Immunization history:: Last tetanus immunization: unknown, Pneumococcal vaccine is up           

  to date, Flu vaccine is not up to date.                                                         

- Social history:: Smoking status: Patient/guardian denies using tobacco,                         

  Patient/guardian denies using alcohol, street drugs.                                            

- Ebola Screening: : Patient negative for fever greater than or equal to 101.5 degrees            

  Fahrenheit, and additional compatible Ebola Virus Disease symptoms Patient denies               

  exposure to infectious person Patient denies travel to an Ebola-affected area in the            

  21 days before illness onset.                                                                   

- Family history:: not pertinent.                                                                 

                                                                                                  

                                                                                                  

Screenin:43 Abuse screen: Denies threats or abuse. Nutritional screening: No deficits noted.        fc  

      Tuberculosis screening: No symptoms or risk factors identified.                             

23:23 Fall Risk IV access (20 points). Ambulatory Aid- Crutches/Cane/Walker (15 pts). Gait-   mg2 

      Impaired (20 pts.).                                                                         

                                                                                                  

Assessment:                                                                                       

21:05 General: Appears in no apparent distress. uncomfortable, Behavior is calm, cooperative. rr5 

      Pain: Denies pain. Neuro: Level of Consciousness is awake, alert, obeys commands,           

      Oriented to person, place. Cardiovascular: Capillary refill < 3 seconds Patient's skin      

      is warm and dry. Respiratory: Airway is patent Respiratory effort is even, unlabored,       

      Respiratory pattern is regular, symmetrical. GI: Abdomen is round Pt is actively            

      vomiting undigested food, Reports nausea, vomiting. : Parent/caregiver report the         

      patient having inability to void since 19. EENT: No signs and/or symptoms were         

      reported regarding the EENT system. Derm: Skin is intact, is fragile, is thin, with         

      poor turgor has skin tears on lower extremities Wound noted right leg and left leg.         

      Musculoskeletal: Capillary refill < 3 seconds.                                              

21:05 Derm: Decubitus located on bilateral sacrum gluteal area approximately 2.6 cm to 7.5 cm rr5 

      is stage II has erythematous edges.                                                         

22:10 Reassessment: Patient appears in no apparent distress at this time. No changes from     rr5 

      previously documented assessment. Patient is alert, oriented x 3, equal unlabored           

      respirations, skin warm/dry/pink. awaiting for results. no complaints made.                 

23:00 Reassessment: Patient appears in no apparent distress at this time. No changes from     rr5 

      previously documented assessment. Patient and/or family updated on plan of care and         

      expected duration. Pain level reassessed.                                                   

                                                                                             

00:00 Reassessment: Patient appears in no apparent distress at this time. No changes from     rr5 

      previously documented assessment. Patient and/or family updated on plan of care and         

      expected duration. Pain level reassessed. chatting with her  no complaints         

      made.                                                                                       

01:05 Reassessment: Patient appears in no apparent distress at this time. Patient and/or      rr5 

      family updated on plan of care and expected duration. Pain level reassessed. Patient is     

      alert, oriented x 3, equal unlabored respirations, skin warm/dry/pink. stat medications     

      given, diaper changed negative for stool output.                                            

                                                                                                  

Vital Signs:                                                                                      

                                                                                             

20:43  / 51; Pulse 81; Resp 18; Temp 97.7(O); Pulse Ox 92% on R/A; Weight 91.63 kg (R); fc  

      Height 5 ft. 3 in. (160.02 cm) (R); Pain 0/10;                                              

22:14  / 38; Pulse 70; Resp 18; Pulse Ox 98% on 2 lpm NC;                               mg2 

23:00  / 55; Pulse 73; Resp 19; Pulse Ox 99% on 3 lpm NC;                               rr5 

                                                                                             

00:00  / 34; Pulse 76; Resp 17; Pulse Ox 98% on 3 lpm NC;                               rr5 

01:05  / 44; Pulse 81; Resp 19; Temp 97.8; Pulse Ox 99% on 3 lpm NC;                    rr5 

                                                                                             

20:43 Body Mass Index 35.78 (91.63 kg, 160.02 cm)                                             fc  

                                                                                                  

Ty Coma Score:                                                                               

                                                                                             

20:43 Eye Response: spontaneous(4). Verbal Response: oriented(5). Motor Response: obeys       rr5 

      commands(6). Total: 15.                                                                     

                                                                                                  

ED Course:                                                                                        

20:43 No provider procedures requiring assistance completed.                                  fc  

20:43 Arm band placed on Patient placed in an exam room, on a stretcher.                      fc  

20:43 Patient has correct armband on for positive identification. Placed in gown. Bed in low  fc  

      position. Call light in reach. Side rails up X2. Cardiac monitor on. Pulse ox on. NIBP      

      on.                                                                                         

20:54 Patient arrived in ED.                                                                  fc  

20:57 Teo Goode, RN is Primary Nurse.                                                    rr5 

20:57 Bladder scan completed. 139ml.                                                          rr5 

21:00 Triage completed.                                                                       fc  

21:04 Reyes Markham MD is Attending Physician.                                             kenneth 

21:20 Inserted saline lock: 22 gauge in right antecubital area, using aseptic technique.      rr5 

      ,using aseptic technique. inserted by [employer] ED tech Blood collected.                           

21:45 Urine collected: inserted by [employer] ED tech.                                                rr5 

22:39 XRAY Chest (1 view) In Process Unspecified.                                             EDMS

22:40 Abdomen 1 View (KUB) XRAY In Process Unspecified.                                       EDMS

22:55 Earline Sanchez MD is Hospitalizing Provider.                                           [facility] 

23:06 Notified ED physician of a critical lab result(s). potassium of 6.3.                    fc  

23:23 Dinero cath inserted, using sterile technique, 16 Fr., by ED Tech, balloon inflated, to  mg2 

      gravity drainage, returned cloudy urine. Patient tolerated well.                            

23:34 CT Head Brain wo Cont In Process Unspecified.                                           EDMS

11                                                                                             

00:56 Patient admitted, IV remains in place. intact, No redness/swelling at site.             rr5 

                                                                                                  

Administered Medications:                                                                         

                                                                                             

21:45 Drug: NS 0.9% 500 ml Route: IV; Rate: bolus; Site: right antecubital;                   rr5 

23:22 Follow up: Response: No adverse reaction; IV Status: Completed infusion; IV Intake:     mg2 

      500ml                                                                                       

22:16 Drug: NS 0.9% 1000 ml Route: IV; Rate: 125 ml/hr; Site: right antecubital;              mg2 

                                                                                             

01:49 Follow up: Response: No adverse reaction; IV Status: Infusion continued upon admission; rr5 

      IV Intake: 375ml                                                                            

                                                                                             

23:03 Drug: Rocephin 1 grams Route: IV; Rate: per protocol; Site: right antecubital;          rr5 

23:25 Drug: NS 0.9% 1000 ml Route: IV; Rate: 1 bolus; Site: right antecubital;                rr5 

                                                                                             

01:51 Follow up: Response: No adverse reaction; IV Status: Infusion continued upon admission; rr5 

      IV Intake: 450ml                                                                            

                                                                                             

23:55 Drug: Insulin Regular Human 10 units {Co-Signature: mg2 (Tay Gardose RN).} Route:   rr5 

      IVP; Site: right antecubital;                                                               

                                                                                             

01:05 Follow up: Response: No adverse reaction                                                rr5 

00:00 Drug: Albuterol - atroVENT (3:1) (2.5 mg - 0.5 mg) 3 ml Route: Nebulizer;               rr5 

01:00 Follow up: Response: No adverse reaction                                                rr5 

00:05 Drug: Kayexalate 45 grams Route: PO;                                                    rr5 

01:05 Follow up: Response: No adverse reaction                                                rr5 

00:07 Drug: D50W 50 ml Route: IVP; Site: right antecubital;                                   rr5 

01:10 Follow up: Response: No adverse reaction                                                rr5 

00:12 Drug: Sodium Bicarbonate 1 amp Route: IVP; Site: right antecubital;                     rr5 

01:10 Follow up: Response: No adverse reaction                                                rr5 

00:18 Drug: Calcium Gluconate 1 grams Route: IVPB; Infused Over: 30 mins; Site: right         rr5 

      antecubital;                                                                                

01:00 Follow up: Response: No adverse reaction; IV Status: Completed infusion; IV Intake:     rr5 

      100ml                                                                                       

00:49 Drug: Lactulose 30 grams Volume: 45 ml; Route: PO;                                      rr5 

01:29 Follow up: Response: No adverse reaction                                                rr5 

01:00 Drug: Dulcolax Suppository 10 mg Route: FL;                                             rr5 

01:29 Follow up: Response: No adverse reaction                                                rr5 

                                                                                                  

                                                                                                  

Intake:                                                                                           

                                                                                             

23:22 IV: 500ml; Total: 500ml.                                                                mg2 

                                                                                             

01:00 IV: 100ml; Total: 600ml.                                                                rr5 

01:49 IV: 375ml; Total: 975ml.                                                                rr5 

01:51 IV: 450ml; Total: 1425ml.                                                               rr5 

                                                                                                  

Outcome:                                                                                          

                                                                                             

22:56 Decision to Hospitalize by Provider.                                                    kenneth 

                                                                                             

00:55 Admitted to Tele accompanied by nurse, via stretcher, room 416, with oxygen, with       rr5 

      chart, Report called to  charly                                                            

      Condition: stable                                                                           

      Instructed on the need for admit.                                                           

01:29 Patient left the ED.                                                                    rr5 

                                                                                                  

Signatures:                                                                                       

Dispatcher [employer]                           EDReyes Szymanski MD MD cha Chretien, Felicia, RN RN                                                      

Tay Kirk RN RN   mg2                                                  

Teo Goode RN                      RN   rr5                                                  

Tay Gardose RN                           mg2                                                  

                                                                                                  

Corrections: (The following items were deleted from the chart)                                    

01:51 01:20 Response: No adverse reaction; IV Status: Infusion continued upon admission; IV   rr5 

      Intake: 1000ml rr5                                                                          

                                                                                                  

**************************************************************************************************

## 2019-11-02 NOTE — EDPHYS
Physician Documentation                                                                           

 [facility]                                                                 

Name: Gladys Ramos                                                                                    

Age: 79 yrs                                                                                       

Sex: Female                                                                                       

: 1940                                                                                   

MRN: [id number]                                                                                   

Arrival Date: 2019                                                                          

Time: 20:54                                                                                       

Account#: [id number]                                                                            

Bed 2                                                                                             

Private MD:                                                                                       

ED Physician Reyes Markham                                                                      

HPI:                                                                                              

                                                                                             

21:24 This 79 yrs old  Female presents to ER via EMS with complaints of Urinary      kenneth 

      Problem.                                                                                    

21:24 The patient presents with abdominal pain in the upper abdomen, in the lower abdomen,    kenneth 

      abdominal distention in the upper abdomen, in the lower abdomen. Onset: The                 

      symptoms/episode began/occurred 2 day(s) ago. The patient presents to the emergency         

      department with nausea, vomiting, that is continuous. Onset: The symptoms/episode           

      began/occurred 2 day(s) ago. The patient or guardian reports cough. Associated signs        

      and symptoms: The patient has no apparent associated signs or symptoms.                     

                                                                                                  

Historical:                                                                                       

- Allergies:                                                                                      

21:04 ambien;                                                                                 fc  

21:04 Levaquin;                                                                               fc  

- Home Meds:                                                                                      

21:33 carvedilol 12.5 mg oral tab 1 tab 2 times per day [Active]; hydralazine 10 mg Oral tab  fc  

      2 tab 3 TIMES A DAY [Active]; benazepril 40 mg Oral tab 1 tab once daily [Active];          

      gabapentin 400 mg Oral cap 1 cap 3 times per day [Active]; metformin 500 mg Oral tab 1      

      tab 3 TIMES A DAY [Active]; furosemide 40 mg Oral tab 1 tab 2 times per day [Active];       

      atorvastatin 10 mg Oral tab 1 tab once daily [Active]; duloxetine 20 mg oral cpDR 1 cap     

      daily [Active]; acetaminophen-codeine 300-30 mg Oral tab 1 tab q 8 hrs prn [Active];        

      ferrous sulfate 325 mg (65 mg iron) Oral tab daily [Active];                                

- PMHx:                                                                                           

21:04 AAA; CHF; Diabetes - IDDM; Hyperlipidemia; Hypertension; OA; Pneumonia;                 fc  

- PSHx:                                                                                           

21:04 Hernia repair; Cholecystectomy; ;                                              fc  

                                                                                                  

- Immunization history:: Last tetanus immunization: unknown, Pneumococcal vaccine is up           

  to date, Flu vaccine is not up to date.                                                         

- Social history:: Smoking status: Patient/guardian denies using tobacco,                         

  Patient/guardian denies using alcohol, street drugs.                                            

- Ebola Screening: : Patient negative for fever greater than or equal to 101.5 degrees            

  Fahrenheit, and additional compatible Ebola Virus Disease symptoms Patient denies               

  exposure to infectious person Patient denies travel to an Ebola-affected area in the            

  21 days before illness onset.                                                                   

- Family history:: not pertinent.                                                                 

                                                                                                  

                                                                                                  

ROS:                                                                                              

21:24 Constitutional: Negative for fever, chills, and weight loss, Eyes: Negative for injury, kenneth 

      pain, redness, and discharge, ENT: Negative for injury, pain, and discharge, Neck:          

      Negative for injury, pain, and swelling, Cardiovascular: Negative for chest pain,           

      palpitations, and edema, Respiratory: Negative for shortness of breath, cough,              

      wheezing, and pleuritic chest pain, Back: Negative for injury and pain, : Negative        

      for injury, bleeding, discharge, and swelling, MS/Extremity: Negative for injury and        

      deformity, Skin: Negative for injury, rash, and discoloration, Neuro: Negative for          

      headache, weakness, numbness, tingling, and seizure, Psych: Negative for depression,        

      anxiety, suicide ideation, homicidal ideation, and hallucinations, Allergy/Immunology:      

      Negative for hives, rash, and allergies, Endocrine: Negative for neck swelling,             

      polydipsia, polyuria, polyphagia, and marked weight changes, Hematologic/Lymphatic:         

      Negative for swollen nodes, abnormal bleeding, and unusual bruising.                        

21:24 Abdomen/GI: Positive for nausea and vomiting.                                               

                                                                                                  

Exam:                                                                                             

21:24 Constitutional:  This is a well developed, well nourished patient who is awake, alert,  kenneth 

      and in no acute distress. Head/Face:  Normocephalic, atraumatic. Eyes:  Pupils equal        

      round and reactive to light, extra-ocular motions intact.  Lids and lashes normal.          

      Conjunctiva and sclera are non-icteric and not injected.  Cornea within normal limits.      

      Periorbital areas with no swelling, redness, or edema. ENT:  Nares patent. No nasal         

      discharge, no septal abnormalities noted.  Tympanic membranes are normal and external       

      auditory canals are clear.  Oropharynx with no redness, swelling, or masses, exudates,      

      or evidence of obstruction, uvula midline.  Mucous membranes moist. Neck:  Trachea          

      midline, no thyromegaly or masses palpated, and no cervical lymphadenopathy.  Supple,       

      full range of motion without nuchal rigidity, or vertebral point tenderness.  No            

      Meningismus. Chest/axilla:  Normal chest wall appearance and motion.  Nontender with no     

      deformity.  No lesions are appreciated. Cardiovascular:  Regular rate and rhythm with a     

      normal S1 and S2.  No gallops, murmurs, or rubs.  Normal PMI, no JVD.  No pulse             

      deficits. Respiratory:  Lungs have equal breath sounds bilaterally, clear to                

      auscultation and percussion.  No rales, rhonchi or wheezes noted.  No increased work of     

      breathing, no retractions or nasal flaring. Abdomen/GI:  Soft, non-tender, with normal      

      bowel sounds.  No distension or tympany.  No guarding or rebound.  No evidence of           

      tenderness throughout. Back:  No spinal tenderness.  No costovertebral tenderness.          

      Full range of motion. Female :  Normal external genitalia. Skin:  Warm, dry with          

      normal turgor.  Normal color with no rashes, no lesions, and no evidence of cellulitis.     

      MS/ Extremity:  Pulses equal, no cyanosis.  Neurovascular intact.  Full, normal range       

      of motion. Neuro:  Awake and alert, GCS 15, oriented to person, place, time, and            

      situation.  Cranial nerves II-XII grossly intact.  Motor strength 5/5 in all                

      extremities.  Sensory grossly intact.  Cerebellar exam normal.  Normal gait. Psych:         

      Awake, alert, with orientation to person, place and time.  Behavior, mood, and affect       

      are within normal limits.                                                                   

                                                                                                  

Vital Signs:                                                                                      

20:43  / 51; Pulse 81; Resp 18; Temp 97.7(O); Pulse Ox 92% on R/A; Weight 91.63 kg (R);   

      Height 5 ft. 3 in. (160.02 cm) (R); Pain 0/10;                                              

22:14  / 38; Pulse 70; Resp 18; Pulse Ox 98% on 2 lpm NC;                               mg2 

23:00  / 55; Pulse 73; Resp 19; Pulse Ox 99% on 3 lpm NC;                               rr5 

                                                                                             

00:00  / 34; Pulse 76; Resp 17; Pulse Ox 98% on 3 lpm NC;                               rr5 

01:05  / 44; Pulse 81; Resp 19; Temp 97.8; Pulse Ox 99% on 3 lpm NC;                    rr5 

                                                                                             

20:43 Body Mass Index 35.78 (91.63 kg, 160.02 cm)                                               

                                                                                                  

[place] Coma Score:                                                                               

                                                                                             

20:43 Eye Response: spontaneous(4). Verbal Response: oriented(5). Motor Response: obeys       rr5 

      commands(6). Total: 15.                                                                     

                                                                                                  

MDM:                                                                                              

21:04 Patient medically screened.                                                             [facility] 

21:26 Data reviewed: vital signs, nurses notes, lab test result(s), EKG, radiologic studies,  [facility] 

      CT scan, plain films.                                                                       

                                                                                                  

                                                                                             

21:23 Order name: Basic Metabolic Panel; Complete Time: 23:10                                 [facility] 

                                                                                             

21:23 Order name: CBC with Diff; Complete Time: 22:13                                         [facility] 

                                                                                             

21:23 Order name: LFT's; Complete Time: 23:10                                                 [facility] 

                                                                                             

21:23 Order name: Magnesium; Complete Time: 23:10                                             [facility] 

                                                                                             

21:23 Order name: NT PRO-BNP; Complete Time: 23:10                                            [facility] 

                                                                                             

21:23 Order name: PT-INR; Complete Time: 22:13                                                [facility] 

                                                                                             

21:23 Order name: Troponin (emerg Dept Use Only); Complete Time: 23:10                        [facility] 

                                                                                             

21:23 Order name: Lipase; Complete Time: 23:10                                                [facility] 

                                                                                             

21:23 Order name: Urine Culture                                                               [facility] 

                                                                                             

21:55 Order name: Urine Dipstick--Ancillary (enter results)                                   [facility] 

                                                                                             

23:55 Order name: CBC with Automated Diff                                                     [facility]

                                                                                             

23:55 Order name: CBC with Automated Diff                                                     [facility]

                                                                                             

23:55 Order name: Comprehensive Metabolic Panel                                               [facility]

                                                                                             

23:55 Order name: Comprehensive Metabolic Panel                                               [facility]

                                                                                             

21:23 Order name: XRAY Chest (1 view)                                                         [facility] 

                                                                                             

21:23 Order name: Abdomen 1 View (KUB) XRAY                                                   [facility] 

                                                                                             

22:35 Order name: CT Head Brain wo Cont                                                       [facility] 

                                                                                             

23:10 Order name: CT Stone Protocol                                                           [facility] 

                                                                                             

21:23 Order name: EKG; Complete Time: 21:23                                                   [facility] 

                                                                                             

21:23 Order name: Cardiac monitoring; Complete Time: 22:03                                    [facility] 

                                                                                             

21:23 Order name: EKG - Nurse/Tech; Complete Time: 22:14                                      [facility] 

                                                                                             

21:23 Order name: IV Saline Lock; Complete Time: 22:03                                        [facility] 

                                                                                             

21:23 Order name: Labs collected and sent; Complete Time: 22:03                               [facility] 

                                                                                             

21:23 Order name: O2 Per Protocol; Complete Time: 22:03                                       [facility] 

                                                                                             

21:23 Order name: O2 Sat Monitoring; Complete Time: 22:03                                     [facility] 

                                                                                             

21:23 Order name: Urine Dipstick-Ancillary (obtain specimen); Complete Time: 22:03            [facility] 

                                                                                             

23:10 Order name: Dinero; Complete Time: 23:22                                                 [facility] 

                                                                                             

23:55 Order name: CONS Pharmacy Consult                                                       [facility]

                                                                                             

23:55 Order name: Renal                                                                       [facility]

                                                                                                  

Administered Medications:                                                                         

21:45 Drug: NS 0.9% 500 ml Route: IV; Rate: bolus; Site: right antecubital;                   rr5 

23:22 Follow up: Response: No adverse reaction; IV Status: Completed infusion; IV Intake:     mg2 

      500ml                                                                                       

22:16 Drug: NS 0.9% 1000 ml Route: IV; Rate: 125 ml/hr; Site: right antecubital;              mg2 

                                                                                             

01:49 Follow up: Response: No adverse reaction; IV Status: Infusion continued upon admission; rr5 

      IV Intake: 375ml                                                                            

                                                                                             

23:03 Drug: Rocephin 1 grams Route: IV; Rate: per protocol; Site: right antecubital;          rr5 

23:25 Drug: NS 0.9% 1000 ml Route: IV; Rate: 1 bolus; Site: right antecubital;                rr5 

                                                                                             

01:51 Follow up: Response: No adverse reaction; IV Status: Infusion continued upon admission; rr5 

      IV Intake: 450ml                                                                            

                                                                                             

23:55 Drug: Insulin Regular Human 10 units {Co-Signature: mg2 (Tay Kirk RN).} Route:   rr5 

      IVP; Site: right antecubital;                                                               

                                                                                             

01:05 Follow up: Response: No adverse reaction                                                rr5 

00:00 Drug: Albuterol - atroVENT (3:1) (2.5 mg - 0.5 mg) 3 ml Route: Nebulizer;               rr5 

01:00 Follow up: Response: No adverse reaction                                                rr5 

00:05 Drug: Kayexalate 45 grams Route: PO;                                                    rr5 

01:05 Follow up: Response: No adverse reaction                                                rr5 

00:07 Drug: D50W 50 ml Route: IVP; Site: right antecubital;                                   rr5 

01:10 Follow up: Response: No adverse reaction                                                rr5 

00:12 Drug: Sodium Bicarbonate 1 amp Route: IVP; Site: right antecubital;                     rr5 

01:10 Follow up: Response: No adverse reaction                                                rr5 

00:18 Drug: Calcium Gluconate 1 grams Route: IVPB; Infused Over: 30 mins; Site: right         rr5 

      antecubital;                                                                                

01:00 Follow up: Response: No adverse reaction; IV Status: Completed infusion; IV Intake:     rr5 

      100ml                                                                                       

00:49 Drug: Lactulose 30 grams Volume: 45 ml; Route: PO;                                      rr5 

01:29 Follow up: Response: No adverse reaction                                                rr5 

01:00 Drug: Dulcolax Suppository 10 mg Route: OR;                                             rr5 

01:29 Follow up: Response: No adverse reaction                                                rr5 

                                                                                                  

                                                                                                  

Disposition:                                                                                      

19 22:56 Hospitalization ordered by Earline Sanchez for Inpatient Admission. Preliminary     

  diagnosis are Altered mental status, unspecified, Weakness, Urinary tract                       

  infection, site not specified, Acute kidney failure, Hypokalemia,                               

  Hydronephrosis with ureteral stricture, not elsewhere classified,                               

  Constipation.                                                                                   

- Bed requested for Telemetry/MedSurg (Inpatient).                                                

- Status is Inpatient Admission.                                                              rr5 

- Condition is Stable.                                                                            

- Problem is new.                                                                                 

- Symptoms have improved.                                                                         

UTI on Admission? Yes                                                                             

                                                                                                  

                                                                                                  

                                                                                                  

Signatures:                                                                                       

Dispatcher [employer]                           EDMS                                                 

Reyes Markham MD MD cha Chretien, Felicia, RN RN                                                      

Irma Sun RN RN                                                      

Tay Kirk RN RN   [place]                                                  

Teo Goode RN                      RN   rr5                                                  

Tay Kirk RN                           mg2                                                  

                                                                                                  

Corrections: (The following items were deleted from the chart)                                    

                                                                                             

23:14 22:56 Hospitalization Ordered by Earline Sanchez MD for Inpatient Admission. Preliminary  kenneth 

      diagnosis is Altered mental status, unspecified; Weakness; Urinary tract infection,         

      site not specified. Bed requested for Telemetry/MedSurg (Inpatient). Status is              

      Inpatient Admission. Condition is Stable. Problem is new. Symptoms have improved. UTI       

      on Admission? Yes. kenneth                                                                      

                                                                                             

00:02  23:14 2019 22:56 Hospitalization Ordered by Earline Sanchez MD for Inpatient  cg  

      Admission. Preliminary diagnosis is Altered mental status, unspecified; Weakness;           

      Urinary tract infection, site not specified; Acute kidney failure; Hypokalemia. Bed         

      requested for Telemetry/MedSurg (Inpatient). Status is Inpatient Admission. Condition       

      is Stable. Problem is new. Symptoms have improved. UTI on Admission? Yes. kenneth               

                                                                                             

00:35 00:02 2019 22:56 Hospitalization Ordered by Earline Sanchez MD for Inpatient        kenneth 

      Admission. Preliminary diagnosis is Altered mental status, unspecified; Weakness;           

      Urinary tract infection, site not specified; Acute kidney failure; Hypokalemia. Bed         

      requested for Telemetry/MedSurg (Inpatient). Status is Inpatient Admission. Condition       

      is Stable. Problem is new. Symptoms have improved. UTI on Admission? Yes.                 

00:39 00:35 2019 22:56 Hospitalization Ordered by Earline Sanchez MD for Inpatient        kenneth 

      Admission. Preliminary diagnosis is Altered mental status, unspecified; Weakness;           

      Urinary tract infection, site not specified; Acute kidney failure; Hypokalemia;             

      Hydronephrosis with ureteral stricture, not elsewhere classified. Bed requested for         

      Telemetry/MedSurg (Inpatient). Status is Inpatient Admission. Condition is Stable.          

      Problem is new. Symptoms have improved. UTI on Admission? Yes. [facility]                          

01:29 00:39 2019 22:56 Hospitalization Ordered by Earline Sanchez MD for Inpatient        rr5 

      Admission. Preliminary diagnosis is Altered mental status, unspecified; Weakness;           

      Urinary tract infection, site not specified; Acute kidney failure; Hypokalemia;             

      Hydronephrosis with ureteral stricture, not elsewhere classified; Constipation. Bed         

      requested for Telemetry/MedSurg (Inpatient). Status is Inpatient Admission. Condition       

      is Stable. Problem is new. Symptoms have improved. UTI on Admission? Yes. [facility]               

                                                                                                  

**************************************************************************************************

## 2019-11-03 LAB
ALBUMIN SERPL BCP-MCNC: 3 G/DL (ref 3.4–5)
ALP SERPL-CCNC: 63 U/L (ref 45–117)
ALT SERPL W P-5'-P-CCNC: 12 U/L (ref 12–78)
ANISOCYTOSIS BLD QL: (no result)
AST SERPL W P-5'-P-CCNC: 10 U/L (ref 15–37)
BUN BLD-MCNC: 120 MG/DL (ref 7–18)
BURR CELLS BLD QL SMEAR: (no result)
CREAT UR-SCNC: 27 MG/DL (ref 20–320)
FERRITIN SERPL-MCNC: 34.7 NG/ML (ref 8–388)
GLUCOSE SERPLBLD-MCNC: 92 MG/DL (ref 74–106)
HCT VFR BLD CALC: 31.6 % (ref 36–45)
IRON SERPL-MCNC: 36 UG/DL (ref 50–170)
LYMPHOCYTES # SPEC AUTO: 0.7 K/UL (ref 0.7–4.9)
MORPHOLOGY BLD-IMP: (no result)
PMV BLD: 10.7 FL (ref 7.6–11.3)
POTASSIUM SERPL-SCNC: 5.4 MMOL/L (ref 3.5–5.1)
PROT UR-MCNC: 1211 MG/DL (ref ?–11.9)
RBC # BLD: 3.52 M/UL (ref 3.86–4.86)
TRANSFERRIN SERPL-MCNC: 186 MG/DL (ref 200–360)

## 2019-11-03 RX ADMIN — Medication SCH: at 08:37

## 2019-11-03 RX ADMIN — GABAPENTIN SCH MG: 100 CAPSULE ORAL at 13:56

## 2019-11-03 RX ADMIN — ATORVASTATIN CALCIUM SCH MG: 10 TABLET, FILM COATED ORAL at 21:27

## 2019-11-03 RX ADMIN — SODIUM CHLORIDE SCH: 0.9 INJECTION, SOLUTION INTRAVENOUS at 09:45

## 2019-11-03 RX ADMIN — DULOXETINE SCH MG: 20 CAPSULE, DELAYED RELEASE ORAL at 08:37

## 2019-11-03 RX ADMIN — SODIUM CHLORIDE SCH MLS: 0.9 INJECTION, SOLUTION INTRAVENOUS at 01:32

## 2019-11-03 RX ADMIN — IRON SUPPLEMENT SCH MG: 325 TABLET ORAL at 08:37

## 2019-11-03 RX ADMIN — CEFTRIAXONE SCH MLS: 1 INJECTION, SOLUTION INTRAVENOUS at 11:20

## 2019-11-03 RX ADMIN — Medication SCH ML: at 21:27

## 2019-11-03 RX ADMIN — GABAPENTIN SCH MG: 100 CAPSULE ORAL at 21:26

## 2019-11-03 NOTE — RAD REPORT
EXAM DESCRIPTION:  RAD - Chest Single View - 11/2/2019 10:39 pm

 

CLINICAL HISTORY:  COUGH

Chest pain.

 

COMPARISON:  Chest Single View dated 7/2/2019; Chest Single View dated 6/20/2019; Chest Single View d
ated 6/18/2019; Chest Single View dated 4/12/2019

 

FINDINGS:  Portable technique limits examination quality.

 

Mild elevation of the left hemidiaphragm is seen. Subsegmental atelectasis and linear opacities in rohan
th lung bases, greater on the right. Right basilar infiltrate or aspiration is difficult to exclude. 
The heart is mildly enlarged in size. No displaced fractures.

## 2019-11-03 NOTE — RAD REPORT
EXAM DESCRIPTION:  RAD - Abdomen 1 View (KUB) - 11/2/2019 10:39 pm

 

CLINICAL HISTORY:  ABD PAIN

Pain

 

COMPARISON:  Stone Protocol dated 11/2/2019

 

FINDINGS:  Prominent gaseous distention of bowel loops is seen in the left abdomen. This is favored t
o represent a dilated stomach. No pneumoperitoneum.

 

No significant bony findings.

## 2019-11-03 NOTE — CON
Identification:  79-year-old woman.



Reason For Consult:  History of congestive heart failure.



History Of Present Illness:  Ms. Ramos has been told various things by different heart doctors.  Appare
ntly, she has heart failure with normal ejection fraction and she had some medicine changes in the la
st month, spironolactone was stopped and a fairly high dose of Lasix was given.  The patient became c
onfused, hypotensive, lightheaded, was sent from a nursing home where she lives to the emergency room
 where she was found to have acute kidney injury.  Her creatinine, although normal about 5 months ago
, had gone up to 3.49.  It is 3.39 today.  She is getting some gentle hydration.  Her outpatient medi
cations have been Lipitor, hydralazine, gabapentin, metformin, benazepril, acetaminophen, furosemide 
40 b.i.d., carvedilol 12.5 b.i.d., iron sulfate, and duloxetine.  In April of this year or so, 7 ambreen
hs ago, she had a normal ejection fraction.  She had mild aortic regurgitation, mitral annular calcif
ication.  Moderate pulmonary hypertension.  Estimated right ventricular systolic pressure 50 mmHg.  W
e do not have a more recent echo than that here.



Physical Examination:

General:  5 feet 3 inches, 187 pounds. 

HEENT:  Within normal limits. 

Lungs:  Clear. 

Heart:  Within normal limits, although she does have a 1/6 holosystolic murmur, probably from mitral 
and tricuspid regurgitation both noted on the previous echo. 

Extremities:  No edema.  Distal pulses diminished.



Laboratory Data:  Her sodium was 132, potassium 5.4, creatinine 3.39.  Procalcitonin 0.33.  Blood sug
ars are all within the normal range.  Her hemoglobin is 10.2, white blood cell count 13.9.



Impression:  The patient has acute kidney injury from medications, overdiuresis.  I agree with withho
lding Lasix and benazepril.  An echocardiogram will be done tomorrow.  If it shows anything similar t
o what she had 7 months ago, there is really no need for her to be on benazepril or anything that wou
ld bother the kidneys.  I think I do not think she is volume overloaded now.  I am not sure why she i
s getting ceftriaxone, but usually an appropriate amount of diuretics and a low-dose beta blocker is 
the best thing for heart failure with normal ejection fraction.  As I look at her x-ray exam, I do no
t think she is volume overloaded now.





ASHLEY/RAMYA

DD:  11/03/2019 11:06:38Voice ID:  [id number]

DT:  11/03/2019 12:08:32Report ID:  [id number]

## 2019-11-03 NOTE — EKG
Test Date:    2019-11-02               Test Time:    22:19:29

Technician:   RR                                     

                                                     

MEASUREMENT RESULTS:                                       

Intervals:                                           

Rate:         69                                     

TN:           220                                    

QRSD:         114                                    

QT:           420                                    

QTc:          450                                    

Axis:                                                

P:            -9                                     

TN:           220                                    

QRS:          60                                     

T:            68                                     

                                                     

INTERPRETIVE STATEMENTS:                                       

                                                     

Sinus rhythm with 1st degree AV block

Otherwise normal ECG

Compared to ECG 07/02/2019 17:02:45

First degree AV block now present

Atrial premature complex(es) no longer present



Electronically Signed On 11-03-19 06:22:26 CST by Adis Keen

## 2019-11-03 NOTE — P.HP
Certification for Inpatient


Patient admitted to: Inpatient


With expected LOS: >2 Midnights


Practitioner: I am a practitioner with admitting privileges, knowledge of 

patient current condition, hospital course, and medical plan of care.


Services: Services provided to patient in accordance with Admission 

requirements found in Title 42 Section 412.3 of the Code of Federal Regulations





Patient History


Date of Service: 19


Reason for admission: Acute kidney injury/prerenal azotemia


History of Present Illness: 





  Patient is an 79-year-old female who presents from the nursing home with 

altered mental status.  In the emergency room, patient's workup revealed acute 

renal failure.  Patient's BUN was greater than 120 and creatinine was greater 

than 3. patient has been on Lasix 40 mg b.i.d..  Prior to this patient was on 

Aldactone.  I am not sure as to the etiology of the change in medication.  We 

will try to get these records from the hospital nursing home.  Patient has a 

history of right-sided heart failure along with pulmonary hypertension.  

Patient has diastolic dysfunction.  At this time, patient will need hydration 

and close monitoring of renal function.  Nephrology consultation as well.  

Patient with pre renal azotemia with a  BUN to creatinine ratio of greater than 

20:1.  In light of this patient will need continued hydration.


Allergies





levofloxacin [From Levaquin] Allergy (Verified 19 00:48)


 Anaphylaxis


zolpidem [From Ambien] Allergy (Verified 10/01/17 03:02)


 Unknown





Home Medications: 








Atorvastatin Calcium [Lipitor*] 10 mg PO BEDTIME #30 tab 10/10/17 


Hydralazine [Apresoline*] 20 mg PO TID #180 tab 10/10/17 


Benazepril HCl [Lotensin] 40 mg PO DAILY 19 


Gabapentin [Neurontin*] 400 mg PO TID 19 


Metformin HCl [Glucophage] 500 mg PO TID 19 


Acetaminophen- Codeine 300-30mg 1 tab PO Q8H PRN 19 


Carvedilol [Coreg] 12.5 mg PO BID 19 


Duloxetine HCl [Cymbalta] 20 mg PO DAILY 19 


Ferrous Sulfate [Ferrous Sulfate*] 1 tab PO DAILY 19 


Furosemide [Lasix] 40 mg PO BIDL 19 








- Past Medical/Surgical History


Has patient received pneumonia vaccine in the past: Yes


Diabetic: Yes


-: HTN


-: IDDM


-: Cardiac aneurysm


-: Status post fall with severe osteoarthritis of the knees and hips


-: aaa


-: chf


-: hyperlipidemia


-: osteoarthritis


-: pneumonia


-: cholecystectomy


-: 


-: hernia repair


-: cataract surgery





- Family History


  ** Father


Medical History: Heart disease


Notes: MI





  ** Mother


Medical History: Kidney disease





- Social History


Smoking Status: Never smoker


Alcohol use: No


CD- Drugs: No


Caffeine use: Yes


Place of Residence: Home





Review of Systems


10-point ROS is otherwise unremarkable





Physical Examination





- Vital Signs


Temperature: 97.6 F


Blood Pressure: 140/62


Pulse: 75


Respirations: 18


Pulse Ox (%): 98





- Physical Exam


General: Alert, In no apparent distress, Oriented x2


HEENT: Atraumatic, PERRLA, Mucous membr. moist/pink, EOMI, Sclerae nonicteric


Neck: Supple, 2+ carotid pulse no bruit, No LAD, Without JVD or thyroid 

abnormality


Respiratory: Clear to auscultation bilaterally, Normal air movement


Cardiovascular: Regular rate/rhythm, Normal S1 S2, No murmurs


Gastrointestinal: Normal bowel sounds, Soft and benign, Non-distended, No 

tenderness


Musculoskeletal: No clubbing, No swelling, No tenderness


Integumentary: No rashes


Neurological: Normal gait, Normal speech, Normal strength at 5/5 x4 extr, 

Normal tone, Sensation intact, Cranial nerves 3-12 intact, Normal affect


Lymphatics: No axilla or inguinal lymphadenopathy





- Studies


Laboratory Data (last 24 hrs)





19 22:35: Sodium 128 L, Potassium 6.3 H*,  H, Creatinine 3.49 H, 

Glucose 91, Magnesium 1.6 L, Total Bilirubin 0.2, AST 10 L, ALT 11 L, Alkaline 

Phosphatase 66, Lipase 61 L


19 21:25: PT 12.2, INR 1.04


19 21:25: WBC 7.8, Hgb 10.3 L, Hct 31.6 L, Plt Count 222








Assessment & Plan





- Problems (Diagnosis)


(1) Hyperkalemia


Current Visit: Yes   Status: Acute   





(2) Acute encephalopathy


Current Visit: No   Status: Acute   





(3) Acute prerenal azotemia


Current Visit: No   Status: Acute   





(4) Altered mental status


Onset Date: 01/14/15   Current Visit: No   Status: Acute   





(5) Dehydration


Onset Date: 18   Current Visit: No   Status: Acute   





(6) Diabetes mellitus


Onset Date: 11/30/15   Current Visit: No   Status: Chronic   


Qualifiers: 


 





(7) Hyperlipidemia


Onset Date: 11/30/15   Current Visit: No   Status: Chronic   


Qualifiers: 


 





(8) Hypertension


Onset Date: 18   Current Visit: No   Status: Chronic   


Qualifiers: 


 





(9) Obesity (BMI 30.0-34.9)


Onset Date: 18   Current Visit: No   Status: Chronic   





- Plan





Plan:


1.  Continue with aggressive IV hydration


2.  Continue to monitor renal function


3.  Repeat potassium levels


4.  Nephrology consultation


5.  Renal ultrasound


6.  UA with micro


7.  GI and DVT prophylaxis


Discharge Plan: Home


Plan to discharge in: Greater than 2 days





- Advance Directives


Does patient have a Living Will: No


Does patient have a Durable POA for Healthcare: Yes





- Code Status/Comfort Care


Code Status Assessed: Yes


Code Status: Full Code


Critical Care: No


Time Spent Managing PTS Care (In Minutes): 45

## 2019-11-03 NOTE — RAD REPORT
EXAM DESCRIPTION:  RAD - Chest Single View - 11/3/2019 10:53 am

 

CLINICAL HISTORY:  evaluate for effusion

Chest pain.

 

COMPARISON:  Abdomen 1 View (KUB) dated 11/2/2019; Chest Single View dated 11/2/2019; Chest Single Vi
ew dated 7/2/2019; Chest Single View dated 6/20/2019; [facility] Protocol dated 11/2/2019

 

FINDINGS:  Portable technique limits examination quality.

 

The lungs are quite underinflated with atelectasis in both lung bases. Trace bilateral pleural effusi
ons. The heart is normal in size. No displaced fractures.

## 2019-11-03 NOTE — P.CNS
Date of Consult: 19


Reason for Consult: ANA, hyopnatremia and hyperkalemia 


Primary Care Provider: Dr. Lugo


Chief Complaint: Acute kidney injury/prerenal azotemia


History of Present Illness: 





An 80 Y/o woman with PMHx of Dm on metformin, HTN on Benzapril, CHF on lasix 

and OA 


pt presented with weakness and confusion 


Nephrology consulted for ANA , hyponatremia and hyperkalemia 


pt was discharged in july , her Cr was 0.7 , she was discharged on IV 

vancomycin , for 2wks and her PCP extended it for 2 extra week, as per daughter 

had labs after discharge, no mention for Kidney failure 


pt taking percocet for pain, denied NSAID intake or recent contrast exposure


in ER bun >100, Na 128, K 6.3, 


no nausea , vomiting, diarrhea or constipation , denied recent contrast 

exposure 


Allergies





levofloxacin [From Levaquin] Allergy (Verified 19 00:48)


 Anaphylaxis


zolpidem [From Ambien] Allergy (Verified 10/01/17 03:02)


 Unknown





Home Medications: 








Atorvastatin Calcium [Lipitor*] 10 mg PO BEDTIME #30 tab 10/10/17 


Hydralazine [Apresoline*] 20 mg PO TID #180 tab 10/10/17 


Benazepril HCl [Lotensin] 40 mg PO DAILY 19 


Gabapentin [Neurontin*] 400 mg PO TID 19 


Metformin HCl [Glucophage] 500 mg PO TID 19 


Acetaminophen- Codeine 300-30mg 1 tab PO Q8H PRN 19 


Carvedilol [Coreg] 12.5 mg PO BID 19 


Duloxetine HCl [Cymbalta] 20 mg PO DAILY 19 


Ferrous Sulfate [Ferrous Sulfate*] 1 tab PO DAILY 19 


Furosemide [Lasix] 40 mg PO BIDL 19 








- Past Medical/Surgical History


Diabetic: Yes


-: HTN


-: IDDM


-: Cardiac aneurysm


-: Status post fall with severe osteoarthritis of the knees and hips


-: aaa


-: chf


-: hyperlipidemia


-: osteoarthritis


-: pneumonia


-: cholecystectomy


-: 


-: hernia repair


-: cataract surgery





- Family History


  ** Father


Medical History: Heart disease


Notes: MI





  ** Mother


Medical History: Kidney disease





- Social History


Smoking Status: Unknown if ever smoked


Alcohol use: No


CD- Drugs: No


Caffeine use: Yes


Place of Residence: Home





Physical Examination














Temp Pulse Resp BP Pulse Ox


 


 97.6 F   75   18   140/62   98 


 


 19 11:32  19 11:32  19 11:32  19 11:32  19 11:32








General: Alert, Cooperative, Obese


HEENT: Atraumatic


Neck: Supple, Without JVD or thyroid abnormality


Respiratory: Diminished


Cardiovascular: Regular rate/rhythm, No gallops, No rubs, Edema, Systolic murmur


Gastrointestinal: Distended


Musculoskeletal: Swelling


Laboratory Data (last 24 hrs)





19 22:35: Sodium 128 L, Potassium 6.3 H*,  H, Creatinine 3.49 H, 

Glucose 91, Magnesium 1.6 L, Total Bilirubin 0.2, AST 10 L, ALT 11 L, Alkaline 

Phosphatase 66, Lipase 61 L


19 21:25: PT 12.2, INR 1.04


19 21:25: WBC 7.8, Hgb 10.3 L, Hct 31.6 L, Plt Count 222





Conclusions/Impression: 





ANA 


Cr 0.7 in july 


possibly due to obstructive Uropathy , CT B/l hydronephrosis , sewell placed 

with no significant UO /- RPGN 


will dc IVF , pt with anasarca 


dc metformin and benzapril 


will start on lasix 


Urology evaluation 


will send for full serology W/u 


if no improvement then will start on HD tomorrow, dicuseed with family at 

bedside 


renal dose meds , will reduce gabapentin dose 


pt might not be a good canidadte for Biopsy due to volume status an difficulty 

nadya stay in prone position 








Anasarca 


will start on lasix 











hyponatremia 


due to fluid overload 


will order TSH 


cont lasix 








Hyperkalmeia 


due to ANA and benzapril 


will give kaeyxalate 








UTI 


will start On Rocephin 











DM 


off metfromin , will reduce  gabapentin 


SSI

## 2019-11-03 NOTE — P.PN
Subjective


Date of Service: 11/03/19


Primary Care Provider: Dr. Lugo


Chief Complaint: Acute kidney injury/prerenal azotemia


Subjective: Other (Patient stable this time.)





Physical Examination





- Vital Signs


Temperature: 97.6 F


Blood Pressure: 140/62


Pulse: 75


Respirations: 18


Pulse Ox (%): 98





- Physical Exam


General: Alert, In no apparent distress, Cooperative


HEENT: Atraumatic


Neck: Supple


Respiratory: Crackles/rales (Crackles to the bases)


Cardiovascular: Regular rate/rhythm


Gastrointestinal: Normal bowel sounds, Soft and benign, No tenderness, No masses

, No rebound, No guarding, Distended (Some distention noted to the abdomen)


Musculoskeletal: No warmth


Integumentary: Tenderness/swelling (Edema to the lower extremities bilateral)


Neurological: Normal speech, Normal strength at 5/5 x4 extr, Normal tone, 

Normal affect





- Studies


Laboratory Data (last 24 hrs)





11/02/19 22:35: Sodium 128 L, Potassium 6.3 H*,  H, Creatinine 3.49 H, 

Glucose 91, Magnesium 1.6 L, Total Bilirubin 0.2, AST 10 L, ALT 11 L, Alkaline 

Phosphatase 66, Lipase 61 L


11/02/19 21:25: PT 12.2, INR 1.04


11/02/19 21:25: WBC 7.8, Hgb 10.3 L, Hct 31.6 L, Plt Count 222





Medications List Reviewed: Yes





Assessment & Plan


Discharge Plan: Nursing Home


Plan to discharge in: Greater than 2 days


Physician Review Additional Text: 





Impression:


Toxic encephalopathy likely related to UTI


Acute renal failure with hyperkalemia, hematuria


Abdominal distension with noted right greater than left hydronephrosis and 

hydroureter without ureteral obstruction


Chronic diastolic CHF with small bilateral pleural effusion and atelectasis


Diabetes mellitus type 2 non-insulin-dependent


Hypertension


Hyperlipidemia


Chronic pain with neuropathy


Iron deficiency anemia


Constipation


Obesity, BMI 33





Plan:


Toxic encephalopathy likely related to UTI:  Case discussed at length with 

nephrology.  Patient with poor urinary output.  Dinero catheter in place.  Some 

hematuria noted suspect ATN.  Will consult urology to further evaluate.  Renal 

ultrasound to be obtained to rule out obstruction.  If her condition does not 

improve patient may require dialysis.  IV antibiotics started.  Will make sure 

urine and blood cultures obtained.  Continue monitor closely.  Monitor 

electrolytes.  Electrolyte protocol in place.  Patient given Kayexalate for 

hyperkalemia.  This has improved.  Await further recommendations by nephrology.

  Anticipate discharge in the next 3-5 days with clinical improvement.


Acute renal failure with hyperkalemia, hematuria:  Kayexalate given this 

morning.  Continue monitor closely.  Nephrology plans to evaluate 

hydronephrosis to rule out obstruction.  ATN suspected.  Patient may require 

dialysis if her condition does not improve.  Please note metformin and 

benazepril have been discontinued.  Gabapentin to be decreased.  Will need to 

adjust other medications per renal function.


Abdominal distension with noted right greater than left hydronephrosis and 

hydroureter without ureteral obstruction:  Continue with above plan of care.  

Urology also consulted to further evaluate.  Renal ultrasound to evaluate for 

obstruction.


Chronic diastolic CHF with small bilateral pleural effusion and atelectasis:  

Case discussed with nephrology.  Patient appears to be slightly over hydrated.  

Will discontinue IV fluids.  Nephrology plans to give Lasix.  Will continue to 

monitor closely.  Will provide oxygen to maintain sats above 93%.


Diabetes mellitus type 2 non-insulin-dependent:  Continue Accu-Cheks and 

sliding scale.  Discontinue metformin.


Hypertension:  Benazepril has been discontinued.  Will restart carvedilol at a 

lower dose.  May need to further adjust medication and restart hydralazine.  

Will continue to adjust medication. 


Hyperlipidemia:  Continue medication.


Chronic pain with neuropathy:  Gabapentin to be adjusted.


Iron deficiency anemia:  Will monitor closely.


Constipation:  Continue with stool softener.  Patient was given Kayexalate.


Obesity, BMI 33:  Address lifestyle modification education.


Time Spent Managing Pts Care (In Minutes): 55

## 2019-11-04 LAB
BUN BLD-MCNC: 122 MG/DL (ref 7–18)
GLUCOSE SERPLBLD-MCNC: 100 MG/DL (ref 74–106)
HCT VFR BLD CALC: 26.6 % (ref 36–45)
LYMPHOCYTES # SPEC AUTO: 0.5 K/UL (ref 0.7–4.9)
MAGNESIUM SERPL-MCNC: 1.5 MG/DL (ref 1.8–2.4)
PMV BLD: 9.6 FL (ref 7.6–11.3)
POTASSIUM SERPL-SCNC: 4.6 MMOL/L (ref 3.5–5.1)
RBC # BLD: 3.03 M/UL (ref 3.86–4.86)
TSH SERPL DL<=0.05 MIU/L-ACNC: 0.9 UIU/ML (ref 0.36–3.74)

## 2019-11-04 RX ADMIN — GABAPENTIN SCH MG: 100 CAPSULE ORAL at 14:51

## 2019-11-04 RX ADMIN — ATORVASTATIN CALCIUM SCH MG: 10 TABLET, FILM COATED ORAL at 22:06

## 2019-11-04 RX ADMIN — Medication SCH ML: at 22:06

## 2019-11-04 RX ADMIN — CEFTRIAXONE SCH MLS: 1 INJECTION, SOLUTION INTRAVENOUS at 09:45

## 2019-11-04 RX ADMIN — GABAPENTIN SCH MG: 100 CAPSULE ORAL at 22:06

## 2019-11-04 RX ADMIN — SODIUM CHLORIDE SCH: 0.45 INJECTION, SOLUTION INTRAVENOUS at 20:45

## 2019-11-04 RX ADMIN — Medication SCH ML: at 09:45

## 2019-11-04 RX ADMIN — DOCUSATE SODIUM SCH MG: 100 CAPSULE, LIQUID FILLED ORAL at 09:45

## 2019-11-04 RX ADMIN — Medication SCH PKT: at 22:07

## 2019-11-04 RX ADMIN — GABAPENTIN SCH MG: 100 CAPSULE ORAL at 09:44

## 2019-11-04 RX ADMIN — DULOXETINE SCH MG: 20 CAPSULE, DELAYED RELEASE ORAL at 09:44

## 2019-11-04 RX ADMIN — SODIUM CHLORIDE SCH MLS: 0.45 INJECTION, SOLUTION INTRAVENOUS at 10:45

## 2019-11-04 RX ADMIN — IRON SUPPLEMENT SCH MG: 325 TABLET ORAL at 09:44

## 2019-11-04 NOTE — P.PN
Date of Service: 11/04/19





Patient appeared to have intravascular volume depletion.  Was getting lasix 

40mg b.i.d..  Prerenal azotemia.  Prior echo with normal systolic functioning.  

Patient had pulmonary hypertension.  Fluids have been stopped.  Renal function 

has worsened.  Surgery consultation has been obtained.  Will notify Nephrology 

this morning.

## 2019-11-04 NOTE — RAD REPORT
EXAM DESCRIPTION:  CT - Thorax Wo Con - 11/4/2019 3:37 pm

 

CLINICAL HISTORY:  Shortness of breath, pleural effusion

 

COMPARISON:  Portable chest November 4, CT chest 2009, lung base images from November 2 CT abdomen

 

TECHNIQUE:  Axial 5 mm thick images of the chest were obtained without IV contrast.

 

All CT scans are performed using dose optimization technique as appropriate and may include automated
 exposure control or mA/KV adjustment according to patient size.

 

FINDINGS:  Bilateral pleural effusions are present slightly larger on the right. Partial atelectasis 
noted in each lower lobe. Right lung base parenchymal opacification is also present with air bronchog
cari seen in the right lower lobe. Pleural effusion volume is similar to the November 2 imaging. No p
leural based mass or pleural thickening. No pneumothorax.

 

No abnormal mediastinal or hilar masses or lymphadenopathy seen. No gross aortic or pulmonary artery 
finding suspected.  Assessment is limited in the absence of IV contrast.

 

No chest wall mass or abnormal axillary lymphadenopathy. Left hemidiaphragm elevation noted.

 

 

IMPRESSION:  Small bilateral pleural effusions are present slightly larger on the right. Volume is no
t substantially different from the lung base images of November 2 imaging.

 

Airspace opacification and air bronchogram formation is seen in the partially collapsed right lower l
obe.  A right lower lobe pneumonia in the atelectatic lung cannot be excluded.

## 2019-11-04 NOTE — RAD REPORT
EXAM DESCRIPTION:

 [facility] Protocol

 

CLINICAL HISTORY:  79 years   Female   Abd pain; Abdominal distention

 

COMPARISON:  6/13/2019.

 

TECHNIQUE:  Contiguous axial images obtained through the abdomen and pelvis without IV contrast. Refo
rmatted images obtained.

This exam was performed according to our department optimization program which includes automated exp
osure control, adjustment of the mA and/or kv according to patient size and/or use of iterative recon
struction technique.

 

FINDINGS:  There is elevation of the left hemidiaphragm. Small pleural effusions. Atelectasis/infiltr
ate in the lower lungs.

Coronary artery calcifications.

The liver appears unremarkable.

The spleen and pancreas appear unremarkable.

No adrenal masses.

There is marked right hydronephrosis and hydroureter similar compared to the prior study. There is ri
ght-sided perinephric stranding which also appears similar. The left kidney is markedly atrophic. The
re is left hydronephrosis and left hydroureter which is similar compared to the prior study. No urete
ral calculi are visualized.

Changes from previous cholecystectomy. There is some stranding in the fatty tissues in the right uppe
r quadrant which is similar compared to the prior study.

Atherosclerotic calcifications. No aneurysmal dilatation of the aorta.

The rectum is distended with stool measuring 9.2 cm in diameter. There is mild stranding and fluid in
 the perirectal fat. The changes could be from stercoral colitis. The colon is slightly distended wit
h stool. The findings suggest constipation. No bowel obstruction. The appendix appears unremarkable. 
  There are scattered colonic diverticula.

Dinero catheter in urinary bladder. The urinary bladder wall appears thickened which was similar on th
e previous study.

There are postsurgical changes within the anterior abdominal wall. There is stranding in the subcutan
eous fatty tissues similar compared to the previous study likely from edema.

There are degenerative changes in the spine. There is severe spinal stenosis at L3-4 which is similar
 compared to the prior study.

 

IMPRESSION:  The rectum is distended with stool with stranding in the fatty tissues around the rectum
. The changes may be from stercoral colitis.

The colon is slightly distended with stool consistent with constipation.

There is marked right hydronephrosis and hydroureter and less pronounced left hydronephrosis and hydr
oureter without ureteral calculi visualized. There is wall thickening in the urinary bladder. Clinica
l correlation or urological consultation is recommended.

There is perinephric stranding around the right kidney which appears similar compared to the prior st
udy. Clinical and laboratory correlation is recommended to exclude pyelonephritis.

There is some stranding in the fatty tissues in the right upper quadrant which is similar compared to
 the prior study.

Small pleural effusions with atelectasis/infiltrate in the lower lungs.

Degenerative changes in the spine with severe spinal stenosis at L3-4.

 

Electronically signed by:   Anderson Haskins MD   11/3/2019 12:20 AM CDT Workstation: [id number]

 

 

Due to temporary technical issues with the PACS/Fluency reporting system, reports are being signed by
 the in house radiologist as a courtesy to ensure prompt reporting. The interpreting radiologist is f
ully responsible for the content of the report.

## 2019-11-04 NOTE — PN
Ms. Ramos's renal function is not worsening, may be getting slightly better, although her GFR is still 
just 13.  I do not think she needs diuresis.  She might need some gentle hydration.  Perhaps some snehal
f-normal saline at 25 mL an hour would help her kidneys.  I do not think we would make her go into pu
lmonary edema.  The echocardiogram will be done today to help us see what her cardiac function is, bu
t I suspect she has heart failure with normal EF and she is still volume depleted at this point.





ASHLEY/RAMYA

DD:  11/04/2019 10:40:38Voice ID:  [id number]

DT:  11/04/2019 15:07:45Report ID:  [id number]

## 2019-11-04 NOTE — CON
Date of Consultation:  2019



Brief History Of Present Illness:  Patient is an 79-year-old female who presents to the hospital from
 nursing home with altered mental status.  In the emergency room, she was noted to have acute renal f
ailure.  Her BUN was greater than 120, creatinine greater than 3.  She had been on Lasix 40 mg p.o. b
.i.d.  She was given Aldactone.  The etiology of her azotemia had not been completely elicited.  Ramos
kartik, there was concern the patient may need dialysis.  As such, I was consulted for possible placemen
t of a hemodialysis catheter.



Past Medical History:  Significant for hypertension, diabetes, cardiac aneurysm.  She has osteoarthri
tis in the hips and knees.  She has AAA, CHF, hypertension, osteoarthritis, and pneumonia.



Past Surgical History:  Cholecystectomy, , hernia repair, cataract surgery.  She has had PIC
C lines placed.



Allergies:  LEVAQUIN AND AMBIEN.



Home Medications:  Lipitor, Apresoline, Lotensin, Neurontin, Glucophage, Tylenol with Codeine, Coreg,
 Cymbalta, ferrous sulfate, and Lasix.



Family History:  Consistent with heart disease and kidney disease.  She denies smoking, alcohol, or r
ecreational drug use.



Review of Systems:

10-point review of systems other than HPI, denies.



Physical Examination:

Vital Signs:  At the time of examination, her vital signs were a BMI of 33.3, blood pressure 136/62, 
pulse 77, respiratory rate 17, temperature 98.8. 

General:  She is awake, alert, and oriented. 

Psychiatric:  She is appropriate and conversive. 

HEENT:  She is normocephalic.  Sclerae icteric.  Mucous membranes are moist.  Oropharynx clear. 

Neck:  Supple.  No JVD. 

Chest:  Normal expansion and excursion. 

Cardiovascular:  Regular rate and rhythm. 

Pulmonary:  Clear to auscultation bilaterally. 

Abdomen:  Soft, nontender, nondistended. 

Pelvis:  Stable. 

Extremities:  No clubbing, cyanosis, or edema. 

Skin:  Warm and dry.



Laboratory Exam:  White blood cell count of 14.5, hemoglobin is 8.7, hematocrit is 26.6, platelet cou
nt is 208, her neutrophils are 89%.  Her PT was 12.2, INR 1.04.  Sodium was 134, potassium 4.6, chlor
heather 100, carbon dioxide 29, , creatinine 3.4, her glucose is 100.  Her lactic acid was 0.8.  C
alcium is 7.4, magnesium is 1.5.  She had a renal ultrasound performed today, which is officially kary
d as mild improvement in the right-sided hydronephrosis since previous CT.  This was the renal ultras
ound officially read today.  She had a chest x-ray performed as well, which was officially read as th
e lungs are quite underinflated with atelectasis of both lungs.  Trace bilateral pleural effusions.  
Heart is normal size.  No displaced fracture.



Assessment And Plan:  This is an 79-year-old female who comes in with acute renal dysfunction.

1.Continue medical management.

2.I will follow along with you.  I have explained the risks, benefits, and alternatives of placement
 of a tunneled versus temporary hemodialysis catheter including but not limited to bleeding, infectio
n, damage to surrounding tissues, pneumothorax, need for further operation and procedures.  Patient a
grees to proceed as indicated.  At this point, we will await Nephrology's full disposition prior to s
urgical planning.  I will follow along with you. 



Thank you for this interesting consult.





RHONDA/RAMYA

DD:  2019 10:37:27Voice ID:  [id number]

DT:  2019 14:36:35Report ID:  [id number]

## 2019-11-04 NOTE — P.PN
Subjective


Date of Service: 11/04/19


Primary Care Provider: Dr. Lugo


Chief Complaint: Acute kidney injury/prerenal azotemia





Patient seen and examined at bedside with RN.  Chart reviewed.  Case discussed 

with nephrology, urology, cardiology at this time.  Patient appears to be a 

little bit lethargic this morning.  Family at bedside states that she has been 

doing much better than before.





Review of Systems


10-point ROS is otherwise unremarkable





Physical Examination





- Vital Signs


Temperature: 98.8 F


Blood Pressure: 136/62


Pulse: 77


Respirations: 17


Pulse Ox (%): 98





- Physical Exam


General: In no apparent distress, Other (Lethargic)


HEENT: Atraumatic, PERRLA, EOMI


Neck: Supple, JVD not distended


Respiratory: Normal air movement, Expiratory wheezes, Inspiratory wheezes


Cardiovascular: Regular rate/rhythm, Normal S1 S2, Edema


Gastrointestinal: Normal bowel sounds, Soft and benign, Non-distended, No 

tenderness


Musculoskeletal: Swelling


Integumentary: No rashes


Neurological: Normal speech, Normal tone, Normal affect


Lymphatics: No axilla or inguinal lymphadenopathy





- Studies


Medications List Reviewed: Yes





Assessment And Plan





- Plan





Assessment and plan





Toxic encephalopathy likely related to UTI  


Currently patient is alert and oriented x1.  Appears to be lethargic


We will continue to monitor patient here closely regarding improvement 


family at bedside states that much better than before





UTI with history of multi-drug resistant bacteria


Patient with past culture positive for multidrug resistant bacteria


Recently finished for total of 4 weeks of IV vancomycin


Urine culture currently with mixed leonardo most likely contaminant


Will repeat urine culture here in the hospital





Acute renal failure with hyperkalemia, hematuria 


Most likely secondary to ATN secondary to vancomycin and Lasix use


Hyperkalemia and hematuria now resolved


Renal ultrasound and CT abdomen consistent with right-sided moderately large 

hydronephrosis


Nephrology consulted.  Appreciated recommendations 


IV fluids at this time.  Will monitor for next 24 hr


Patient may require dialysis if her condition does not improve.  


Await nephrotoxic agents at this time





Right greater than left hydronephrosis and hydroureter without ureteral 

obstruction


Continue with above plan of care.  


Urology also consulted to further evaluate.  


Renal ultrasound to evaluate for obstruction.





Chronic diastolic CHF with small bilateral pleural effusion and atelectasis


Cardiology consulted.  Appreciated recommendations at this time


Patient does appear to be having anasarca


Will get a CT scan of the chest done to evaluate pleural effusion


Currently has been on IV fluids at this time





Diabetes mellitus type 2 non-insulin-dependent


Continue Accu-Cheks and sliding scale.  Discontinue metformin.





Hypertension


Benazepril has been discontinued.  


Will restart carvedilol at a lower dose.  


May need to further adjust medication and restart hydralazine.  


Will continue to adjust medication. 





Hyperlipidemia


Continue medication.





Chronic pain with neuropathy


Gabapentin to be adjusted.





Iron deficiency anemia


Will monitor closely.





Constipation


Continue with stool softener.  Patient was given Kayexalate.





Obesity, BMI 33


Address lifestyle modification education.





Disposition:  We will continue to monitor patient closely here in the hospital 

will continue with IV fluids and follow up with CT scan results.


Discharge Plan: Home


Plan to discharge in: Greater than 2 days





- Code Status/Comfort Care


Code Status Assessed: Yes


Critical Care: No

## 2019-11-04 NOTE — RAD REPORT
EXAM DESCRIPTION:  US - Renal Ultrasound-Complete - 11/4/2019 9:22 am

 

CLINICAL HISTORY:  F/u for Hydronephrosis resolution

Flank pain

 

COMPARISON:  [facility] Protocol dated 11/2/2019

 

FINDINGS:  Bilateral renal cysts are present, benign in appearance. The largest cyst on the right pollo
suring 5 cm.

 

The right kidney measures 13.9 x 6.3 x 6.0 cm.. Mild-to-moderate right-sided hydronephrosis, appearin
g less severe than on the recently performed CT study dated 11/02/2019.

 

The left kidney measures 8.9 x 5.5 x 4.1 cm. No significant left-sided hydronephrosis.

 

The urinary bladder is decompressed by means of a Dinero catheter.

 

IMPRESSION:  Mild improvement in right-sided hydronephrosis since prior CT study dated 11/02/2019.

## 2019-11-04 NOTE — RAD REPORT
EXAM DESCRIPTION:  Head Brain Wo Cont

 

CLINICAL HISTORY:  79 years   Female   Confused; syncope

 

COMPARISON:  6/18/2019.

 

TECHNIQUE:  Contiguous axial CT images obtained through the brain without IV contrast.

This exam was performed according to our department optimization program which includes automated exp
osure control, adjustment of the mA and/or kv according to patient size and/or use of iterative recon
struction technique.

 

FINDINGS:  The ventricles and sulci are prominent consistent with atrophic changes.

Microvascular ischemic changes.

No mass lesions.

No acute hemorrhage.

Atherosclerotic calcifications.

No fluid or significant mucosal thickening in the visualized paranasal sinuses.

No depressed calvarial fractures.

No significant changes are visualized compared to the prior study.

 

IMPRESSION:  Atrophy and microvascular ischemic changes.   No acute intracranial abnormality is ident
ified.

 

Electronically signed by:   Anderson Haskins MD   11/3/2019 12:06 AM CDT Workstation: [id number]

 

 

 

Due to temporary technical issues with the PACS/Fluency reporting system, reports are being signed by
 the in house radiologist as a courtesy to ensure prompt reporting. The interpreting radiologist is f
ully responsible for the content of the report.

## 2019-11-05 LAB
BUN BLD-MCNC: 119 MG/DL (ref 7–18)
GLUCOSE SERPLBLD-MCNC: 108 MG/DL (ref 74–106)
HCT VFR BLD CALC: 24.6 % (ref 36–45)
LYMPHOCYTES # SPEC AUTO: 0.6 K/UL (ref 0.7–4.9)
MAGNESIUM SERPL-MCNC: 1.8 MG/DL (ref 1.8–2.4)
PMV BLD: 10.1 FL (ref 7.6–11.3)
POTASSIUM SERPL-SCNC: 3.7 MMOL/L (ref 3.5–5.1)
RBC # BLD: 2.76 M/UL (ref 3.86–4.86)
UA COMPLETE W REFLEX CULTURE PNL UR: (no result)

## 2019-11-05 PROCEDURE — 0T768DZ DILATION OF RIGHT URETER WITH INTRALUMINAL DEVICE, VIA NATURAL OR ARTIFICIAL OPENING ENDOSCOPIC: ICD-10-PCS

## 2019-11-05 RX ADMIN — CEFTRIAXONE SCH MLS: 1 INJECTION, SOLUTION INTRAVENOUS at 09:28

## 2019-11-05 RX ADMIN — GABAPENTIN SCH: 100 CAPSULE ORAL at 14:00

## 2019-11-05 RX ADMIN — Medication SCH: at 09:00

## 2019-11-05 RX ADMIN — Medication SCH PKT: at 22:23

## 2019-11-05 RX ADMIN — DULOXETINE SCH MG: 20 CAPSULE, DELAYED RELEASE ORAL at 09:28

## 2019-11-05 RX ADMIN — GABAPENTIN SCH MG: 100 CAPSULE ORAL at 22:22

## 2019-11-05 RX ADMIN — SODIUM CHLORIDE SCH MLS: 0.45 INJECTION, SOLUTION INTRAVENOUS at 16:57

## 2019-11-05 RX ADMIN — ATORVASTATIN CALCIUM SCH MG: 10 TABLET, FILM COATED ORAL at 22:22

## 2019-11-05 RX ADMIN — IRON SUPPLEMENT SCH MG: 325 TABLET ORAL at 09:29

## 2019-11-05 RX ADMIN — Medication SCH ML: at 22:23

## 2019-11-05 RX ADMIN — SODIUM CHLORIDE SCH: 0.45 INJECTION, SOLUTION INTRAVENOUS at 06:45

## 2019-11-05 RX ADMIN — DOCUSATE SODIUM SCH MG: 100 CAPSULE, LIQUID FILLED ORAL at 09:28

## 2019-11-05 RX ADMIN — GABAPENTIN SCH MG: 100 CAPSULE ORAL at 09:29

## 2019-11-05 NOTE — ECHO
HEIGHT: 5 ft 3 in   WEIGHT: 187 lb 4.8 oz   DATE OF STUDY: 11/5/19   REFER DR: Adis Keen MD

2-DIMENSIONAL: YES

     M.MODE: YES

 DOPPLER: YES

COLOR FLOW: YES



                    TDS:  NO

PORTABLE: NO

 DEFINITY:  NO

BUBBLE STUDY: NO





DIAGNOSIS:  CONGESTIVE HEART FAILURE



CARDIAC HISTORY:  

CATHERIZATION: NO

SURGERY: NO

PROSTHETIC VALVE: NO

PACEMAKER: NO





MEASUREMENTS (cm)

    DIASTOLIC (NORMALS)                 SYSTOLIC (NORMALS)

IVSd                 1.0 (0.6-1.2)                    LA Diam    (1.9-4.0)     LVEF        
 78 %  

LVIDd               3.9 (3.5-5.7)                        LVIDs      2.1 (2.0-3.5)     %FS  
        46%

LVPWd             1.1 (0.6-1.2)

Ao Diam           3.1 (2.0-3.7)



2 DIMENSIONAL ASSESSMENT:

RIGHT ATRIUM:                   NORMAL

LEFT ATRIUM:       NORMAL



RIGHT VENTRICLE:            NORMAL

LEFT VENTRICLE: NORMAL



TRICUSPID VALVE:             NORMAL

MITRAL VALVE:     MITRAL ANNULAR CALCIFICATION



PULMONIC VALVE:             NORMAL

AORTIC VALVE:     SCLEROSIS



PERICARDIAL EFFUSION: NONE

AORTIC ROOT:      NORMAL





LEFT VENTRICULAR WALL MOTION:     NORMAL.



DOPPLER/COLOR FLOW:     MILD MITRAL AND TRICUSPID REGURGITATION.



COMMENTS:      NORMAL LEFT VENTRICULAR SIZE AND FUNCTION  EJECTION FRACTION 78%. MITRAL 
ANNULAR CALCIFICATION. MILD MITRAL AND TRICUSPID REGURGITATION. AORTIC SCLEROSIS, NO 
STENOSIS.



TECHNOLOGIST:   LONNIE SOLIS

## 2019-11-05 NOTE — PN
Date of Progress Note:  11/05/2019



Subjective:  Patient was admitted with acute kidney injury, multifactorial, vancomycin, obstructive u
ropathy, ACE inhibitor.  Patient planned for stent placement.



Physical Examination:

Vital Signs:  Blood pressure 125/67, pulse of 71, afebrile.  Patient had good urine output of 1600. 

Chest:  Clear to auscultation. 

Heart:  S1, S2.  Regular. 

Abdomen:  Soft, mild tenderness. 

Extremities:  Trace edema.



Laboratory Data:  WBC 10.7, H and H 8.1/24.6, platelet 179.  Sodium 135, potassium 3.7, bicarb 22, BU
N 119, creatinine 3.3, GFR of 13, calcium 7.9, magnesium 1.8.



Medications:  Current medications the patient on, its include:

1.Ceftriaxone.

2.Ferrous sulfate.

3.Atorvastatin.

4.Carvedilol 6.25 b.i.d.

5.Gabapentin.

6.Januvia.

7.Docusate.



Assessment And Plan:  

1.Acute kidney injury secondary to obstructive uropathy, nonoliguric.  No hyperkalemia or acidosis. 
 We will follow up with Urology.  Planned for stent.  We will follow up kidney function after that.

2.Hyperkalemia, resolved.

3.Congestive heart failure exacerbation.  Currently, patient looked to me on the normal volume side.
  I will monitor the patient closely.

4.Hypertension, controlled, optimal.  Continue current medication.

5.Obstructive uropathy.  Follow up with Urology.  We will add Flomax and we will follow up.





GINA

DD:  11/05/2019 12:09:50Voice ID:  [id number]

DT:  11/05/2019 16:27:13Report ID:  [id number]

## 2019-11-05 NOTE — PN
The patient was admitted on 11/02/2019, followed by Dr. Collazo and Dr. Keen.  She came in with acute
 kidney injury, hyperkalemia.  She has congestive heart failure with normal ejection fraction.  Echoc
ardiogram is pending.  GFR is 13.  She is being hydrated.  Possibility of dialysis is being entertain
ed.  We will see what her echo shows prior to making any final decisions.





NB/RAMYA

DD:  11/05/2019 10:48:38Voice ID:  [id number]

DT:  11/05/2019 15:32:12Report ID:  [id number]

## 2019-11-05 NOTE — RAD REPORT
EXAM DESCRIPTION:  NM - Kidney Imag W/Flow F W - 11/5/2019 7:48 am

 

CLINICAL HISTORY:   Hydronephrosis

 

COMPARISON:  November 2, 2019 cat scan

 

TECHNIQUE:  The patient was administered 10.2 microcuries technetium MAA G3. Images of the abdomen an
d pelvis obtained for 30 minutes. Perfusion, concentration and excretion images obtained. 10 minutes 
into the exam 40 milligrams IV Lasix given

 

FINDINGS:  Perfusion, concentration and excretion images do not demonstrate radiotracer activity to t
he left kidney.

 

The right kidney demonstrates marked hydronephrosis and hydroureter. After the administration of the 
Lasix the hydronephrosis and hydroureter do not resolve.

 

) right renal Peak activity 19.6 minutes

 

IMPRESSION:  A distal right ureteral mechanical obstruction is suspected

 

Nonfunctioning left kidney

## 2019-11-05 NOTE — P.PN
Subjective


Date of Service: 11/05/19


Primary Care Provider: Dr. Lugo


Chief Complaint: Acute kidney injury/prerenal azotemia





Patient seen and examined at bedside with RN.  Chart reviewed.  Case discussed 

with nephrology, urology, cardiology at this time.  Family at bedside states 

that she has been doing much better than before. Denies Fever, Chills and N/V. 

NPO after midnight for procedure today





Review of Systems


10-point ROS is otherwise unremarkable





Physical Examination





- Vital Signs


Temperature: 97.3 F


Blood Pressure: 125/67


Pulse: 71


Respirations: 16


Pulse Ox (%): 99





- Physical Exam


General: Alert, In no apparent distress


HEENT: Atraumatic, PERRLA, EOMI


Neck: Supple, JVD not distended


Respiratory: Clear to auscultation bilaterally, Normal air movement


Cardiovascular: Regular rate/rhythm, Normal S1 S2


Gastrointestinal: Normal bowel sounds, No tenderness


Musculoskeletal: No tenderness


Integumentary: No rashes


Neurological: Normal speech, Normal tone, Normal affect


Lymphatics: No axilla or inguinal lymphadenopathy





- Studies


Microbiology Data (last 24 hrs): 








11/02/19 21:46   Catheterized Urine   [place] Count - Final


                            >100,000 CFU/ML.


11/02/19 21:46   Catheterized Urine    - Final


                            MIXED LEONARDO.





Medications List Reviewed: Yes





Assessment And Plan





- Plan





Assessment and plan





Toxic encephalopathy likely related to UTI  


Currently patient is alert and oriented x3.  


family at bedside states that much better than before





UTI with history of multi-drug resistant bacteria


Patient with past culture positive for multidrug resistant bacteria


Recently finished for total of 4 weeks of IV vancomycin


Urine culture currently with mixed leonardo most likely contaminant





Acute renal failure with hyperkalemia, hematuria 


Most likely secondary to ATN secondary to vancomycin and Lasix use


Hyperkalemia and hematuria now resolved


Renal ultrasound and CT abdomen consistent with right-sided moderately large 

hydronephrosis


Nephrology consulted.  Appreciated recommendations 


IV fluids at this time.  Will monitor for next 24 hr


Patient may require dialysis if her condition does not improve.  


Avoid nephrotoxic agents at this time





Right greater than left hydronephrosis and hydroureter without ureteral 

obstruction


Continue with above plan of care.  


Urology also consulted to further evaluate.  


Renal scan with Right Sided Obstruction 


Scheduled for Procedure today





Chronic diastolic CHF with small bilateral pleural effusion and atelectasis


Cardiology consulted.  Appreciated recommendations at this time


Patient does appear to be having anasarca


CT chest with mild improvement. 


Currently has been on IV fluids at this time. Will continue





Diabetes mellitus type 2 non-insulin-dependent


Continue Accu-Cheks and sliding scale.  Discontinue metformin.





Hypertension


Benazepril has been discontinued.  


Will restart carvedilol at a lower dose.  


May need to further adjust medication and restart hydralazine.  


Will continue to adjust medication. 





Hyperlipidemia


Continue medication.





Chronic pain with neuropathy


Gabapentin to be adjusted.





Iron deficiency anemia


Will monitor closely.





Constipation


Continue with stool softener.  Patient was given Kayexalate.





Obesity, BMI 33


Address lifestyle modification education.





Disposition:  We will continue to monitor patient closely here in the hospital 

will continue with IV fluids and follow up with urology regarding Further care. 


Discharge Plan: Home


Plan to discharge in: Greater than 2 days





- Code Status/Comfort Care


Code Status Assessed: Yes


Critical Care: No

## 2019-11-05 NOTE — CON
History Of Present Illness:  She is 79 years old.  She is chronically in bed, 
does not get out of bed at all.  She has diabetes, hypertension, CHF, 
osteoarthritis.  She has some confusion, hyponatremia.  She has lots of 
constipation, urinary retention and some hydronephrosis.  On the CT scan, left 
kidney is very thin, most likely nonfunctional.  Right kidney with 
hydronephrosis down to the bladder consistent with urinary retention, 
consistent with severe constipation.  She is currently over the constipation 
and the patient says she is having bowel movements every day now, so we may 
actually go ahead and try a voiding trial and start on Flomax and do a voiding 
trial.  I know that her creatinine is still high, so we may just leave the 
Sewell catheter in for now until her kidney function is normal.  Says she does 
not want any surgery for her kidneys and does not want any stents for now, but 
hopefully the creatine will improve with a Sewell catheter in the bladder.  Her 
creatinine is 3.5 now and before it was 0.79.



Allergies:  TO LEVAQUIN AND AMBIEN.



Home Medications:  Lipitor, apresoline, Lotensin, Neurontin, Glucophage, 
Tylenol with Codeine, Coreg, Cymbalta, iron, and Lasix.



Past Medical History:  Hypertension, insulin-dependent diabetes mellitus, 
cardiac aneurysm, status post fall, osteoarthritis, AAA aneurysm, CHF, multiple 
medical problems.



Review of Systems:

Negative.



Physical Examination:

Vital Signs:  98.8, 77, 136/63, 98% sat on 2 L/minutes oxygen. 

General:  She is lying in bed.  Feels like a nursing home patient. 

HEENT:  Atraumatic, normocephalic. 

Chest:  Clear. 

Abdomen:  Soft, nontender.  Sewell catheter draining clear urine that is okay. 

Extremities:  Legs with no new bruises.



Laboratory Data:  Shows chemistry, sodium 134, potassium 4.6, chloride 100, 
carbon dioxide 21, , creatinine 3.84, GFR 13.  Calcium is 7.4, magnesium 
1.5.  Hematology:  White count 14.5, H and H is ____, platelet count is 208.  
UA shows 3+ esterase.  Microbiology shows 100,000 CFUs per mL, but preliminary 
culture stain mixed leonardo.  She is currently on IV Rocephin.  We will continue 
that.



Assessment:  Mild hydronephrosis:  Likely secondary to urinary retention and 
urinary retention most likely secondary to constipation and constipation has 
resolved.  However, her sewell is continuing with drainage.  She does not want 
any surgical procedures for her kidneys or any stent.  Hopefully, the Sewell 
catheter will improve things, if not she may need a stent.





PIERRE/RAMYA

DD:  11/04/2019 13:45:45   Voice ID:  [id number]

DT:  11/04/2019 19:25:46   Report ID:  [id number]

[email or web address]

## 2019-11-05 NOTE — RAD REPORT
EXAM DESCRIPTION:  RAD - Chest Single View - 11/5/2019 3:06 pm

 

CLINICAL HISTORY:  COPD, shortness of breath

 

COMPARISON:  November 4

 

TECHNIQUE:  AP portable chest image was obtained 1452 hours .

 

FINDINGS:  Left hemidiaphragm elevation is again noted. There is left base atelectasis. Exam is limit
ed overall by shallow inspiration. No progressive lung parenchymal process seen. Heart and vasculatur
e are normal. No pneumothorax or pleural enlarging effusion No acute bony abnormality seen. No acute 
aortic findings suspected.

 

IMPRESSION:  Limited shallow inspiration film showing no substantial change from prior day imaging.

## 2019-11-05 NOTE — RAD REPORT
EXAM DESCRIPTION:

RAD - Urethrocystogrphy Retrograde - 11/5/2019 2:23 pm

 

CLINICAL HISTORY:  ICD N 20.0

 

FINDINGS:  Thirteen fluoroscopic spot images obtained. Fluoroscopy time 2 minutes

 

The right ureter was cannulated and contrast administered. Subsequently a right ureteral stent was pl
aced. Examination was performed by

## 2019-11-05 NOTE — PN
Date of Progress Note:  11/04/2019



Chief Complaint:  Acute kidney injury, severe, nonoliguric.  Renal function is plateauing over last 2
4 hours.



History Of Present Illness:  Patient is an 79-year-old woman with history of diabetes mellitus on met
formin.  She has hypertension, which was treated with benazepril.  Patient is on Lasix for congestive
 heart failure.  Patient presented to the hospital because of weakness and confusion.  Patient was fo
und to have severe acute kidney injury associated with hyponatremia, hyperkalemia.  Previously in Jul
y, she was discharged from the hospital with creatinine of 0.7.  In the emergency room, blood urea ni
trogen was 100, sodium 128, potassium 6.3.



Review of Systems:

Patient denies PND or orthopnea.



Physical Examination:

Lungs:  Diminished breath sounds at bases. 

Heart:  S1, S2. 

Abdomen:  Soft, benign. 

Extremities:  Slight edema.



Laboratory Data:  Sodium 144, potassium is 4.6, chloride 100, CO2 21, BUN is 122, creatinine 3.46, ma
gnesium 1.5.



Impression And Plan:  

1.Creatinine level is stabilizing, although various effects of diuretics and BUN is elevated up to 1
22 and Lasix is on hold.  Patient will continue IV fluids with gentle hydration and plan is to reasse
ss kidney function and plan dialysis accordingly.

2.Today, patient is asymptomatic.  She denies PND or orthopnea.  Monitor fluid balance and resume di
uretic as needed and plan dialysis accordingly.

3.Hypertension.  Blood pressure is in good control.

4.Diabetes mellitus.  Avoid metformin due to kidney failure.





EB/MODL

DD:  11/04/2019 21:15:06Voice ID:  [id number]

DT:  11/05/2019 02:02:34Report ID:  [id number]

## 2019-11-06 LAB
BUN BLD-MCNC: 110 MG/DL (ref 7–18)
GLUCOSE SERPLBLD-MCNC: 120 MG/DL (ref 74–106)
HCT VFR BLD CALC: 26.5 % (ref 36–45)
LYMPHOCYTES # SPEC AUTO: 0.5 K/UL (ref 0.7–4.9)
MAGNESIUM SERPL-MCNC: 1.6 MG/DL (ref 1.8–2.4)
PMV BLD: 9.9 FL (ref 7.6–11.3)
POTASSIUM SERPL-SCNC: 3.4 MMOL/L (ref 3.5–5.1)
RBC # BLD: 3 M/UL (ref 3.86–4.86)

## 2019-11-06 RX ADMIN — FUROSEMIDE SCH MG: 40 TABLET ORAL at 09:22

## 2019-11-06 RX ADMIN — ATORVASTATIN CALCIUM SCH MG: 10 TABLET, FILM COATED ORAL at 22:00

## 2019-11-06 RX ADMIN — SODIUM CHLORIDE SCH: 0.45 INJECTION, SOLUTION INTRAVENOUS at 02:45

## 2019-11-06 RX ADMIN — DULOXETINE SCH MG: 20 CAPSULE, DELAYED RELEASE ORAL at 09:22

## 2019-11-06 RX ADMIN — IRON SUPPLEMENT SCH MG: 325 TABLET ORAL at 09:22

## 2019-11-06 RX ADMIN — CEFTRIAXONE SCH MLS: 1 INJECTION, SOLUTION INTRAVENOUS at 09:21

## 2019-11-06 RX ADMIN — Medication SCH PKT: at 22:01

## 2019-11-06 RX ADMIN — Medication SCH ML: at 09:23

## 2019-11-06 RX ADMIN — Medication SCH PKT: at 09:23

## 2019-11-06 RX ADMIN — GABAPENTIN SCH MG: 100 CAPSULE ORAL at 22:00

## 2019-11-06 RX ADMIN — GABAPENTIN SCH MG: 100 CAPSULE ORAL at 13:05

## 2019-11-06 RX ADMIN — Medication SCH ML: at 22:01

## 2019-11-06 RX ADMIN — HEPARIN SODIUM SCH UNIT: 5000 INJECTION, SOLUTION INTRAVENOUS; SUBCUTANEOUS at 22:01

## 2019-11-06 RX ADMIN — GABAPENTIN SCH MG: 100 CAPSULE ORAL at 09:22

## 2019-11-06 RX ADMIN — SODIUM CHLORIDE SCH MLS: 0.45 INJECTION, SOLUTION INTRAVENOUS at 16:32

## 2019-11-06 RX ADMIN — DOCUSATE SODIUM SCH MG: 100 CAPSULE, LIQUID FILLED ORAL at 09:22

## 2019-11-06 NOTE — P.PN
Subjective


Date of Service: 11/06/19


Primary Care Provider: Dr. Lugo


Chief Complaint: Acute kidney injury/prerenal azotemia


Subjective: Improving





pt admitted for AMS, found to have ANA with B/l hydronephrosis











today 


more alert 


edema improving 


S/P Stent placement 


Cr slightly improved 


will cont 1/2 NS and adjsut rate to match 2/3 of UO 


K replaced 





Physical Examination





- Vital Signs


Temperature: 97.9 F


Blood Pressure: 165/70


Pulse: 75


Respirations: 18


Pulse Ox (%): 97





- Physical Exam


General: Alert, In no apparent distress


HEENT: Atraumatic


Neck: Supple


Respiratory: Clear to auscultation bilaterally, Normal air movement


Cardiovascular: Regular rate/rhythm, Normal S1 S2, No gallops, No rubs, No 

murmurs, Edema


Gastrointestinal: Normal bowel sounds, Soft and benign


Musculoskeletal: Swelling





- Studies


Microbiology Data (last 24 hrs): 








11/02/19 21:46   Catheterized Urine   [place] Count - Final


                            >100,000 CFU/ML.


11/02/19 21:46   Catheterized Urine    - Final


                            MIXED ELKIN.





Medications List Reviewed: Yes





Assessment And Plan





- Plan





ANA 


due to obstructive uropathy 


S/p Stent placement 


Cont 1/2 NS and adjust rate to match 2/3 of UO 











polyuria 


due to obstructive uriopathy 


Cont 1/2 NS at rate 2/3 of UO 








hyponatremia 


due to fluid overload 


resolved 














Hyperkalmeia 


due to ANA and benzapril 


resolved 











UTI 


Cont Abx 











DM 


off metfromin 


SSI

## 2019-11-06 NOTE — P.PN
Subjective


Date of Service: 11/06/19


Primary Care Provider: Dr. Lugo


Chief Complaint: Acute kidney injury/prerenal azotemia


Subjective: Improving





Physical Examination





- Vital Signs


Temperature: 97.9 F


Blood Pressure: 165/70


Pulse: 75


Respirations: 18


Pulse Ox (%): 97





- Physical Exam


General: Alert, In no apparent distress, Cooperative


HEENT: Mucous membr. moist/pink


Respiratory: Normal air movement


Cardiovascular: Regular rate/rhythm





- Studies


Microbiology Data (last 24 hrs): 








11/02/19 21:46   Catheterized Urine   [place] Count - Final


                            >100,000 CFU/ML.


11/02/19 21:46   Catheterized Urine    - Final


                            MIXED ELKIN.





Medications List Reviewed: Yes





Assessment And Plan





- Current Problems (Diagnosis)


(1) Acute prerenal azotemia


Current Visit: No   Status: Acute   


Plan: 











- Patient appears to be improving and as such will not need hemodialysis at 

this time


- Will sign off for now, please call back for any future needs








Physician Review Additional Text: 





Impression:


Toxic encephalopathy likely related to UTI


Acute renal failure with hyperkalemia, hematuria


Abdominal distension with noted right greater than left hydronephrosis and 

hydroureter without ureteral obstruction


Chronic diastolic CHF with small bilateral pleural effusion and atelectasis


Diabetes mellitus type 2 non-insulin-dependent


Hypertension


Hyperlipidemia


Chronic pain with neuropathy


Iron deficiency anemia


Constipation


Obesity, BMI 33





Plan:


Toxic encephalopathy likely related to UTI:  Case discussed at length with 

nephrology.  Patient with poor urinary output.  Dinero catheter in place.  Some 

hematuria noted suspect ATN.  Will consult urology to further evaluate.  Renal 

ultrasound to be obtained to rule out obstruction.  If her condition does not 

improve patient may require dialysis.  IV antibiotics started.  Will make sure 

urine and blood cultures obtained.  Continue monitor closely.  Monitor 

electrolytes.  Electrolyte protocol in place.  Patient given Kayexalate for 

hyperkalemia.  This has improved.  Await further recommendations by nephrology.

  Anticipate discharge in the next 3-5 days with clinical improvement.


Acute renal failure with hyperkalemia, hematuria:  Kayexalate given this 

morning.  Continue monitor closely.  Nephrology plans to evaluate 

hydronephrosis to rule out obstruction.  ATN suspected.  Patient may require 

dialysis if her condition does not improve.  Please note metformin and 

benazepril have been discontinued.  Gabapentin to be decreased.  Will need to 

adjust other medications per renal function.


Abdominal distension with noted right greater than left hydronephrosis and 

hydroureter without ureteral obstruction:  Continue with above plan of care.  

Urology also consulted to further evaluate.  Renal ultrasound to evaluate for 

obstruction.


Chronic diastolic CHF with small bilateral pleural effusion and atelectasis:  

Case discussed with nephrology.  Patient appears to be slightly over hydrated.  

Will discontinue IV fluids.  Nephrology plans to give Lasix.  Will continue to 

monitor closely.  Will provide oxygen to maintain sats above 93%.


Diabetes mellitus type 2 non-insulin-dependent:  Continue Accu-Cheks and 

sliding scale.  Discontinue metformin.


Hypertension:  Benazepril has been discontinued.  Will restart carvedilol at a 

lower dose.  May need to further adjust medication and restart hydralazine.  

Will continue to adjust medication. 


Hyperlipidemia:  Continue medication.


Chronic pain with neuropathy:  Gabapentin to be adjusted.


Iron deficiency anemia:  Will monitor closely.


Constipation:  Continue with stool softener.  Patient was given Kayexalate.


Obesity, BMI 33:  Address lifestyle modification education.

## 2019-11-06 NOTE — P.PN
Subjective


Date of Service: 11/06/19


Primary Care Provider: Dr. Lugo


Chief Complaint: Acute kidney injury/prerenal azotemia


Subjective: Improving





Physical Examination





- Vital Signs


Temperature: 97.9 F


Blood Pressure: 165/70


Pulse: 75


Respirations: 18


Pulse Ox (%): 97





- Physical Exam


General: Alert, In no apparent distress, Oriented x3, Cooperative


HEENT: Atraumatic


Neck: Supple


Respiratory: Clear to auscultation bilaterally, Normal air movement


Cardiovascular: Normal pulses, Regular rate/rhythm


Gastrointestinal: Normal bowel sounds, Soft and benign, Non-distended


Musculoskeletal: No tenderness, No warmth


Neurological: Normal speech, Normal strength at 5/5 x4 extr, Normal tone





- Studies


Medications List Reviewed: Yes





Assessment & Plan


Discharge Plan: Home


Plan to discharge in: Greater than 2 days


Physician Review Additional Text: 





Impression:


Toxic encephalopathy likely related to UTI with hydronephrosis status post 

right ureteral stent


Acute renal failure with hyperkalemia, hematuria


Abdominal distension with noted right greater than left hydronephrosis and 

hydroureter without ureteral obstruction


Chronic diastolic CHF with small bilateral pleural effusion and atelectasis


Diabetes mellitus type 2 non-insulin-dependent


Hypertension


Hyperlipidemia


Chronic pain with neuropathy


Iron deficiency anemia


Constipation


Obesity, BMI 33





Plan:


Toxic encephalopathy likely related to UTI with hydronephrosis status post 

right ureteral stent:  Case discussed at length with nephrology and urology.  

Urology placed in right ureteral stent yesterday.  There was a great deal of 

pus behind the inflamed area.  Urine culture was obtained.  Await cultures.  

Continue IV antibiotic therapy.  Initial culture shows some yeast.  Will add IV 

Diflucan.  Renal function improved.  Will continue to monitor closely.  

Continue to monitor input and output.  Patient with Dinero catheter.  This be 

further addressed by nephrology.  Will physical therapy assess ambulation.  

Patient desires to go home at discharge.  Anticipate discharge in the next 3-5 

days with clinical improvement.  


Acute renal failure with hyperkalemia, hematuria likely ATN:  This has improved 

with right ureteral stents placed.


Abdominal distension with noted right greater than left hydronephrosis and 

hydroureter without ureteral obstruction:  Right ureteral stent placed.


Chronic diastolic CHF with small bilateral pleural effusion and atelectasis:  

Continue to monitor closely.  Lasix to be continued.  Lasix to be further 

adjusted by Nephrology.


Diabetes mellitus type 2 non-insulin-dependent:  Continue Accu-Cheks and 

sliding scale.  Patient remains off metformin.


Hypertension:  Patient remains off benazepril.  Continue carvedilol.  Continue 

to adjust medication


Hyperlipidemia:  Continue medication.


Chronic pain with neuropathy:  Gabapentin has been adjusted


Iron deficiency anemia:  Will monitor closely.


Constipation:  Continue with stool softener. 


Obesity, BMI 33:  Address lifestyle modification education.


Time Spent Managing Pts Care (In Minutes): 55

## 2019-11-06 NOTE — PN
The patient is feeling very well today, lot more talkative than yesterday 
preoperatively.  Her creatinine is down to 3.03 from a high of 3.5.  GFR is 
still 15.  White count went from 10.7 to 11.1 in 24 hours.  Her urine culture 
is still pending.  However, she is making good urinary output and  noticed that 
she took in 754 mL and voided 1814 for negative of 1060 mL over the last shift.
  So, urine is now clear, not pussy looking anymore and we are waiting on the 
Gram stain from the urine culture and the final culture.





PIERRE/RAMYA

DD:  11/06/2019 13:50:34   Voice ID:  [id number]

DT:  11/06/2019 19:09:42   Report ID:  [id number]

ANGELI

## 2019-11-07 LAB
ALBUMIN SERPL ELPH-MCNC: 2.9 G/DL (ref 3.8–4.8)
BUN BLD-MCNC: 92 MG/DL (ref 7–18)
GLUCOSE SERPLBLD-MCNC: 116 MG/DL (ref 74–106)
HCT VFR BLD CALC: 27.3 % (ref 36–45)
LYMPHOCYTES # SPEC AUTO: 0.8 K/UL (ref 0.7–4.9)
MAGNESIUM SERPL-MCNC: 1.6 MG/DL (ref 1.8–2.4)
PMV BLD: 10 FL (ref 7.6–11.3)
POTASSIUM SERPL-SCNC: 3.4 MMOL/L (ref 3.5–5.1)
PROT PATTERN SERPL ELPH-IMP: (no result)
RBC # BLD: 3.09 M/UL (ref 3.86–4.86)

## 2019-11-07 PROCEDURE — 02HV33Z INSERTION OF INFUSION DEVICE INTO SUPERIOR VENA CAVA, PERCUTANEOUS APPROACH: ICD-10-PCS

## 2019-11-07 RX ADMIN — SODIUM CHLORIDE SCH MLS: 0.45 INJECTION, SOLUTION INTRAVENOUS at 02:00

## 2019-11-07 RX ADMIN — DULOXETINE SCH MG: 20 CAPSULE, DELAYED RELEASE ORAL at 08:47

## 2019-11-07 RX ADMIN — Medication SCH PKT: at 08:47

## 2019-11-07 RX ADMIN — FUROSEMIDE SCH MG: 40 TABLET ORAL at 08:47

## 2019-11-07 RX ADMIN — HEPARIN SODIUM SCH UNIT: 5000 INJECTION, SOLUTION INTRAVENOUS; SUBCUTANEOUS at 21:42

## 2019-11-07 RX ADMIN — SODIUM CHLORIDE SCH MLS: 0.45 INJECTION, SOLUTION INTRAVENOUS at 21:47

## 2019-11-07 RX ADMIN — DOCUSATE SODIUM SCH MG: 100 CAPSULE, LIQUID FILLED ORAL at 08:47

## 2019-11-07 RX ADMIN — GABAPENTIN SCH MG: 100 CAPSULE ORAL at 15:03

## 2019-11-07 RX ADMIN — HEPARIN SODIUM SCH UNIT: 5000 INJECTION, SOLUTION INTRAVENOUS; SUBCUTANEOUS at 08:48

## 2019-11-07 RX ADMIN — Medication SCH: at 21:00

## 2019-11-07 RX ADMIN — FLUCONAZOLE SCH MG: 100 TABLET ORAL at 16:59

## 2019-11-07 RX ADMIN — IRON SUPPLEMENT SCH MG: 325 TABLET ORAL at 08:47

## 2019-11-07 RX ADMIN — ATORVASTATIN CALCIUM SCH MG: 10 TABLET, FILM COATED ORAL at 21:42

## 2019-11-07 RX ADMIN — SODIUM CHLORIDE SCH: 0.45 INJECTION, SOLUTION INTRAVENOUS at 00:20

## 2019-11-07 RX ADMIN — GABAPENTIN SCH MG: 100 CAPSULE ORAL at 21:43

## 2019-11-07 RX ADMIN — Medication SCH ML: at 08:47

## 2019-11-07 RX ADMIN — CEFTRIAXONE SCH MLS: 1 INJECTION, SOLUTION INTRAVENOUS at 08:46

## 2019-11-07 RX ADMIN — Medication SCH PKT: at 21:43

## 2019-11-07 RX ADMIN — SODIUM CHLORIDE SCH MLS: 0.45 INJECTION, SOLUTION INTRAVENOUS at 11:34

## 2019-11-07 RX ADMIN — GABAPENTIN SCH MG: 100 CAPSULE ORAL at 08:47

## 2019-11-07 NOTE — P.PN
Subjective


Date of Service: 11/07/19


Primary Care Provider: Dr. Lugo


Chief Complaint: Acute kidney injury/prerenal azotemia


Subjective: Doing well





Physical Examination





- Vital Signs


Temperature: 97.6 F


Blood Pressure: 144/63


Pulse: 73


Respirations: 16


Pulse Ox (%): 97





- Physical Exam


General: Alert, In no apparent distress, Oriented x3, Cooperative


HEENT: Atraumatic


Neck: Supple


Respiratory: Clear to auscultation bilaterally, Normal air movement


Cardiovascular: Normal pulses, Regular rate/rhythm


Gastrointestinal: Normal bowel sounds, Soft and benign, Non-distended


Neurological: Normal speech, Normal strength at 5/5 x4 extr, Normal tone





- Studies


Medications List Reviewed: Yes





Assessment & Plan


Discharge Plan: Home


Plan to discharge in: 24 Hours


Physician Review Additional Text: 





Impression:


Toxic encephalopathy likely related to UTI with hydronephrosis status post 

right ureteral stent


Acute renal failure with hyperkalemia, hematuria


Abdominal distension with noted right greater than left hydronephrosis and 

hydroureter without ureteral obstruction


Chronic diastolic CHF with small bilateral pleural effusion and atelectasis


Diabetes mellitus type 2 non-insulin-dependent


Hypertension


Hyperlipidemia


Chronic pain with neuropathy


Iron deficiency anemia


Constipation


Obesity, BMI 33





Plan:


Toxic encephalopathy likely related to UTI with hydronephrosis status post 

right ureteral stent:  Case discussed at length with nephrology and urology 

yesterday.  Will continue to monitor closely.  Will adjust Diflucan to oral.  

Continue antibiotic therapy at this time.  Will need to discuss with nephrology 

about the possibility of oral antibiotics at discharge. Patient will likely 

require stent long-term.  Continue with physical therapy.  Anticipate discharge 

likely tomorrow.  Will check to see if patient will require home health therapy 

at discharge.


Acute renal failure with hyperkalemia, hematuria likely ATN:  This continues to 

improved with right ureteral stents placed.


Abdominal distension with noted right greater than left hydronephrosis and 

hydroureter without ureteral obstruction:  Right ureteral stent placed.  

Patient will require stent long-term.


Chronic diastolic CHF with small bilateral pleural effusion and atelectasis:  

Continue to monitor closely.  Lasix to be continued.  Lasix to be further 

adjusted by Nephrology.


Diabetes mellitus type 2 non-insulin-dependent:  Continue Accu-Cheks and 

sliding scale.  Patient remains off metformin.


Hypertension:  Patient remains off benazepril.  Continue carvedilol.  Continue 

to adjust medication


Hyperlipidemia:  Continue medication.


Chronic pain with neuropathy:  Gabapentin has been adjusted


Iron deficiency anemia:  Will monitor closely.


Constipation:  Continue with stool softener. 


Obesity, BMI 33:  Address lifestyle modification education.


Time Spent Managing Pts Care (In Minutes): 55

## 2019-11-07 NOTE — CON
History Of Present Illness:  This is an 79-year-old female consulted for urinary tract infection.  Th
e patient initially came in with altered mental status and in the emergency room initial workup showe
d patient is in acute renal failure and was admitted for urosepsis and altered mental status.  The pa
mallika was started on antibiotic Rocephin.  Denies any headaches, nausea, vomiting, chest pain, abdomi
nal pain, constipation, or diarrhea.  Currently being treated with Rocephin.



Past Medical History:  Hypercholesterolemia, hypertension, insulin dependent diabetes mellitus, cardi
ac aneurysm, status post fall with severe osteoarthritis of the knee and hip, congestive heart failur
e, hyperlipidemia, pneumonia, cholecystectomy, osteoarthritis, , hernia repair, cataract mariola
marques.



Social History:  Nonsmoker, nondrinker.



Family History:  Noncontributory.



Medications:  Rocephin, see MAR for other medications.



Allergies:  LEVAQUIN AND AMBIEN.



Review of Systems:

A 10-point review was performed.



Physical Examination:

General:  This is an 79-year-old female, lying in bed, not in any acute cardiopulmonary distress. 

Vital Signs:  Temperature 97, pulse 73, respirations 16, blood pressure 144/63. 

HEENT:  Unremarkable. 

Neck:  Supple. 

Lungs:  Clear to auscultation. 

Heart:  S1, S2.  Regular. 

Abdomen:  Soft, nontender.  Bowel sounds present. 

Extremities:  No edema.



Laboratory Data:  Shows WBC 8.6, down from 11.1; hemoglobin 8.9; platelets are 200.  Chemistry shows 
sodium 141, potassium 3.4, chloride 103, bicarb 23, BUN 19, creatinine 2.38, glucose is 200.  Micro d
luis armando shows urine growing yeast, otherwise mixed leonardo.  No blood cultures done.  Chest x-ray, the lung
s are underinflated with areas of atelectasis.  CT chest shows a small bilateral pleural effusion sli
ghtly larger on the right side.



Assessment And Plan:  Possible right lower lobe pneumonia, urinary tract infection, yeast growing.  W
e will continue Rocephin at this time for at least 2 more weeks.  Consider starting Diflucan 200 mg d
aily for 10 days.  We will follow the patient closely. 



Thank you Dr. Abdi for consult.





NF/MODL

DD:  2019 15:34:36Voice ID:  [id number]

DT:  2019 20:39:59Report ID:  [id number]

## 2019-11-07 NOTE — PN
Labs reviewed, very mild low potassium. I will send 1 week of replacement to pharmacy to improve. Also, INR is elevated by 0.05, this is insignificant.    Patient is resting comfortable.  Her urine culture mainly grew 4+ yeast or 
bacteria.  We will consider an ID consult since she had a lot of pus coming 
from her right kidney and I can still see some pus in the urinary tube today.  
She may need more than just a few days of Diflucan. We will see what ID thinks.
  Patient is now asymptomatic and doing well.





PIERRE/RAMYA

DD:  11/07/2019 14:47:32   Voice ID:  [id number]

DT:  11/07/2019 20:07:27   Report ID:  [id number]

ANGELI

## 2019-11-08 LAB
BUN BLD-MCNC: 91 MG/DL (ref 7–18)
GLUCOSE SERPLBLD-MCNC: 120 MG/DL (ref 74–106)
HCT VFR BLD CALC: 26.2 % (ref 36–45)
LYMPHOCYTES # SPEC AUTO: 0.7 K/UL (ref 0.7–4.9)
MAGNESIUM SERPL-MCNC: 1.5 MG/DL (ref 1.8–2.4)
PMV BLD: 9.1 FL (ref 7.6–11.3)
POTASSIUM SERPL-SCNC: 2.7 MMOL/L (ref 3.5–5.1)
RBC # BLD: 2.98 M/UL (ref 3.86–4.86)

## 2019-11-08 RX ADMIN — Medication SCH ML: at 20:18

## 2019-11-08 RX ADMIN — Medication SCH: at 09:00

## 2019-11-08 RX ADMIN — Medication SCH PKT: at 12:06

## 2019-11-08 RX ADMIN — GABAPENTIN SCH MG: 100 CAPSULE ORAL at 16:30

## 2019-11-08 RX ADMIN — DULOXETINE SCH MG: 20 CAPSULE, DELAYED RELEASE ORAL at 10:00

## 2019-11-08 RX ADMIN — CEFTRIAXONE SCH MLS: 1 INJECTION, SOLUTION INTRAVENOUS at 10:02

## 2019-11-08 RX ADMIN — Medication SCH PKT: at 20:16

## 2019-11-08 RX ADMIN — DEXTROSE AND SODIUM CHLORIDE SCH: 5; .9 INJECTION, SOLUTION INTRAVENOUS at 20:20

## 2019-11-08 RX ADMIN — DOCUSATE SODIUM SCH MG: 100 CAPSULE, LIQUID FILLED ORAL at 10:01

## 2019-11-08 RX ADMIN — SODIUM CHLORIDE SCH: 0.45 INJECTION, SOLUTION INTRAVENOUS at 09:40

## 2019-11-08 RX ADMIN — GABAPENTIN SCH MG: 100 CAPSULE ORAL at 10:00

## 2019-11-08 RX ADMIN — GABAPENTIN SCH MG: 100 CAPSULE ORAL at 20:17

## 2019-11-08 RX ADMIN — IRON SUPPLEMENT SCH MG: 325 TABLET ORAL at 10:00

## 2019-11-08 RX ADMIN — HEPARIN SODIUM SCH UNIT: 5000 INJECTION, SOLUTION INTRAVENOUS; SUBCUTANEOUS at 10:01

## 2019-11-08 RX ADMIN — HEPARIN SODIUM SCH UNIT: 5000 INJECTION, SOLUTION INTRAVENOUS; SUBCUTANEOUS at 20:17

## 2019-11-08 RX ADMIN — SODIUM CHLORIDE SCH MLS: 0.45 INJECTION, SOLUTION INTRAVENOUS at 06:51

## 2019-11-08 RX ADMIN — ATORVASTATIN CALCIUM SCH MG: 10 TABLET, FILM COATED ORAL at 20:17

## 2019-11-08 RX ADMIN — DEXTROSE AND SODIUM CHLORIDE SCH MLS: 5; .9 INJECTION, SOLUTION INTRAVENOUS at 16:28

## 2019-11-08 RX ADMIN — FLUCONAZOLE SCH MG: 100 TABLET ORAL at 16:30

## 2019-11-08 RX ADMIN — FUROSEMIDE SCH MG: 40 TABLET ORAL at 10:01

## 2019-11-08 RX ADMIN — CEFUROXIME AXETIL SCH MG: 250 TABLET, FILM COATED ORAL at 20:16

## 2019-11-08 NOTE — PN
Date of Progress Note:  11/07/2019



Chief Complaint:  Acute kidney injury, moderately severe nonoliguric.



History Of Present Illness:  Patient is admitted for altered mental status.  She was found to have bi
lateral hydronephrosis.  She was found to have acute kidney injury.  Edema is improving.  She had a s
tent placement.  Creatinine slightly improved.  She is on IV fluids with half-normal saline.



Review of Systems:

Denies chest pain or palpitations.



Physical Examination:

Lungs:  Diminished breath sounds at bases.  Heart:  S1, S2.  Abdomen:  Soft, benign.  Extremities:  S
light edema in both ankles.



Laboratory Data:  Hemoglobin 8.9, WBC 8.6, platelet count is 200,000.  Chemistry showed sodium 141, p
otassium 3.4, chloride 103, CO2 of 26, BUN 92, creatinine 2.38, glucose 116, magnesium 1.6, calcium 7
.7.



Impression And Plan:  

1.Acute kidney injury.  Serum creatinine has improved from 3.03 to 2.38 since yesterday.  Monitor re
nal function.  Continue on IV fluids.

2.Hypokalemia and hypomagnesemia.  Replacement ordered.

3.Hydronephrosis per primary team and Urology.





EB/MODL

DD:  11/07/2019 22:09:09Voice ID:  [id number]

DT:  11/08/2019 02:05:09Report ID:  [id number]

## 2019-11-08 NOTE — P.PN
Subjective


Date of Service: 11/08/19


Primary Care Provider: Dr. Lugo


Chief Complaint: Acute kidney injury/prerenal azotemia


Subjective: Doing well





Physical Examination





- Vital Signs


Temperature: 96.9 F


Blood Pressure: 164/70


Pulse: 69


Respirations: 17


Pulse Ox (%): 99





- Physical Exam


General: Alert, In no apparent distress, Oriented x3, Cooperative


HEENT: Atraumatic


Neck: Supple


Respiratory: Clear to auscultation bilaterally, Normal air movement


Cardiovascular: Normal pulses, Regular rate/rhythm


Gastrointestinal: No masses, No rebound, No guarding


Integumentary: No erythema, No warmth, No cyanosis


Neurological: Normal speech, Normal strength at 5/5 x4 extr, Normal tone, 

Normal affect





- Studies


Medications List Reviewed: Yes





Assessment & Plan


Discharge Plan: Home


Plan to discharge in: 48 Hours


Physician Review Additional Text: 





Impression:


Toxic encephalopathy likely related to pyelonephritis with hydronephrosis 

status post right ureteral stent


Acute renal failure with hyperkalemia, hematuria


Abdominal distension with noted right greater than left hydronephrosis and 

hydroureter without ureteral obstruction


Chronic diastolic CHF with small bilateral pleural effusion and atelectasis


Diabetes mellitus type 2 non-insulin-dependent


Hypertension


Hyperlipidemia


Chronic pain with neuropathy


Iron deficiency anemia


Constipation


Obesity, BMI 33





Plan:


Toxic encephalopathy likely related to pyelonephritis with hydronephrosis 

status post right ureteral stent:  So far blood and urine cultures negative for 

bacteria.  Yeast noted on last culture.  Sensitivities are being sent out.  

Infectious Disease was consulted yesterday for further recommendation.  Case 

discussed at length with nephrology, urology and infectious disease. At this 

time will transition patient to oral ceftin and continue Diflucan for at least 

10 more days.  Will see how she does over the next 2 days. Continue with PT for 

the time being. If much better and stable, will plan to DC home with HH/PT on 

Monday. Will discuss with patient and family. I will be out of town this 

weekend. I will have hospitalist team continue her care. 


Acute renal failure with hyperkalemia, hematuria likely ATN:  This continues to 

improved with right ureteral stents placed.  Electrolyte protocol in place.


Abdominal distension with noted right greater than left hydronephrosis and 

hydroureter without ureteral obstruction:  Right ureteral stent placed.  

Patient will require stent long-term.


Chronic diastolic CHF with small bilateral pleural effusion and atelectasis:  

Continue to monitor closely.  Lasix adjusted by Nephrology.


Diabetes mellitus type 2 non-insulin-dependent:  Continue Accu-Cheks and 

sliding scale.  Patient remains off metformin.


Hypertension:  Patient remains off benazepril.  Continue to adjust carvedilol.  

Continue to adjust medication


Hyperlipidemia:  Continue medication.


Chronic pain with neuropathy:  Gabapentin has been adjusted


Iron deficiency anemia:  Will monitor closely.


Constipation:  Continue with stool softener. 


Obesity, BMI 33:  Address lifestyle modification education.


Time Spent Managing Pts Care (In Minutes): 55

## 2019-11-08 NOTE — P.PN
Subjective


Date of Service: 11/08/19


Primary Care Provider: Dr. Lugo


Chief Complaint: Acute kidney injury/prerenal azotemia


Subjective: Improving





pt admitted for AMS, found to have ANA with B/l hydronephrosis











today 


edema resolved 


Cr cont to improve 


K 2.7, Mg low , likely to polyuria 


will dc lasix , will add 20meq KCL to IVF 


will start on daily K replacement 


Pt cont to be Polyuric m will monitor over the weekend , if she continuous to 

be polyuric with hypokalemia, then will recommend to transfer to LTAC for fluid 

and electrolytes management 


Agree to switch Abx to Ceftin 








Physical Examination





- Vital Signs


Temperature: 96.9 F


Blood Pressure: 164/70


Pulse: 69


Respirations: 17


Pulse Ox (%): 99





- Physical Exam


General: Alert, In no apparent distress


HEENT: Atraumatic


Neck: Supple, Without JVD or thyroid abnormality


Respiratory: Clear to auscultation bilaterally, Normal air movement


Cardiovascular: No edema, Regular rate/rhythm, Normal S1 S2, No gallops, No rubs

, No murmurs


Gastrointestinal: Normal bowel sounds, Soft and benign, Non-distended


Musculoskeletal: No swelling





- Studies


Medications List Reviewed: Yes





Assessment And Plan





- Plan





ANA 


Improving 


due to obstructive uropathy 


S/p Stent placement 


Cont 1/2 NS and adjust rate to match 2/3 of UO 











polyuria 


due to obstructive uriopathy 


Cont 1/2 NS at rate 2/3 of UO 








Hypokalemia 


K 2.7, Mg low , likely to polyuria 


will dc lasix , will add 20meq KCL to IVF 


will start on daily K replacement 


Pt cont to be Polyuric m will monitor over the weekend , if she continuous to 

be polyuric with hypokalemia, then will recommend to transfer to LTAC for fluid 

and electrolytes managemen











UTI 


Cont Abx 











DM 


off metfromin 


SSI

## 2019-11-08 NOTE — RAD REPORT
EXAM DESCRIPTION:  RAD - Chest Single View - 11/8/2019 2:06 am

 

 

CLINICAL HISTORY:  The patient is 79 years old and is Female; PICC Placement

 

TECHNIQUE:  Frontal view of the chest.

 

COMPARISON:  Chest radiograph June 21, 2019.

 

FINDINGS:  LUNGS:   Bibasilar opacities are present.

   PLEURAL SPACE:   Suggestion of pleural effusions are noted.   No pneumothorax.

   HEART:   The cardiac silhouette is stable.

   MEDIASTINUM:   Unremarkable.

   BONES/JOINTS:   There are degenerative changes of the bones.

   TUBES, LINES AND DEVICES:   A right upper extremity PICC is present with the tip in the SVC.

 

IMPRESSION:  A right upper extremity PICC is present with the tip in the SVC.

 

Electronically signed by:   Maya Wei MD   11/8/2019 2:23 AM CST Workstation: [id number]

 

 

Due to temporary technical issues with the PACS/Fluency reporting system, reports are being signed by
 the in house radiologist as a courtesy to ensure prompt reporting. The interpreting radiologist is f
ully responsible for the content of the report.

## 2019-11-08 NOTE — PN
Date of Progress Note:  11/06/2019



Ms. Ramos is a patient with end-stage renal disease.  Hemodialysis is now planned.  The cause for her e
nd-stage renal disease appears to be an obstruction.  She is being hydrated and improved.  We were meneses
specting congestive heart failure with a normal ejection fraction and __________ echocardiogram is no
rmal.  We will sign off her case.  Continue present management, can go home whenever it is okay with 
admitting physician and consulting physician.





NB/RAMYA

DD:  11/07/2019 18:28:02Voice ID:  [id number]

DT:  11/07/2019 23:46:50Report ID:  [id number]

## 2019-11-09 LAB
BUN BLD-MCNC: 75 MG/DL (ref 7–18)
BUN BLD-MCNC: 77 MG/DL (ref 7–18)
GLUCOSE SERPLBLD-MCNC: 110 MG/DL (ref 74–106)
GLUCOSE SERPLBLD-MCNC: 147 MG/DL (ref 74–106)
HCT VFR BLD CALC: 22 % (ref 36–45)
HCT VFR BLD CALC: 24.8 % (ref 36–45)
LYMPHOCYTES # SPEC AUTO: 0.8 K/UL (ref 0.7–4.9)
LYMPHOCYTES # SPEC AUTO: 1 K/UL (ref 0.7–4.9)
MAGNESIUM SERPL-MCNC: 1.6 MG/DL (ref 1.8–2.4)
MAGNESIUM SERPL-MCNC: 2 MG/DL (ref 1.8–2.4)
PMV BLD: 8.6 FL (ref 7.6–11.3)
PMV BLD: 9.2 FL (ref 7.6–11.3)
POTASSIUM SERPL-SCNC: 4 MMOL/L (ref 3.5–5.1)
POTASSIUM SERPL-SCNC: 4.5 MMOL/L (ref 3.5–5.1)
RBC # BLD: 2.49 M/UL (ref 3.86–4.86)
RBC # BLD: 2.8 M/UL (ref 3.86–4.86)

## 2019-11-09 RX ADMIN — HEPARIN SODIUM SCH UNIT: 5000 INJECTION, SOLUTION INTRAVENOUS; SUBCUTANEOUS at 09:28

## 2019-11-09 RX ADMIN — CYANOCOBALAMIN TAB 1000 MCG SCH MCG: 1000 TAB at 09:30

## 2019-11-09 RX ADMIN — DULOXETINE SCH MG: 20 CAPSULE, DELAYED RELEASE ORAL at 09:00

## 2019-11-09 RX ADMIN — HYDRALAZINE HYDROCHLORIDE SCH MG: 25 TABLET, FILM COATED ORAL at 21:30

## 2019-11-09 RX ADMIN — HYDRALAZINE HYDROCHLORIDE PRN MG: 20 INJECTION INTRAMUSCULAR; INTRAVENOUS at 17:34

## 2019-11-09 RX ADMIN — DEXTROSE AND SODIUM CHLORIDE SCH: 5; .9 INJECTION, SOLUTION INTRAVENOUS at 20:00

## 2019-11-09 RX ADMIN — Medication SCH: at 09:00

## 2019-11-09 RX ADMIN — Medication SCH ML: at 20:12

## 2019-11-09 RX ADMIN — HEPARIN SODIUM SCH UNIT: 5000 INJECTION, SOLUTION INTRAVENOUS; SUBCUTANEOUS at 20:11

## 2019-11-09 RX ADMIN — CEFUROXIME AXETIL SCH MG: 250 TABLET, FILM COATED ORAL at 20:10

## 2019-11-09 RX ADMIN — CEFUROXIME AXETIL SCH MG: 250 TABLET, FILM COATED ORAL at 09:27

## 2019-11-09 RX ADMIN — POTASSIUM BICARBONATE SCH MEQ: 25 TABLET, EFFERVESCENT ORAL at 09:27

## 2019-11-09 RX ADMIN — GABAPENTIN SCH MG: 100 CAPSULE ORAL at 20:11

## 2019-11-09 RX ADMIN — Medication SCH PKT: at 09:29

## 2019-11-09 RX ADMIN — IRON SUPPLEMENT SCH MG: 325 TABLET ORAL at 09:28

## 2019-11-09 RX ADMIN — ATORVASTATIN CALCIUM SCH MG: 10 TABLET, FILM COATED ORAL at 20:11

## 2019-11-09 RX ADMIN — FLUCONAZOLE SCH MG: 100 TABLET ORAL at 17:34

## 2019-11-09 RX ADMIN — GABAPENTIN SCH MG: 100 CAPSULE ORAL at 12:03

## 2019-11-09 RX ADMIN — Medication SCH PKT: at 20:11

## 2019-11-09 RX ADMIN — DEXTROSE AND SODIUM CHLORIDE SCH: 5; .9 INJECTION, SOLUTION INTRAVENOUS at 19:00

## 2019-11-09 RX ADMIN — GABAPENTIN SCH MG: 100 CAPSULE ORAL at 09:28

## 2019-11-09 RX ADMIN — DEXTROSE AND SODIUM CHLORIDE SCH MLS: 5; .9 INJECTION, SOLUTION INTRAVENOUS at 03:21

## 2019-11-09 RX ADMIN — DOCUSATE SODIUM SCH MG: 100 CAPSULE, LIQUID FILLED ORAL at 09:28

## 2019-11-09 NOTE — P.PN
Subjective


Date of Service: 11/09/19


Primary Care Provider: Dr. Lugo


Chief Complaint: Acute kidney injury/prerenal azotemia


Subjective: Improving (Patient doing well no new complaints status post)





Review of Systems


General: Weakness





Physical Examination





- Vital Signs


Temperature: 97.0 F


Blood Pressure: 166/70


Pulse: 62


Respirations: 16


Pulse Ox (%): 100





- Physical Exam


General: Alert, In no apparent distress, Oriented x3


Respiratory: Clear to auscultation bilaterally


Cardiovascular: No edema, Normal pulses





- Studies


Medications List Reviewed: Yes





Assessment & Plan





- Problems (Diagnosis)


(1) Complicated UTI (urinary tract infection)


Current Visit: No   Status: Acute   


Plan: 


Patient admitted with obstructive uropathy patient is very polyuric penal 

function is worse hemoglobin stable white count is normal fungus isolated 

kidney wound culture blood pressure mildly elevated continue to observe this 

patient is still very poly uric is also on Diflucan Dr. Lyman does not want to 

do any further interventions





Physician Review Additional Text: 





I

## 2019-11-10 LAB
BUN BLD-MCNC: 67 MG/DL (ref 7–18)
GLUCOSE SERPLBLD-MCNC: 167 MG/DL (ref 74–106)
HCT VFR BLD CALC: 24.6 % (ref 36–45)
PMV BLD: 8.3 FL (ref 7.6–11.3)
POTASSIUM SERPL-SCNC: 5 MMOL/L (ref 3.5–5.1)
RBC # BLD: 2.76 M/UL (ref 3.86–4.86)

## 2019-11-10 RX ADMIN — Medication SCH PKT: at 21:40

## 2019-11-10 RX ADMIN — GABAPENTIN SCH MG: 100 CAPSULE ORAL at 21:39

## 2019-11-10 RX ADMIN — CYANOCOBALAMIN TAB 1000 MCG SCH MCG: 1000 TAB at 09:07

## 2019-11-10 RX ADMIN — HEPARIN SODIUM SCH UNIT: 5000 INJECTION, SOLUTION INTRAVENOUS; SUBCUTANEOUS at 09:08

## 2019-11-10 RX ADMIN — GABAPENTIN SCH MG: 100 CAPSULE ORAL at 13:11

## 2019-11-10 RX ADMIN — CEFUROXIME AXETIL SCH MG: 250 TABLET, FILM COATED ORAL at 21:39

## 2019-11-10 RX ADMIN — HYDRALAZINE HYDROCHLORIDE PRN MG: 20 INJECTION INTRAMUSCULAR; INTRAVENOUS at 13:11

## 2019-11-10 RX ADMIN — DOCUSATE SODIUM SCH MG: 100 CAPSULE, LIQUID FILLED ORAL at 09:07

## 2019-11-10 RX ADMIN — DULOXETINE SCH MG: 20 CAPSULE, DELAYED RELEASE ORAL at 09:08

## 2019-11-10 RX ADMIN — FLUCONAZOLE SCH MG: 100 TABLET ORAL at 16:36

## 2019-11-10 RX ADMIN — GABAPENTIN SCH MG: 100 CAPSULE ORAL at 09:07

## 2019-11-10 RX ADMIN — Medication SCH ML: at 09:09

## 2019-11-10 RX ADMIN — Medication SCH ML: at 21:39

## 2019-11-10 RX ADMIN — POTASSIUM BICARBONATE SCH MEQ: 25 TABLET, EFFERVESCENT ORAL at 09:08

## 2019-11-10 RX ADMIN — DEXTROSE AND SODIUM CHLORIDE SCH MLS: 5; .9 INJECTION, SOLUTION INTRAVENOUS at 13:10

## 2019-11-10 RX ADMIN — ATORVASTATIN CALCIUM SCH MG: 10 TABLET, FILM COATED ORAL at 21:39

## 2019-11-10 RX ADMIN — HYDRALAZINE HYDROCHLORIDE SCH: 25 TABLET, FILM COATED ORAL at 13:11

## 2019-11-10 RX ADMIN — SODIUM CHLORIDE SCH MLS: 0.45 INJECTION, SOLUTION INTRAVENOUS at 21:38

## 2019-11-10 RX ADMIN — IRON SUPPLEMENT SCH MG: 325 TABLET ORAL at 09:07

## 2019-11-10 RX ADMIN — HYDRALAZINE HYDROCHLORIDE SCH MG: 25 TABLET, FILM COATED ORAL at 09:07

## 2019-11-10 RX ADMIN — HYDRALAZINE HYDROCHLORIDE SCH MG: 25 TABLET, FILM COATED ORAL at 21:39

## 2019-11-10 RX ADMIN — HEPARIN SODIUM SCH UNIT: 5000 INJECTION, SOLUTION INTRAVENOUS; SUBCUTANEOUS at 21:49

## 2019-11-10 RX ADMIN — CEFUROXIME AXETIL SCH MG: 250 TABLET, FILM COATED ORAL at 09:06

## 2019-11-10 RX ADMIN — Medication SCH PKT: at 09:00

## 2019-11-10 NOTE — PN
Date of Progress Note:  11/10/2019



Chief Complaint:  Acute kidney injury.



History Of Present Illness:  Acute kidney injury in recovery.  Patient has history of acute kidney in
jury with bilateral hydronephrosis.  Patient was consulted by urologist.  Patient received potassium 
supplementation for hypokalemia and the patient remains nonoliguric, on IV fluids.  Renal function ha
s stabilized.  IV fluids rate was reduced.



Review of Systems:

Denies fever, chills.



Physical Examination:

Lungs:  Clear to auscultation bilaterally. 

Heart:  S1, S2. 

Abdomen:  Soft, benign, nontender. 

Extremities:  No edema.



Laboratory Data:  Hemoglobin 8.2, WBC 8.7, platelet count is 265,000.  Chemistry showed sodium 142, p
otassium 5.0, chloride 106, CO2 of 31, BUN 67, creatinine 1.21, glucose 167 and calcium 8.1.



Impression And Plan:  

1.Acute kidney injury.  Renal function are improving.  There is high BUN and creatinine ratio.  Cont
inue hydration.

2.Hydronephrosis.  Workup and management per Urology.

3.Hypokalemia.  Replacement was ordered.  Monitor potassium level.  Hold potassium replacement.  Pot
assium level has improved and is trending up.

4.Hypertension.  Blood pressure medication adjusted.  Patient was started on calcium channel blocker
.  Monitor blood pressure.





EB/MODL

DD:  11/10/2019 20:42:34Voice ID:  [id number]

DT:  11/10/2019 21:01:28Report ID:  [id number]

## 2019-11-11 LAB
BUN BLD-MCNC: 61 MG/DL (ref 7–18)
GLUCOSE SERPLBLD-MCNC: 123 MG/DL (ref 74–106)
POTASSIUM SERPL-SCNC: 4.8 MMOL/L (ref 3.5–5.1)

## 2019-11-11 RX ADMIN — GABAPENTIN SCH MG: 100 CAPSULE ORAL at 14:04

## 2019-11-11 RX ADMIN — IRON SUPPLEMENT SCH MG: 325 TABLET ORAL at 08:53

## 2019-11-11 RX ADMIN — HEPARIN SODIUM SCH UNIT: 5000 INJECTION, SOLUTION INTRAVENOUS; SUBCUTANEOUS at 08:54

## 2019-11-11 RX ADMIN — DOCUSATE SODIUM SCH MG: 100 CAPSULE, LIQUID FILLED ORAL at 08:53

## 2019-11-11 RX ADMIN — FLUCONAZOLE SCH MG: 100 TABLET ORAL at 17:05

## 2019-11-11 RX ADMIN — DULOXETINE SCH MG: 20 CAPSULE, DELAYED RELEASE ORAL at 08:53

## 2019-11-11 RX ADMIN — GABAPENTIN SCH MG: 100 CAPSULE ORAL at 20:41

## 2019-11-11 RX ADMIN — CEFUROXIME AXETIL SCH MG: 250 TABLET, FILM COATED ORAL at 08:54

## 2019-11-11 RX ADMIN — CEFUROXIME AXETIL SCH MG: 250 TABLET, FILM COATED ORAL at 20:41

## 2019-11-11 RX ADMIN — HYDRALAZINE HYDROCHLORIDE PRN MG: 20 INJECTION INTRAMUSCULAR; INTRAVENOUS at 12:45

## 2019-11-11 RX ADMIN — CYANOCOBALAMIN TAB 1000 MCG SCH MCG: 1000 TAB at 08:53

## 2019-11-11 RX ADMIN — ATORVASTATIN CALCIUM SCH MG: 10 TABLET, FILM COATED ORAL at 20:41

## 2019-11-11 RX ADMIN — Medication SCH PKT: at 20:42

## 2019-11-11 RX ADMIN — Medication SCH PKT: at 08:55

## 2019-11-11 RX ADMIN — HYDRALAZINE HYDROCHLORIDE SCH MG: 25 TABLET, FILM COATED ORAL at 08:53

## 2019-11-11 RX ADMIN — HEPARIN SODIUM SCH UNIT: 5000 INJECTION, SOLUTION INTRAVENOUS; SUBCUTANEOUS at 20:40

## 2019-11-11 RX ADMIN — Medication SCH: at 19:47

## 2019-11-11 RX ADMIN — HYDRALAZINE HYDROCHLORIDE SCH: 25 TABLET, FILM COATED ORAL at 14:00

## 2019-11-11 RX ADMIN — SODIUM CHLORIDE SCH MLS: 0.45 INJECTION, SOLUTION INTRAVENOUS at 05:06

## 2019-11-11 RX ADMIN — HYDRALAZINE HYDROCHLORIDE SCH MG: 25 TABLET, FILM COATED ORAL at 20:41

## 2019-11-11 RX ADMIN — Medication SCH: at 08:55

## 2019-11-11 RX ADMIN — SODIUM CHLORIDE SCH MLS: 0.45 INJECTION, SOLUTION INTRAVENOUS at 17:06

## 2019-11-11 RX ADMIN — GABAPENTIN SCH MG: 100 CAPSULE ORAL at 08:53

## 2019-11-11 NOTE — P.PN
Subjective


Date of Service: 11/11/19


Primary Care Provider: Dr. Lugo


Chief Complaint: Acute kidney injury/prerenal azotemia


Subjective: Improving





Physical Examination





- Vital Signs


Temperature: 98.0 F


Blood Pressure: 168/71


Pulse: 71


Respirations: 16


Pulse Ox (%): 97





- Physical Exam


General: Alert, In no apparent distress, Oriented x3, Cooperative


HEENT: Atraumatic


Neck: Supple


Respiratory: Clear to auscultation bilaterally, Normal air movement


Cardiovascular: Normal pulses, Regular rate/rhythm


Gastrointestinal: Normal bowel sounds, Soft and benign, Non-distended


Neurological: Normal speech, Normal strength at 5/5 x4 extr, Normal tone





- Studies


Medications List Reviewed: Yes





Assessment & Plan


Discharge Plan: Home


Plan to discharge in: 24 Hours


Physician Review Additional Text: 





Impression:


Toxic encephalopathy likely related to pyelonephritis with hydronephrosis 

status post right ureteral stent


Acute renal failure with hyperkalemia, hematuria


Abdominal distension with noted right greater than left hydronephrosis and 

hydroureter without ureteral obstruction


Chronic diastolic CHF with small bilateral pleural effusion and atelectasis


Diabetes mellitus type 2 non-insulin-dependent


Hypertension


Hyperlipidemia


Chronic pain with neuropathy


Iron deficiency anemia


Constipation


Obesity, BMI 33





Plan:


Toxic encephalopathy likely related to pyelonephritis with hydronephrosis 

status post right ureteral stent:  So far patient has done well.  Patient 

remains on Ceftin and Diflucan.  This to be continued for total of 10 days 

since last week.  Patient urinary output improved compared to intake.  Case 

discussed with nephrology.  Will continue monitor urine output closely.  IV 

fluids decreased.  Patient to maintain oral intake.  Consider discharge as 

early as tomorrow if cleared by nephrology.  Patient needs to maintain adequate 

hydration with urinary output noted. Patient desires to go home with home 

health and physical therapy.  I will turn the service over to Dr. Collazo 

tomorrow.  I will go over the plan of care with her. 


Acute renal failure with hyperkalemia, hematuria likely ATN:  This continues to 

improved with right ureteral stents placed.  Electrolyte protocol in place.


Abdominal distension with noted right greater than left hydronephrosis and 

hydroureter without ureteral obstruction:  Right ureteral stent placed.  

Patient will require stent long-term.


Chronic diastolic CHF with small bilateral pleural effusion and atelectasis:  

Continue to monitor closely.  Lasix adjusted by Nephrology.


Diabetes mellitus type 2 non-insulin-dependent:  Continue Accu-Cheks and 

sliding scale.  Patient remains off metformin.


Hypertension:  Continue to adjust blood pressure medication. 


Hyperlipidemia:  Continue medication.


Chronic pain with neuropathy:  Gabapentin has been adjusted


Iron deficiency anemia:  Will monitor closely.


Constipation:  Continue with stool softener. 


Obesity, BMI 33:  Address lifestyle modification education.





Time Spent Managing Pts Care (In Minutes): 55

## 2019-11-11 NOTE — PN
Subjective:  Patient lying in bed.  Denies any headache, nausea, vomiting, chest pain, abdominal pain
, constipation, or diarrhea.



Objective:  Vital Signs:  Temperature 97, pulse 74, respirations 16, blood pressure 183/72. 

Lungs:  Basal crackles. 

Heart:  S1, S2.  Regular. 

Abdomen:  Soft, nontender.  Bowel sounds present. 

Extremities:  No edema. 



WBC 8.7, hemoglobin 8.2, platelets are 255.  Chemistry shows sodium 143, potassium 4.8, chloride 107,
 bicarb 32, BUN 61, creatinine 0.9, glucose 133.  Micro data; urine culture grew mixed leonardo.



Assessment And Plan:  Patient is currently being treated with Diflucan and cefuroxime, will be discha
rged home tomorrow.  We will follow patient as needed.





PRASHANT/MODL

DD:  11/11/2019 13:52:57Voice ID:  [id number]

DT:  11/11/2019 19:29:13Report ID:  [id number]

## 2019-11-11 NOTE — PN
The patient is doing well, getting ready to go home.  Her white count is stable 
at 8.7, H and H 8.2 and 24.6, platelet 255.  Her creatinine is down to 0.97 
from a high of 3.5.  She has looks pretty good.  A Dinero catheter is still in.  
We will plan to go ahead and discontinue the Dinero and send her home on 
Diflucan and she should follow up with me in about a month to re-evaluate the 
ureter and possibly remove her stent with ureteroscopy.





PIERRE/RAMYA

DD:  11/11/2019 14:58:29   Voice ID:  [id number]

DT:  11/11/2019 20:40:15   Report ID:  [id number]

ANGELI

## 2019-11-12 VITALS — SYSTOLIC BLOOD PRESSURE: 159 MMHG | DIASTOLIC BLOOD PRESSURE: 72 MMHG

## 2019-11-12 VITALS — OXYGEN SATURATION: 99 %

## 2019-11-12 VITALS — TEMPERATURE: 97.3 F

## 2019-11-12 LAB
BUN BLD-MCNC: 53 MG/DL (ref 7–18)
GLUCOSE SERPLBLD-MCNC: 125 MG/DL (ref 74–106)
POTASSIUM SERPL-SCNC: 4.3 MMOL/L (ref 3.5–5.1)

## 2019-11-12 RX ADMIN — GABAPENTIN SCH MG: 100 CAPSULE ORAL at 12:35

## 2019-11-12 RX ADMIN — SODIUM CHLORIDE SCH MLS: 0.45 INJECTION, SOLUTION INTRAVENOUS at 09:00

## 2019-11-12 RX ADMIN — DULOXETINE SCH MG: 20 CAPSULE, DELAYED RELEASE ORAL at 08:58

## 2019-11-12 RX ADMIN — CYANOCOBALAMIN TAB 1000 MCG SCH MCG: 1000 TAB at 08:59

## 2019-11-12 RX ADMIN — SODIUM CHLORIDE SCH MLS: 0.45 INJECTION, SOLUTION INTRAVENOUS at 00:09

## 2019-11-12 RX ADMIN — HYDRALAZINE HYDROCHLORIDE SCH MG: 25 TABLET, FILM COATED ORAL at 08:59

## 2019-11-12 RX ADMIN — Medication SCH: at 09:00

## 2019-11-12 RX ADMIN — HYDRALAZINE HYDROCHLORIDE SCH MG: 25 TABLET, FILM COATED ORAL at 12:35

## 2019-11-12 RX ADMIN — IRON SUPPLEMENT SCH MG: 325 TABLET ORAL at 08:59

## 2019-11-12 RX ADMIN — GABAPENTIN SCH MG: 100 CAPSULE ORAL at 08:59

## 2019-11-12 RX ADMIN — DOCUSATE SODIUM SCH MG: 100 CAPSULE, LIQUID FILLED ORAL at 08:59

## 2019-11-12 RX ADMIN — HEPARIN SODIUM SCH UNIT: 5000 INJECTION, SOLUTION INTRAVENOUS; SUBCUTANEOUS at 09:00

## 2019-11-12 RX ADMIN — CEFUROXIME AXETIL SCH MG: 250 TABLET, FILM COATED ORAL at 08:59

## 2019-11-12 RX ADMIN — Medication SCH PKT: at 08:57

## 2019-11-12 NOTE — RAD REPORT
EXAM DESCRIPTION:  RAD - Chest Single View - 11/12/2019 10:06 am

 

CLINICAL HISTORY:  fluid overload

Chest pain.

 

COMPARISON:  Chest Single View dated 11/8/2019; Chest Single View dated 11/5/2019; Chest Single View 
dated 11/4/2019; Chest Single View dated 11/3/2019

 

FINDINGS:  Portable technique limits examination quality.

 

Since 11/08/2019, there has been mild improvement in bilateral pulmonary opacities. The heart is uppe
r limit normal in size. Right-sided PICC line has tip in the SVC.

 

IMPRESSION:  Mild improvement in lung aeration since 11/08/2019.

## 2019-11-12 NOTE — PN
Date of Progress Note:  11/12/2019



Subjective:  Patient was admitted with acute kidney injury secondary to obstructive uropathy, toxic A
TN secondary to UTI.  Patient has undergone stent placement.  Patient doing well.



Physical Examination:

Vital Signs:  Blood pressure 154/65, pulse of 64, afebrile.  Patient had good urine output of 2600, n
egative of 400. 

Chest:  Faint rales on the left base. 

Heart:  S1, S2.  Systolic murmur. 

Abdomen:  Soft, nontender. 

Extremities:  Trace edema.



Laboratory Data:  WBC 8.7, H and H of 8.2/24.6, platelets 255.  Sodium 142, potassium 4.3, bicarb 31,
 BUN 53, creatinine down to 1, GFR of 50, calcium 8.5.  Serum protein electrophoresis, possible M spi
ke.  .  Serology, MIRTHA positive.  Hepatitis and HIV were negative.



Assessment And Plan:  

1.Acute kidney injury secondary to obstructive uropathy, recovered, resolved status post stenting.  
We will continue to monitor.  Patient cleared from the renal standpoint for discharge planning.

2.Hypertension, controlled, not optimal.  I am going to go ahead and increase the hydralazine to 50 
mg t.i.d.  We will monitor.

3.Obstructive uropathy, status post stenting.  Follow up with Urology.

4.Urinary tract infection, complicated, resolved.

5.Congestive heart failure, currently looked to me normal volume.  For better evaluation of the flui
d status, I will get a chest x-ray.

6.Deconditioning.  Continue PT/OT.

7.M spike, on the serum protein electrophoresis.  I am going to go ahead and send for immune fixatio
n to evaluate.  Patient will need outpatient workup and Hematology evaluation as outpatient.

8.Positive MIRTHA, to me insignificant.  I do not think she has autoimmune nephritis given the fact jose
t her kidney function has been improved.  No hematuria.

9.Nephrotic range proteinuria with positive serum protein 

electrophoresis as above, especially with the disproportion in the dipstick and the quantification of
 the proteinuria.





MA/RAMYA

DD:  11/12/2019 09:49:19Voice ID:  [id number]

DT:  11/12/2019 14:33:01Report ID:  [id number]

## 2019-11-12 NOTE — PN
Date of Progress Note:  11/11/2019



Chief Complaint:  Acute kidney injury in recovery phase.



Subjective:  Patient has nonoliguric urine output.  She is on IV fluids.  She continues to have posto
bstructive polyuria.  Patient is on IV fluids to continue adequate hydration.



Review of Systems:

Denies fever, chills.



Physical Examination:

Lungs:  Clear to auscultation bilaterally. 

Heart S1, S2. 

Abdomen:  Soft, benign. 

Extremities:  No edema.



Assessment And Plan:  

1.Acute kidney injury.  Renal function is improving.  There is high BUN and creatinine ratio.  Brendan
nue IV hydration and p.o. intake as tolerated.  It was discussed with the patient.

2.Anemia.  Hemoglobin 8.2.  Monitor and plan transfusion as needed.

3.Hypertension.  Blood pressure is controlled.

4.Hypokalemia.  Replace.  Potassium level is stable.



Laboratory Data:  Blood work today shows sodium 143, potassium 4.8, chloride 107, CO2 of 32, BUN 61, 
glucose 123.  Monitor daily lab work and adjust treatment accordingly.





KEILY/RAMYA

DD:  11/11/2019 21:14:26Voice ID:  [id number]

DT:  11/12/2019 02:18:18Report ID:  [id number]

## 2019-11-12 NOTE — P.DS
Admission Date: 11/02/19


Discharge Date: 11/12/19


Primary Care Provider: Dr. Lugo


Disposition: ROUTINE DISCHARGE


Discharge Condition: GOOD


Reason for Admission: Acute kidney injury/prerenal azotemia


Consultations: 





Nephrology


Urology


Procedures: 





Stent placement





- Problems


(1) Acute kidney injury


Status: Acute   





(2) Obstructive uropathy


Status: Acute   





(3) Depression


Status: Chronic   


Qualifiers: 


   Depression Type: unspecified   Qualified Code(s): F32.9 - Major depressive 

disorder, single episode, unspecified   





(4) Hyperlipidemia


Onset Date: 11/30/15   Status: Chronic   


Qualifiers: 


   Hyperlipidemia type: mixed hyperlipidemia 





(5) Hypertension


Onset Date: 01/05/18   Status: Chronic   


Qualifiers: 


   Hypertension type: essential hypertension 





(6) Obesity (BMI 30.0-34.9)


Onset Date: 01/05/18   Status: Chronic   


Brief History of Present Illness: 





Patient is an 79-year-old female who presents from the nursing home with 

altered mental status.  In the emergency room, patient's workup revealed acute 

renal failure.  Patient's BUN was greater than 120 and creatinine was greater 

than 3. patient has been on Lasix 40 mg b.i.d..  Prior to this patient was on 

Aldactone.  I am not sure as to the etiology of the change in medication.  We 

will try to get these records from the hospital nursing home.  Patient has a 

history of right-sided heart failure along with pulmonary hypertension.  

Patient has diastolic dysfunction.  At this time, patient will need hydration 

and close monitoring of renal function.  Nephrology consultation as well.  

Patient with pre renal azotemia with a  BUN to creatinine ratio of greater than 

20:1.  In light of this patient will need continued hydration.


Hospital Course: 





Overall during the hospital stay patient remained stable





Patient was initially admitted to the hospital for acute kidney injury most 

likely secondary to obstructive uropathy causing hydronephrosis more on right 

versus left.  Patient was initially admitted to the hospital with elevated BUN 

and creatinine was started on IV fluids.  Nephrology was consulted here in the 

hospital who recommended the patient's IV fluids be stopped and was started on 

Lasix for fluid overload.  BUN and creatinine was slowly getting better at that 

time patient had a CT abdomen and to evaluate for hydronephrosis and was found 

to have obstructive uropathy on the right side.  Urology was consulted at that 

time who took the patient to the OR and patient had a right sided stent placed.

  Patient was continued on IV antibiotics at that time for possible infection.  

Urine cultures were done at the hospital.  In the body.  Urine creatinine did 

improve the pain resolved completely on the day of discharge.  Patient also had 

some cultures collected during the stent placement which was positive for yeast 

infection and thus was started on Diflucan which she was to finish for total of 

10 days.  Given the extensive nature of the purulent discharge during the 

procedure with urology the decision was made to discharge patient on Ceftin for 

10 days as well.  Once patient was able to have good urine output and 

creatinine resolved she was discharged home under stable condition.


Vital Signs/Physical Exam: 














Temp Pulse Resp BP Pulse Ox


 


 97.3 F   77   18   159/72 H  98 


 


 11/12/19 12:00  11/12/19 13:31  11/12/19 12:00  11/12/19 13:31  11/12/19 12:00








General: Alert, In no apparent distress


HEENT: Atraumatic, PERRLA, EOMI


Neck: Supple, JVD not distended


Respiratory: Clear to auscultation bilaterally, Normal air movement


Cardiovascular: Regular rate/rhythm, Normal S1 S2


Gastrointestinal: Normal bowel sounds, No tenderness


Musculoskeletal: No tenderness


Integumentary: No rashes


Neurological: Normal speech, Normal tone, Normal affect


Lymphatics: No axilla or inguinal lymphadenopathy


Laboratory Data at Discharge: 














WBC  8.7 K/uL (4.3-10.9)  D 11/10/19  11:33    


 


Hgb  8.2 g/dL (12.0-15.0)  L  11/10/19  11:33    


 


Hct  24.6 % (36.0-45.0)  L  11/10/19  11:33    


 


Plt Count  255 K/uL (152-406)   11/10/19  11:33    


 


PT  12.2 SECONDS (9.5-12.5)   11/02/19  21:25    


 


INR  1.04   11/02/19  21:25    


 


Sodium  142 mmol/L (136-145)   11/12/19  04:32    


 


Potassium  4.3 mmol/L (3.5-5.1)   11/12/19  04:32    


 


BUN  53 mg/dL (7-18)  H  11/12/19  04:32    


 


Creatinine  1.05 mg/dL (0.55-1.3)   11/12/19  04:32    


 


Glucose  125 mg/dL ()  H  11/12/19  04:32    


 


Phosphorus  3.9 mg/dL (2.5-4.9)   11/09/19  12:00    


 


Magnesium  2.0 mg/dL (1.8-2.4)   11/09/19  12:00    


 


Total Bilirubin  0.3 mg/dL (0.2-1.0)   11/03/19  06:12    


 


AST  10 U/L (15-37)  L  11/03/19  06:12    


 


ALT  12 U/L (12-78)   11/03/19  06:12    


 


Alkaline Phosphatase  63 U/L ()   11/03/19  06:12    


 


Lipase  61 U/L ()  L  11/02/19  22:35    








Home Medications: 








Atorvastatin Calcium [Lipitor*] 10 mg PO BEDTIME #30 tab 10/10/17 


Hydralazine [Apresoline*] 20 mg PO TID #180 tab 10/10/17 


Gabapentin [Neurontin*] 400 mg PO TID 04/06/19 


Metformin HCl [Glucophage] 500 mg PO TID 04/06/19 


Acetaminophen- Codeine 300-30mg 1 tab PO Q8H PRN 06/19/19 


Carvedilol [Coreg*] 12.5 mg PO BID 11/03/19 


Duloxetine HCl [Cymbalta] 20 mg PO DAILY 11/03/19 


Ferrous Sulfate [Ferrous Sulfate*] 1 tab PO DAILY 11/03/19 


Cefuroxime [Ceftin*] 250 mg PO BID #14 tab 11/12/19 


Fluconazole [Diflucan] 100 mg PO BID #14 tablet 11/12/19 





New Medications: 


Cefuroxime [Ceftin*] 250 mg PO BID #14 tab


Fluconazole [Diflucan] 100 mg PO BID #14 tablet


Diet: Regular


Activity: Ad saskia


Followup: 


Arpan Tubbs MD [ACTIVE - CAN ADMIT] -  (call to schedule appointment)


Kathy Lyman MD [ACTIVE - CAN ADMIT] -  (call to schedule appointment)

## 2019-11-20 ENCOUNTER — HOSPITAL ENCOUNTER (EMERGENCY)
Dept: HOSPITAL 97 - ER | Age: 79
Discharge: HOME | End: 2019-11-20
Payer: COMMERCIAL

## 2019-11-20 VITALS — DIASTOLIC BLOOD PRESSURE: 78 MMHG | TEMPERATURE: 98.2 F | SYSTOLIC BLOOD PRESSURE: 165 MMHG

## 2019-11-20 VITALS — OXYGEN SATURATION: 100 %

## 2019-11-20 DIAGNOSIS — Z88.8: ICD-10-CM

## 2019-11-20 DIAGNOSIS — Z88.1: ICD-10-CM

## 2019-11-20 DIAGNOSIS — I10: ICD-10-CM

## 2019-11-20 DIAGNOSIS — K59.00: Primary | ICD-10-CM

## 2019-11-20 LAB
ALBUMIN SERPL BCP-MCNC: 3.1 G/DL (ref 3.4–5)
ALP SERPL-CCNC: 68 U/L (ref 45–117)
ALT SERPL W P-5'-P-CCNC: 13 U/L (ref 12–78)
AST SERPL W P-5'-P-CCNC: 10 U/L (ref 15–37)
BUN BLD-MCNC: 28 MG/DL (ref 7–18)
GLUCOSE SERPLBLD-MCNC: 91 MG/DL (ref 74–106)
HCT VFR BLD CALC: 24.2 % (ref 36–45)
LIPASE SERPL-CCNC: 29 U/L (ref 73–393)
LYMPHOCYTES # SPEC AUTO: 1 K/UL (ref 0.7–4.9)
PMV BLD: 9.3 FL (ref 7.6–11.3)
POTASSIUM SERPL-SCNC: 4.9 MMOL/L (ref 3.5–5.1)
RBC # BLD: 2.68 M/UL (ref 3.86–4.86)

## 2019-11-20 PROCEDURE — 99285 EMERGENCY DEPT VISIT HI MDM: CPT

## 2019-11-20 PROCEDURE — 93005 ELECTROCARDIOGRAM TRACING: CPT

## 2019-11-20 PROCEDURE — 36415 COLL VENOUS BLD VENIPUNCTURE: CPT

## 2019-11-20 PROCEDURE — 83690 ASSAY OF LIPASE: CPT

## 2019-11-20 PROCEDURE — 80076 HEPATIC FUNCTION PANEL: CPT

## 2019-11-20 PROCEDURE — 85025 COMPLETE CBC W/AUTO DIFF WBC: CPT

## 2019-11-20 PROCEDURE — 80048 BASIC METABOLIC PNL TOTAL CA: CPT

## 2019-11-20 PROCEDURE — 74176 CT ABD & PELVIS W/O CONTRAST: CPT

## 2019-11-20 NOTE — RAD REPORT
EXAM DESCRIPTION:  CT - Abdomen   Pelvis Wo Contrast - 11/20/2019 6:48 pm

 

CLINICAL HISTORY:  Abdominal  pain

 

COMPARISON:   November 2, 2019

 

TECHNIQUE:  Computed axial tomography of the abdomen and pelvis was obtained. IV and oral contrast we
re not requested.

 

All CT scans are performed using dose optimization technique as appropriate and may include automated
 exposure control or mA/KV adjustment according to patient size.

 

FINDINGS:   The evaluation of solid organs, vessels and bowel is limited secondary to the lack of con
trast administration.

 

A right ureteral stent has been placed since the prior exam. Marked right hydronephrosis and marked r
ight hydroureter is without significant change. Right renal cyst is unchanged.

 

Marked chronic left hydronephrosis with marked left renal cortical thinning. The left ureter is mildl
y dilated to the level of the bladder.

 

Cholecystectomy

 

Liver, spleen and adrenals appear grossly normal.

 

Atrophic pancreas.

 

The rectum is distended with stool measuring 6.8 centimeters. Moderate large amount stool is present 
within the colon

 

Small pleural effusions

 

Ventral hernia repair

 

IMPRESSION:  A right ureteral stent has been placed since the prior exam. Marked right hydronephrosis
 and right hydroureter persists

## 2019-11-20 NOTE — ER
Nurse's Notes                                                                                     

 [facility]                                                                 

Name: Gladys Ramos                                                                                    

Age: 79 yrs                                                                                       

Sex: Female                                                                                       

: 1940                                                                                   

MRN: [id number]                                                                                   

Arrival Date: 2019                                                                          

Time: 17:34                                                                                       

Account#: [id number]                                                                            

Bed 26                                                                                            

Private MD:                                                                                       

Diagnosis: Constipation, unspecified                                                              

                                                                                                  

Presentation:                                                                                     

                                                                                             

17:53 Presenting complaint: Patient states: My last bm was 3-4 days ago. I called my doctor   rodney  

      and he told me to have milk of magnesia. I took that but did not work. He told me to        

      have also Miralax but I was not able to take that one. Yesterday, I before I went to my     

      doctor, I took two pills of Immodium because I was afraid I am going to have one on my      

      way to the clinic and I don't want that to happen. Transition of care: patient was not      

      received from another setting of care. Onset of symptoms was November 15, 2019 at           

      08:00. Risk Assessment: Do you want to hurt yourself or someone else? Patient reports       

      no desire to harm self or others. Initial Sepsis Screen: Does the patient meet any 2        

      criteria? No. Patient's initial sepsis screen is negative. Does the patient have a          

      suspected source of infection? No. Patient's initial sepsis screen is negative. Care        

      prior to arrival: None.                                                                     

17:53 Method Of Arrival: EMS: [facility]                                                  

17:53 Acuity: ANATOLY 4                                                                           rv  

                                                                                                  

Historical:                                                                                       

- Allergies:                                                                                      

18:03 ambien;                                                                                 rv  

18:03 Levaquin;                                                                               rv  

- PMHx:                                                                                           

18:03 AAA; CHF; Diabetes - IDDM; Hyperlipidemia; Hypertension; OA; Pneumonia;                 rv  

- PSHx:                                                                                           

18:03 Kidney stents; ; Cholecystectomy;                                              rv  

                                                                                                  

- Immunization history:: Adult Immunizations up to date.                                          

- Social history:: Smoking status: Patient/guardian denies using tobacco,                         

  Patient/guardian denies using alcohol, street drugs, The patient lives with family.             

- Ebola Screening: : No symptoms or risks identified at this time.                                

- Family history:: not pertinent.                                                                 

                                                                                                  

                                                                                                  

Screenin:22 Abuse screen: Denies threats or abuse. Denies injuries from another. Nutritional        mg2 

      screening: No deficits noted. Tuberculosis screening: No symptoms or risk factors           

      identified. Fall Risk IV access (20 points). Gait- Weak (10 pts.).                          

                                                                                                  

Assessment:                                                                                       

18:04 General: Appears in no apparent distress. comfortable, Behavior is calm, cooperative.   rv  

      Pain: Denies pain. Neuro: Level of Consciousness is awake, alert, obeys commands,           

      Oriented to person, place, time, situation. Cardiovascular: Patient's skin is warm and      

      dry. Respiratory: Airway is patent. GI: Abdomen is round non-distended, Reports             

      constipation. : No signs and/or symptoms were reported regarding the genitourinary        

      system. EENT: No signs and/or symptoms were reported regarding the EENT system. Derm:       

      Skin is intact. Musculoskeletal: No signs and/or symptoms reported regarding the            

      musculoskeletal system.                                                                     

19:45 Reassessment: patient updated about the discharge. patient is requesting for EMS going  mg2 

      home.                                                                                       

                                                                                                  

Vital Signs:                                                                                      

17:46  / 87; Pulse 72; Resp 15 S; Temp 98.7(O); Pulse Ox 98% on R/A;                    jp3 

20:00  / 78; Pulse 81; Resp 18; Temp 98; Pulse Ox 100% on R/A;                          mg2 

21:11  / 78; Pulse 80; Resp 18; Temp 98.2; Pulse Ox 100% on R/A;                        mg2 

                                                                                                  

ED Course:                                                                                        

17:34 Patient arrived in ED.                                                                  bd  

17:46 Earline Jiménez MD is Attending Physician.                                           ma2 

17:46 Patient has correct armband on for positive identification. Bed in low position. Call   jp3 

      light in reach. Side rails up X 1. Side rails up X2. Warm blanket given. Pillow given.      

      Verbal reassurance given. Cardiac monitor on. Pulse ox on. NIBP on.                         

17:47 Patient maintains SpO2 saturation greater than 95% on room air.                         jp3 

17:53 Leo Dueñas, RN is Primary Nurse.                                                  rv  

17:57 Triage completed.                                                                       rv  

18:49 CT Abd/Pelvis - Without Contrast In Process Unspecified.                                EDMS

19:22 manual evacuation of stool done. large amount of stool taken from the patient.          mg2 

19:45 No provider procedures requiring assistance completed.                                  mg2 

21:12 IV discontinued, intact, bleeding controlled, No redness/swelling at site. Pressure     mg2 

      dressing applied.                                                                           

21:13 Arm band placed on.                                                                     mg2 

                                                                                                  

Administered Medications:                                                                         

19:21 Drug: Fleet Enema 133 ml Route: NH;                                                     mg2 

19:44 Follow up: Response: No adverse reaction; Marked relief of symptoms                     mg2 

19:21 Drug: Fleet Enema 133 ml Route: NH;                                                     mg2 

19:44 Follow up: Response: No adverse reaction; Marked relief of symptoms                     mg2 

                                                                                                  

                                                                                                  

Outcome:                                                                                          

19:35 Discharge ordered by MD.                                                                amarilis 

21:12 Discharged to home via ambulance.                                                       mg2 

21:12 Condition: stable                                                                           

21:12 Discharge instructions given to patient, family, EMS, Instructed on discharge               

      instructions, follow up and referral plans. medication usage, Demonstrated                  

      understanding of instructions, follow-up care, medications, Prescriptions given X 1.        

21:13 Patient left the ED.                                                                    mg2 

                                                                                                  

Signatures:                                                                                       

Dispatcher [employer]                           EDMS                                                 

Irma Tovar Mohammad, MD MD ma2                                                  

Tay Kirk RN                    RN   mg2                                                  

Leo Dueñas RN                    RN                                                      

Iban Kothari jp3                                                  

                                                                                                  

**************************************************************************************************

## 2019-11-20 NOTE — EKG
Test Date:    2019-11-20               Test Time:    17:51:37

Technician:   LORY                                     

                                                     

MEASUREMENT RESULTS:                                       

Intervals:                                           

Rate:         70                                     

MS:           218                                    

QRSD:         134                                    

QT:           460                                    

QTc:          496                                    

Axis:                                                

P:            43                                     

MS:           218                                    

QRS:          11                                     

T:            84                                     

                                                     

INTERPRETIVE STATEMENTS:                                       

                                                     

Sinus rhythm with 1st degree AV block with occasional premature ventricular

complexes

Left ventricular hypertrophy with QRS widening and repolarization abnormality

Cannot rule out Inferior infarct, age undetermined

Abnormal ECG

Compared to ECG 11/02/2019 22:19:29

Ventricular premature complex(es) now present

Left ventricular hypertrophy now present

Early repolarization now present

Myocardial infarct finding now present



Electronically Signed On 11-20-19 18:32:44 CST by Lewis Deras

## 2019-11-20 NOTE — EDPHYS
Physician Documentation                                                                           

 [facility]                                                                 

Name: Gladys Ramos                                                                                    

Age: 79 yrs                                                                                       

Sex: Female                                                                                       

: 1940                                                                                   

MRN: [id number]                                                                                   

Arrival Date: 2019                                                                          

Time: 17:34                                                                                       

Account#: [id number]                                                                            

Bed 26                                                                                            

Private MD:                                                                                       

ED Physician Earline Jiménez                                                                    

HPI:                                                                                              

                                                                                             

18:43 This 79 yrs old  Female presents to ER via EMS with complaints of constipation.ma2 

18:43 Onset: The symptoms/episode began/occurred gradually, 1 day(s) ago. Severity of         ma2 

      symptoms: At their worst the symptoms were moderate in the emergency department the         

      symptoms are unchanged. The patient has experienced similar episodes in the past. last      

      bm was 2 days ago, no abd pain or vomiting .                                                

                                                                                                  

Historical:                                                                                       

- Allergies:                                                                                      

18:03 ambien;                                                                                 rv  

18:03 Levaquin;                                                                               rv  

- PMHx:                                                                                           

18:03 AAA; CHF; Diabetes - IDDM; Hyperlipidemia; Hypertension; OA; Pneumonia;                 rv  

- PSHx:                                                                                           

18:03 Kidney stents; ; Cholecystectomy;                                              rv  

                                                                                                  

- Immunization history:: Adult Immunizations up to date.                                          

- Social history:: Smoking status: Patient/guardian denies using tobacco,                         

  Patient/guardian denies using alcohol, street drugs, The patient lives with family.             

- Ebola Screening: : No symptoms or risks identified at this time.                                

- Family history:: not pertinent.                                                                 

                                                                                                  

                                                                                                  

ROS:                                                                                              

18:43 Constitutional: Negative for fever, chills, and weight loss.                            ma2 

18:43 All other systems are negative.                                                             

                                                                                                  

Exam:                                                                                             

18:43 Constitutional:  This is a well developed, well nourished patient who is awake, alert,  ma2 

      and in no acute distress. Chest/axilla:  Normal chest wall appearance and motion.           

      Nontender with no deformity.  No lesions are appreciated. Cardiovascular:  Regular rate     

      and rhythm with a normal S1 and S2.  No gallops, murmurs, or rubs.  Normal PMI, no JVD.     

       No pulse deficits. Respiratory:  Lungs have equal breath sounds bilaterally, clear to      

      auscultation and percussion.  No rales, rhonchi or wheezes noted.  No increased work of     

      breathing, no retractions or nasal flaring. Abdomen/GI:  Soft, non-tender, with normal      

      bowel sounds.  No distension or tympany.  No guarding or rebound.  No evidence of           

      tenderness throughout. MS/ Extremity:  Pulses equal, no cyanosis.  Neurovascular            

      intact.  Full, normal range of motion. Neuro:  Awake and alert, GCS 15, oriented to         

      person, place, time, and situation.  Cranial nerves II-XII grossly intact.  Motor           

      strength 5/5 in all extremities.  Sensory grossly intact.  Cerebellar exam normal.          

      Normal gait.                                                                                

                                                                                                  

Vital Signs:                                                                                      

17:46  / 87; Pulse 72; Resp 15 S; Temp 98.7(O); Pulse Ox 98% on R/A;                    jp3 

20:00  / 78; Pulse 81; Resp 18; Temp 98; Pulse Ox 100% on R/A;                          mg2 

21:11  / 78; Pulse 80; Resp 18; Temp 98.2; Pulse Ox 100% on R/A;                        mg2 

                                                                                                  

MDM:                                                                                              

17:46 Patient medically screened.                                                             ma2 

18:43 Differential Diagnosis constipation.. vs electrolyte abnormality vs others .            ma2 

19:34 Data reviewed: vital signs, nurses notes. Counseling: I had a detailed discussion with  ma2 

      the patient and/or guardian regarding: the historical points, exam findings, and any        

      diagnostic results supporting the discharge/admit diagnosis, the presence of at least       

      one elevated blood pressure reading (>120/80) during this emergency department visit,       

      the need for outpatient follow up. Response to treatment: the patient's symptoms have       

      resolved after treatment.                                                                   

                                                                                                  

                                                                                             

18:28 Order name: Basic Metabolic Panel; Complete Time: 19:13                                 ma2 

                                                                                             

18:28 Order name: CBC with Diff; Complete Time: 19:13                                         ma2 

                                                                                             

18:28 Order name: CT Abd/Pelvis - Without Contrast; Complete Time: 19:13                      ma2 

                                                                                             

18:28 Order name: Creatinine for Radiology; Complete Time: 19:13                              ma2 

                                                                                             

18:28 Order name: Hepatic Function; Complete Time: 19:13                                      ma2 

                                                                                             

18:28 Order name: Lipase; Complete Time: 19:13                                                ma2 

                                                                                             

18:02 Order name: EKG Electrocardiogram; Complete Time: 18:24                                 EDMS

                                                                                             

18:28 Order name: IV Saline Lock; Complete Time: 18:57                                        ma2 

20                                                                                             

18:28 Order name: Labs collected and sent; Complete Time: 18:57                               ma2 

                                                                                                  

Administered Medications:                                                                         

19:21 Drug: Fleet Enema 133 ml Route: UT;                                                     mg2 

19:44 Follow up: Response: No adverse reaction; Marked relief of symptoms                     mg2 

19:21 Drug: Fleet Enema 133 ml Route: UT;                                                     mg2 

19:44 Follow up: Response: No adverse reaction; Marked relief of symptoms                     mg2 

                                                                                                  

                                                                                                  

Disposition:                                                                                      

19 19:35 Discharged to Home. Impression: Constipation, unspecified.                         

- Condition is Stable.                                                                            

- Discharge Instructions: Constipation, Adult.                                                    

- Prescriptions for Lactulose 10 gram/15 mL Oral Solution - take 30 milliliter by ORAL            

  route once daily; 300 milliliter.                                                               

- Medication Reconciliation Form, Thank You Letter, Antibiotic Education, Prescription            

  Opioid Use, SBAR form form.                                                                     

- Follow up: Private Physician; When: Tomorrow; Reason: Continuance of care.                      

                                                                                                  

                                                                                                  

                                                                                                  

Signatures:                                                                                       

Dispatcher [employer]                           EDMS                                                 

Earline Jiménez MD MD   ma2                                                  

Tay Kirk RN                    RN   mg2                                                  

Leo Dueñas RN                    RN   rv                                                   

                                                                                                  

Corrections: (The following items were deleted from the chart)                                    

21:13 19:35 2019 19:35 Discharged to Home. Impression: Constipation, unspecified.       mg2 

      Condition is Stable. Prescriptions for Lactulose 10 gram/15 mL Oral Solution - take 30      

      milliliter by ORAL route once daily; 300 milliliter. and Forms are Medication               

      Reconciliation Form, Thank You Letter, Antibiotic Education, Prescription Opioid Use.       

      Follow up: Private Physician; When: Tomorrow; Reason: Continuance of care. amarilis              

                                                                                                  

**************************************************************************************************

## 2021-05-02 ENCOUNTER — HOSPITAL ENCOUNTER (INPATIENT)
Dept: HOSPITAL 97 - ER | Age: 81
LOS: 4 days | Discharge: HOME HEALTH | DRG: 640 | End: 2021-05-06
Attending: HOSPITALIST | Admitting: FAMILY MEDICINE
Payer: COMMERCIAL

## 2021-05-02 VITALS — BODY MASS INDEX: 36.2 KG/M2

## 2021-05-02 DIAGNOSIS — K59.00: ICD-10-CM

## 2021-05-02 DIAGNOSIS — D50.9: ICD-10-CM

## 2021-05-02 DIAGNOSIS — I12.9: ICD-10-CM

## 2021-05-02 DIAGNOSIS — N13.6: ICD-10-CM

## 2021-05-02 DIAGNOSIS — N17.0: ICD-10-CM

## 2021-05-02 DIAGNOSIS — N18.30: ICD-10-CM

## 2021-05-02 DIAGNOSIS — F41.8: ICD-10-CM

## 2021-05-02 DIAGNOSIS — M19.90: ICD-10-CM

## 2021-05-02 DIAGNOSIS — Z90.710: ICD-10-CM

## 2021-05-02 DIAGNOSIS — E83.42: ICD-10-CM

## 2021-05-02 DIAGNOSIS — T83.593A: ICD-10-CM

## 2021-05-02 DIAGNOSIS — Z66: ICD-10-CM

## 2021-05-02 DIAGNOSIS — N39.0: ICD-10-CM

## 2021-05-02 DIAGNOSIS — M54.9: ICD-10-CM

## 2021-05-02 DIAGNOSIS — Z20.822: ICD-10-CM

## 2021-05-02 DIAGNOSIS — Z79.899: ICD-10-CM

## 2021-05-02 DIAGNOSIS — Z88.1: ICD-10-CM

## 2021-05-02 DIAGNOSIS — E11.22: ICD-10-CM

## 2021-05-02 DIAGNOSIS — Z90.49: ICD-10-CM

## 2021-05-02 DIAGNOSIS — E78.5: ICD-10-CM

## 2021-05-02 DIAGNOSIS — E11.42: ICD-10-CM

## 2021-05-02 DIAGNOSIS — E53.8: ICD-10-CM

## 2021-05-02 DIAGNOSIS — Z79.4: ICD-10-CM

## 2021-05-02 DIAGNOSIS — E66.9: ICD-10-CM

## 2021-05-02 DIAGNOSIS — E87.5: Primary | ICD-10-CM

## 2021-05-02 DIAGNOSIS — G89.29: ICD-10-CM

## 2021-05-02 LAB
ALBUMIN SERPL BCP-MCNC: 3.1 G/DL (ref 3.4–5)
ALP SERPL-CCNC: 77 U/L (ref 45–117)
ALT SERPL W P-5'-P-CCNC: 15 U/L (ref 12–78)
AST SERPL W P-5'-P-CCNC: 10 U/L (ref 15–37)
BUN BLD-MCNC: 66 MG/DL (ref 7–18)
GLUCOSE SERPLBLD-MCNC: 137 MG/DL (ref 74–106)
HCT VFR BLD CALC: 28.1 % (ref 36–45)
LIPASE SERPL-CCNC: 51 U/L (ref 73–393)
LYMPHOCYTES # SPEC AUTO: 0.9 K/UL (ref 0.7–4.9)
PMV BLD: 8.7 FL (ref 7.6–11.3)
POTASSIUM SERPL-SCNC: 6.8 MMOL/L (ref 3.5–5.1)
RBC # BLD: 2.97 M/UL (ref 3.86–4.86)

## 2021-05-02 PROCEDURE — 80076 HEPATIC FUNCTION PANEL: CPT

## 2021-05-02 PROCEDURE — 87088 URINE BACTERIA CULTURE: CPT

## 2021-05-02 PROCEDURE — 81015 MICROSCOPIC EXAM OF URINE: CPT

## 2021-05-02 PROCEDURE — 36415 COLL VENOUS BLD VENIPUNCTURE: CPT

## 2021-05-02 PROCEDURE — 86901 BLOOD TYPING SEROLOGIC RH(D): CPT

## 2021-05-02 PROCEDURE — 99284 EMERGENCY DEPT VISIT MOD MDM: CPT

## 2021-05-02 PROCEDURE — 83540 ASSAY OF IRON: CPT

## 2021-05-02 PROCEDURE — 83690 ASSAY OF LIPASE: CPT

## 2021-05-02 PROCEDURE — 85025 COMPLETE CBC W/AUTO DIFF WBC: CPT

## 2021-05-02 PROCEDURE — 80048 BASIC METABOLIC PNL TOTAL CA: CPT

## 2021-05-02 PROCEDURE — 87086 URINE CULTURE/COLONY COUNT: CPT

## 2021-05-02 PROCEDURE — 87186 SC STD MICRODIL/AGAR DIL: CPT

## 2021-05-02 PROCEDURE — 74176 CT ABD & PELVIS W/O CONTRAST: CPT

## 2021-05-02 PROCEDURE — 82607 VITAMIN B-12: CPT

## 2021-05-02 PROCEDURE — 81003 URINALYSIS AUTO W/O SCOPE: CPT

## 2021-05-02 PROCEDURE — 86900 BLOOD TYPING SEROLOGIC ABO: CPT

## 2021-05-02 PROCEDURE — 87040 BLOOD CULTURE FOR BACTERIA: CPT

## 2021-05-02 PROCEDURE — 82947 ASSAY GLUCOSE BLOOD QUANT: CPT

## 2021-05-02 PROCEDURE — 82746 ASSAY OF FOLIC ACID SERUM: CPT

## 2021-05-02 PROCEDURE — 86850 RBC ANTIBODY SCREEN: CPT

## 2021-05-02 PROCEDURE — 96375 TX/PRO/DX INJ NEW DRUG ADDON: CPT

## 2021-05-02 PROCEDURE — 76770 US EXAM ABDO BACK WALL COMP: CPT

## 2021-05-02 PROCEDURE — 87077 CULTURE AEROBIC IDENTIFY: CPT

## 2021-05-02 PROCEDURE — 83735 ASSAY OF MAGNESIUM: CPT

## 2021-05-02 PROCEDURE — 96374 THER/PROPH/DIAG INJ IV PUSH: CPT

## 2021-05-02 RX ADMIN — GABAPENTIN SCH MG: 300 CAPSULE ORAL at 21:45

## 2021-05-02 RX ADMIN — GABAPENTIN SCH: 300 CAPSULE ORAL at 15:28

## 2021-05-02 RX ADMIN — SODIUM CHLORIDE SCH: 0.9 INJECTION, SOLUTION INTRAVENOUS at 15:28

## 2021-05-02 RX ADMIN — HYDRALAZINE HYDROCHLORIDE SCH MG: 10 TABLET, FILM COATED ORAL at 16:48

## 2021-05-02 RX ADMIN — HUMAN INSULIN SCH: 100 INJECTION, SOLUTION SUBCUTANEOUS at 16:30

## 2021-05-02 RX ADMIN — HUMAN INSULIN SCH: 100 INJECTION, SOLUTION SUBCUTANEOUS at 21:00

## 2021-05-02 RX ADMIN — Medication SCH ML: at 21:46

## 2021-05-02 RX ADMIN — HEPARIN SODIUM SCH UNIT: 5000 INJECTION, SOLUTION INTRAVENOUS; SUBCUTANEOUS at 21:46

## 2021-05-02 RX ADMIN — HYDRALAZINE HYDROCHLORIDE SCH MG: 10 TABLET, FILM COATED ORAL at 21:45

## 2021-05-02 NOTE — RAD REPORT
EXAM DESCRIPTION:  CT - Abdomen   Pelvis Wo Contrast - 5/2/2021 11:31 am

 

CLINICAL HISTORY:  Abdominal pain.

abd distention

 

COMPARISON:  Abdomen   Pelvis Wo Contrast dated 11/20/2019

 

TECHNIQUE:  CT imaging of the abdomen and pelvis was performed without contrast. Solid organ, bowel a
nd vascular assessment is limited due to lack of IV and oral contrast.

 

All CT scans are performed using dose optimization technique as appropriate and may include automated
 exposure control or mA/KV adjustment according to patient size.

 

FINDINGS:  Elevated left hemidiaphragm is seen with atelectasis in both lung bases.

 

Noncontrast assessment of the liver demonstrates no focal mass or biliary dilatation.Cholecystectomy 
clips. The spleen, adrenal glands and pancreas show no acute abnormality.

 

Moderate right hydronephrosis and hydroureter is present. Atrophic changes of the left kidney with mo
derate left-sided hydronephrosis and hydroureter also seen. Findings appear similar in severity mya
red to 2019 prior study.

 

Large amount of stool is present in the rectum.  No bowel obstruction or free air. No abscess.

 

Moderate lumbar degenerative changes.

 

IMPRESSION:  Chronic moderate bilateral hydronephrosis and hydroureter seen.

 

Significant fecal retention throughout the colon particularly in the rectum.

 

A limited non-contrast examination was performed as detailed.

## 2021-05-02 NOTE — XMS REPORT
Continuity of Care Document

                             Created on:May 2, 2021



Patient:PAKO ENG

Sex:Female

:1940

External Reference #:[id number]





Demographics







                          Address                   [address] [place] [address]

 

                          Home Phone                [phone]

 

                          Mobile Phone              [phone]

 

                          Email Address             [email or web address]

 

                          Preferred Language        English

 

                          Marital Status            Unknown

 

                          Jain Affiliation     Unknown

 

                          Race                      Unknown

 

                          Additional Race(s)        Unavailable



                                                    White

 

                          Ethnic Group              Unknown









Author







                          Organization              [facility]

t

 

                          Address                   [address] [place] [address] [address]



                                                    [place] [address]

 

                          Phone                     [phone]









Support







                Name            Relationship    Address         Phone

 

                Solo          Child           Unavailable     [phone]









Care Team Providers







                    Name                Role                Phone

 

                    Juan Carlos Dennis DO Primary Care Physician [phone]

 

                    Juan Carlos Dennis DO Attending Clinician [phone]

 

                    Oleksandr ANDERSON            Attending Clinician Unavailable

 

                    Laura BERRY             Attending Clinician [phone]









Payers







           Payer Name Policy Type Policy Number Effective Date Expiration Date S

marcell

 

           [id number]            [id number]            [facility]



           [id number]                       00:00:00              [employer]



           /2004-Presen                                             



           [employer]                                              







Problems







       Condition Condition Condition Status Onset  Resolution Last   Treating Co

mments 

Source



       Name   Details Category        Date   Date   Treatment Clinician        



                                                 Date                 

 

       Recurrent Recurrent Disease Active                              [facility]

[facility]



       UTI    UTI                                                 Methodi



                                   00:00:                             st



                                   00                                 

 

       Cachexia Cachexia Disease Active                              [place]

[facility]



                                                                  Methodi



                                   00:00:                             st



                                   00                                 

 

       Venous Venous Disease Active                              [facility]



       stasis stasis                                              Methodi



       dermatitis dermatitis               00:00:                             st



       of left of left               00                                 



       lower  lower                                                   



       extremity extremity                                                  

 

       Sepsis Sepsis Disease Active 2019                             [facility]



       without without                                              Methodi



       acute  acute                00:00:                             st



       organ  organ                00                                 



       dysfunctio dysfunctio                                                  



       n      n                                                       

 

       Acute  Acute  Disease Active 2019                             [facility]



       renal  renal                                               Methodi



       failure failure               00:00:                             st



                                   00                                 

 

       Hydronephr Hydronephr Disease Active 2019                             [facility]

[place]



       osis with osis with               -19                               Meth

gabriella



       ureteral ureteral               00:00:                             st



       stricture, stricture,               00                                 



       not    not                                                     



       elsewhere elsewhere                                                  



       classified classified                                                  

 

       Ingrown Ingrown Disease Active 2019                             [facility]



       toenail toenail               0-01                               Methodi



                                   00:00:                             st



                                   00                                 

 

       Urine  Urine  Disease Active 2019                             [facility]



       retention retention               0-01                               Meth

gabriella



                                   00:00:                             st



                                   00                                 

 

       Anemia Anemia Disease Active                              [facility]



                                   8-02                               Methodi



                                   00:00:                             st



                                   00                                 

 

       Does not Does not Disease Active                              [facility]

on



       transfer transfer                                              Method

i



       from bed from bed               00:00:                             st



       to     to                   00                                 



       wheelchair wheelchair                                                  

 

       Hallucinat Hallucinat Disease Active                              [facility]

[place]



       ions   ions                                                Methodi



                                   00:00:                             st



                                   00                                 

 

       Tremor of Tremor of Disease Active                              [facility]

[facility]



       both hands both hands                                              Me

thodi



                                   00:00:                             st



                                   00                                 

 

       Administra Administra Disease Active 2017                             [facility]

[place]



       tive   tive                                                Methodi



       encounter encounter               00:00:                             st



                                   00                                 

 

       Fall   Fall   Disease Active                              [facility]



                                                                  Methodi



                                   00:00:                             st



                                   00                                 

 

       Leg    Leg    Disease Active                              [facility]



       injuries injuries                                              Method

i



                                   00:00:                             st



                                   00                                 

 

       Abrasion Abrasion Disease Active                              [place]

[facility]



                                                                  Methodi



                                   00:00:                             st



                                   00                                 

 

       Cellulitis Cellulitis Disease Active                              [facility]

[place]



       of left of left                                              Methodi



       lower  lower                00:00:                             st



       extremity extremity               00                                 

 

       Acute  Acute  Disease Active                              [facility]



       cystitis cystitis                                              Method

i



       without without               00:00:                             st



       hematuria hematuria               00                                 

 

       Urinary Urinary Disease Active                              [facility]



       tract  tract                20                               Methodi



       infection infection               00:00:                             st



       associated associated               00                                 



       with   with                                                    



       catheteriz catheteriz                                                  



       ation of ation of                                                  



       urinary urinary                                                  



       tract  tract                                                   

 

       Anxiety Anxiety Disease Active                              [facility]



                                                                  Methodi



                                   00:00:                             st



                                   00                                 

 

       Arthritis Arthritis Disease Active                              [facility]

[facility]



       of knee of knee                                              Methodi



                                   00:00:                             st



                                   00                                 

 

       Dysuria Dysuria Disease Active                              [facility]



                                                                  Methodi



                                   00:00:                             st



                                   00                                 

 

       Edema of Edema of Disease Active                              [facility]

[facility]



       lower  lower                2-                               Methodi



       extremity extremity               00:00:                             st



                                   00                                 

 

       Hip pain Hip pain Disease Active                              [facility]

[facility]



                                                                  Methodi



                                   00:00:                             st



                                   00                                 

 

       Hypertensi Hypertensi Disease Active                              [facility]

[place]



       on     on                                                  Methodi



                                   00:00:                             st



                                   00                                 

 

       Knee pain Knee pain Disease Active                              [facility]

[facility]



                                                                  Methodi



                                   00:00:                             st



                                   00                                 

 

       Neuropathy Neuropathy Disease Active                              [facility]

[place]



                                                                  Methodi



                                   00:00:                             st



                                   00                                 

 

       Polyuria Polyuria Disease Active                              [place]

[facility]



                                                                  Methodi



                                   00:00:                             st



                                   00                                 

 

       Toothache Toothache Disease Active                              [facility]

[facility]



                                                                  Methodi



                                   00:00:                             st



                                   00                                 

 

       Type 2 Type 2 Disease Active                              [facility]



       diabetes diabetes                                              Method

i



       mellitus mellitus               00:00:                             st



       with stage with stage               00                                 



       3 chronic 3 chronic                                                  



       kidney kidney                                                  



       disease disease                                                  

 

       Unsteady Unsteady Disease Active                              [facility]

[facility]



       gait   gait                                                Methodi



                                   00:00:                             st



                                   00                                 

 

       Vitamin D Vitamin D Disease Active                              [facility]

[facility]



       deficiency deficiency                                              Me

thodi



                                   00:00:                             st



                                   00                                 

 

       Murmur Murmur Disease Active                       Overview: [place]

n



                                                           Formattin Methodi



                                   00:00:                      g of this                           note   



                                                               might be 



                                                               different 



                                                               from the 



                                                               original. 



                                                               "aortic 



                                                               aneurysm" 







Allergies, Adverse Reactions, Alerts







       Allergy Allergy Status Severity Reaction(s) Onset  Inactive Treating Comm

ents 

Source



       Name   Type                        Date   Date   Clinician        

 

       Levoflox Propensi Active                              Other  [place]

n



       acin   ty to                       6-19                 reaction( Methodi



              adverse                      00:00:               s):    st



              reaction                      00                   Anaphylax 



              s to                                             is     



              drug                                                    







Family History







           Family Member Diagnosis  Comments   Start Date Stop Date  Source

 

           Natural father Heart disease                                  [facility]

 [employer]

 

           Natural father Hypertension                                  [facility] 

[employer]

 

           Maternal grandmother Diabetes                                    [facility]

ton [employer]







Social History







           Social Habit Start Date Stop Date  Quantity   Comments   Source

 

           Exposure to                       Not sure              [facility]o

dist



           SARS-CoV-2                                             



           (event)                                                

 

           Tobacco use and 2021 Never used            [facility]

ethodist



           exposure   00:00:00   00:00:00                         

 

           Alcohol intake 2021 Current               [facility]

thodist



                      00:00:00   00:00:00   non-drinker of            



                                            alcohol               



                                            (finding)             

 

           Sex Assigned At 1940                       [facility]

apoloniaodist



           Birth      00:00:00   00:00:00                         









                Smoking Status  Start Date      Stop Date       Source

 

                Never smoker                                    [facility] Chip

t







Medications







       Ordered Filled Start  Stop   Current Ordering Indication Dosage Frequency

 Signature

                    Comments            Components          Source



     Medication Medication Date Date Medication? Clinician                (SIG) 

          



     Name Name                                                   

 

     atorvastati            Yes                      Take 1           [facility]

[place]



     n (LIPITOR)      4-27                               tablet by           Met

hodi



     10 mg      00:00:                               mouth           st



     tablet      00                                 daily           

 

     acetaminoph      2021- Yes                      Take 1           [facility]

ston



     en-codeine       07-26                          tablet by           Met russo



     (TYLENOL      00:00: 23:59                          mouth           st



     WITH      00   :00                           every 8           



     CODEINE #3)                                         hours as           



     300-30 mg                                         needed for           



     per tablet                                         moderate           



                                                  pain           

 

     insulin            Yes                      Inject           [facility]



     ASPART                                     under the           Methodi



     (NovoLOG)      15:17:                               skin.           st



     100 unit/mL      22                                 Sliding           



     (3 mL)                                         scale           



     insulin pen                                                        

 

     predniSONE      2021- No             10mg Q.5D Take 10 mg           

[facility]



     (DELTASONE)                                by mouth 2           M

ethodi



     10 mg      15:13: 00:00                          (two)           st



     tablet      31   :00                           times a           



                                                  day.           

 

     INSULIN      2021- No                       Inject           [facility]



     SUBCUTANEOU                                under the           Me

thodi



     S PUMP,      15:13: 00:00                          skin           st



     NOVOLOG,SANAM      13   :00                           continuous           



     SP, 100                                         ly.            



     UNIT/ML                                         Sliding           



     INSULIN                                         scale           



     PUMP                                                        



     INFUSION                                                        



     (NovoLOG)                                                        

 

     gabapentin            Yes            400mg [id number] Take 1         

  [facility]



     (NEURONTIN)                                [id number] capsule          

 Methodi



     400 mg      00:00:                          3D   (400 mg           st



     capsule      00                                 total) by           



                                                  mouth 3           



                                                  (three)           



                                                  times a           



                                                  day.           

 

     hydrALAZINE            Yes                      Two P.O.           [facility]

[place]



     (APRESOLINE                                     T.I.D.           Method

i



     ) 10 MG      00:00:                                              st



     tablet      00                                                

 

     carvediloL            Yes            6.25mg Q.5D Take 1           [facility]

[place]



     (COREG)                                     tablet           Methodi



     6.25 MG      00:00:                               (6.25 mg           st



     tablet      00                                 total) by           



                                                  mouth 2           



                                                  (two)           



                                                  times a           



                                                  day with           



                                                  meals.           

 

     ciprofloxac      2021- No             500mg Q.5D Take 1           [facility]

[place]



     in (Cipro)                                tablet           Method

i



     500 MG      00:00: 23:59                          (500 mg           st



     tablet      00   :00                           total) by           



                                                  mouth 2           



                                                  (two)           



                                                  times a           



                                                  day for 3           



                                                  days.           

 

     ciprofloxac      2021- No             500mg Q.5D Take 1           [facility]

[place]



     in (Cipro)                                tablet           Method

i



     500 MG      00:00: 00:00                          (500 mg           st



     tablet      00   :00                           total) by           



                                                  mouth 2           



                                                  (two)           



                                                  times a           



                                                  day for 3           



                                                  days.           

 

     fluconazole      2021- No             150mg      Take 1           [facility]



     (Diflucan)                                tablet           Method

i



     150 MG      00:00: 00:00                          (150 mg           st



     tablet      00   :00                           total) by           



                                                  mouth once           



                                                  for 1           



                                                  dose.           

 

     ciprofloxac       No             500mg Q.5D Take 1           [facility]



     in (Cipro)                                tablet           Method

i



     500 MG      00:00: 00:00                          (500 mg           st



     tablet      00   :00                           total) by           



                                                  mouth 2           



                                                  (two)           



                                                  times a           



                                                  day for 3           



                                                  days.           

 

     fluconazole       No             150mg      Take 1           [facility]



     (Diflucan)                                tablet           Method

i



     150 MG      00:00: 23:59                          (150 mg           st



     tablet      00   :00                           total) by           



                                                  mouth once           



                                                  for 1           



                                                  dose.           

 

     nitrofurant       No             100mg Q.5D Take 1           [facility]



     oin,      3-30 04-06                          capsule           [facility]



     macrocrysta      00:00: 23:59                          (100 mg           st



     l-monohydra      00   :00                           total) by           



     te,                                          mouth 2           



     (Macrobid)                                         (two)           



     100 MG                                         times a           



     capsule                                         day for 7           



                                                  days.           

 

     fluconazole      2021- No             150mg      Take 1           [facility]



     (Diflucan)                                tablet           Method

i



     150 MG      00:00: 23:59                          (150 mg           st



     tablet      00   :00                           total) by           



                                                  mouth once           



                                                  for 1           



                                                  dose.           

 

     hydrALAZINE       No                       Two P.O.           H

david



     (APRESOLINE                                T.I.D.           [facility]

di



     ) 10 MG      00:00: 00:00                                         st



     tablet      00   :00                                          

 

     insulin      2021- No                       Inject           [facility]



     aspart,                                under the           Method

i



     niacinamide      14:31: 00:00                          skin.           st



     , (Fiasp      43   :00                           Sliding           



     Penfill                                         scale.           



     U-100                                                        



     Insulin)                                                        



     100 unit/mL                                                        



     (3 mL)                                                        



     cartridge                                                        

 

     insulin            Yes            4U   Q.5D Inject 4           [facility]

n



     aspart,      1-08                               Units           [facility]



     niacinamide      00:00:                               under the           s

t



     , (Fiasp      00                                 skin 2           



     Penfill                                         (two)           



     U-100                                         times a           



     Insulin)                                         day.           



     100 unit/mL                                         Sliding           



     (3 mL)                                         scale.           



     cartridge                                                        

 

     metFORMIN            Yes            500mg [id number] Take 1          

 [facility]



     (GLUCOPHAGE                                [id number] tablet           

[facility]



     ) 500 mg      00:00:                          3D   (500 mg           st



     tablet      00                                 total) by           



                                                  mouth 3           



                                                  (three)           



                                                  times a           



                                                  day.           

 

     acetaminoph      2021- No        chronic 1{tbl} Q8H  Take 1         

  [facility]



     en-codeine                 pain           tablet by           [facility]

rafaela



     (TYLENOL      00:00: 00:00                          mouth           st



     WITH      00   :00                           every 8           



     CODEINE #3)                                         (eight)           



     300-30 mg                                         hours as           



     per tablet                                         needed for           



                                                  moderate           



                                                  pain for           



                                                  up to 30           



                                                  days           



                                                  .chronic           



                                                  pain.           

 

     atorvastati      2021- No             10mg QD   Take 1           [facility] zimmerman



     ricarda (LIPITOR)                                tablet (10           M

[facility]



     10 mg      00:00: 00:00                          mg total)           st



     tablet      00   :00                           by mouth           



                                                  daily.           

 

     carvediloL       No             6.25mg Q.5D Take 1           [facility]

pel[facility]



     (COREG)                                tablet           [facility]



     6.25 MG      00:00: 00:00                          (6.25 mg           st



     tablet      00   :00                           total) by           



                                                  mouth 2           



                                                  (two)           



                                                  times a           



                                                  day with           



                                                  meals.           

 

     DULoxetine      2021- No             20mg QD   Take 1           [facility] bird



     (CYMBALTA)                                capsule           [facility]

di



     20 MG      00:00: 00:00                          (20 mg           st



     capsule      00   :00                           total) by           



                                                  mouth           



                                                  daily.           

 

     gabapentin      2021- No             400mg [id number] Take 1        

   [facility]



     (NEURONTIN)                           [id number] capsule         

  [facility]



     400 mg      00:00: 00:00                     3D   (400 mg           st



     capsule      00   :00                           total) by           



                                                  mouth 3           



                                                  (three)           



                                                  times a           



                                                  day.           

 

     hydrALAZINE      2021- No             100mg Q.[id number] Take 10      

     [facility]



     (APRESOLINE                           [id number] tablets         

  [facility]



     ) 10 MG      00:00: 00:00                     3D   (100 mg           st



     tablet      00   :00                           total) by           



                                                  mouth 3           



                                                  (three)           



                                                  times a           



                                                  day.           

 

     nitrofurant      2020- No        Urinary 100mg Q.5D Take 1          

 [facility]



     oin,      028 -04           tract           capsule           [facility]



     macrocrysta      00:00: 23:59           infection           (100 mg        

   st



     l-monohydra      00   :00            without           total) by           



     te,                           hematuria,           mouth 2           



     (Macrobid)                          site           (two)           



     100 MG                          unspecified           times a           



     capsule                                         day for 7           



                                                  days.           

 

     gabapentin      2020- No                       Take 1           [facility]

[facility]



     (NEURONTIN)                                capsule by           RISHI gray



     400 mg      00:00: 00:00                          mouth           st



     capsule      00   :00                           three           



                                                  times           



                                                  daily           

 

     hydrALAZINE      2020- No                       Take 2           [facility]

[facility]



     (APRESOLINE                                tablets by           RISHI gray



     ) 10 MG      00:00: 00:00                          mouth           st



     tablet      00   :00                           three           



                                                  times           



                                                  daily           

 

     atorvastati      2020- No                       Take 1           [facility]

[facility]



     n (LIPITOR)                                tablet by           Milka orozco



     10 mg      00:00: 00:00                          mouth           st



     tablet      00   :00                           daily           

 

     DULoxetine      2020- No                       Take 1           [facility]

[facility]



     (CYMBALTA)                                capsule by           Me

ernesto



     20 MG      00:00: 00:00                          mouth           st



     capsule      00   :00                           daily           

 

     metFORMIN      2020- No                       Take 1           [facility]

[facility]



     (GLUCOPHAGE                                tablet by           Me

ernesto



     ) 500 mg      00:00: 00:00                          mouth           st



     tablet      00   :00                           three           



                                                  times           



                                                  daily           

 

     carvediloL      2020- No                       Take 1           [facility]

[facility]



     (COREG)                                tablet by           Method

i



     6.25 MG      00:00: 00:00                          mouth           st



     tablet      00   :00                           twice           



                                                  daily with           



                                                  meals           

 

     acetaminoph      2020- No                       Take 1           [facility]

ston



     en-codeine                                tablet by           [facility]



     (TYLENOL      00:00: 00:00                          mouth           st



     WITH      00   :00                           every 8           



     CODEINE #3)                                         hours as           



     300-30 mg                                         needed for           



     per tablet                                         moderate           



                                                  pain           

 

     nitrofurant      2020- No             100mg Q.5D Take 1           [facility]

terry



     oin,      6 07-06                          capsule           [facility]



     macrocrysta      00:00: 23:59                          (100 mg           st



     l-monohydra      00   :00                           total) by           



     te,                                          mouth 2           



     (Macrobid)                                         (two)           



     100 MG                                         times a           



     capsule                                         day for 7           



                                                  days.           

 

     hydrALAZINE      2020- No                       Take 2           [facility]

[facility]



     (APRESOLINE      6-23 10-22                          tablets by           RISHI gray



     ) 10 MG      00:00: 00:00                          mouth           st



     tablet      00   :00                           three           



                                                  times           



                                                  daily           

 

     atorvastati      2020- No                       Take 1           [facility]

ston



     n (LIPITOR)      6-23 10-22                          tablet by           Me

ernesto



     10 mg      00:00: 00:00                          mouth           st



     tablet      00   :00                           daily           

 

     DULoxetine      2020- No                       Take 1           [facility]

[facility]



     (CYMBALTA)      6-23 10-22                          capsule [facility]           [facility]



     20 MG      00:00: 00:00                          mouth           st



     capsule      00   :00                           daily           

 

     gabapentin      2020- No                       Take 1           [facility]

[facility]



     (NEURONTIN)      6-23 10-22                          capsule by           

marina



     400 mg      00:00: 00:00                          mouth           st



     capsule      00   :00                           three           



                                                  times           



                                                  daily           

 

     metFORMIN      2020- No                       Take 1           [facility]

[facility]



     (GLUCOPHAGE      6-23 10-22                          tablet by           [facility]



     ) 500 mg      00:00: 00:00                          mouth           st



     tablet      00   :00                           three           



                                                  times           



                                                  daily           

 

     carvediloL      2020- No                       Take 1           [facility]

[facility]



     (COREG)      6-23 10-22                          tablet by           [facility]

i



     6.25 MG      00:00: 00:00                          mouth           st



     tablet      00   :00                           twice           



                                                  daily with           



                                                  meals           

 

     acetaminoph      2020- No                       Take 1           [facility]

[facility]



     en-codeine                                tablet by           [facility]



     (TYLENOL      00:00: 23:59                          mouth           st



     WITH      00   :00                           every 8           



     CODEINE #3)                                         hours as           



     300-30 mg                                         needed for           



     per tablet                                         moderate           



                                                  pain           

 

     hydrALAZINE      2020- No                       Take 2           [facility]

[facility]



     (APRESOLINE      3-24 06-23                          tablets by           RISHI gray



     ) 10 MG      00:00: 00:00                          mouth           st



     tablet      00   :00                           three           



                                                  times           



                                                  daily           

 

     atorvastati      2020- No                       Take 1           [facility]

[facility]



     n (LIPITOR)      3-24 06-23                          tablet by           Me

thodi



     10 MG      00:00: 00:00                          mouth           st



     tablet      00   :00                           daily           

 

     DULoxetine      2020- No                       Take 1           [facility]

[facility]



     (CYMBALTA)      3-24 06-23                          capsule by           Me

thodi



     20 MG      00:00: 00:00                          mouth           st



     capsule      00   :00                           daily           

 

     gabapentin      2020- No                       Take 1           [facility]

[facility]



     (NEURONTIN)      3-24 06-23                          capsule by           RISHI gray



     400 mg      00:00: 00:00                          mouth           st



     capsule      00   :00                           three           



                                                  times           



                                                  daily           

 

     metFORMIN      2020- No                       Take 1           [facility]

[facility]



     (GLUCOPHAGE      3-24 06-23                          tablet by           Milka orozco



     ) 500 mg      00:00: 00:00                          mouth           st



     tablet      00   :00                           three           



                                                  times           



                                                  daily           

 

     carvediloL      2020- No                       Take 1           [facility]

[facility]



     (COREG)      3-24 06-23                          tablet by           [facility]

i



     6.25 MG      00:00: 00:00                          mouth           st



     tablet      00   :00                           twice           



                                                  daily with           



                                                  meals           

 

     acetaminoph      2020- No                       Take 1           [facility]

[facility]



     en-codeine      3-24 06-23                          tablet by           [facility]



     (TYLENOL      00:00: 00:00                          mouth           st



     WITH      00   :00                           every 8           



     CODEINE #3)                                         hours as           



     300-30 mg                                         needed for           



     per tablet                                         moderate           



                                                  pain           

 

     furosemide      2019- No             40mg Q.5D Take 1           [facility]

[facility]



     (LASIX) 40      0- 09-30                          tablet (40           Me

thodi



     mg tablet      00:00: 23:59                          mg total)           st



               00   :00                           by mouth 2           



                                                  (two)           



                                                  times a           



                                                  day.           

 

     hydrocolloi            Yes            1{packa Q.5D Apply 1           

[facility]



     d dressing      5-20                     ge}       Package           Method

i



     (DUODERM      00:00:                               topically           st



     CGF BORDER      00                                 2 (two)           



     DRESSING) 4                                         times a           



     X 4 "                                         day.           



     bandage                                                        

 

     soft lens            Yes            20mL QD   20 mL           [facility]



     rinse,store      4-25                               daily.           [facility]

i



     solution      00:00:                                              st



     (SALINE      00                                                



     SOLUTION)                                                        



     solution                                                        

 

     insulin      2021- No             30U  QD   Inject 30           [facility]

[facility]



     glargine-li      4-25 04-21                          Units           [facility]

i



     xisenatide      00:00: 00:00                          under the           s

t



     (SOLIQUA      00   :00                           skin           



     100/33) 100                                         daily.           



     unit-33                                                        



     mcg/mL                                                        



     insulin pen                                                        

 

     pen needle,            Yes                      Use once           [facility]

[facility]



     diabetic      7-18                               daily           [facility]



     (BD INSULIN      00:00:                                              st



     PEN NEEDLE      00                                                



     UF SHORT)                                                        



     31 gauge x                                                        



     5/16"                                                        



     needle                                                        

 

     blood sugar            Yes                      Check four           

[facility]



     diagnostic      2-07                               strips           [facility]



     strips      00:00:                               daily           st



     (FREESTYLE      00                                                



     LITE                                                        



     STRIPS)                                                        



     strip test                                                        



     strips                                                        

 

     lancets       Yes                      Check 4           [facility]

[facility]



     gauge misc      2-07                               times           [facility]



               00:00:                               daily           st



               00                                                







Immunizations







           Ordered Immunization Filled Immunization Date       Status     Commen

ts   Source



           Name       Name                                        

 

           Pneumococcal            2020 Completed             [facility]



           Conjugate 13-Valent            00:00:00                         [facility]

dist

 

           Influenza (IM)            2019 Completed             [facility]



           Preservative Free            00:00:00                         [facility]

st

 

           Pneumococcal            2019 Completed             [facility]



           Polysaccharide            00:00:00                         [facility]

 

           Influenza (IM)            2017-10-01 Completed             [facility]



           Preservative Free            00:00:00                         [facility]

st

 

           FLUZONE HIGH-DOSE PF            2016-10-06 Completed             [facility]

[facility]



                                 00:00:00                         [facility]

 

           FLUZONE HIGH-DOSE PF            2015-10-15 Completed             [facility]

[facility]



                                 00:00:00                         [facility]







Vital Signs







             Vital Name   Observation Time Observation Value Comments     Source

 

             Systolic blood 2021   135 mm[Hg]                [facility]



             pressure     15:06:00                               [facility]

 

             Diastolic blood 2021   78 mm[Hg]                 [facility]



             pressure     15:06:00                               [facility]

 

             Heart rate   2021   75 /min                   [facility]



                          15:06:00                               [facility]

 

             Body temperature 2021   36.83 Meredith                 [facility]



                          15:06:00                               [facility]

 

             Body height  2021   160 cm       Patient unable [facility]



                          15:06:00                  to stand .   [facility]

 

             Oxygen saturation 2021   100 /min                  [facility]



             in Arterial blood 15:06:00                               [facility]



             by Pulse oximetry                                        

 

             Respiratory rate 2021   24 /min                   [facility]



                          15:20:00                               [facility]

 

             Body weight  2021   95.255 kg                 [facility]



                          13:34:00                               [facility]

 

             BMI          2021   37.20 kg/m2               [facility]



                          13:34:00                               [facility]







Procedures







                Procedure       Date / Time Performed Performing Clinician Sour

e

 

                HEMOGLOBIN A1C  2021 16:11:00 MELLO Dennis

 

                COMPREHENSIVE METABOLIC 2021 16:11:00 MELLO Dennis



                PANEL                                           

 

                CBC WITH PLATELET AND 2021 16:11:00 MELLO Dennis



                DIFFERENTIAL                                    

 

                LIPID PANEL     2021 16:11:00 MELLO Dennis

 

                URINE CULTURE   2020 13:33:00 MELLO Dennis

 

                URINALYSIS, MICRO UA 2020 13:33:00 MELLO Dennis 

terry Ley



                SCREEN WITH MICROSCOPY                                 







Plan of Care







             Planned Activity Planned Date Details      Comments     Source

 

             Future Scheduled 2022   DIABETIC FOOT EXAM              [place]

on [facility]



             Test         00:00:00     [code = DIABETIC              



                                       FOOT EXAM]                

 

             Future Scheduled 2021   INFLUENZA VACCINE              [place]

n [facility]



             Test         00:00:00     [code = INFLUENZA              



                                       VACCINE]                  

 

             Future Scheduled 1989   SHINGLES VACCINES              [place]

n [facility]



             Test         00:00:00     (#1) [code =              



                                       SHINGLES VACCINES              



                                       (#1)]                     

 

             Future Scheduled 1955   COVID-19 VACCINE (1)              Ramon zimmerman [facility]



             Test         00:00:00     [code = COVID-19              



                                       VACCINE (1)]              

 

             Future Scheduled 1949   DIABETES: RETINAL              [place]

n [facility]



             Test         00:00:00     EYE EXAM [code =              



                                       DIABETES: RETINAL              



                                       EYE EXAM]                 







Encounters







        Start   End     Encounter Admission Attending Care    Care    Encounter 

Source



        Date/Time Date/Time Type    Type    Clinicians Facility Department ID   

   

 

        2021 Outpatient         [facility]     210

[id number] [facility]



        00:00:00 00:00:00                 DONATO RAmor                   [address]     [facility]

i



                                                                        st

 

        2021 Outpatient         [facility]     210

[id number] [facility]



        00:00:00 00:00:00                 MELLO SEWELL                   861     [facility]

i



                                                                        st

 

        2020 Outpatient         [facility]     210

[id number] [facility]



        00:00:00 00:00:00                 R R                    757     [facility]

i



                                                                        st







Results







           Test Description Test Time  Test Comments Results    Result Comments 

Source









                    Comprehensive metabolic panel 2021 05:14:00 









                      Test Item  Value      Reference Range Interpretation Comme

nts









             Glucose (test code = 105 mg/dL                               

 Non-fasting



             2345-7)                                             reference inter

sowmya

 

             BUN (test code = 3094-0) 40 mg/dL     7-25         H            

 

             Creatinine (test code = 1.31 mg/dL   0.60-0.88    H            For 

patients >49 years of



             2160-0)                                             age, the refere

nce



                                                                 limitfor Creati

nine is



                                                                 approximately 1

3% higher



                                                                 for peopleident

ified as



                                                                 -Jacqui

n.

 

             EGFR [employer] 38           See_Comment  L             [Aut

omated message] The



             (test code = 2775)                                        system wh

ich generated



                                                                 this result tra

nsmitted



                                                                 reference range

: > OR =



                                                                 60 mL/min/1.73m

2. The



                                                                 reference range

 was not



                                                                 used to interpr

et this



                                                                 result as



                                                                 normal/abnormal

.

 

             EGFR  44           See_Comment  L             [Auto

mated message] The



             (test code = 22697-6)                                        system

 which generated



                                                                 this result tra

nsmitted



                                                                 reference range

: > OR =



                                                                 60 mL/min/1.73m

2. The



                                                                 reference range

 was not



                                                                 used to interpr

et this



                                                                 result as



                                                                 normal/abnormal

.

 

             BUN/creatinine ratio 31           See_Comment  H             [Autom

ated message] The



             (test code = 3097-3)                                        system 

which generated



                                                                 this result tra

nsmitted



                                                                 reference range

: 6 - 22



                                                                 (calc). The ref

erence



                                                                 range was not u

sed to



                                                                 interpret this 

result as



                                                                 normal/abnormal

.

 

             Sodium (test code = 143 mmol/L   135-146                   



             2951-2)                                             

 

             Potassium (test code = 5.4 mmol/L   3.5-5.3      H            



             2823-3)                                             

 

             Chloride (test code = 109 mmol/L                       



             2075-0)                                             

 

             CO2 (test code = 8-9) 22 mmol/L    20-32                     

 

             Calcium (test code = 9.4 mg/dL    8.6-10.4                  



             23560-5)                                            

 

             Protein (test code = 6.7 g/dL     6.1-8.1                   



             2885-2)                                             

 

             Albumin, S (test code = 3.8 g/dL     3.6-5.1                   



             1751-7)                                             

 

             Globulin, total (test 2.9          See_Comment                [Auto

mated message] The



             code = 59150-0)                                        system which

 generated



                                                                 this result tra

nsmitted



                                                                 reference range

: 1.9 -



                                                                 3.7 g/dL (calc)

. The



                                                                 reference range

 was not



                                                                 used to interpr

et this



                                                                 result as



                                                                 normal/abnormal

.

 

             Albumin/globulin ratio 1.3          See_Comment                [Aut

omated message] The



             (test code = 1759-0)                                        system 

which generated



                                                                 this result tra

nsmitted



                                                                 reference range

: 1.0 -



                                                                 2.5 (calc). The

 reference



                                                                 range was not u

sed to



                                                                 interpret this 

result as



                                                                 normal/abnormal

.

 

             Total bilirubin (test 0.3 mg/dL    0.2-1.2                   



             code = -2)                                        

 

             Alkaline phosphatase 61 U/L                           



             (test code = 6768-6)                                        

 

             AST (test code = 1920-8) 9 U/L        10-35        L            

 

             ALT (test code = 1742-6) 7 U/L        6-29                      

 

             ELADIO (test code = ELADIO) FASTING:NOFASTING: NO                        

   

 

             RAC (test code = RAC) Performing                             



                          Organization                           



                          Information:    Site                           



                          ID: [id number]    Name: [facility]                           



                          Lab    Address: [address] [place] [phone]                           



                             Director: Viet Sosa                           

 

             Lab Interpretation (test Abnormal                               



             code = 51825-6)                                        



[id number] 05:14:00





             Test Item    Value        Reference Range Interpretation Comments

 

             Cholesterol, total 113 mg/dL    <200                      



             (test code = 2093-3)                                        

 

             HDL cholesterol 46 mg/dL     See_Comment  L             [Automated



             (test code = -9)                                        message

] The



                                                                 system which



                                                                 generated this



                                                                 result



                                                                 transmitted



                                                                 reference range

:



                                                                 > OR = 50. The



                                                                 reference range



                                                                 was not used to



                                                                 interpret this



                                                                 result as



                                                                 normal/abnormal

.

 

             Triglycerides (test 134 mg/dL    <150                      



             code = 2571-8)                                        

 

             LDL cholesterol 45           mg/dL (calc)              Reference ra

nge:



             calculated (test                                        <100 Desira

ble



             code = 00902-2)                                        range <100 m

g/dL



                                                                 for primary



                                                                 prevention;  <7

0



                                                                 mg/dL for



                                                                 patients with C

HD



                                                                 or diabetic



                                                                 patients with >



                                                                 or = 2 CHD risk



                                                                 factors. LDL-C 

is



                                                                 now calculated



                                                                 using the



                                                                 Rob-Deyanira



                                                                 calculation,



                                                                 which is a



                                                                 validated novel



                                                                 method providin

g



                                                                 better accuracy



                                                                 than the



                                                                 Friedewald



                                                                 equation in the



                                                                 estimation of



                                                                 LDL-C. Rob S

S



                                                                 et al. JD.



                                                                 2013;310(19):



                                                                 [id number]



                                                                 (http://educati

on



                                                                 .[employer]

[employer]



                                                                 .com/faq/EWI463

)

 

             Cholesterol/HDL 2.5          See_Comment                [Automated



             ratio (test code =                                        message] 

The



             9830-1)                                             system which



                                                                 generated this



                                                                 result



                                                                 transmitted



                                                                 reference range

:



                                                                 <5.0 (calc). Th

e



                                                                 reference range



                                                                 was not used to



                                                                 interpret this



                                                                 result as



                                                                 normal/abnormal

.

 

             Non-HDL cholesterol 67           See_Comment               For beto

ents with



             (test code =                                        diabetes plus 1



             21283-6)                                            major ASCVD ris

k



                                                                 factor, treatin

g



                                                                 to a non-HDL-C



                                                                 goal of <100



                                                                 mg/dL (LDL-C of



                                                                 <70 mg/dL) is



                                                                 considered a



                                                                 therapeutic



                                                                 option.



                                                                 [Automated



                                                                 message] The



                                                                 system which



                                                                 generated this



                                                                 result



                                                                 transmitted



                                                                 reference range

:



                                                                 <130 mg/dL



                                                                 (calc). The



                                                                 reference range



                                                                 was not used to



                                                                 interpret this



                                                                 result as



                                                                 normal/abnormal

.

 

             ELADIO (test code = FASTING:NOFASTING:                           



             ELADIO)         NO                                     

 

             RAC (test code = Performing                             



             RAC)         Organization                           



                          Information:                           



                          Site ID: [id number]                           



                          Name: [email or web address]                           



                          n Lab    Address:                           



                          [address] [place] [address]                           



                          [phone]                             



                          Director: Viet Sosa                           

 

             Lab Interpretation Abnormal                               



             (test code =                                        



             66843-9)                                            



[facility] [id number] 05:14:00





             Test Item    Value        Reference Range Interpretation Comments

 

             Hemoglobin A1C 4.9          See_Comment               For the purpo

se of



             (test code =                                        screening for t

he



             4548-4)                                             presence ofdiab

etes:



                                                                 <5.7%



                                                                 Consistent with

 the



                                                                 absence of



                                                                 diabetes5.7-6.4

%



                                                                 Consistent with



                                                                 increased risk 

for



                                                                 diabetes



                                                                 (prediabetes)> 

or



                                                                 =6.5%  Consiste

nt



                                                                 with diabetes T

his



                                                                 assay result is



                                                                 consistent with

 a



                                                                 decreased risko

f



                                                                 diabetes. Curre

ntly,



                                                                 no consensus ex

ists



                                                                 regarding use



                                                                 ofhemoglobin A1

c for



                                                                 diagnosis of di

abetes



                                                                 in children.



                                                                 According to Am

erican



                                                                 Diabetes Associ

ation



                                                                 (ADA)guidelines

,



                                                                 hemoglobin A1c 

<7.0%



                                                                 represents



                                                                 optimalcontrol 

in



                                                                 non-pregnant di

abetic



                                                                 patients.



                                                                 Differentmetric

s may



                                                                 apply to specif

ic



                                                                 patient populat

ions.



                                                                 Standards of Me

dical



                                                                 Care in



                                                                 Diabetes(ADA).



                                                                 [Automated mess

age]



                                                                 The system [employer]

[employer]



                                                                 generated this 

result



                                                                 transmitted ref

erence



                                                                 range: <5.7 % o

f



                                                                 total Hgb. The



                                                                 reference range

 was



                                                                 not used to int

erpret



                                                                 this result as



                                                                 normal/abnormal

.

 

             ELADIO (test code = FASTING:NOFASTING:                           



             ELADIO)         NO                                     

 

             RAC (test code = Performing                             



             RAC)         Organization                           



                          Information:                           



                          Site ID: [facility]                           



                          Name: [email or web address]                           



                          n Lab    Address:                           



                          [address] [place] [address]                           



                          [phone]                             



                          Director: Viet Sosa                           



[facility] with platelet and [id number] 05:14:00





             Test Item    Value        Reference Range Interpretation Comments

 

             WBC (test code = 10.9         See_Comment  H             [Automated



             1790-2)                                             message] The



                                                                 system which



                                                                 generated this



                                                                 result



                                                                 transmitted



                                                                 reference range

:



                                                                 3.8 - 10.8



                                                                 Thousand/uL. Th

e



                                                                 reference range



                                                                 was not used to



                                                                 interpret this



                                                                 result as



                                                                 normal/abnormal

.

 

             RBC (test code = 3.47         See_Comment  L             [Automated



             789-8)                                              message] The



                                                                 system which



                                                                 generated this



                                                                 result



                                                                 transmitted



                                                                 reference range

:



                                                                 3.80 - 5.10



                                                                 Million/uL. The



                                                                 reference range



                                                                 was not used to



                                                                 interpret this



                                                                 result as



                                                                 normal/abnormal

.

 

             HGB (test code = 10.2 g/dL    11.7-15.5    L            



             718-7)                                              

 

             HCT (test code = 32.5 %       35.0-45.0    L            



             4544-3)                                             

 

             MCV (test code = 93.7 fL      80.0-100.0                



             787-2)                                              

 

             MCH (test code = 29.4 pg      27.0-33.0                 



             785-6)                                              

 

             MCHC (test code = 31.4 g/dL    32.0-36.0    L            



             786-4)                                              

 

             RDW (test code = 12.3 %       11.0-15.0                 



             788-0)                                              

 

             Platelet count (test 287          See_Comment                [Autom

ated



             code = 777-3)                                        message] The



                                                                 system which



                                                                 generated this



                                                                 result



                                                                 transmitted



                                                                 reference range

:



                                                                 140 - 400



                                                                 Thousand/uL. Th

e



                                                                 reference range



                                                                 was not used to



                                                                 interpret this



                                                                 result as



                                                                 normal/abnormal

.

 

             MPV (test code = 11.0 fL      7.5-12.5                  



             776-5)                                              

 

             Neutrophils, 8491         See_Comment  H             [Automated



             absolute (test code                                        message]

 The



             = 751-8)                                            system which



                                                                 generated this



                                                                 result



                                                                 transmitted



                                                                 reference range

:



                                                                 1,500 - 7,800



                                                                 cells/uL. The



                                                                 reference range



                                                                 was not used to



                                                                 interpret this



                                                                 result as



                                                                 normal/abnormal

.

 

             Lymphocytes, 1406         See_Comment                [Automated



             absolute (test code                                        message]

 The



             = 731-0)                                            system which



                                                                 generated this



                                                                 result



                                                                 transmitted



                                                                 reference range

:



                                                                 850 - 3,900



                                                                 cells/uL. The



                                                                 reference range



                                                                 was not used to



                                                                 interpret this



                                                                 result as



                                                                 normal/abnormal

.

 

             Monocytes, absolute 785          See_Comment                [Automa

onofre



             (test code = 742-7)                                        message]

 The



                                                                 system which



                                                                 generated this



                                                                 result



                                                                 transmitted



                                                                 reference range

:



                                                                 200 - 950



                                                                 cells/uL. The



                                                                 reference range



                                                                 was not used to



                                                                 interpret this



                                                                 result as



                                                                 normal/abnormal

.

 

             Eosinophils, 164          See_Comment                [Automated



             absolute (test code                                        message]

 The



             = 711-2)                                            system which



                                                                 generated this



                                                                 result



                                                                 transmitted



                                                                 reference range

:



                                                                 15 - 500



                                                                 cells/uL. The



                                                                 reference range



                                                                 was not used to



                                                                 interpret this



                                                                 result as



                                                                 normal/abnormal

.

 

             Basophils, absolute 55           See_Comment                [Automa

onofre



             (test code = 704-7)                                        message]

 The



                                                                 system which



                                                                 generated this



                                                                 result



                                                                 transmitted



                                                                 reference range

:



                                                                 0 - 200 cells/u

L.



                                                                 The reference



                                                                 range was not



                                                                 used to interpr

et



                                                                 this result as



                                                                 normal/abnormal

.

 

             Neutrophils (test 77.9 %                                 



             code = 770-8)                                        

 

             Lymphocytes (test 12.9 %                                 



             code = 736-9)                                        

 

             Monocytes (test code 7.2 %                                  



             = 5905-5)                                           

 

             Eosinophils (test 1.5 %                                  



             code = 713-8)                                        

 

             Basophils + RC (test 0.5 %                                  



             code = 706-2)                                        

 

             ELADIO (test code = FASTING:NOFASTING:                           



             ELADIO)         NO                                     

 

             RAC (test code = Performing                             



             RAC)         Organization                           



                          Information:                           



                          Site ID: [facility]                           



                          Name: [facility]                           



                          n Lab    Address:                           



                          [address] [place] [address]                           



                          [address]                             



                          Director: Viet Sosa                           

 

             Lab Interpretation Abnormal                               



             (test code =                                        



             29971-7)                                            



[id number] 19[facility]43:00





             Test Item    Value        Reference    Interpretation Comments



                                       Range                     

 

             Urine culture (test SEE NOTE                  A              CULTUR

E, URINE,



             code = 630-4)                                        ROUTINE     Mi





                                                                 Number:



                                                                 [id number]  Test



                                                                 Status:       F

inal



                                                                  Specimen Sourc

e:



                                                                 URINE  Specimen



                                                                 Quality:  Adequ

ate



                                                                 Result:



                                                                 Greater than



                                                                 100,000 CFU/mL 

of



                                                                 Enterococcus



                                                                 faecalis  COMME

NT:



                                                                          Additi

onal



                                                                 organism(s) les

s



                                                                 than 10,000 CFU

/mL



                                                                 



                                                                 isolated. These



                                                                 organisms, comm

only



                                                                 found on



                                                                          extern

al



                                                                 and internal



                                                                 genitalia, are



                                                                 considered



                                                                 



                                                                 colonizers. No



                                                                 further testing



                                                                 performed.



                                                                 



                                                                 E.faecalis



                                                                 



                                                                 ---------------

-



                                                                 



                                                                   INT   MOISES



                                                                 AMPICILLIN



                                                                    S     <=2



                                                                 CIPROFLOXACIN



                                                                    R     >=8



                                                                 LEVOFLOXACIN



                                                                    R     >=8



                                                                 NITROFURANTOIN



                                                                    S     <=16



                                                                 TETRACYCLINE



                                                                    R     >=16



                                                                 VANCOMYCIN



                                                                    S



                                                                 1S=Susceptible



                                                                 I=Intermediate



                                                                 R=Resistant  * 

=



                                                                 Not TestedNR = 

Not



                                                                 Reported  **NN 

=



                                                                 See Therapy



                                                                 Comments

 

             RAC (test code = Performing                             



             RAC)         Organization                           



                          Information:                           



                          Site ID: [facility]                           



                          Name: [facility]                           



                          on Lab                                 



                          Address: [address] [place] [address]                            



                          [phone]                             



                          Director: Viet Sosa                           

 

             Lab Interpretation Abnormal                               



             (test code =                                        



             18092-0)                                            



[facility] UA screen with [id number] 19:43:00





             Test Item    Value        Reference Range Interpretation Comments

 

             WBC, UA (test code = PACKED       See_Comment  A             [Autom

ated



             5821-4)                                             message] The



                                                                 system which



                                                                 generated this



                                                                 result



                                                                 transmitted



                                                                 reference range

:



                                                                 < OR = 5 /HPF.



                                                                 The reference



                                                                 range was not



                                                                 used to interpr

et



                                                                 this result as



                                                                 normal/abnormal

.

 

             RBC, UA (test code = 3-10         See_Comment  A             [Autom

ated



             64863-9)                                            message] The



                                                                 system which



                                                                 generated this



                                                                 result



                                                                 transmitted



                                                                 reference range

:



                                                                 < OR = 2 /HPF.



                                                                 The reference



                                                                 range was not



                                                                 used to interpr

et



                                                                 this result as



                                                                 normal/abnormal

.

 

             Squamous epithelial 6-10         See_Comment  A             [Automa

onofre



             cells, UA (test code                                        message

] The



             = 81692-2)                                          system which



                                                                 generated this



                                                                 result



                                                                 transmitted



                                                                 reference range

:



                                                                 < OR = 5 /HPF.



                                                                 The reference



                                                                 range was not



                                                                 used to interpr

et



                                                                 this result as



                                                                 normal/abnormal

.

 

             Bacteria, UA (test MODERATE     NONE SEEN /HPF A            



             code = 5769-5)                                        

 

             Hyaline casts, UA NONE SEEN    NONE SEEN /LPF              



             (test code = 5796-8)                                        

 

             Note: (test code =                                        This urin

e was



             8251-1)                                             analyzed for th

e



                                                                 presence of WBC

,



                                                                 RBC, bacteria,



                                                                 casts, and othe

r



                                                                 formed elements

.



                                                                 Only those



                                                                 elements seen



                                                                 were reported.

 

             RAC (test code = Performing                             



             RAC)         Organization                           



                          Information:                           



                          Site ID: [facility]                           



                          Name: [facility]    Address:                           



                          [address] [place] [address]                           



                          [id number]                             



                          Director: Viet Sosa                           

 

             Lab Interpretation Abnormal                               



             (test code =                                        



             48511-6)                                            



[facility]

## 2021-05-02 NOTE — ER
Nurse's Notes                                                                                     

 [facility]                                                                 

Name: Gladys Ramos                                                                                    

Age: 81 yrs                                                                                       

Sex: Female                                                                                       

: 1940                                                                                   

MRN: [id number]                                                                                   

Arrival Date: 2021                                                                          

Time: 10:46                                                                                       

Account#: [id number]                                                                            

Bed 6                                                                                             

Private MD:                                                                                       

Diagnosis: Hyperkalemia                                                                           

                                                                                                  

Presentation:                                                                                     

                                                                                             

10:46 Chief complaint: EMS states: UTI x 2 weeks ago, pt is taking Cipro. Per EMS family      aa5 

      reported pt was hallucinating last night.                                                   

10:46 Acuity: ANATOLY 3                                                                           aa5 

10:46 Method Of Arrival: EMS: [facility]                                                aa5 

11:22 Coronavirus screen: Client denies travel out of the [place] in the last 14 days. Ebola     kg  

      Screen: Patient negative for fever greater than or equal to 101.5 degrees Fahrenheit,       

      and additional compatible Ebola Virus Disease symptoms. Initial Sepsis Screen: Does the     

      patient meet any 2 criteria? Altered Mental Status. No. Patient's initial sepsis screen     

      is negative. Does the patient have a suspected source of infection? No. Patient's           

      initial sepsis screen is negative. Risk Assessment: Do you want to hurt yourself or         

      someone else? Patient reports no desire to harm self or others. Note Daughter states        

      that she has had a UTI two weeks ago that shes been taking antibiotics for. Onset of        

      symptoms was May 2, 2021.                                                                   

                                                                                                  

Triage Assessment:                                                                                

11:22 Pain: Denies pain. EENT: No deficits noted.                                             kg  

                                                                                                  

Historical:                                                                                       

- Allergies:                                                                                      

10:48 ambien;                                                                                 aa5 

10:48 Levaquin;                                                                               aa5 

- PMHx:                                                                                           

10:48 AAA; CHF; Diabetes - IDDM; Hyperlipidemia; Hypertension; OA; Pneumonia;                 aa5 

- PSHx:                                                                                           

10:48 Kidney stents; ; Cholecystectomy;                                              aa5 

                                                                                                  

- Immunization history:: Adult Immunizations up to date, Client reports having NOT                

  received the Covid vaccine.                                                                     

- Social history:: Smoking status: Patient denies any tobacco usage or history of.                

- Family history:: not pertinent.                                                                 

                                                                                                  

                                                                                                  

Screening:                                                                                        

10:57 Abuse screen: Denies threats or abuse. Nutritional screening: No deficits noted.        kg  

      Tuberculosis screening: No symptoms or risk factors identified. Fall Risk No fall in        

      past 12 months (0 pts). Secondary diagnosis (15 points) IV access (20 points).              

      Ambulatory Aid- None/Bed Rest/Nurse Assist (0 pts). Gait- Normal/Bed Rest/Wheelchair (0     

      pts) Mental Status- Oriented to own ability (0 pts). Total Vásquez Fall Scale indicates       

      High Risk Score (45 or more points).                                                        

                                                                                                  

Assessment:                                                                                       

10:53 General: Appears in no apparent distress. Behavior is calm, cooperative, appropriate    kg  

      for age, quiet. Pain: Denies pain. Neuro: Level of Consciousness is awake, alert, obeys     

      commands, Oriented to person, place, time, situation. Cardiovascular: No deficits           

      noted. Heart tones S1 S2 JVD is present on left. Respiratory: No deficits noted. GI: No     

      deficits noted. GI: No deficits noted. Abdomen is distended. : : Parent/caregiver       

      report the patient having Pt daughter who is the caregiver stated she has had decrease      

      in urine output. EENT: No deficits noted. Derm: No deficits noted. Musculoskeletal: No      

      deficits noted.                                                                             

13:04 General: Called daughter Ирина to notify her of patient admission per pt request. .  kg  

                                                                                                  

Vital Signs:                                                                                      

10:53  / 61; Pulse 79; Resp 18; Temp 97.6(O); Pulse Ox 95% on R/A; Pain 0/10;           kg  

12:00  / 50; Pulse 71; Resp 18; Pulse Ox 99% on R/A;                                    kg  

13:00  / 43; Pulse 65; Resp 20; Pulse Ox 98% on R/A;                                    kg  

14:00  / 48; Pulse 82; Resp 20 S; Pulse Ox 100% on R/A;                                 kg  

                                                                                                  

ED Course:                                                                                        

10:46 Patient arrived in ED.                                                                  aa5 

10:47 Arm band placed on.                                                                     aa5 

10:48 Triage completed.                                                                       aa5 

10:52 Elke Padgett is Primary Nurse.                                                       kg  

10:54 Earline Jiménez MD is Attending Physician.                                           ma2 

10:57 Patient has correct armband on for positive identification. Allergy band placed. Fall   kg  

      risk band placed. Bed in low position. Call light in reach. Side rails up X2. Adult w/      

      patient.                                                                                    

11:05 Inserted saline lock: 18 gauge in right antecubital area, using aseptic technique.      kg  

11:19 COVID-19 : Document "Date of Symptom Onset" if Symptomatic. Sent.                       kg  

11:19 Blood Culture Adult (2) Sent.                                                           kg  

11:19 Basic Metabolic Panel Sent.                                                             kg  

11:19 Hepatic Function Sent.                                                                  kg  

11:19 CBC with Diff Sent.                                                                     kg  

11:20 Lipase Sent.                                                                            kg  

11:35 Right 18 A/C infiltrated.                                                               kg  

11:40 Inserted saline lock: 20 gauge.                                                         kg  

11:59 CORONAVIRUS Sent.                                                                       kg  

13:13 Jeffery Abdi DO is Hospitalizing Provider.                                           ma2 

14:33 Report given to Suzette BAGLEY [facility] S[facility].                                                    kg  

                                                                                                  

Administered Medications:                                                                         

11:59 Drug: Rocephin (cefTRIAXone) 1 grams Route: IV; Rate: calculated rate; Site: left       kg  

      antecubital;                                                                                

12:58 Follow up: Response: No adverse reaction                                                kg  

12:58 Follow up: Response: No adverse reaction                                                kg  

12:00 Drug: NS 0.9% 1000 ml Route: IV; Rate: 125 ml/hr; Site: left antecubital;               kg  

12:59 Follow up: Response: No adverse reaction                                                kg  

13:30 Drug: D50W 50 ml Route: IVP; Site: left antecubital;                                    kg  

14:21 Follow up: Response: No adverse reaction                                                kg  

13:30 Drug: Albuterol 5 mg Route: Inhalation;                                                 kg  

14:21 Follow up: Response: No adverse reaction                                                kg  

13:31 Drug: Insulin Regular Human 10 units {Co-Signature: [email or web address] (Elgin Mittal RN).} Route: IVP; kg  

      Site: left antecubital;                                                                     

14:22 Follow up: Response: No adverse reaction                                                kg  

                                                                                                  

                                                                                                  

Outcome:                                                                                          

12:52 Discharge ordered by MD.                                                                ma2 

13:13 Decision to Hospitalize by Provider.                                                    ma2 

15:31 Patient left the ED.                                                                    kg  

                                                                                                  

Signatures:                                                                                       

Mercedez Wagner RN                     RN   aa5                                                  

Earline Jiménez MD MD ma2                                                  

Elke Padgett                              kg                                                   

Elgin Mittal RN                              ja1                                                  

                                                                                                  

**************************************************************************************************

## 2021-05-02 NOTE — EDPHYS
Physician Documentation                                                                           

 [facility]                                                                 

Name: Gladys Ramos                                                                                    

Age: 81 yrs                                                                                       

Sex: Female                                                                                       

: 1940                                                                                   

MRN: [id number]                                                                                   

Arrival Date: 2021                                                                          

Time: 10:46                                                                                       

Account#: [id number]                                                                            

Bed 6                                                                                             

Private MD:                                                                                       

ED Physician Earline Jiménez                                                                    

HPI:                                                                                              

                                                                                             

12:47 This 81 yrs old  Female presents to ER via EMS with complaints of abdominal    ma2 

      bloating .                                                                                  

12:47 Onset: The symptoms/episode began/occurred gradually, 2 week(s) ago. Associated signs   ma2 

      and symptoms: Pertinent negatives: blood in stools, constipation, diarrhea, dysuria.        

      Severity of pain: At its worst the pain was mild in the emergency department the pain       

      is unchanged. The patient has experienced similar episodes in the past. no abd pain,        

      patient has abdominal bloating which she had before and has hx of chronic                   

      hydronephrosis and sees urologist .                                                         

                                                                                                  

Historical:                                                                                       

- Allergies:                                                                                      

10:48 ambien;                                                                                 aa5 

10:48 Levaquin;                                                                               aa5 

- PMHx:                                                                                           

10:48 AAA; CHF; Diabetes - IDDM; Hyperlipidemia; Hypertension; OA; Pneumonia;                 aa5 

- PSHx:                                                                                           

10:48 Kidney stents; ; Cholecystectomy;                                              aa5 

                                                                                                  

- Immunization history:: Adult Immunizations up to date, Client reports having NOT                

  received the Covid vaccine.                                                                     

- Social history:: Smoking status: Patient denies any tobacco usage or history of.                

- Family history:: not pertinent.                                                                 

                                                                                                  

                                                                                                  

ROS:                                                                                              

12:47 Constitutional: Negative for fever, chills, and weight loss.                            ma2 

12:47 All other systems are negative.                                                             

                                                                                                  

Exam:                                                                                             

12:47 Constitutional:  This is a well developed, well nourished patient who is awake, alert,  ma2 

      and in no acute distress.                                                                   

12:47 Head/Face:  Normocephalic, atraumatic. Eyes:  Pupils equal round and reactive to light,     

      extra-ocular motions intact.  Lids and lashes normal.  Conjunctiva and sclera are           

      non-icteric and not injected.  Cornea within normal limits.  Periorbital areas with no      

      swelling, redness, or edema. ENT:  Nares patent. No nasal discharge, no septal              

      abnormalities noted.  Tympanic membranes are normal and external auditory canals are        

      clear.  Oropharynx with no redness, swelling, or masses, exudates, or evidence of           

      obstruction, uvula midline.  Mucous membranes moist. Neck:  Trachea midline, no             

      thyromegaly or masses palpated, and no cervical lymphadenopathy.  Supple, full range of     

      motion without nuchal rigidity, or vertebral point tenderness.  No Meningismus.             

      Chest/axilla:  Normal chest wall appearance and motion.  Nontender with no deformity.       

      No lesions are appreciated. Cardiovascular:  Regular rate and rhythm with a normal S1       

      and S2.  No gallops, murmurs, or rubs.  Normal PMI, no JVD.  No pulse deficits.             

      Respiratory:  Lungs have equal breath sounds bilaterally, clear to auscultation and         

      percussion.  No rales, rhonchi or wheezes noted.  No increased work of breathing, no        

      retractions or nasal flaring. Abdomen/GI:  Soft, non-tender, with normal bowel sounds.      

      No distension or tympany.  No guarding or rebound.  No evidence of tenderness               

      throughout. Skin:  Warm, dry with normal turgor.  Normal color with no rashes, no           

      lesions, and no evidence of cellulitis. MS/ Extremity:  Pulses equal, no cyanosis.          

      Neurovascular intact.  Full, normal range of motion. Neuro:  Awake and alert, GCS 15,       

      oriented to person, place, time, and situation.  Cranial nerves II-XII grossly intact.      

      Motor strength 5/5 in all extremities.  Sensory grossly intact.  Cerebellar exam            

      normal.  Normal gait.                                                                       

                                                                                                  

Vital Signs:                                                                                      

10:53  / 61; Pulse 79; Resp 18; Temp 97.6(O); Pulse Ox 95% on R/A; Pain 0/10;           kg  

12:00  / 50; Pulse 71; Resp 18; Pulse Ox 99% on R/A;                                    kg  

13:00  / 43; Pulse 65; Resp 20; Pulse Ox 98% on R/A;                                    kg  

14:00  / 48; Pulse 82; Resp 20 S; Pulse Ox 100% on R/A;                                 kg  

                                                                                                  

MDM:                                                                                              

10:54 Patient medically screened.                                                             ma2 

12:47 Differential diagnosis: Cholelithiasis, gastritis, gastroesophageal reflux disease,     ma2 

      Irritable bowel syndrome. Data reviewed: vital signs, nurses notes. Counseling: I had a     

      detailed discussion with the patient and/or guardian regarding: the historical points,      

      exam findings, and any diagnostic results supporting the discharge/admit diagnosis, the     

      presence of at least one elevated blood pressure reading (>120/80) during this              

      emergency department visit, the need for outpatient follow up. Response to treatment:       

      the patient's symptoms have markedly improved after treatment.                              

                                                                                                  

                                                                                             

10:56 Order name: Basic Metabolic Panel; Complete Time: 13:07                                 ma2 

                                                                                             

10:56 Order name: CBC with Diff                                                               ma2 

                                                                                             

10:56 Order name: Hepatic Function; Complete Time: 13:07                                      ma2 

                                                                                             

10:56 Order name: Lipase; Complete Time: 13:07                                                ma2 

                                                                                             

10:56 Order name: Blood Culture Adult (2)                                                     ma2 

                                                                                             

10:56 Order name: COVID-19 : Document "Date of Symptom Onset" if Symptomatic.                 aa5 

                                                                                             

10:56 Order name: Abdomen 1 View XRAY                                                         ma2 

                                                                                             

11:10 Order name: CT Abd/Pelvis - Without Contrast                                            ma2 

                                                                                             

11:38 Order name: CBC with Automated Diff; Complete Time: 11:45                               EDMS

                                                                                             

11:40 Order name: CORONAVIRUS                                                                 EDMS

                                                                                             

11:43 Order name: CT; Complete Time: 11:45                                                    EDMS

                                                                                             

12:27 Order name: SARS-COV-2 RT PCR; Complete Time: 12:52                                     EDMS

                                                                                             

10:56 Order name: IV Saline Lock; Complete Time: 11:19                                        ma2 

                                                                                             

10:56 Order name: Labs collected and sent; Complete Time: 11:19                               ma 

                                                                                                  

Administered Medications:                                                                         

11:59 Drug: Rocephin (cefTRIAXone) 1 grams Route: IV; Rate: calculated rate; Site: left       kg  

      antecubital;                                                                                

12:58 Follow up: Response: No adverse reaction                                                kg  

12:58 Follow up: Response: No adverse reaction                                                kg  

12:00 Drug: NS 0.9% 1000 ml Route: IV; Rate: 125 ml/hr; Site: left antecubital;               kg  

12:59 Follow up: Response: No adverse reaction                                                kg  

13:30 Drug: D50W 50 ml Route: IVP; Site: left antecubital;                                    kg  

14:21 Follow up: Response: No adverse reaction                                                kg  

13:30 Drug: Albuterol 5 mg Route: Inhalation;                                                 kg  

14:21 Follow up: Response: No adverse reaction                                                kg  

13:31 Drug: Insulin Regular Human 10 units {Co-Signature: rohan1 (Elgin Mittal RN).} Route: IVP; kg  

      Site: left antecubital;                                                                     

14:22 Follow up: Response: No adverse reaction                                                kg  

                                                                                                  

                                                                                                  

Disposition:                                                                                      

21 13:13 Hospitalization ordered by Jeffery Abdi for Observation. Preliminary             

  diagnosis is Hyperkalemia.                                                                      

- Bed requested for Telemetry/MedSurg (observation).                                              

- Status is Observation.                                                                      kg  

- Condition is Stable.                                                                            

- Problem is new.                                                                                 

- Symptoms are unchanged.                                                                         

                                                                                                  

                                                                                                  

                                                                                                  

Signatures:                                                                                       

Dispatcher [employer]                           EDMS                                                 

Mercedez Wagner RN                     RN   aa5                                                  

Earline Jiménez MD MD   ma2                                                  

Rupa Alvarado                                                                              

Elke Padgett                              kg                                                   

Elgin Mittal RN                              ja1                                                  

                                                                                                  

Corrections: (The following items were deleted from the chart)                                    

12:52 12:52 2021 12:52 Discharged to Home. Impression: Abdominal distension (gaseous).  ma2 

      Condition is Stable. Forms are Medication Reconciliation Form, Thank You Letter,            

      Antibiotic Education, Prescription Opioid Use. Follow up: Private Physician; When:          

      Tomorrow; Reason: Continuance of care. ma2                                                  

12:54 12:52 2021 12:52 Discharged to Home. Impression: Abdominal distension (gaseous);  ma2 

      Constipation, unspecified. Condition is Stable. Forms are Medication Reconciliation         

      Form, Thank You Letter, Antibiotic Education, Prescription Opioid Use. Follow up:           

      Private Physician; When: Tomorrow; Reason: Continuance of care. ma2                         

14:10 13:13 Hospitalization Ordered by Jeffery Abdi DO for Observation. Preliminary          eb  

      diagnosis is Hyperkalemia. Bed requested for Telemetry/MedSurg (observation). Status is     

      Observation. Condition is Stable. Problem is new. Symptoms are unchanged. ma2               

15:31 14:10 2021 13:13 Hospitalization Ordered by Jeffery Abdi DO for Observation.     kg  

      Preliminary diagnosis is Hyperkalemia. Bed requested for Telemetry/MedSurg                  

      (observation). Status is Observation. Condition is Stable. Problem is new. Symptoms are     

      unchanged. eb                                                                               

                                                                                                  

**************************************************************************************************

## 2021-05-02 NOTE — P.HP
Certification for Inpatient


Patient admitted to: Observation


With expected LOS: <2 Midnights


Patient will require the following post-hospital care: None


Practitioner: I am a practitioner with admitting privileges, knowledge of 

patient current condition, hospital course, and medical plan of care.


Services: Services provided to patient in accordance with Admission requirements

found in Title 42 Section 412.3 of the Code of Federal Regulations





Patient History


Date of Service: 21


Primary Care Provider: Dr. Olivera; [facility] [place]


Reason for admission: Increased abdominal bloating with fatigue


History of Present Illness: 





81-year-old  female with multiple medical problems including 

hypertension, hyperlipidemia, diabetes mellitus type 2, depression with anxiety,

diabetic neuropathy and chronic hydronephrosis with hydroureter.  Patient also 

reports a history of recurrent UTI.





Patient reports about 2 weeks ago patient was having UTI symptoms.  She was 

started on antibiotic.  This was switched over to a different type of 

antibiotic.  Since that time she has been having some abdominal bloating.  No 

significant nausea, vomiting, diarrhea.  Some constipation noted.  She denies 

any fever, chills.  Denies any chest pain or shortness of breath.  Due to her 

recent changes he came to the ER for further evaluation.





In the ER patient was evaluated.  White count 9.8, hemoglobin 8.6.  Platelet 

count 329.  Sodium 136, potassium 6.8.  BUN of 64, creatinine 1.7 with a GFR 27.

 Glucose 137.  Covid test negative.  CT scan revealed elevated left 

hemidiaphragm with atelectasis.  Moderate right hydronephrosis and hydroureter 

is present.  Atrophic changes in the left kidney with moderate left-sided hyd

ronephrosis and hydroureter were seen.  Findings appear similar in comparison 

since 2019.  Large amount of stool also noted in the rectum.  No bowel 

obstruction noted.  Moderate lumbar degenerative changes noted.  Patient was 

given IV fluids in the emergency room.  Patient also given treatment for her 

hyperkalemia.  Patient admitted for further evaluation. 





Patient further reports that the patient had seen urology and nephrology back in

2019.  She had stents placed to her ureters.  These were removed at some point.


Allergies





levofloxacin [From Levaquin] Allergy (Verified 19 00:48)


   Anaphylaxis


zolpidem [From Ambien] Allergy (Verified 10/01/17 03:02)


   Unknown





Home medications list reviewed: Yes


Home Medications: 








Atorvastatin Calcium [Lipitor*] 10 mg PO BEDTIME #30 tab 10/10/17 


Hydralazine [Apresoline*] 20 mg PO TID #180 tab 10/10/17 


Gabapentin [Neurontin*] 400 mg PO TID 19 


Metformin HCl [Glucophage] 500 mg PO TID 19 


Acetaminophen- Codeine 300-30mg 1 tab PO Q8H PRN 19 


Duloxetine HCl [Cymbalta] 20 mg PO DAILY 19 


Ferrous Sulfate [Ferrous Sulfate*] 1 tab PO DAILY 19 


carvediloL [Coreg*] 12.5 mg PO BID 19 


Cefuroxime [Ceftin*] 250 mg PO BID #14 tab 19 


Fluconazole [Diflucan] 100 mg PO BID #14 tablet 19 








- Past Medical/Surgical History


Diabetic: Yes


-: HTN


-: Diabetes mellitus type 2


-: Chronic hydronephrosis/hydroureter with prior stents


-: Arthritis to the knees and hips


-: Abdominal aortic aneurysm


-: Hyperlipidemia


-: Diabetic neuropathy


-: Iron deficiency anemia


-: Degenerative disc and joint disease


-: Hysterectomy


-: 


-: Hernia repair


-: Cataract surgery


Psychosocial/ Personal History: Patient lives with daughter.  She is a .





- Family History


  ** Father


-: Heart disease


Notes: MI





  ** Mother


-: Kidney disease





- Social History


Smoking Status: Never smoker


Alcohol use: No


CD- Drugs: No


Caffeine use: Yes


Place of Residence: Home





Review of Systems


General: Weakness, As per HPI


Eyes: Unremarkable


ENT: Unremarkable


Respiratory: Unremarkable


Cardiovascular: Unremarkable


Gastrointestinal: Constipation, As per HPI


Genitourinary: Dysuria, As per HPI


Musculoskeletal: As per HPI


Integumentary: Unremarkable


Neurological: Weakness, As per HPI


Lymphatics: Unremarkable





Physical Examination





- Physical Exam


General: Alert, In no apparent distress, Oriented x3, Cooperative


HEENT: Atraumatic, Normocephalic, Mucous membr. moist/pink


Neck: Supple


Respiratory: Clear to auscultation bilaterally, Normal air movement


Cardiovascular: Normal pulses, Regular rate/rhythm


Gastrointestinal: Normal bowel sounds, Soft and benign, Non-distended, No 

masses, No rebound, No guarding, Other (Patient does not appear significantly 

distended.  No significant pain noted.)


Musculoskeletal: No erythema, No tenderness, No warmth


Integumentary: No tenderness/swelling, No erythema, No warmth, No cyanosis


Neurological: Normal speech, Normal strength at 5/5 x4 extr, Normal tone, Normal

 affect





- Studies


Laboratory Data (last 24 hrs)





21 12:05: Sodium 136, Potassium 6.8 H*, BUN 66 H, Creatinine 1.77 H, 

Glucose 137 H, Total Bilirubin 0.2, AST 10 L, ALT 15, Alkaline Phosphatase 77, 

Lipase 51 L


21 11:05: WBC 9.20, Hgb 8.6 L, Hct 28.1 L, Plt Count 329








Assessment and Plan





- Plan





Impression:


Acute renal failure with hyperkalemia complicated with chronic hydronephrosis 

and hydroureter with prior ureteral stents


Weakness, abdominal bloating secondary to constipation


History of recurrent UTI


Diabetes mellitus type 2


Hypertension


Hyperlipidemia


Chronic back pain with degenerative disc and joint disease


Diabetic neuropathy


Anemia of chronic disease with iron deficiency





Plan:


Acute renal failure with hyperkalemia complicated with chronic hydronephrosis 

and hydroureter with prior ureteral stents: Patient will be admitted for further

 evaluation and treatment.  Patient given hyperkalemia treatment in the 

emergency room.  We will continue to monitor closely.  Will start low-dose IV 

fluids.  We will also order renal ultrasound to further evaluate her 

hydronephrosis.  We will hold metformin.  We will adjust medications accordingly

 to her renal function.  Patient with history of hydronephrosis and hydroureter 

in the past with prior ureteral stent.  Stent was removed by urology about 1.5 

years ago.  We will need to investigate this further.  Nephrology consulted to 

further evaluate and address.  We will start DVT prophylaxisheparin.  Patient 

has been treated for UTI.  She was on a antibiotic which was changed to Cipro 

recently.  Will discontinue antibiotics at this time.  No evidence of infection.

  Will check urine culture and blood culture.  Advancedcare planning addressed 

in [id number] minutes.  Patient plans to go home at discharge.  Advance care 

directives addressed.  Patient is DO NOT RESUSCITATE.  Continue to monitor and 

trend renal function.  Await further recommendations from nephrology.  May need 

to consider urology evaluation if required.  Patient sees urology in [place]. 

 Anticipate improvement over the next 48 hours.  I will turn the service over to

 the hospitalist team tomorrow.  I will go up on her care with them.


Weakness, abdominal bloating secondary to constipation: We will start stool 

softener.  We will also provide lactulose.


History of recurrent UTI: Patient recently treated with one particular 

antibiotic which will need to find out.  This was changed over to Cipro.  No 

evidence of infection at this time.  Discontinue antibiotics at this time.  Will

 check urine and blood cultures.


Diabetes mellitus type 2: Hold Metformin due to her acute renal failure.  

Continue Accu-Cheks and sliding scale.  Will check A1c.


Hypertension: Continue hydralazine and carvedilol.  Will monitor and adjust 

appropriately.


Hyperlipidemia: Continue Lipitor


Chronic back pain with degenerative disc and joint disease: We will provide 

medication for pain as needed


Diabetic neuropathy: Continue gabapentin.  Patient also takes Cymbalta.


Anemia of chronic disease with iron deficiency: Continue iron supplementation.  

Maintain hemoglobin above 7.0.  Will monitor closely.


Discharge Plan: Home


Plan to discharge in: 48 Hours





- Advance Directives


Does patient have a Living Will: Yes


Does patient have a Durable POA for Healthcare: Yes





- Code Status/Comfort Care


Code Status Assessed: Yes (Patient is DNR)


Time Spent Managing Pts Care (In Minutes): 55

## 2021-05-02 NOTE — RAD REPORT
EXAM DESCRIPTION:  US - Renal Ultrasound-Complete - 5/2/2021 9:22 pm

 

CLINICAL HISTORY:  Chronic hydronephrosis/hydroureter w ARF

Abdominal pain

 

COMPARISON:  Renal Ultrasound-Complete dated 11/4/2019; Abdomen   Pelvis Wo Contrast dated 5/2/2021

 

FINDINGS:  Moderate bilateral hydronephrosis and hydroureter. Echogenicity of both kidneys is within 
limits.

 

The right kidney measures 13.6 x 7.9 x 5.2 cm. No focal mass detected.

 

The left kidney measures 12.4 x 7.2 x 6.9 cm. No focal mass detected.

 

The urinary bladder is incompletely distended without gross abnormality seen.

 

IMPRESSION:  Moderate bilateral hydronephrosis and hydroureter noted.

## 2021-05-03 LAB
BUN BLD-MCNC: 55 MG/DL (ref 7–18)
BUN BLD-MCNC: 58 MG/DL (ref 7–18)
BUN BLD-MCNC: 63 MG/DL (ref 7–18)
GLUCOSE SERPLBLD-MCNC: 115 MG/DL (ref 74–106)
GLUCOSE SERPLBLD-MCNC: 128 MG/DL (ref 74–106)
GLUCOSE SERPLBLD-MCNC: 147 MG/DL (ref 74–106)
HCT VFR BLD CALC: 27.1 % (ref 36–45)
LYMPHOCYTES # SPEC AUTO: 1 K/UL (ref 0.7–4.9)
MAGNESIUM SERPL-MCNC: 1.6 MG/DL (ref 1.8–2.4)
PMV BLD: 8.2 FL (ref 7.6–11.3)
POTASSIUM SERPL-SCNC: 4.9 MMOL/L (ref 3.5–5.1)
POTASSIUM SERPL-SCNC: 5.6 MMOL/L (ref 3.5–5.1)
POTASSIUM SERPL-SCNC: 6 MMOL/L (ref 3.5–5.1)
RBC # BLD: 2.85 M/UL (ref 3.86–4.86)
UA DIPSTICK W REFLEX MICRO PNL UR: (no result)

## 2021-05-03 PROCEDURE — 30233N1 TRANSFUSION OF NONAUTOLOGOUS RED BLOOD CELLS INTO PERIPHERAL VEIN, PERCUTANEOUS APPROACH: ICD-10-PCS

## 2021-05-03 RX ADMIN — SODIUM CHLORIDE SCH: 0.45 INJECTION, SOLUTION INTRAVENOUS at 17:57

## 2021-05-03 RX ADMIN — THIAMINE HYDROCHLORIDE SCH MG: 100 INJECTION, SOLUTION INTRAMUSCULAR; INTRAVENOUS at 08:30

## 2021-05-03 RX ADMIN — HYDRALAZINE HYDROCHLORIDE SCH MG: 10 TABLET, FILM COATED ORAL at 13:00

## 2021-05-03 RX ADMIN — DOCUSATE SODIUM SCH MG: 100 CAPSULE, LIQUID FILLED ORAL at 08:29

## 2021-05-03 RX ADMIN — HUMAN INSULIN SCH: 100 INJECTION, SOLUTION SUBCUTANEOUS at 16:30

## 2021-05-03 RX ADMIN — SODIUM CHLORIDE SCH MLS: 0.45 INJECTION, SOLUTION INTRAVENOUS at 20:20

## 2021-05-03 RX ADMIN — Medication SCH ML: at 20:20

## 2021-05-03 RX ADMIN — HEPARIN SODIUM SCH UNIT: 5000 INJECTION, SOLUTION INTRAVENOUS; SUBCUTANEOUS at 20:51

## 2021-05-03 RX ADMIN — HUMAN INSULIN SCH: 100 INJECTION, SOLUTION SUBCUTANEOUS at 20:49

## 2021-05-03 RX ADMIN — HYDRALAZINE HYDROCHLORIDE SCH MG: 10 TABLET, FILM COATED ORAL at 08:30

## 2021-05-03 RX ADMIN — SODIUM CHLORIDE SCH MLS: 0.45 INJECTION, SOLUTION INTRAVENOUS at 08:00

## 2021-05-03 RX ADMIN — FOLIC ACID SCH MG: 1 TABLET ORAL at 08:29

## 2021-05-03 RX ADMIN — HEPARIN SODIUM SCH UNIT: 5000 INJECTION, SOLUTION INTRAVENOUS; SUBCUTANEOUS at 08:30

## 2021-05-03 RX ADMIN — SODIUM CHLORIDE SCH MLS: 0.9 INJECTION, SOLUTION INTRAVENOUS at 01:30

## 2021-05-03 RX ADMIN — HYDRALAZINE HYDROCHLORIDE SCH MG: 10 TABLET, FILM COATED ORAL at 20:50

## 2021-05-03 RX ADMIN — IRON SUPPLEMENT SCH MG: 325 TABLET ORAL at 08:29

## 2021-05-03 RX ADMIN — HUMAN INSULIN SCH: 100 INJECTION, SOLUTION SUBCUTANEOUS at 07:30

## 2021-05-03 RX ADMIN — Medication SCH ML: at 08:31

## 2021-05-03 RX ADMIN — GABAPENTIN SCH MG: 300 CAPSULE ORAL at 13:06

## 2021-05-03 RX ADMIN — GABAPENTIN SCH MG: 300 CAPSULE ORAL at 08:30

## 2021-05-03 RX ADMIN — HUMAN INSULIN SCH: 100 INJECTION, SOLUTION SUBCUTANEOUS at 11:18

## 2021-05-03 RX ADMIN — GABAPENTIN SCH MG: 400 CAPSULE ORAL at 20:50

## 2021-05-03 NOTE — CON
Date of Consultation:  2021



Chief Complaint:  Acute on chronic kidney injury, obstructive uropathy.



History Of Present Illness:  The patient is an 81-year-old woman with multiple medical problems inclu
ding hypertension, hyperlipidemia, diabetes mellitus, depression, anxiety, diabetic neuropathy, chron
ic kidney disease stage 3, history of chronic hydronephrosis and hydroureter.  The patient previously
 was treated for urinary tract infection, was seen by urologist during her admission back in 2019.  T
he patient presented to the hospital because of symptoms related to lower urinary tract with dysuria,
 hematuria.  She was started on antibiotics as outpatient.  Despite multiple antibiotic courses, the 
patient had symptoms related to abdominal discomfort and bloating, nausea, vomiting, diarrhea.  Subse
quently, she developed some constipation.  She denied chills or fever.  ER workup showed white count 
of 9.8, hemoglobin 8.6, potassium of 6.8, creatinine 1.7, BUN 64.  Baseline creatinine level is 1.1 t
o 1.2.  CT scan revealed elevated left hemidiaphragm and moderate right hydronephrosis and hydrourete
r was present, atrophic changes in the left kidney with moderate left-sided hydronephrosis and hydrou
reter was found.  The patient is admitted to the hospital and started on IV fluids for hydration to t
reat acute kidney injury.  Subsequently, fluids were changed to bicarbonate drip to control hyperkale
juan.  The patient received Kayexalate and follow lab was to re-evaluate potassium level is pending.



Review of Systems:

Constitutional:  Denies fever or chills. 

Eyes:  Denies vision changes. 

Ears, Nose, Mouth, and Throat:  Denies sore throat or earache. 

Respiratory:  Denies PND or orthopnea. 

Cardiovascular:  Denies chest pain or palpitation. 

GI:  Denies nausea or vomiting. 

:  Denies dysuria or hematuria today.  She is feeling better.  Denies renal colic. 



All other systems reviewed and all are negative.



Past Medical History:  Hypertension, diabetes mellitus type 2, chronic hydronephrosis, hydroureter wi
th prior stents, arthritis to the knee and hips, abdominal aortic aneurysm, hyperlipidemia, diabetic 
neuropathy, degenerative disk disease and joint disease, hysterectomy, , cataract surgery.



Family History:  Father has heart disease.  Mother with kidney disease.



Social History:  She denies tobacco, alcohol, or illicit drugs.



Physical Examination:

General:  The patient is awake, alert, follows commands. 

Eyes:  Anicteric sclerae.  EOMI. 

Ears, Nose, Mouth, and Throat:  Oral mucosa moist.  No pallor. 

Neck:  Supple.  No bruits. 

Lungs:  Diminished breath sounds at bases. 

Heart:  S1, S2.  No pericardial friction rub. 

Abdomen:  Soft, benign, nontender.  No rebound.  No guarding. 

Extremities:  Slight edema present in both legs. 

Neurological:  Moving extremities.  Cranial nerves intact. 

Psychiatric:  Alert and oriented x3.  Normal affect.



Laboratory Studies:  Sodium 136, potassium 6.8, BUN 66, creatinine 1.77, glucose 137, total bilirubin
 0.2, lipase 51.  Hemoglobin 8.6, WBC 9.2.



Impression And Plan:  

1.Acute kidney injury on chronic kidney disease, hydronephrosis, complicated by hydroureter.  Previo
usly, the patient had a ureteral stent.  The patient on presentation to the hospital was complaining 
of recurrent urinary tract infection.  Urine culture is pending.  The patient was found to have resid
ual hyperkalemia.  The patient received Kayexalate and IV fluids were started for hydration to preven
t renal hypoperfusion.  The patient was found to have hydronephrosis.  Metformin currently is on hold
.  The patient is not a good candidate for oral metformin at this point.

2.Recommend Urology consultation for evaluation of chronic hydronephrosis, although the patient has 
recurrent urinary tract infection and she may need further workup.

3.Continue antibiotics of broad spectrum.  Avoid nephrotoxic medication.

4.Hypertension.  Continue blood pressure medication.

5.Hyperkalemia.  Kayexalate will be given.  Continue bicarbonate drip.  Adjust IV fluids and treatme
nt for hyperkalemia as needed.





EB/MODL

DD:  2021 21:43:01Voice ID:  [id number]

DT:  2021 23:16:20Report ID:  [id number]

## 2021-05-04 LAB
BUN BLD-MCNC: 52 MG/DL (ref 7–18)
GLUCOSE SERPLBLD-MCNC: 112 MG/DL (ref 74–106)
HCT VFR BLD CALC: 24 % (ref 36–45)
HCT VFR BLD CALC: 25.9 % (ref 36–45)
IRON SERPL-MCNC: 42 UG/DL (ref 50–170)
LYMPHOCYTES # SPEC AUTO: 0.8 K/UL (ref 0.7–4.9)
LYMPHOCYTES # SPEC AUTO: 0.9 K/UL (ref 0.7–4.9)
MAGNESIUM SERPL-MCNC: 1.8 MG/DL (ref 1.8–2.4)
MAGNESIUM SERPL-MCNC: 1.9 MG/DL (ref 1.8–2.4)
PMV BLD: 8.2 FL (ref 7.6–11.3)
PMV BLD: 8.3 FL (ref 7.6–11.3)
POTASSIUM SERPL-SCNC: 4.7 MMOL/L (ref 3.5–5.1)
RBC # BLD: 2.63 M/UL (ref 3.86–4.86)
RBC # BLD: 2.82 M/UL (ref 3.86–4.86)

## 2021-05-04 RX ADMIN — IRON SUPPLEMENT SCH MG: 325 TABLET ORAL at 11:14

## 2021-05-04 RX ADMIN — FOLIC ACID SCH MG: 1 TABLET ORAL at 11:14

## 2021-05-04 RX ADMIN — HYDRALAZINE HYDROCHLORIDE SCH: 10 TABLET, FILM COATED ORAL at 09:00

## 2021-05-04 RX ADMIN — HUMAN INSULIN SCH: 100 INJECTION, SOLUTION SUBCUTANEOUS at 11:30

## 2021-05-04 RX ADMIN — Medication SCH ML: at 09:00

## 2021-05-04 RX ADMIN — DULOXETINE SCH MG: 20 CAPSULE, DELAYED RELEASE ORAL at 11:14

## 2021-05-04 RX ADMIN — HUMAN INSULIN SCH: 100 INJECTION, SOLUTION SUBCUTANEOUS at 21:00

## 2021-05-04 RX ADMIN — HUMAN INSULIN SCH UNIT: 100 INJECTION, SOLUTION SUBCUTANEOUS at 07:30

## 2021-05-04 RX ADMIN — HYDRALAZINE HYDROCHLORIDE SCH MG: 25 TABLET, FILM COATED ORAL at 13:01

## 2021-05-04 RX ADMIN — HEPARIN SODIUM SCH UNIT: 5000 INJECTION, SOLUTION INTRAVENOUS; SUBCUTANEOUS at 11:15

## 2021-05-04 RX ADMIN — HUMAN INSULIN SCH: 100 INJECTION, SOLUTION SUBCUTANEOUS at 16:30

## 2021-05-04 RX ADMIN — HYDRALAZINE HYDROCHLORIDE SCH MG: 25 TABLET, FILM COATED ORAL at 21:33

## 2021-05-04 RX ADMIN — Medication SCH ML: at 21:00

## 2021-05-04 RX ADMIN — GABAPENTIN SCH MG: 400 CAPSULE ORAL at 21:36

## 2021-05-04 RX ADMIN — THIAMINE HYDROCHLORIDE SCH MG: 100 INJECTION, SOLUTION INTRAMUSCULAR; INTRAVENOUS at 11:14

## 2021-05-04 RX ADMIN — ATORVASTATIN CALCIUM SCH MG: 10 TABLET, FILM COATED ORAL at 11:14

## 2021-05-04 RX ADMIN — CEFEPIME SCH MLS: 1 INJECTION, POWDER, FOR SOLUTION INTRAMUSCULAR; INTRAVENOUS at 13:01

## 2021-05-04 RX ADMIN — Medication SCH: at 09:00

## 2021-05-04 RX ADMIN — DOCUSATE SODIUM SCH MG: 100 CAPSULE, LIQUID FILLED ORAL at 11:15

## 2021-05-04 RX ADMIN — TAMSULOSIN HYDROCHLORIDE SCH MG: 0.4 CAPSULE ORAL at 21:36

## 2021-05-04 RX ADMIN — GABAPENTIN SCH MG: 400 CAPSULE ORAL at 14:19

## 2021-05-04 RX ADMIN — HEPARIN SODIUM SCH UNIT: 5000 INJECTION, SOLUTION INTRAVENOUS; SUBCUTANEOUS at 21:36

## 2021-05-04 RX ADMIN — GABAPENTIN SCH MG: 400 CAPSULE ORAL at 11:13

## 2021-05-04 NOTE — PN
Date of Progress Note:  05/04/2021



Subjective:  The patient was admitted with acute kidney injury and hyperkalemia.
 The patient's after hydration kidney function started being improving.



Physical Examination:

Vital Signs:  When I saw the patient; blood pressure of 181/72, pulse of 75, 
afebrile.  The patient had good urine output, voiding. 

Chest:  Clear to auscultation. 

Heart:  S1, S2.  Regular. 

Abdomen:  Soft, nontender. 

Extremity:  No edema. 

Neuro:  Alert.  No focality.



Laboratory Data:  WBC 6.4, H and H 7.7/24.  Sodium 143, potassium 4.7, bicarb 
24, BUN 52, creatinine down to 1.3.  The patient baseline 0.9 with normal GFR, 
calcium 8.3.  Urine culture is still pending.  Renal ultrasound; 13.6/12.4, 
moderate bilateral hydronephrosis and hydroureter.



Assessment And Plan:  

1.   Acute kidney injury secondary to obstructive uropathy, complicated urinary 
tract infection, toxic ATN, complicated with hyperkalemia, status post 
treatment.  Kidney function is trending down.  The patient started eating 
normally.  I am going to go ahead and discontinue IV fluid and we will follow up
with primary.

2.   Hypertension, not controlled.  I am going to go ahead and increase 
hydralazine to 50 mg.  We will avoid any ACE inhibitor or ARB for the time being
and we will follow up the patient.  Discontinue IV fluid.

3.   Urinary tract infection, complicated.  Waiting for culture.  Start the 
patient on Rocephin.

4.   Hyperkalemia secondary to renal failure, resolved with the presence of the 
anemia.  We will send for stool occult.  Anemia workup is still pending.

5.   Obstructive uropathy.  Waiting for Urology input, especially with the 
recurrent urinary tract infection and persistent hydro.

Time spent discussing with the patient, examining the patient, face-to-face 
withthe patient, placing orders, discussing the case with our team member 
including nursing, discussing the case with the other subspecialty including 
Cardiology and hospitalist 35 minutes.



GINA

DD:  05/04/2021 11:04:55   Voice ID:  [id number]

DT:  05/04/2021 11:22:26   Report ID:  [id number]

ANGELI

## 2021-05-05 VITALS — SYSTOLIC BLOOD PRESSURE: 170 MMHG | DIASTOLIC BLOOD PRESSURE: 80 MMHG

## 2021-05-05 VITALS — TEMPERATURE: 97 F

## 2021-05-05 LAB
BUN BLD-MCNC: 43 MG/DL (ref 7–18)
GLUCOSE SERPLBLD-MCNC: 124 MG/DL (ref 74–106)
HCT VFR BLD CALC: 25.2 % (ref 36–45)
LYMPHOCYTES # SPEC AUTO: 1 K/UL (ref 0.7–4.9)
MAGNESIUM SERPL-MCNC: 1.8 MG/DL (ref 1.8–2.4)
PMV BLD: 8.9 FL (ref 7.6–11.3)
POTASSIUM SERPL-SCNC: 4.3 MMOL/L (ref 3.5–5.1)
RBC # BLD: 2.72 M/UL (ref 3.86–4.86)

## 2021-05-05 RX ADMIN — FOLIC ACID SCH MG: 1 TABLET ORAL at 11:00

## 2021-05-05 RX ADMIN — Medication SCH: at 09:00

## 2021-05-05 RX ADMIN — HUMAN INSULIN SCH: 100 INJECTION, SOLUTION SUBCUTANEOUS at 20:45

## 2021-05-05 RX ADMIN — HUMAN INSULIN SCH: 100 INJECTION, SOLUTION SUBCUTANEOUS at 07:30

## 2021-05-05 RX ADMIN — DOCUSATE SODIUM SCH MG: 100 CAPSULE, LIQUID FILLED ORAL at 11:00

## 2021-05-05 RX ADMIN — HEPARIN SODIUM SCH: 5000 INJECTION, SOLUTION INTRAVENOUS; SUBCUTANEOUS at 09:00

## 2021-05-05 RX ADMIN — IRON SUPPLEMENT SCH MG: 325 TABLET ORAL at 11:00

## 2021-05-05 RX ADMIN — GABAPENTIN SCH MG: 400 CAPSULE ORAL at 20:44

## 2021-05-05 RX ADMIN — DULOXETINE SCH MG: 20 CAPSULE, DELAYED RELEASE ORAL at 10:59

## 2021-05-05 RX ADMIN — HYDRALAZINE HYDROCHLORIDE SCH MG: 25 TABLET, FILM COATED ORAL at 10:59

## 2021-05-05 RX ADMIN — HYDRALAZINE HYDROCHLORIDE SCH MG: 25 TABLET, FILM COATED ORAL at 13:38

## 2021-05-05 RX ADMIN — GABAPENTIN SCH MG: 400 CAPSULE ORAL at 13:37

## 2021-05-05 RX ADMIN — HEPARIN SODIUM SCH: 5000 INJECTION, SOLUTION INTRAVENOUS; SUBCUTANEOUS at 21:00

## 2021-05-05 RX ADMIN — Medication SCH ML: at 09:00

## 2021-05-05 RX ADMIN — CEFEPIME SCH MLS: 1 INJECTION, POWDER, FOR SOLUTION INTRAMUSCULAR; INTRAVENOUS at 11:10

## 2021-05-05 RX ADMIN — ATORVASTATIN CALCIUM SCH MG: 10 TABLET, FILM COATED ORAL at 11:00

## 2021-05-05 RX ADMIN — GABAPENTIN SCH MG: 400 CAPSULE ORAL at 10:59

## 2021-05-05 RX ADMIN — HYDRALAZINE HYDROCHLORIDE SCH MG: 25 TABLET, FILM COATED ORAL at 20:44

## 2021-05-05 RX ADMIN — HUMAN INSULIN SCH: 100 INJECTION, SOLUTION SUBCUTANEOUS at 16:30

## 2021-05-05 RX ADMIN — HUMAN INSULIN SCH: 100 INJECTION, SOLUTION SUBCUTANEOUS at 11:30

## 2021-05-05 RX ADMIN — TAMSULOSIN HYDROCHLORIDE SCH MG: 0.4 CAPSULE ORAL at 20:44

## 2021-05-05 RX ADMIN — THIAMINE HYDROCHLORIDE SCH MG: 100 INJECTION, SOLUTION INTRAMUSCULAR; INTRAVENOUS at 10:59

## 2021-05-05 RX ADMIN — Medication SCH ML: at 20:44

## 2021-05-05 NOTE — P.PN
Subjective


Date of Service: 05/03/21





Patient continues to improve with no new complaints.  Patient's symptoms are 

much better.  Patient still anemic and will transfuse 1 unit of packed red blood

cells.  Patient wants us to talk with family prior to discharge





Review of Systems


10-point ROS is otherwise unremarkable





Physical Examination





- Vital Signs


Temperature: 97.5 F


Blood Pressure: 151/68


Pulse: 79


Respirations: 16


Pulse Ox (%): 94





- Physical Exam


General: Alert, In no apparent distress, Oriented x3


HEENT: Atraumatic, PERRLA, EOMI


Neck: Supple, JVD not distended


Respiratory: Clear to auscultation bilaterally, Normal air movement


Cardiovascular: Regular rate/rhythm, Normal S1 S2


Gastrointestinal: Normal bowel sounds, No tenderness


Musculoskeletal: No tenderness


Integumentary: No rashes


Neurological: Normal speech, Normal tone, Normal affect


Lymphatics: No axilla or inguinal lymphadenopathy





- Studies


Medications List Reviewed: Yes





Assessment & Plan





- Problems (Diagnosis)


(1) Acute kidney injury


Current Visit: No   Status: Acute   





(2) Diabetes mellitus


Onset Date: 11/30/15   Current Visit: No   Status: Chronic   


Qualifiers: 


 





(3) Diabetic neuropathy


Onset Date: 01/05/18   Current Visit: No   Status: Chronic   


Qualifiers: 


   Diabetes mellitus type: type 2   Diabetes mellitus complication detail: 

diabetic polyneuropathy   Qualified Code(s): E11.42 - Type 2 diabetes mellitus 

with diabetic polyneuropathy   





(4) Hyperlipidemia


Onset Date: 11/30/15   Current Visit: No   Status: Chronic   


Qualifiers: 


   Hyperlipidemia type: mixed hyperlipidemia 





(5) Hypertension


Onset Date: 01/05/18   Current Visit: No   Status: Chronic   


Qualifiers: 


   Hypertension type: essential hypertension 





(6) Obesity (BMI 30.0-34.9)


Onset Date: 01/05/18   Current Visit: No   Status: Chronic   





(7) Hypomagnesemia


Onset Date: 01/14/15   Current Visit: No   Status: Resolved   





(8) Status post fall


Onset Date: 01/05/18   Current Visit: No   Status: Resolved   





(9) Urinary tract infectious disease


Onset Date: 11/30/15   Current Visit: No   Status: Resolved   





- Plan





Plan:


1. Continue with IV fluids


2. Continue with monitoring renal function


3. Transfuse 1 unit of packed red blood cells


4. Out of bed and ambulate


5. Anticipate discharge home with outpatient follow with Nephrology


6. Monitor H&H an outpatient Hematology follow-up


7. B12 supplementation


8. GI and DVT prophylaxis


Discharge Plan: Home


Plan to discharge in: Greater than 2 days





- Advance Directives


Does patient have a Living Will: No


Does patient have a Durable POA for Healthcare: Yes





- Code Status/Comfort Care


Code Status Assessed: Yes


Code Status: Full Code


Critical Care: No


Time Spent Managing PTS Care (In Minutes): 35

## 2021-05-05 NOTE — P.DS
Discharge Date: 05/05/21


Primary Care Provider: Dr. Olivera; [facility], [place]


Disposition: ROUTINE DISCHARGE


Discharge Condition: GOOD


Reason for Admission: Increased abdominal bloating with fatigue


Consultations: 





Nephrology








- Problems


(1) Acute kidney injury


Current Visit: No   Status: Acute   





(2) Diabetes mellitus


Onset Date: 11/30/15   Current Visit: No   Status: Chronic   


Qualifiers: 


 





(3) Diabetic neuropathy


Onset Date: 01/05/18   Current Visit: No   Status: Chronic   


Qualifiers: 


   Diabetes mellitus type: type 2   Diabetes mellitus complication detail: 

diabetic polyneuropathy   Qualified Code(s): E11.42 - Type 2 diabetes mellitus 

with diabetic polyneuropathy   





(4) Hyperlipidemia


Onset Date: 11/30/15   Current Visit: No   Status: Chronic   


Qualifiers: 


   Hyperlipidemia type: mixed hyperlipidemia 





(5) Hypertension


Onset Date: 01/05/18   Current Visit: No   Status: Chronic   


Qualifiers: 


   Hypertension type: essential hypertension 





(6) Obesity (BMI 30.0-34.9)


Onset Date: 01/05/18   Current Visit: No   Status: Chronic   





(7) Hypomagnesemia


Onset Date: 01/14/15   Current Visit: No   Status: Resolved   





(8) Status post fall


Onset Date: 01/05/18   Current Visit: No   Status: Resolved   





(9) Urinary tract infectious disease


Onset Date: 11/30/15   Current Visit: No   Status: Resolved   


Brief History of Present Illness: 








81-year-old  female with multiple medical problems including 

hypertension, hyperlipidemia, diabetes mellitus type 2, depression with anxiety,

diabetic neuropathy and chronic hydronephrosis with hydroureter.  Patient also 

reports a history of recurrent UTI.





Patient reports about 2 weeks ago patient was having UTI symptoms.  She was 

started on antibiotic.  This was switched over to a different type of 

antibiotic.  Since that time she has been having some abdominal bloating.  No 

significant nausea, vomiting, diarrhea.  Some constipation noted.  She denies 

any fever, chills.  Denies any chest pain or shortness of breath.  Due to her 

recent changes he came to the ER for further evaluation.





In the ER patient was evaluated.  White count 9.8, hemoglobin 8.6.  Platelet 

count 329.  Sodium 136, potassium 6.8.  BUN of 64, creatinine 1.7 with a GFR 27.

 Glucose 137.  Covid test negative.  CT scan revealed elevated left 

hemidiaphragm with atelectasis.  Moderate right hydronephrosis and hydroureter 

is present.  Atrophic changes in the left kidney with moderate left-sided 

hydronephrosis and hydroureter were seen.  Findings appear similar in comparison

since 2019.  Large amount of stool also noted in the rectum.  No bowel 

obstruction noted.  Moderate lumbar degenerative changes noted.  Patient was 

given IV fluids in the emergency room.  Patient also given treatment for her 

hyperkalemia.  Patient admitted for further evaluation. 





Patient further reports that the patient had seen urology and nephrology back in

2019.  She had stents placed to her ureters.  These were removed at some point.


Hospital Course: 





Patient has done well during hospitalization.  She was anemic chronically and we

transfuse 1 unit of packed red blood cells.  Patient is clinically doing well 

and at this time is stable for discharge home.  Patient needs close outpatient 

follow with Nephrology as well as Hematology.  Supplement iron along with B-12. 

Return to the emergency room if weakness or worsening symptoms.


Vital Signs/Physical Exam: 














Temp Pulse Resp BP Pulse Ox


 


 97.5 F   79   16   151/68 H  94 


 


 05/05/21 08:54  05/05/21 08:54  05/05/21 08:54  05/05/21 08:54  05/05/21 08:54








General: Alert, In no apparent distress, Oriented x3


Laboratory Data at Discharge: 














WBC  6.80 K/uL (4.3-10.9)   05/05/21  05:47    


 


Hgb  7.9 g/dL (12.0-15.0)  L*  05/05/21  05:47    


 


Hct  25.2 % (36.0-45.0)  L  05/05/21  05:47    


 


Plt Count  308 K/uL (152-406)   05/05/21  05:47    


 


Sodium  145 mmol/L (136-145)   05/05/21  05:47    


 


Potassium  4.3 mmol/L (3.5-5.1)   05/05/21  05:47    


 


BUN  43 mg/dL (7-18)  H  05/05/21  05:47    


 


Creatinine  1.44 mg/dL (0.55-1.3)  H  05/05/21  05:47    


 


Glucose  124 mg/dL ()  H  05/05/21  05:47    


 


Magnesium  1.8 mg/dL (1.8-2.4)   05/05/21  05:47    


 


Total Bilirubin  0.2 mg/dL (0.2-1.0)   05/02/21  12:05    


 


AST  10 U/L (15-37)  L  05/02/21  12:05    


 


ALT  15 U/L (12-78)   05/02/21  12:05    


 


Alkaline Phosphatase  77 U/L ()   05/02/21  12:05    


 


Lipase  51 U/L ()  L  05/02/21  12:05    








Home Medications: 








Atorvastatin Calcium [Lipitor*] 1 tab PO DAILY 05/02/21 


Ciprofloxacin HCl 1 tab PO BID 05/02/21 


Codeine/APAP [Tylenol #3*] 1 tab PO Q8H PRN 05/02/21 


Duloxetine [Cymbalta *] 1 tab PO DAILY 05/02/21 


Gabapentin [Neurontin*] 1 cap PO TID 05/02/21 


Hydralazine [Apresoline*] 2 tab PO TID 05/02/21 


Insulin Aspart (Niacinamide) [Fiasp 100 Unit/ml Flextouch] See Protocol SQ ACHS 

05/02/21 


Iron,Carb/Vit C/Vit B12/Folic [Iron 100 Plus Tablet] 55 mg PO DAILY 05/02/21 


Metformin HCl [Glucophage] 1 tab PO TID 05/02/21 


carvediloL [Carvedilol] 1 tab PO BID 05/02/21 


Cyanocobalamin/Cobamamide [Vitamin B-12 5,000 Mcg Tab Sl] 1 each SL DAILY #30 

tab.subl 05/05/21 


Docusate [Colace Cap*] 100 mg PO DAILY #30 cap 05/05/21 


Tamsulosin [Flomax*] 0.4 mg PO BEDTIME #30 cap 05/05/21 


Thiamine HCl [Vitamin B-1*] 100 mg PO BID #60 tablet 05/05/21 





New Medications: 


Docusate [Colace Cap*] 100 mg PO DAILY #30 cap


Tamsulosin [Flomax*] 0.4 mg PO BEDTIME #30 cap


Thiamine HCl [Vitamin B-1*] 100 mg PO BID #60 tablet


Cyanocobalamin/Cobamamide [Vitamin B-12 5,000 Mcg Tab Sl] 1 each SL DAILY #30 

tab.subl


Physician Discharge Instructions: 


OK TO DC IV AND DC HOME


FOLLOW-UP WITH PRIMARY CARE PROVIDER IN 1-2 WEEKS


FOLLOW-UP WITH Urology & Nephrology IN 1-2 WEEKS


RETURN TO THE ER IF symptoms worsen


CALL or TEXT DR. LOPEZ [phone] IF ANY QUESTIONS REGARDING HOSPITAL STAY.


PLEASE CALL THE FLOOR [phone] IF ANY MEDICATION OR NURSING QUESTIONS.


Diet: AHA


Activity: Fall precautions


Followup: 


OOT,OOT [Primary Care Provider] - 


Time spent managing pt's care (in minutes): 35

## 2021-05-05 NOTE — PN
Date of Progress Note:  05/05/2021



Subjective:  The patient is doing well.  No nausea.  No vomiting.  Blood 
pressure still on the upper side.



Physical Examination:

Vital Signs:  Blood pressure 151/68, pulse of 79. 

Chest:  Clear to auscultation. 

Heart:  S1, S2.  Regular. 

Abdomen:  Soft, nontender. 

Extremity:  No edema. 

Neuro:  Alert, oriented.  No focal.



Laboratory Data:  WBC 6.8, H and H 7.9/25.2.  Sodium 145, potassium 4.3, bicarb 
24, BUN 43, creatinine 1.4, calcium 8.5, magnesium 1.8.



Current Medications:  The patient on include cefepime, Flomax, ferrous sulfate, 
heparin, carvedilol 6.25, hydralazine 50 t.i.d., folic acid, lactulose, 
gabapentin.



Assessment And Plan:  

1.   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 
uropathy, toxic ATN, back close to baseline.  We will continue current 
treatment.  The patient cleared from the Renal standpoint for discharge planning
to follow up in the office in 2 weeks.

2.   Urinary tract infection complicated with urine stent.  We will follow up 
with Urology.  If the patient got discharged, we will arrange for outpatient 
Urology.

3.   Urinary tract infection.  Culture still pending with the presence of 
stenting.  I am going to continue on antibiotics.  If the patient is going to 
need to be discharged, can be on Augmentin 500 

b.i.d.

4.   Deconditioning.  Continue PT/OT.



Time spent discussing with the patient, examining the patient, face-to-face 
withthe patient, placing orders, discussing the case with our team member 
including nursing, discussing the case with the other subspecialty including 
Cardiology and hospitalist 35 minutes.

GINA

DD:  05/05/2021 12:12:23   Voice ID:  [id number]

DT:  05/05/2021 13:59:52   Report ID:  [id number]

ANGELI

## 2021-05-05 NOTE — P.PN
Date of Service: 05/04/21











Subjective


Patient continues to improve.  Renal function stable.  Hemoglobin is stable as 

well.  Patient with B12 and iron deficiency.  Will transfuse 1 unit of packed 

red blood cells and anticipate discharge afterwards.





Review of Systems


10-point ROS is otherwise unremarkable





Physical Examination





- Vital Signs


Reviewed





- Physical Exam


General: Alert, In no apparent distress, Oriented x3


Respiratory: Clear to auscultation bilaterally, Normal air movement


Cardiovascular: Regular rate/rhythm, Normal S1 S2


Gastrointestinal: Normal bowel sounds, No tenderness


Neurological: Normal speech, Normal tone, Normal affect





Assessment & Plan





- Problems (Diagnosis)


(1) Acute kidney injury


Current Visit: No   Status: Acute   





(2) Diabetes mellitus


Onset Date: 11/30/15   Current Visit: No   Status: Chronic   


Qualifiers: 


 





(3) Diabetic neuropathy


Onset Date: 01/05/18   Current Visit: No   Status: Chronic   


Qualifiers: 


   Diabetes mellitus type: type 2   Diabetes mellitus complication detail: 

diabetic polyneuropathy   Qualified Code(s): E11.42 - Type 2 diabetes mellitus 

with diabetic polyneuropathy   





(4) Hyperlipidemia


Onset Date: 11/30/15   Current Visit: No   Status: Chronic   


Qualifiers: 


   Hyperlipidemia type: mixed hyperlipidemia 





(5) Hypertension


Onset Date: 01/05/18   Current Visit: No   Status: Chronic   


Qualifiers: 


   Hypertension type: essential hypertension 





(6) Obesity (BMI 30.0-34.9)


Onset Date: 01/05/18   Current Visit: No   Status: Chronic   





(7) Hypomagnesemia


Onset Date: 01/14/15   Current Visit: No   Status: Resolved   





(8) Status post fall


Onset Date: 01/05/18   Current Visit: No   Status: Resolved   





(9) Urinary tract infectious disease


Onset Date: 11/30/15   Current Visit: No   Status: Resolved   





- Plan





Plan:


Continue with plan of care as mentioned below


1. Continue with IV fluids


2. Continue with monitoring renal function


3. Transfuse 1 unit of packed red blood cells


4. Out of bed and ambulate


5. Anticipate discharge home with outpatient follow with Nephrology


6. Monitor H&H an outpatient Hematology follow-up


7. B12 supplementation


8. GI and DVT prophylaxis

## 2021-05-06 VITALS — OXYGEN SATURATION: 96 %

## 2021-05-10 NOTE — P.PN
Date of Service: 05/05/21











Subjective


Patient is doing well with no new complaints. Renal function is stable. Patient 

clinically is much better. At the time, patient is stable for discharge home if 

nephrology is agreeable. 





Review of Systems


10-point ROS is otherwise unremarkable





Physical Examination





- Vital Signs


Reviewed





- Physical Exam


General: Alert, In no apparent distress, Oriented x3


Respiratory: Clear to auscultation bilaterally, Normal air movement


Cardiovascular: Regular rate/rhythm, Normal S1 S2


Gastrointestinal: Normal bowel sounds, No tenderness


Neurological: Normal speech, Normal tone, Normal affect





Assessment & Plan





- Problems (Diagnosis)


(1) Acute kidney injury


Current Visit: No   Status: Acute   





(2) Diabetes mellitus


Onset Date: 11/30/15   Current Visit: No   Status: Chronic   


Qualifiers: 


 





(3) Diabetic neuropathy


Onset Date: 01/05/18   Current Visit: No   Status: Chronic   


Qualifiers: 


   Diabetes mellitus type: type 2   Diabetes mellitus complication detail: 

diabetic polyneuropathy   Qualified Code(s): E11.42 - Type 2 diabetes mellitus 

with diabetic polyneuropathy   





(4) Hyperlipidemia


Onset Date: 11/30/15   Current Visit: No   Status: Chronic   


Qualifiers: 


   Hyperlipidemia type: mixed hyperlipidemia 





(5) Hypertension


Onset Date: 01/05/18   Current Visit: No   Status: Chronic   


Qualifiers: 


   Hypertension type: essential hypertension 





(6) Obesity (BMI 30.0-34.9)


Onset Date: 01/05/18   Current Visit: No   Status: Chronic   





(7) Hypomagnesemia


Onset Date: 01/14/15   Current Visit: No   Status: Resolved   





(8) Status post fall


Onset Date: 01/05/18   Current Visit: No   Status: Resolved   





(9) Urinary tract infectious disease


Onset Date: 11/30/15   Current Visit: No   Status: Resolved   





- Plan





Plan:


Continue with plan of care as mentioned below


1. Continue with IV fluids


2. Continue with monitoring renal function


3. Transfuse 1 unit of packed red blood cells; If transfusion is completed we 

should be able to discharge early 


4. Out of bed and ambulate


5. Anticipate discharge home with outpatient follow with Nephrology


6. Monitor H&H an outpatient Hematology follow-up


7. B12 supplementation


8. GI and DVT prophylaxis

## 2021-05-10 NOTE — P.DS
Discharge Date: 05/06/21


Primary Care Provider: Dr. Olivera; [facility], [place]


Disposition: [facility] HOME/HOME HEALTH CARE


Discharge Condition: GOOD


Reason for Admission: Increased abdominal bloating with fatigue


Consultations: 





Nephrology








- Problems


(1) Acute kidney injury


Status: Acute   





(2) Diabetes mellitus


Onset Date: 11/30/15   Status: Chronic   


Qualifiers: 


 





(3) Diabetic neuropathy


Onset Date: 01/05/18   Status: Chronic   


Qualifiers: 


   Diabetes mellitus type: type 2   Diabetes mellitus complication detail: 

diabetic polyneuropathy   Qualified Code(s): E11.42 - Type 2 diabetes mellitus 

with diabetic polyneuropathy   





(4) Hyperlipidemia


Onset Date: 11/30/15   Status: Chronic   


Qualifiers: 


   Hyperlipidemia type: mixed hyperlipidemia   Qualified Code(s): E78.2 - Mixed 

hyperlipidemia   





(5) Hypertension


Onset Date: 01/05/18   Status: Chronic   


Qualifiers: 


   Hypertension type: essential hypertension   Qualified Code(s): I10 - 

Essential (primary) hypertension   





(6) Obesity (BMI 30.0-34.9)


Onset Date: 01/05/18   Status: Chronic   





(7) Hypomagnesemia


Onset Date: 01/14/15   Status: Resolved   





(8) Status post fall


Onset Date: 01/05/18   Status: Resolved   





(9) Urinary tract infectious disease


Onset Date: 11/30/15   Status: Resolved   


Brief History of Present Illness: 








81-year-old  female with multiple medical problems including 

hypertension, hyperlipidemia, diabetes mellitus type 2, depression with anxiety,

diabetic neuropathy and chronic hydronephrosis with hydroureter.  Patient also 

reports a history of recurrent UTI.





Patient reports about 2 weeks ago patient was having UTI symptoms.  She was 

started on antibiotic.  This was switched over to a different type of 

antibiotic.  Since that time she has been having some abdominal bloating.  No 

significant nausea, vomiting, diarrhea.  Some constipation noted.  She denies 

any fever, chills.  Denies any chest pain or shortness of breath.  Due to her 

recent changes he came to the ER for further evaluation.





In the ER patient was evaluated.  White count 9.8, hemoglobin 8.6.  Platelet 

count 329.  Sodium 136, potassium 6.8.  BUN of 64, creatinine 1.7 with a GFR 27.

 Glucose 137.  Covid test negative.  CT scan revealed elevated left 

hemidiaphragm with atelectasis.  Moderate right hydronephrosis and hydroureter 

is present.  Atrophic changes in the left kidney with moderate left-sided 

hydronephrosis and hydroureter were seen.  Findings appear similar in comparison

since 2019.  Large amount of stool also noted in the rectum.  No bowel 

obstruction noted.  Moderate lumbar degenerative changes noted.  Patient was 

given IV fluids in the emergency room.  Patient also given treatment for her 

hyperkalemia.  Patient admitted for further evaluation. 





Patient further reports that the patient had seen urology and nephrology back in

2019.  She had stents placed to her ureters.  These were removed at some point.


Hospital Course: 





Patient has done well during hospitalization. Hemoglobin had decreased slightly 

but renal function has improved.  She was anemic chronically and we transfused 1

unit of packed red blood cells.  Patient is clinically doing well and at this 

time is stable for discharge home.  Patient needs close outpatient follow with 

Nephrology as well as Hematology.  Supplement iron along with B-12.  Return to 

the emergency room if weakness or worsening symptoms.


Vital Signs/Physical Exam: 














Temp Pulse Resp BP Pulse Ox


 


 97 F   88   16   170/80 H  96 


 


 05/05/21 19:45  05/05/21 20:44  05/05/21 19:45  05/05/21 20:44  05/05/21 19:45








General: Alert, In no apparent distress, Oriented x3


Laboratory Data at Discharge: 














WBC  6.80 K/uL (4.3-10.9)   05/05/21  05:47    


 


Hgb  7.9 g/dL (12.0-15.0)  L*  05/05/21  05:47    


 


Hct  25.2 % (36.0-45.0)  L  05/05/21  05:47    


 


Plt Count  308 K/uL (152-406)   05/05/21  05:47    


 


Sodium  145 mmol/L (136-145)   05/05/21  05:47    


 


Potassium  4.3 mmol/L (3.5-5.1)   05/05/21  05:47    


 


BUN  43 mg/dL (7-18)  H  05/05/21  05:47    


 


Creatinine  1.44 mg/dL (0.55-1.3)  H  05/05/21  05:47    


 


Glucose  124 mg/dL ()  H  05/05/21  05:47    


 


Magnesium  1.8 mg/dL (1.8-2.4)   05/05/21  05:47    


 


Total Bilirubin  0.2 mg/dL (0.2-1.0)   05/02/21  12:05    


 


AST  10 U/L (15-37)  L  05/02/21  12:05    


 


ALT  15 U/L (12-78)   05/02/21  12:05    


 


Alkaline Phosphatase  77 U/L ()   05/02/21  12:05    


 


Lipase  51 U/L ()  L  05/02/21  12:05    








Home Medications: 








Atorvastatin Calcium [Lipitor*] 1 tab PO DAILY 05/02/21 


Codeine/APAP [Tylenol #3*] 1 tab PO Q8H PRN 05/02/21 


Duloxetine [Cymbalta *] 1 tab PO DAILY 05/02/21 


Gabapentin [Neurontin*] 1 cap PO TID 05/02/21 


Hydralazine [Apresoline*] 2 tab PO TID 05/02/21 


Insulin Aspart (Niacinamide) [Fiasp 100 Unit/ml Flextouch] See Protocol SQ ACHS 

05/02/21 


Iron,Carb/Vit C/Vit B12/Folic [Iron 100 Plus Tablet] 55 mg PO DAILY 05/02/21 


Metformin HCl [Glucophage] 1 tab PO TID 05/02/21 


carvediloL [Carvedilol] 1 tab PO BID 05/02/21 


Amox/Clavulanate [Augmentin 500-125 mg Tab] 500 mg PO BID #28 tab 05/05/21 


Cyanocobalamin/Cobamamide [Vitamin B-12 5,000 Mcg Tab Sl] 1 each SL DAILY #30 

tab.subl 05/05/21 


Docusate [Colace Cap*] 100 mg PO DAILY #30 cap 05/05/21 


Tamsulosin [Flomax*] 0.4 mg PO BEDTIME #30 cap 05/05/21 


Thiamine HCl [Vitamin B-1*] 100 mg PO BID #60 tablet 05/05/21 





New Medications: 


Amox/Clavulanate [Augmentin 500-125 mg Tab] 500 mg PO BID #28 tab


Docusate [Colace Cap*] 100 mg PO DAILY #30 cap


Tamsulosin [Flomax*] 0.4 mg PO BEDTIME #30 cap


Thiamine HCl [Vitamin B-1*] 100 mg PO BID #60 tablet


Cyanocobalamin/Cobamamide [Vitamin B-12 5,000 Mcg Tab Sl] 1 each SL DAILY #30 

tab.subl


Physician Discharge Instructions: 


PROBLEM: Acute Kidney Injury, Urinary Tract Infection, Hydronephrosis and 

Hydroureter





GOAL: Clear understanding of disease process





INSTRUCTIONS:








Diet: AHA





Activity: Fall precautions





OK TO Discontinue IV AND Discharge HOME


FOLLOW-UP WITH PRIMARY CARE PROVIDER IN 1-2 WEEKS


FOLLOW-UP WITH Urology & Nephrology IN 1-2 WEEKS


RETURN TO THE ER IF symptoms worsen


CALL or TEXT DR. LOPEZ [phone] IF ANY QUESTIONS REGARDING HOSPITAL STAY.


PLEASE CALL THE FLOOR -[phone] IF ANY MEDICATION OR NURSING QUESTIONS.


Diet: AHA


Activity: Fall precautions


Followup: 


Arpan Tubbs MD [ACTIVE - CAN ADMIT] - 1-2 Weeks


HUYEN MATSON [Primary Care Provider] - 1-2 Weeks (Follow up with primary care 

provider in 1-2 weeks)


Nick Leonard [ACTIVE - CAN ADMIT] - 1-2 Weeks


Time spent managing pt's care (in minutes): 35

## 2021-05-28 ENCOUNTER — HOSPITAL ENCOUNTER (EMERGENCY)
Dept: HOSPITAL 97 - ER | Age: 81
Discharge: HOME | End: 2021-05-28
Payer: COMMERCIAL

## 2021-05-28 VITALS — SYSTOLIC BLOOD PRESSURE: 134 MMHG | DIASTOLIC BLOOD PRESSURE: 51 MMHG | OXYGEN SATURATION: 97 %

## 2021-05-28 VITALS — TEMPERATURE: 98.3 F

## 2021-05-28 DIAGNOSIS — I12.9: ICD-10-CM

## 2021-05-28 DIAGNOSIS — M25.062: Primary | ICD-10-CM

## 2021-05-28 DIAGNOSIS — N39.0: ICD-10-CM

## 2021-05-28 DIAGNOSIS — E11.22: ICD-10-CM

## 2021-05-28 DIAGNOSIS — Z88.8: ICD-10-CM

## 2021-05-28 DIAGNOSIS — Z88.1: ICD-10-CM

## 2021-05-28 DIAGNOSIS — N18.9: ICD-10-CM

## 2021-05-28 LAB
BUN BLD-MCNC: 49 MG/DL (ref 7–18)
BUN BLD-MCNC: 51 MG/DL (ref 7–18)
GLUCOSE SERPLBLD-MCNC: 178 MG/DL (ref 74–106)
GLUCOSE SERPLBLD-MCNC: 217 MG/DL (ref 74–106)
HCT VFR BLD CALC: 34.7 % (ref 36–45)
LYMPHOCYTES # SPEC AUTO: 0.9 K/UL (ref 0.7–4.9)
PMV BLD: 9.8 FL (ref 7.6–11.3)
POTASSIUM SERPL-SCNC: 4.9 MMOL/L (ref 3.5–5.1)
POTASSIUM SERPL-SCNC: 5.6 MMOL/L (ref 3.5–5.1)
RBC # BLD: 3.71 M/UL (ref 3.86–4.86)

## 2021-05-28 PROCEDURE — 73562 X-RAY EXAM OF KNEE 3: CPT

## 2021-05-28 PROCEDURE — 87086 URINE CULTURE/COLONY COUNT: CPT

## 2021-05-28 PROCEDURE — 87186 SC STD MICRODIL/AGAR DIL: CPT

## 2021-05-28 PROCEDURE — 99285 EMERGENCY DEPT VISIT HI MDM: CPT

## 2021-05-28 PROCEDURE — 87088 URINE BACTERIA CULTURE: CPT

## 2021-05-28 PROCEDURE — 81003 URINALYSIS AUTO W/O SCOPE: CPT

## 2021-05-28 PROCEDURE — 80048 BASIC METABOLIC PNL TOTAL CA: CPT

## 2021-05-28 PROCEDURE — 81015 MICROSCOPIC EXAM OF URINE: CPT

## 2021-05-28 PROCEDURE — 36415 COLL VENOUS BLD VENIPUNCTURE: CPT

## 2021-05-28 PROCEDURE — 87077 CULTURE AEROBIC IDENTIFY: CPT

## 2021-05-28 PROCEDURE — 85025 COMPLETE CBC W/AUTO DIFF WBC: CPT

## 2021-05-28 NOTE — RAD REPORT
EXAM DESCRIPTION:  RAD - Knee Left 3 View - 5/28/2021 11:37 am

 

CLINICAL HISTORY:  PAIN

 

COMPARISON:  Knee Left 3 View dated 9/30/2017

 

FINDINGS:  Severe tricompartmental osteoarthritic changes are present. There is a moderate suprapatel
lar joint effusion present. The bones are demineralized. No definitive acute fracture seen, although 
assessment is limited osteopenia.

## 2021-05-28 NOTE — XMS REPORT
Continuity of Care Document

                             Created on:May 28, 2021



Patient:PAKO ENG

Sex:Female

:1940

External Reference #:[id number]





Demographics







                          Address                   [address] [place] [address]

 

                          Home Phone                [phone]

 

                          Mobile Phone              [phone]

 

                          Email Address             [email or web address]

 

                          Preferred Language        English

 

                          Marital Status            Unknown

 

                          Druze Affiliation     Unknown

 

                          Race                      Unknown

 

                          Additional Race(s)        Unavailable



                                                    White

 

                          Ethnic Group              Unknown









Author







                          Organization              [facility]

t

 

                          Address                   [address]



                                                    [place] [address]

 

                          Phone                     [phone]









Support







                Name            Relationship    Address         Phone

 

                Solo Navas           Unavailable     [phone]









Care Team Providers







                    Name                Role                Phone

 

                    Juan Carlos Dennis DO Primary Care Physician [phone]

 

                    Oleksandr ANDERSON            Attending Clinician Unavailable

 

                    Juan Carlos Dennis DO Attending Clinician [phone]

 

                    Laura BERRY             Attending Clinician [phone]









Payers







           Payer Name Policy Type Policy Number Effective Date Expiration Date S

marcell

 

           [id number]            [id number]            [place]



           [id number]                       00:00:00              [facility]



           /2004-Presen                                             



           [employer]                                              







Problems







       Condition Condition Condition Status Onset  Resolution Last   Treating Co

mments 

Source



       Name   Details Category        Date   Date   Treatment Clinician        



                                                 Date                 

 

       CKD    CKD    Disease Active                              [place]



       (chronic (chronic                                              Method

i



       kidney kidney               00:00:                             st



       disease) disease)               00                                 



       stage 4, stage 4,                                                  



       GFR 15-29 GFR 15-29                                                  



       ml/min ml/min                                                  

 

       Hyperkalem Hyperkalem Disease Active                              [facility]

[place]



       ia     ia                                                  [facility]



                                   00:00:                             st



                                   00                                 

 

       Anemia due Anemia due Disease Active                              [facility]

[place]



       to stage 4 to stage 4               27                               Me

thodi



       chronic chronic               00:00:                             st



       kidney kidney               00                                 



       disease disease                                                  

 

       Recurrent Recurrent Disease Active                              [place]

[place]



       UTI    UTI                                                 [facility]



                                   00:00:                             st



                                   00                                 

 

       Cachexia Cachexia Disease Active                              [place]

[facility]



                                                                  Method[facility]



                                   00:00:                             st



                                   00                                 

 

       Venous Venous Disease Active                              [place]



       stasis stasis                                              Methodi



       dermatitis dermatitis               00:00:                             st



       of left of left               00                                 



       lower  lower                                                   



       extremity extremity                                                  

 

       Hydronephr Hydronephr Disease Active 2019                             [facility]

[place]



       osis with osis with               1-19                               [facility]

gabriella



       ureteral ureteral               00:00:                             st



       stricture, stricture,               00                                 



       not    not                                                     



       elsewhere elsewhere                                                  



       classified classified                                                  

 

       Sepsis Sepsis Disease Active 2019                             [place]



       without without               1-19                               [facility]



       acute  acute                00:00:                             st



       organ  organ                00                                 



       dysfunctio dysfunctio                                                  



       n      n                                                       

 

       Acute  Acute  Disease Active 2019                             [place]



       renal  renal                1-19                               [facility]



       failure failure               00:00:                             st



                                   00                                 

 

       Ingrown Ingrown Disease Active 2019                             [place]



       toenail toenail               0-01                               [facility]



                                   00:00:                             st



                                   00                                 

 

       Urine  Urine  Disease Active 2019                             [place]



       retention retention               0-01                               [facility]

gabriella



                                   00:00:                             st



                                   00                                 

 

       Anemia Anemia Disease Active                              [place]



                                   8-02                               [facility]



                                   00:00:                             st



                                   00                                 

 

       Does not Does not Disease Active                              [place]

on



       transfer transfer                                              Method

i



       from bed from bed               00:00:                             st



       to     to                   00                                 



       wheelchair wheelchair                                                  

 

       Tremor of Tremor of Disease Active                              [place]

[place]



       both hands both hands                                              Me

thodi



                                   00:00:                             st



                                   00                                 

 

       Hallucinat Hallucinat Disease Active                              [facility]

[place]



       ions   ions                 7-                               [facility]



                                   00:00:                             st



                                   00                                 

 

       Administra Administra Disease Active 2017                             [facility]

[place]



       tive   tive                                                [facility]



       encounter encounter               00:00:                             st



                                   00                                 

 

       Fall   Fall   Disease Active                              [place]



                                                                  Method[facility]



                                   00:00:                             st



                                   00                                 

 

       Leg    Leg    Disease Active                              [place]



       injuries injuries                                              Method

i



                                   00:00:                             st



                                   00                                 

 

       Abrasion Abrasion Disease Active                              [place]

[facility]



                                                                  Method[facility]



                                   00:00:                             st



                                   00                                 

 

       Cellulitis Cellulitis Disease Active                              [facility]

[place]



       of left of left               20                               Methodi



       lower  lower                00:00:                             st



       extremity extremity               00                                 

 

       Acute  Acute  Disease Active                              [place]



       cystitis cystitis                                              Method

i



       without without               00:00:                             st



       hematuria hematuria               00                                 

 

       Urinary Urinary Disease Active                              [place]



       tract  tract                5                               [facility]



       infection infection               00:00:                             st



       associated associated               00                                 



       with   with                                                    



       catheteriz catheteriz                                                  



       ation of ation of                                                  



       urinary urinary                                                  



       tract  tract                                                   

 

       Anxiety Anxiety Disease Active                              [place]



                                   2                               [facility]



                                   00:00:                             st



                                   00                                 

 

       Arthritis Arthritis Disease Active                              [place]

[place]



       of knee of knee               -                               [facility]



                                   00:00:                             st



                                   00                                 

 

       Dysuria Dysuria Disease Active                              [place]



                                                                  [facility]



                                   00:00:                             st



                                   00                                 

 

       Edema of Edema of Disease Active                              [place]

[facility]



       lower  lower                2                               [facility]



       extremity extremity               00:00:                             st



                                   00                                 

 

       Hip pain Hip pain Disease Active                              [place]

[facility]



                                                                  [facility]



                                   00:00:                             st



                                   00                                 

 

       Hypertensi Hypertensi Disease Active                              [facility]

[place]



       on     on                                                  [facility]



                                   00:00:                             st



                                   00                                 

 

       Knee pain Knee pain Disease Active                              [place]

[place]



                                                                  [facility]



                                   00:00:                             st



                                   00                                 

 

       Neuropathy Neuropathy Disease Active                              [facility]

[place]



                                                                  [facility]



                                   00:00:                             st



                                   00                                 

 

       Polyuria Polyuria Disease Active                              [place]

[facility]



                                                                  Method[facility]



                                   00:00:                             st



                                   00                                 

 

       Toothache Toothache Disease Active                              [place]

[place]



                                                                  [facility]



                                   00:00:                             st



                                   00                                 

 

       Type 2 Type 2 Disease Active                              [place]



       diabetes diabetes                                              Method

i



       mellitus mellitus               00:00:                             st



       with stage with stage               00                                 



       3 chronic 3 chronic                                                  



       kidney kidney                                                  



       disease disease                                                  

 

       Unsteady Unsteady Disease Active                              [place]

[facility]



       gait   gait                                                [facility]



                                   00:00:                             st



                                   00                                 

 

       Vitamin D Vitamin D Disease Active                              [place]

[place]



       deficiency deficiency                                              Me

thodi



                                   00:00:                             st



                                   00                                 

 

       Murmur Murmur Disease Active                       Overview: [place]

n



                                   2-06                        Formattin Methodi



                                   00:00:                      g of this                           note   



                                                               might be 



                                                               different 



                                                               from the 



                                                               original. 



                                                               "aortic 



                                                               aneurysm" 







Allergies, Adverse Reactions, Alerts







       Allergy Allergy Status Severity Reaction(s) Onset  Inactive Treating Comm

ents 

Source



       Name   Type                        Date   Date   Clinician        

 

       Levoflox Propensi Active                              Other  [place]

n



       acin   ty to                       6-19                 reaction( [facility]



              adverse                      00:00:               s):    st



              reaction                      00                   Anaphylax 



              s to                                             is     



              drug                                                    







Family History







           Family Member Diagnosis  Comments   Start Date Stop Date  Source

 

           Natural father Heart disease                                  [place]

 [facility]

 

           Natural father Hypertension                                  [place] 

[facility]

 

           Maternal grandmother Diabetes                                    [place]

[facility]







Social History







           Social Habit Start Date Stop Date  Quantity   Comments   Source

 

           Exposure to                       Not sure              [place] [facility]

dist



           SARS-CoV-2                                             



           (event)                                                

 

           Tobacco use and 2021 Never used            [facility]

ethodist



           exposure   00:00:00   00:00:00                         

 

           Alcohol intake 2021 Current               [facility]

thodist



                      00:00:00   00:00:00   non-drinker of            



                                            alcohol               



                                            (finding)             

 

           Sex Assigned At 1940                       [facility]

ethodist



           Birth      00:00:00   00:00:00                         









                Smoking Status  Start Date      Stop Date       Source

 

                Never smoker                                    [facility]

t







Medications







       Ordered Filled Start  Stop   Current Ordering Indication Dosage Frequency

 Signature

                    Comments            Components          Source



     Medication Medication Date Date Medication? Clinician                (SIG) 

          



     Name Name                                                   

 

     INSULIN            Yes                      Inject           [place]



     SUBCUTANEOU      5-28                               under the           Met

[facility]



     S PUMP,      09:25:                               skin           st



     NOVOLOG,SANAM      13                                 continuous           



     SP, 100                                         ly.            



     UNIT/ML                                         Sliding           



     INSULIN                                         scale           



     PUMP                                                        



     INFUSION                                                        



     (NovoLOG)                                                        

 

     docusate            Yes            100mg QD   Take 100           [place]

[place]



     sodium 100      5-28                               mg by           Methodi



     mg capsule      09:25:                               mouth           st



               13                                 daily.           

 

     tamsulosin            Yes            .4mg QD   Take 0.4           [place]

[place]



     (FLOMAX)      5-28                               mg by           [facility]



     0.4 mg      09:25:                               mouth           st



     capsule      13                                 daily with           



                                                  dinner.           

 

     thiamine            Yes            100mg Q.5D Take 100           [place]

[place]



     mononitrate      5-28                               mg by           [facility]



     , vit B1,      09:25:                               mouth 2           st



     (B-1) 100      13                                 (two)           



     mg tablet                                         times a           



                                                  day.           

 

     cyanocobala            Yes                      Take by           [facility]



     min,      5-28                               mouth. 1           Methodi



     vitamin      09:25:                               capsule po           st



     B-12, 5,000      13                                 Qd             



     mcg capsule                                                        

 

     insulin            Yes                      Inject           [place]



     ASPART      5-27                               under the           Methodi



     (NovoLOG)      13:57:                               skin.           st



     100 unit/mL      42                                 Sliding           



     (3 mL)                                         scale           



     insulin pen                                                        

 

     lancets       Yes                      Check 4           [place]

[place]



     gauge misc      5-27                               times           Methodi



               00:00:                               daily           st



               00                                                

 

     pen needle,      0      Yes                      Use once           [facility]

[place]



     diabetic      5-27                               daily           Methodi



     (BD       00:00:                                              st



     Ultra-Fine      00                                                



     Short Pen                                                        



     Needle) 31                                                        



     gauge x                                                        



     5/16"                                                        



     needle                                                        

 

     insulin            Yes            4U   Q.5D Inject 4           [place]

n



     aspart,      5-27                               Units           Methodi



     niacinamide      00:00:                               under the           s

t



     , (Fiasp      00                                 skin 2           



     Penfill                                         (two)           



     U-100                                         times a           



     Insulin)                                         day.           



     100 unit/mL                                         Sliding           



     (3 mL)                                         scale.           



     cartridge                                                        

 

     atorvastati            Yes                      Take 1           [place]

[place]



     n (LIPITOR)                                     tablet by           Met

rafaela



     10 mg      00:00:                               mouth           st



     tablet      00                                 daily           

 

     acetaminoph      2021- Yes                      Take 1           [facility]

[place]



     en-codeine       07-26                          tablet by           Met

rafaela



     (TYLENOL      00:00: 23:59                          mouth           st



     WITH      00   :00                           every 8           



     CODEINE #3)                                         hours as           



     300-30 mg                                         needed for           



     per tablet                                         moderate           



                                                  pain           

 

     predniSONE      2021- No             10mg Q.5D Take 10 mg           

[place]



     (DELTASONE)                                by mouth 2           M

ethodi



     10 mg      15:13: 00:00                          (two)           st



     tablet      31   :00                           times a           



                                                  day.           

 

     INSULIN      2021- No                       Inject           [place]



     SUBCUTANEOU                                under the           Me

[facility]



     S PUMP,      15:13: 00:00                          skin           st



     NOVOLOG,SANAM      13   :00                           continuous           



     SP, 100                                         ly.            



     UNIT/ML                                         Sliding           



     INSULIN                                         scale           



     PUMP                                                        



     INFUSION                                                        



     (NovoLOG)                                                        

 

     gabapentin            Yes            400mg [id number] Take 1         

  [facility]



     (NEURONTIN)                                [id number] capsule          

 [facility]



     400 mg      00:00:                          3D   (400 mg           st



     capsule      00                                 total) by           



                                                  mouth 3           



                                                  (three)           



                                                  times a           



                                                  day.           

 

     hydrALAZINE            Yes                      Two P.O.           Phillip stewart



     (APRESOLINE                                     T.I.D.           Method

i



     ) 10 MG      00:00:                                              st



     tablet      00                                                

 

     carvediloL            Yes            6.25mg Q.5D Take 1           Ramon zimmerman



     (COREG)                                     tablet           [facility]



     6.25 MG      00:00:                               (6.25 mg           st



     tablet      00                                 total) by           



                                                  mouth 2           



                                                  (two)           



                                                  times a           



                                                  day with           



                                                  meals.           

 

     ciprofloxac      2021- No             500mg Q.5D Take 1           Phillip stewart



     in (Cipro)                                tablet           Method

i



     500 MG      00:00: 23:59                          (500 mg           st



     tablet      00   :00                           total) by           



                                                  mouth 2           



                                                  (two)           



                                                  times a           



                                                  day for 3           



                                                  days.           

 

     ciprofloxac      2021- No             500mg Q.5D Take 1           Phillip stewart



     in (Cipro)                                tablet           Method

i



     500 MG      00:00: 00:00                          (500 mg           st



     tablet      00   :00                           total) by           



                                                  mouth 2           



                                                  (two)           



                                                  times a           



                                                  day for 3           



                                                  days.           

 

     fluconazole      2021- No             150mg      Take 1           Phillip stewart



     (Diflucan)                                tablet           Method

i



     150 MG      00:00: 00:00                          (150 mg           st



     tablet      00   :00                           total) by           



                                                  mouth once           



                                                  for 1           



                                                  dose.           

 

     ciprofloxac      2021- No             500mg Q.5D Take 1           Phillip stewart



     in (Cipro)                                tablet           Method

i



     500 MG      00:00: 00:00                          (500 mg           st



     tablet      00   :00                           total) by           



                                                  mouth 2           



                                                  (two)           



                                                  times a           



                                                  day for 3           



                                                  days.           

 

     fluconazole      2021- No             150mg      Take 1           Phillip stewart



     (Diflucan)                                tablet           Method

i



     150 MG      00:00: 23:59                          (150 mg           st



     tablet      00   :00                           total) by           



                                                  mouth once           



                                                  for 1           



                                                  dose.           

 

     nitrofurant      2021- No             100mg Q.5D Take 1           Phillip stewart



     oin,      3-30 04-06                          capsule           [facility]



     macrocrysta      00:00: 23:59                          (100 mg           st



     l-monohydra      00   :00                           total) by           



     te,                                          mouth 2           



     (Macrobid)                                         (two)           



     100 MG                                         times a           



     capsule                                         day for 7           



                                                  days.           

 

     fluconazole       No             150mg      Take 1           Phillip stewart



     (Diflucan)                                tablet           Method

i



     150 MG      00:00: 23:59                          (150 mg           st



     tablet      00   :00                           total) by           



                                                  mouth once           



                                                  for 1           



                                                  dose.           

 

     hydrALAZINE      2021- No                       Two P.O.           H

david



     (APRESOLINE                                T.I.D.           [facility]

di



     ) 10 MG      00:00: 00:00                                         st



     tablet      00   :00                                          

 

     insulin      2021- No                       Inject           [facility]



     aspart,                                under the           Method

i



     niacinamide      14:31: 00:00                          skin.           st



     , (Fiasp      43   :00                           Sliding           



     Penfill                                         scale.           



     U-100                                                        



     Insulin)                                                        



     100 unit/mL                                                        



     (3 mL)                                                        



     cartridge                                                        

 

     insulin      2021- No             4U   Q.5D Inject 4           [facility]

[facility]



     aspart,       05-27                          Units           [facility]



     niacinamide      00:00: 00:00                          under the           

st



     , (Fiasp      00   :00                           skin 2           



     Penfill                                         (two)           



     U-100                                         times a           



     Insulin)                                         day.           



     100 unit/mL                                         Sliding           



     (3 mL)                                         scale.           



     cartridge                                                        

 

     metFORMIN      2021- No             500mg Q.[id number] Take 1         

  [facility]



     (GLUCOPHAGE                           [id number] tablet          

 [facility]



     ) 500 mg      00:00: 00:00                     3D   (500 mg           st



     tablet      00   :00                           total) by           



                                                  mouth 3           



                                                  (three)           



                                                  times a           



                                                  day.           

 

     acetaminoph      2021- No        chronic 1{tbl} Q8H  Take 1         

  [facility]



     en-codeine                 pain           tablet by           Met

rafaela



     (TYLENOL      00:00: 00:00                          mouth           st



     WITH      00   :00                           every 8           



     CODEINE #3)                                         (eight)           



     300-30 mg                                         hours as           



     per tablet                                         needed for           



                                                  moderate           



                                                  pain for           



                                                  up to 30           



                                                  days           



                                                  .chronic           



                                                  pain.           

 

     atorvastati      2021- No             10mg QD   Take 1           [facility] zimmerman



     n (LIPITOR)                                tablet (10           M

ethodi



     10 mg      00:00: 00:00                          mg total)           st



     tablet      00   :00                           by mouth           



                                                  daily.           

 

     carvediloL      2021- No             6.25mg Q.5D Take 1           Phill[facility]

pelon



     (COREG)                                tablet           [facility]



     6.25 MG      00:00: 00:00                          (6.25 mg           st



     tablet      00   :00                           total) by           



                                                  mouth 2           



                                                  (two)           



                                                  times a           



                                                  day with           



                                                  meals.           

 

     DULoxetine      2021- No             20mg QD   Take 1           Ken[facility] bird



     (CYMBALTA)                                capsule           [facility]

di



     20 MG      00:00: 00:00                          (20 mg           st



     capsule      00   :00                           total) by           



                                                  mouth           



                                                  daily.           

 

     gabapentin      2021- No             400mg Q.[id number] Take 1        

   [facility]



     (NEURONTIN)                           [id number] capsule         

  [facility]



     400 mg      00:00: 00:00                     3D   (400 mg           st



     capsule      00   :00                           total) by           



                                                  mouth 3           



                                                  (three)           



                                                  times a           



                                                  day.           

 

     hydrALAZINE      2021- No             100mg Q.[id number] Take 10      

     [facility]



     (APRESOLINE                           [id number] tablets         

  [facility]



     ) 10 MG      00:00: 00:00                     3D   (100 mg           st



     tablet      00   :00                           total) by           



                                                  mouth 3           



                                                  (three)           



                                                  times a           



                                                  day.           

 

     nitrofurant      2020 No        Urinary 100mg Q.5D Take 1          

 Rod[facility] monroe,      004           tract           capsule           [facility]



     macrocrysta      00:00: 23:59           infection           (100 mg        

   st



     l-monohydra      00   :00            without           total) by           



     te,                           hematuria,           mouth 2           



     (Macrobid)                          site           (two)           



     100 MG                          unspecified           times a           



     capsule                                         day for 7           



                                                  days.           

 

     gabapentin      2020- No                       Take 1           [facility]

[facility]



     (NEURONTIN)                                capsule by           RISHI gray



     400 mg      00:00: 00:00                          mouth           st



     capsule      00   :00                           three           



                                                  times           



                                                  daily           

 

     hydrALAZINE      2020- No                       Take 2           [facility]

[facility]



     (APRESOLINE                                tablets by           RISHI gray



     ) 10 MG      00:00: 00:00                          mouth           st



     tablet      00   :00                           three           



                                                  times           



                                                  daily           

 

     atorvastati      2020- No                       Take 1           [facility]

[facility]



     n (LIPITOR)                                tablet by           Milka orozco



     10 mg      00:00: 00:00                          mouth           st



     tablet      00   :00                           daily           

 

     DULoxetine      2020- No                       Take 1           [facility]

[facility]



     (CYMBALTA)                                capsule by           Milka orozco



     20 MG      00:00: 00:00                          mouth           st



     capsule      00   :00                           daily           

 

     metFORMIN      2020- No                       Take 1           [facility]

[facility]



     (GLUCOPHAGE                                tablet by           Milka orozco



     ) 500 mg      00:00: 00:00                          mouth           st



     tablet      00   :00                           three           



                                                  times           



                                                  daily           

 

     carvediloL      2020- No                       Take 1           [facility]

[facility]



     (COREG)                                tablet by           Method

i



     6.25 MG      00:00: 00:00                          mouth           st



     tablet      00   :00                           twice           



                                                  daily with           



                                                  meals           

 

     acetaminoph      2020- No                       Take 1           [facility]

[facility]



     en-codeine                                tablet by           [facility]



     (TYLENOL      00:00: 00:00                          mouth           st



     WITH      00   :00                           every 8           



     CODEINE #3)                                         hours as           



     300-30 mg                                         needed for           



     per tablet                                         moderate           



                                                  pain           

 

     nitrofurant      2020- No             100mg Q.5D Take 1           Phillip monroe,      6-                          capsule           [facility]



     macrocrysta      00:00: 23:59                          (100 mg           st



     l-monohydra      00   :00                           total) by           



     te,                                          mouth 2           



     (Macrobid)                                         (two)           



     100 MG                                         times a           



     capsule                                         day for 7           



                                                  days.           

 

     hydrALAZINE      2020- No                       Take 2           [facility]

[facility]



     (APRESOLINE      6-23 10-22                          tablets by           RISHI gray



     ) 10 MG      00:00: 00:00                          mouth           st



     tablet      00   :00                           three           



                                                  times           



                                                  daily           

 

     atorvastati      2020- No                       Take 1           [facility]

[facility]



     n (LIPITOR)      6-23 10-22                          tablet by           Me

ernesto



     10 mg      00:00: 00:00                          mouth           st



     tablet      00   :00                           daily           

 

     DULoxetine      2020- No                       Take 1           [facility]

[facility]



     (CYMBALTA)      6-23 10-22                          capsule by           Me

ernesto



     20 MG      00:00: 00:00                          mouth           st



     capsule      00   :00                           daily           

 

     gabapentin      2020- No                       Take 1           [facility]

[facility]



     (NEURONTIN)      6-23 10-22                          capsule by           RISHI gray



     400 mg      00:00: 00:00                          mouth           st



     capsule      00   :00                           three           



                                                  times           



                                                  daily           

 

     metFORMIN      2020- No                       Take 1           [facility]

[facility]



     (GLUCOPHAGE      6-23 10-22                          tablet by           Me

ernesto



     ) 500 mg      00:00: 00:00                          mouth           st



     tablet      00   :00                           three           



                                                  times           



                                                  daily           

 

     carvediloL      2020- No                       Take 1           [facility]

[facility]



     (COREG)      6-23 10-22                          tablet by           Method

i



     6.25 MG      00:00: 00:00                          mouth           st



     tablet      00   :00                           twice           



                                                  daily with           



                                                  meals           

 

     acetaminoph      2020- No                       Take 1           [facility]

[facility]



     en-codeine                                tablet by           [facility]



     (TYLENOL      00:00: 23:59                          mouth           st



     WITH      00   :00                           every 8           



     CODEINE #3)                                         hours as           



     300-30 mg                                         needed for           



     per tablet                                         moderate           



                                                  pain           

 

     hydrALAZINE      2020- No                       Take 2           [facility]

[facility]



     (APRESOLINE      3-24 06-23                          tablets by           RISHI gray



     ) 10 MG      00:00: 00:00                          mouth           st



     tablet      00   :00                           three           



                                                  times           



                                                  daily           

 

     atorvastati      2020- No                       Take 1           [facility]

[facility]



     n (LIPITOR)      3-24 06-23                          tablet by           Me

ernesto



     10 MG      00:00: 00:00                          mouth           st



     tablet      00   :00                           daily           

 

     DULoxetine      2020- No                       Take 1           [facility]

[facility]



     (CYMBALTA)      3-24 06-23                          capsule by           Me

thodi



     20 MG      00:00: 00:00                          mouth           st



     capsule      00   :00                           daily           

 

     gabapentin      2020- No                       Take 1           [facility]

[facility]



     (NEURONTIN)      3-24 06-23                          capsule by           RISHI gray



     400 mg      00:00: 00:00                          mouth           st



     capsule      00   :00                           three           



                                                  times           



                                                  daily           

 

     metFORMIN      2020- No                       Take 1           [facility]

[facility]



     (GLUCOPHAGE      3-24 06-23                          tablet by           Me

thodi



     ) 500 mg      00:00: 00:00                          mouth           st



     tablet      00   :00                           three           



                                                  times           



                                                  daily           

 

     carvediloL      2020- No                       Take 1           [facility]

[facility]



     (COREG)      3-24 06-23                          tablet by           Method

i



     6.25 MG      00:00: 00:00                          mouth           st



     tablet      00   :00                           twice           



                                                  daily with           



                                                  meals           

 

     acetaminoph      2020- No                       Take 1           [facility]

[facility]



     en-codeine      3-24 06-23                          tablet by           [facility]



     (TYLENOL      00:00: 00:00                          mouth           st



     WITH      00   :00                           every 8           



     CODEINE #3)                                         hours as           



     300-30 mg                                         needed for           



     per tablet                                         moderate           



                                                  pain           

 

     furosemide      2019- No             40mg Q.5D Take 1           [facility]

[facility]



     (LASIX) 40      0- 09-30                          tablet (40           Me

thodi



     mg tablet      00:00: 23:59                          mg total)           st



               00   :00                           by mouth 2           



                                                  (two)           



                                                  times a           



                                                  day.           

 

     hydrocolloi            Yes            1{packa Q.5D Apply 1           

[facility]



     d dressing      5-20                     ge}       Package           Method

i



     (DUODERM      00:00:                               topically           st



     CGF BORDER      00                                 2 (two)           



     DRESSING) 4                                         times a           



     X 4 "                                         day.           



     bandage                                                        

 

     soft lens            Yes            20mL QD   20 mL           [place]



     rinse,store      4-25                               daily.           [facility]

i



     solution      00:00:                                              st



     (SALINE      00                                                



     SOLUTION)                                                        



     solution                                                        

 

     insulin      2021- No             30U  QD   Inject 30           [place]

[place]



     glargine-li      25 04-21                          Units           [facility]

i



     xisenatide      00:00: 00:00                          under the           s

t



     (SOLIQUA      00   :00                           skin           



     100/33) 100                                         daily.           



     unit-33                                                        



     mcg/mL                                                        



     insulin pen                                                        

 

     pen needle,      2021- No                       Use once           [facility]

[place]



     diabetic      7-18 05-27                          daily           [facility]



     (BD INSULIN      00:00: 00:00                                         st



     PEN NEEDLE      00   :00                                          



     UF SHORT)                                                        



     31 gauge x                                                        



     5/16"                                                        



     needle                                                        

 

     blood sugar            Yes                      Check four           

[place]



     diagnostic      2-07                               strips           [facility]



     strips      00:00:                               daily           st



     (FREESTYLE      00                                                



     LITE                                                        



     STRIPS)                                                        



     strip test                                                        



     strips                                                        

 

     lancets 28      2021- No                       Check 4           [facility]

[place]



     gauge misc      2-07                           times           [facility]



               00:00: 00:00                          daily           st



               00   :00                                          







Immunizations







           Ordered Immunization Filled Immunization Date       Status     Commen

ts   Source



           Name       Name                                        

 

           Pneumococcal            2020 Completed             [facility]



           Conjugate 13-Valent            00:00:00                         [facility]

dist

 

           Influenza (IM)            2019 Completed             [facility]



           Preservative Free            00:00:00                         [facility]

st

 

           Pneumococcal            2019 Completed             [facility]



           Polysaccharide            00:00:00                         [facility]

 

           Influenza (IM)            2017-10-01 Completed             [facility]



           Preservative Free            00:00:00                         [facility]

st

 

           FLUZONE HIGH-DOSE PF            2016-10-06 Completed             [facility]

[place]



                                 00:00:00                         [facility]

 

           FLUZONE HIGH-DOSE PF            2015-10-15 Completed             [facility]

[place]



                                 00:00:00                         [facility]







Vital Signs







             Vital Name   Observation Time Observation Value Comments     Source

 

             Systolic blood 2021 13:47:00 135 mm[Hg]                [place]

n [facility]



             pressure                                            

 

             Diastolic blood 2021 13:47:00 79 mm[Hg]                 [place]

[facility]



             pressure                                            

 

             Heart rate   2021 13:47:00 73 /min                   [facility]

 

             Body temperature 2021 13:47:00 36.78 Meredith                 [facility]

 

             Body height  2021 13:47:00 160 cm                    [facility]

 

             Oxygen saturation in 2021 13:47:00 97 /min                   

[facility] Ley



             Arterial blood by                                        



             Pulse oximetry                                        

 

             Respiratory rate 2021 15:20:00 24 /min                   [facility]y

 

             Body weight  2021 13:34:00 95.255 kg                 [facility] Ley

 

             BMI          2021 13:34:00 37.20 kg/m2               [facility] Ley







Procedures







                Procedure       Date / Time Performed Performing Clinician Sour

e

 

                HEMOGLOBIN A1C  2021 16:11:00 MELLO Dennis

 

                COMPREHENSIVE METABOLIC 2021 16:11:00 MELLO Dennis



                PANEL                                           

 

                CBC WITH PLATELET AND 2021 16:11:00 LingamfeltMELLO cuellar



                DIFFERENTIAL                                    

 

                LIPID PANEL     2021 16:11:00 MELLO Dennis

 

                URINE CULTURE   2020 13:33:00 MELLO Dennis

 

                URINALYSIS, MICRO UA 2020 13:33:00 MELLO Dennis

[place] [facility]



                SCREEN WITH MICROSCOPY                                 







Plan of Care







             Planned Activity Planned Date Details      Comments     Source

 

             Future Scheduled 2022   DIABETIC FOOT EXAM              [place]

on [facility]



             Test         00:00:00     [code = DIABETIC              



                                       FOOT EXAM]                

 

             Future Scheduled 2021   INFLUENZA VACCINE              [place]

n [facility]



             Test         00:00:00     [code = INFLUENZA              



                                       VACCINE]                  

 

             Future Scheduled 1989   SHINGLES VACCINES              [place]

n [facility]



             Test         00:00:00     (#1) [code =              



                                       SHINGLES VACCINES              



                                       (#1)]                     

 

             Future Scheduled 1951   COVID-19 VACCINE (1)              [place]

[place] [facility]



             Test         00:00:00     [code = COVID-19              



                                       VACCINE (1)]              

 

             Future Scheduled 1949   DIABETES: RETINAL              [place]

n [facility]



             Test         00:00:00     EYE EXAM [code =              



                                       DIABETES: RETINAL              



                                       EYE EXAM]                 







Encounters







        Start   End     Encounter Admission Attending Care    Care    Encounter 

Source



        Date/Time Date/Time Type    Type    Clinicians Facility Department ID   

   

 

        2021 Outpatient         [facility]     210

[id number] [place]



        00:00:00 00:00:00                 R, R.                   937     [facility]

i



                                                                        st

 

        [facility]     [address]

[id number] [place]



        00:00:00 00:00:00                 R, R.                   970     [facility]

i



                                                                        st

 

        2021 Outpatient         BRITTANY [facility]     210

[id number] [place]



        00:00:00 00:00:00                 R, R.                   861     [facility]

i



                                                                        st

 

        2020 Outpatient         BRITTANY [facility]     210

[id number] [place]



        00:00:00 00:00:00                 R R                    757     [facility]

i



                                                                        st







Results







           Test Description Test Time  Test Comments Results    Result Comments 

Source









                    Comprehensive metabolic panel 2021 05:14:00 









                      Test Item  Value      Reference Range Interpretation Comme

nts









             Glucose (test code = 105 mg/dL                               

 Non-fasting



             2345-7)                                             reference inter

sowmya

 

             BUN (test code = 3094-0) 40 mg/dL     7-25         H            

 

             Creatinine (test code = 1.31 mg/dL   0.60-0.88    H            For 

patients >49 years of



             2160-0)                                             age, the refere

nce



                                                                 limitfor Creati

nine is



                                                                 approximately 1

3% higher



                                                                 for peopleident

ified as



                                                                 -Jacqui

n.

 

             EGFR [employer] 38           See_Comment  L             [Aut

omated message] The



             (test code = 2775)                                        system wh

ich generated



                                                                 this result tra

nsmitted



                                                                 reference range

: > OR =



                                                                 60 mL/min/1.73m

2. The



                                                                 reference range

 was not



                                                                 used to interpr

et this



                                                                 result as



                                                                 normal/abnormal

.

 

             EGFR  44           See_Comment  L             [Auto

mated message] The



             (test code = 16134-8)                                        system

 which generated



                                                                 this result tra

nsmitted



                                                                 reference range

: > OR =



                                                                 60 mL/min/1.73m

2. The



                                                                 reference range

 was not



                                                                 used to interpr

et this



                                                                 result as



                                                                 normal/abnormal

.

 

             BUN/creatinine ratio 31           See_Comment  H             [Autom

ated message] The



             (test code = 3097-3)                                        system 

which generated



                                                                 this result tra

nsmitted



                                                                 reference range

: 6 - 22



                                                                 (calc). The ref

erence



                                                                 range was not u

sed to



                                                                 interpret this 

result as



                                                                 normal/abnormal

.

 

             Sodium (test code = 143 mmol/L   135-146                   



             2951-2)                                             

 

             Potassium (test code = 5.4 mmol/L   3.5-5.3      H            



             2823-3)                                             

 

             Chloride (test code = 109 mmol/L                       



             5-0)                                             

 

             CO2 (test code = -9) 22 mmol/L    20-32                     

 

             Calcium (test code = 9.4 mg/dL    8.6-10.4                  



             70343-0)                                            

 

             Protein (test code = 6.7 g/dL     6.1-8.1                   



             2885-2)                                             

 

             Albumin, S (test code = 3.8 g/dL     3.6-5.1                   



             1751-7)                                             

 

             Globulin, total (test 2.9          See_Comment                [Auto

mated message] The



             code = 54188-4)                                        system which

 generated



                                                                 this result tra

nsmitted



                                                                 reference range

: 1.9 -



                                                                 3.7 g/dL (calc)

. The



                                                                 reference range

 was not



                                                                 used to interpr

et this



                                                                 result as



                                                                 normal/abnormal

.

 

             Albumin/globulin ratio 1.3          See_Comment                [Aut

omated message] The



             (test code = 1759-0)                                        system 

which generated



                                                                 this result tra

nsmitted



                                                                 reference range

: 1.0 -



                                                                 2.5 (calc). The

 reference



                                                                 range was not u

sed to



                                                                 interpret this 

result as



                                                                 normal/abnormal

.

 

             Total bilirubin (test 0.3 mg/dL    0.2-1.2                   



             code = 1975-2)                                        

 

             Alkaline phosphatase 61 U/L                           



             (test code = 6768-6)                                        

 

             AST (test code = 1920-8) 9 U/L        10-35        L            

 

             ALT (test code = 1742-6) 7 U/L        6-29                      

 

             ELADIO (test code = ELADIO) FASTING:NOFASTING: NO                        

   

 

             RAC (test code = RAC) Performing                             



                          Organization                           



                          Information:    Site                           



                          ID: [facility]    [facility]                           



                          [facility]    Address: [address] [place] [id number]                           



                             Director: Viet Sosa                           

 

             Lab Interpretation (test Abnormal                               



             code = 77167-7)                                        



[place] [id number] 05:14:00





             Test Item    Value        Reference Range Interpretation Comments

 

             Cholesterol, total 113 mg/dL    <200                      



             (test code = 2093-3)                                        

 

             HDL cholesterol 46 mg/dL     See_Comment  L             [Automated



             (test code = -9)                                        message

] The



                                                                 system which



                                                                 generated this



                                                                 result



                                                                 transmitted



                                                                 reference range

:



                                                                 > OR = 50. The



                                                                 reference range



                                                                 was not used to



                                                                 interpret this



                                                                 result as



                                                                 normal/abnormal

.

 

             Triglycerides (test 134 mg/dL    <150                      



             code = 2571-8)                                        

 

             LDL cholesterol 45           mg/dL (calc)              Reference ra

nge:



             calculated (test                                        <100 [place]

ble



             code = 16748-9)                                        range <100 m

g/dL



                                                                 for primary



                                                                 prevention;  <7

0



                                                                 mg/dL for



                                                                 patients with C

HD



                                                                 or diabetic



                                                                 patients with >



                                                                 or = 2 CHD risk



                                                                 factors. LDL-C 

is



                                                                 now calculated



                                                                 using the



                                                                 Rob-Deyanira



                                                                 calculation,



                                                                 which is a



                                                                 validated novel



                                                                 method providin

g



                                                                 better accuracy



                                                                 than the



                                                                 Friedewald



                                                                 equation in the



                                                                 estimation of



                                                                 LDL-C. Rob JONES

S



                                                                 et al. JD.



                                                                 2013;310(19):



                                                                 [id number]



                                                                 (http://educati

on



                                                                 .iSECUREtrac



                                                                 .com/faq/EXI591

)

 

             Cholesterol/HDL 2.5          See_Comment                [Automated



             ratio (test code =                                        message] 

The



             9830-1)                                             system which



                                                                 generated this



                                                                 result



                                                                 transmitted



                                                                 reference range

:



                                                                 <5.0 (calc). Th

e



                                                                 reference range



                                                                 was not used to



                                                                 interpret this



                                                                 result as



                                                                 normal/abnormal

.

 

             Non-HDL cholesterol 67           See_Comment               For beto

ents with



             (test code =                                        diabetes plus 1



             84238-1)                                            major ASCVD ris

k



                                                                 factor, treatin

g



                                                                 to a non-HDL-C



                                                                 goal of <100



                                                                 mg/dL (LDL-C of



                                                                 <70 mg/dL) is



                                                                 considered a



                                                                 therapeutic



                                                                 option.



                                                                 [Automated



                                                                 message] The



                                                                 system which



                                                                 generated this



                                                                 result



                                                                 transmitted



                                                                 reference range

:



                                                                 <130 mg/dL



                                                                 (calc). The



                                                                 reference range



                                                                 was not used to



                                                                 interpret this



                                                                 result as



                                                                 normal/abnormal

.

 

             ELADIO (test code = FASTING:NOFASTING:                           



             ELADIO)         NO                                     

 

             RAC (test code = Performing                             



             [facility])         Organization                           



                          Information:                           



                          Site ID: [facility]                           



                          Name: [facility]    Address:                           



                          [address] [place] [address]                           



                          [phone]                             



                          Director: Viet Sosa                           

 

             Lab Interpretation Abnormal                               



             (test code =                                        



             30545-7)                                            



[place] [id number] 05:14:00





             Test Item    Value        Reference Range Interpretation Comments

 

             Hemoglobin A1C 4.9          See_Comment               For the purpo

se of



             (test code =                                        screening for t

he



             4548-4)                                             presence ofdiab

etes:



                                                                 <5.7%



                                                                 Consistent with

 the



                                                                 absence of



                                                                 diabetes5.7-6.4

%



                                                                 Consistent with



                                                                 increased risk 

for



                                                                 diabetes



                                                                 (prediabetes)> 

or



                                                                 =6.5%  Consiste

nt



                                                                 with diabetes T

his



                                                                 assay result is



                                                                 consistent with

 a



                                                                 decreased risko

f



                                                                 diabetes. Curre

ntly,



                                                                 no consensus ex

ists



                                                                 regarding use



                                                                 ofhemoglobin A1

c for



                                                                 diagnosis of di

abetes



                                                                 in children.



                                                                 According to Am

eric[place]



                                                                 Diabetes Associ

ation



                                                                 (ADA)guidelines

,



                                                                 hemoglobin A1c 

<7.0%



                                                                 represents



                                                                 optimalcontrol 

in



                                                                 non-pregnant di

abetic



                                                                 patients.



                                                                 Differentmetric

s may



                                                                 apply to specif

ic



                                                                 patient populat

ions.



                                                                 Standards of Me

dical



                                                                 Care in



                                                                 Diabetes(ADA).



                                                                 [Automated mess

age]



                                                                 The system whUskape

h



                                                                 generated this 

result



                                                                 transmitted ref

erence



                                                                 range: <5.7 % o

f



                                                                 total Hgb. The



                                                                 reference range

 was



                                                                 not used to int

erpret



                                                                 this result as



                                                                 normal/abnormal

.

 

             ELADIO (test code = FASTING:NOFASTING:                           



             ELADIO)         NO                                     

 

             RAC (test code = Performing                             



             RAC)         Organization                           



                          Information:                           



                          Site ID: [facility]                           



                          Name: [facility]                           



                          n Lab    Address:                           



                          [address] [place] [address]                           



                          [id number]                             



                          Director: Viet Sosa                           



[facility] with platelet and [id number] 05:14:00





             Test Item    Value        Reference Range Interpretation Comments

 

             WBC (test code = 10.9         See_Comment  H             [Automated



             6690-2)                                             message] The



                                                                 system which



                                                                 generated this



                                                                 result



                                                                 transmitted



                                                                 reference range

:



                                                                 3.8 - 10.8



                                                                 Thousand/uL. Th

e



                                                                 reference range



                                                                 was not used to



                                                                 interpret this



                                                                 result as



                                                                 normal/abnormal

.

 

             RBC (test code = 3.47         See_Comment  L             [Automated



             789-8)                                              message] The



                                                                 system which



                                                                 generated this



                                                                 result



                                                                 transmitted



                                                                 reference range

:



                                                                 3.80 - 5.10



                                                                 Million/uL. The



                                                                 reference range



                                                                 was not used to



                                                                 interpret this



                                                                 result as



                                                                 normal/abnormal

.

 

             HGB (test code = 10.2 g/dL    11.7-15.5    L            



             718-7)                                              

 

             HCT (test code = 32.5 %       35.0-45.0    L            



             4544-3)                                             

 

             MCV (test code = 93.7 fL      80.0-100.0                



             787-2)                                              

 

             MCH (test code = 29.4 pg      27.0-33.0                 



             785-6)                                              

 

             MCHC (test code = 31.4 g/dL    32.0-36.0    L            



             786-4)                                              

 

             RDW (test code = 12.3 %       11.0-15.0                 



             788-0)                                              

 

             Platelet count (test 287          See_Comment                [Autom

ated



             code = 777-3)                                        message] The



                                                                 system which



                                                                 generated this



                                                                 result



                                                                 transmitted



                                                                 reference range

:



                                                                 140 - 400



                                                                 Thousand/uL. Th

e



                                                                 reference range



                                                                 was not used to



                                                                 interpret this



                                                                 result as



                                                                 normal/abnormal

.

 

             MPV (test code = 11.0 fL      7.5-12.5                  



             776-5)                                              

 

             Neutrophils, 8491         See_Comment  H             [Automated



             absolute (test code                                        message]

 The



             = 751-8)                                            system which



                                                                 generated this



                                                                 result



                                                                 transmitted



                                                                 reference range

:



                                                                 1,500 - 7,800



                                                                 cells/uL. The



                                                                 reference range



                                                                 was not used to



                                                                 interpret this



                                                                 result as



                                                                 normal/abnormal

.

 

             Lymphocytes, 1406         See_Comment                [Automated



             absolute (test code                                        message]

 The



             = 731-0)                                            system which



                                                                 generated this



                                                                 result



                                                                 transmitted



                                                                 reference range

:



                                                                 850 - 3,900



                                                                 cells/uL. The



                                                                 reference range



                                                                 was not used to



                                                                 interpret this



                                                                 result as



                                                                 normal/abnormal

.

 

             Monocytes, absolute 785          See_Comment                [Automa

onofre



             (test code = 742-7)                                        message]

 The



                                                                 system which



                                                                 generated this



                                                                 result



                                                                 transmitted



                                                                 reference range

:



                                                                 200 - 950



                                                                 cells/uL. The



                                                                 reference range



                                                                 was not used to



                                                                 interpret this



                                                                 result as



                                                                 normal/abnormal

.

 

             Eosinophils, 164          See_Comment                [Automated



             absolute (test code                                        message]

 The



             = 711-2)                                            system which



                                                                 generated this



                                                                 result



                                                                 transmitted



                                                                 reference range

:



                                                                 15 - 500



                                                                 cells/uL. The



                                                                 reference range



                                                                 was not used to



                                                                 interpret this



                                                                 result as



                                                                 normal/abnormal

.

 

             Basophils, absolute 55           See_Comment                [Automa

onofre



             (test code = 704-7)                                        message]

 The



                                                                 system which



                                                                 generated this



                                                                 result



                                                                 transmitted



                                                                 reference range

:



                                                                 0 - 200 cells/u

L.



                                                                 The reference



                                                                 range was not



                                                                 used to interpr

et



                                                                 this result as



                                                                 normal/abnormal

.

 

             Neutrophils (test 77.9 %                                 



             code = 770-8)                                        

 

             Lymphocytes (test 12.9 %                                 



             code = 736-9)                                        

 

             Monocytes (test code 7.2 %                                  



             = 5905-5)                                           

 

             Eosinophils (test 1.5 %                                  



             code = 713-8)                                        

 

             Basophils + RC (test 0.5 %                                  



             code = 706-2)                                        

 

             ELADIO (test code = FASTING:NOFASTING:                           



             ELADIO)         NO                                     

 

             RAC (test code = Performing                             



             RAC)         Organization                           



                          Information:                           



                          Site ID: [facility]                           



                          [facility]    Address:                           



                          [address] [place] [address]                           



                          [address]                             



                          Director: Viet Sosa                           

 

             Lab Interpretation Abnormal                               



             (test code =                                        



             95071-7)                                            



[facility] [id number] 19:43:00





             Test Item    Value        Reference    Interpretation Comments



                                       Range                     

 

             Urine culture (test SEE NOTE                  A              CULTUR

E, URINE,



             code = 630-4)                                        ROUTINE     [facility]



                                                                 Number:



                                                                 [id number]  Test



                                                                 Status:       F

inal



                                                                  Specimen Sourc

e:



                                                                 URINE  Specimen



                                                                 Quality:  Adequ

ate



                                                                 Result:



                                                                 Greater than



                                                                 100,000 CFU/mL 

of



                                                                 Enterococcus



                                                                 faecalis  COMME

NT:



                                                                          Additi

onal



                                                                 organism(s) les

s



                                                                 than 10,000 CFU

/mL



                                                                 



                                                                 isolated. These



                                                                 organisms, comm

only



                                                                 found on



                                                                          extern

al



                                                                 and internal



                                                                 genitalia, are



                                                                 considered



                                                                 



                                                                 colonizers. No



                                                                 further testing



                                                                 performed.



                                                                 



                                                                 E.faecalis



                                                                 



                                                                 ---------------

-



                                                                 



                                                                   INT   MOISES



                                                                 AMPICILLIN



                                                                    S     <=2



                                                                 CIPROFLOXACIN



                                                                    R     >=8



                                                                 LEVOFLOXACIN



                                                                    R     >=8



                                                                 NITROFURANTOIN



                                                                    S     <=16



                                                                 TETRACYCLINE



                                                                    R     >=16



                                                                 VANCOMYCIN



                                                                    S



                                                                 1S=Susceptible



                                                                 I=Intermediate



                                                                 R=Resistant  * 

=



                                                                 Not TestedNR = 

Not



                                                                 Reported  **NN 

=



                                                                 See Therapy



                                                                 Comments

 

             RAC (test code = Performing                             



             RAC)         Organization                           



                          Information:                           



                          Site ID: [facility]                           



                          Name: [facility]                            



                          [facility]                           



                          on Lab                                 



                          Address: [address] [place] [address]                            



                          [id number]                             



                          Director: Viet Sosa                           

 

             Lab Interpretation Abnormal                               



             (test code =                                        



             41722-5)                                            



[facility] with [id number] 19:43:00





             Test Item    Value        Reference Range Interpretation Comments

 

             WBC, UA (test code = PACKED       See_Comment  A             [Autom

ated



             5821-4)                                             message] The



                                                                 system which



                                                                 generated this



                                                                 result



                                                                 transmitted



                                                                 reference range

:



                                                                 < OR = 5 /HPF.



                                                                 The reference



                                                                 range was not



                                                                 used to interpr

et



                                                                 this result as



                                                                 normal/abnormal

.

 

             RBC, UA (test code = 3-10         See_Comment  A             [Autom

ated



             91223-6)                                            message] The



                                                                 system which



                                                                 generated this



                                                                 result



                                                                 transmitted



                                                                 reference range

:



                                                                 < OR = 2 /HPF.



                                                                 The reference



                                                                 range was not



                                                                 used to interpr

et



                                                                 this result as



                                                                 normal/abnormal

.

 

             Squamous epithelial 6-10         See_Comment  A             [Automa

onofre



             cells, UA (test code                                        message

] The



             = 03528-7)                                          system which



                                                                 generated this



                                                                 result



                                                                 transmitted



                                                                 reference range

:



                                                                 < OR = 5 /HPF.



                                                                 The reference



                                                                 range was not



                                                                 used to interpr

et



                                                                 this result as



                                                                 normal/abnormal

.

 

             Bacteria, UA (test MODERATE     NONE SEEN /HPF A            



             code = 5769-5)                                        

 

             Hyaline casts, UA NONE SEEN    NONE SEEN /LPF              



             (test code = 5796-8)                                        

 

             Note: (test code =                                        This urin

e was



             8251-1)                                             analyzed for th

e



                                                                 presence of WBC

,



                                                                 RBC, bacteria,



                                                                 casts, and othe

r



                                                                 formed elements

.



                                                                 Only those



                                                                 elements seen



                                                                 were reported.

 

             RAC (test code = Performing                             



             RAC)         Organization                           



                          Information:                           



                          Site ID: [id number]                           



                          Name: [facility]                            



                          [facility]                           



                          n Lab    Address:                           



                          [address] [place] [address]                           



                          [id number]                             



                          Director: Viet Sosa                           

 

             Lab Interpretation Abnormal                               



             (test code =                                        



             46602-0)                                            



[facility]

## 2021-05-28 NOTE — EDPHYS
Physician Documentation                                                                           

 [facility]                                                                 

Name: Gladys Ramos                                                                                    

Age: 81 yrs                                                                                       

Sex: Female                                                                                       

: 1940                                                                                   

MRN: [id number]                                                                                   

Arrival Date: 2021                                                                          

Time: 10:20                                                                                       

Account#: [id number]                                                                            

Bed 14                                                                                            

Private MD:                                                                                       

ED Physician Isaiah Gomez                                                                       

HPI:                                                                                              

                                                                                             

11:28 This 81 yrs old  Female presents to ER via EMS with complaints of Knee Pain.   jr8 

11:28 The complaints affect the left knee. Context: The problem was sustained at home,        jr8 

      outdoors, resulted from twisting of the extremity. Onset: The symptoms/episode              

      began/occurred acutely. Modifying factors: The symptoms are alleviated by nothing. the      

      symptoms are aggravated by movement. Associated signs and symptoms: The patient has no      

      apparent associated signs or symptoms. Severity of symptoms: At their worst the             

      symptoms were moderate, in the emergency department the symptoms are unchanged. The         

      patient has not experienced similar symptoms in the past. The patient has not recently      

      seen a physician. Patient was being transferred vial wheelchair [place]. When at home off       

      loading from van patient footing during transfer and twisted left knee under her.           

      Patient since then has had pain with swelling. Patient non mobile from previous             

      injuries and weakness. Patients daughter also concerned because the patient looks more      

      distended which she stated that the last time patient had this, she was in acute on         

      chronic renal failure. Patient denies n/v/d or abdominal pain .                             

                                                                                                  

Historical:                                                                                       

- Allergies:                                                                                      

10:22 ambien;                                                                                 hb  

10:22 Levaquin;                                                                               hb  

- PMHx:                                                                                           

10:22 AAA; CHF; Diabetes - IDDM; Hyperlipidemia; Hypertension; OA; Pneumonia;                 hb  

- PSHx:                                                                                           

10:22 Kidney stents; ; Cholecystectomy;                                              hb  

                                                                                                  

- Immunization history:: Adult Immunizations up to date.                                          

- Social history:: Smoking status: Patient denies any tobacco usage or history of.                

                                                                                                  

                                                                                                  

ROS:                                                                                              

11:28 Eyes: Negative for injury, pain, redness, and discharge, ENT: Negative for injury,      jr8 

      pain, and discharge, Neck: Negative for injury, pain, and swelling, Cardiovascular:         

      Negative for chest pain, palpitations, and edema, Respiratory: Negative for shortness       

      of breath, cough, wheezing, and pleuritic chest pain, Abdomen/GI: Negative for              

      abdominal pain, nausea, vomiting, diarrhea, and constipation, Back: Negative for injury     

      and pain, Skin: Negative for injury, rash, and discoloration, Neuro: Negative for           

      headache, weakness, numbness, tingling, and seizure.                                        

11:28 MS/extremity: Positive for decreased range of motion, pain, swelling, tenderness, of        

      the left knee.                                                                              

                                                                                                  

Exam:                                                                                             

11:28 Eyes:  Pupils equal round and reactive to light, extra-ocular motions intact.  Lids and jr8 

      lashes normal.  Conjunctiva and sclera are non-icteric and not injected.  Cornea within     

      normal limits.  Periorbital areas with no swelling, redness, or edema. ENT:  Nares          

      patent. No nasal discharge, no septal abnormalities noted.  Tympanic membranes are          

      normal and external auditory canals are clear.  Oropharynx with no redness, swelling,       

      or masses, exudates, or evidence of obstruction, uvula midline.  Mucous membranes           

      moist. Neck:  Trachea midline, no thyromegaly or masses palpated, and no cervical           

      lymphadenopathy.  Supple, full range of motion without nuchal rigidity, or vertebral        

      point tenderness.  No Meningismus. Cardiovascular:  Regular rate and rhythm with a          

      normal S1 and S2.  No gallops, murmurs, or rubs.  Normal PMI, no JVD.  No pulse             

      deficits. Respiratory:  Lungs have equal breath sounds bilaterally, clear to                

      auscultation and percussion.  No rales, rhonchi or wheezes noted.  No increased work of     

      breathing, no retractions or nasal flaring. Abdomen/GI:  Soft, non-tender, with normal      

      bowel sounds. Obese. No distension or tympany.  No guarding or rebound.  No evidence of     

      tenderness throughout. Back:  No spinal tenderness.  No costovertebral tenderness.          

      Full range of motion. Skin:  Warm, dry with normal turgor.  Normal color with no            

      rashes, no lesions, and no evidence of cellulitis. Neuro:  Awake and alert, GCS 15,         

      oriented to person, place, time, and situation.  Cranial nerves II-XII grossly intact.      

      Motor strength 5/5 in all extremities.  Sensory grossly intact.                             

11:28 Musculoskeletal/extremity: Extremities: grossly normal except: noted in the left knee:      

      decreased ROM, pain, swelling, tenderness, moderate to large effusion noted to anterior     

      knee , Circulation is intact in all extremities. Sensation intact.                          

                                                                                                  

Vital Signs:                                                                                      

10:20  / 73; Pulse 90; Resp 16; Temp 98.3; Pulse Ox 100% on R/A; Pain 10/10;            hb  

12:00  / 78; Pulse 88; Resp 15; Pulse Ox 99% on R/A;                                    hb  

14:00  / 51; Pulse 78; Resp 14; Pulse Ox 97% on R/A; Pain 9/10;                         hb  

                                                                                                  

Procedures:                                                                                       

13:58 Splinting: Splint applied to left leg using knee immobilizer, applied by nurse.         jr8 

      Examined by me, post splint application: neurovascular intact, 2+ distal pulses             

      palpable, brisk capillary refill noted, Patient tolerated well. Joint Treatment:            

      Aspiration of left knee using 18 gauge needle, Lidocaine, Removed 30 ml's of bloody         

      fluid, Dressed with 4x4s, ace wrap Patient tolerated well.                                  

                                                                                                  

MDM:                                                                                              

10:21 Patient medically screened.                                                             jr8 

13:54 Data reviewed: vital signs, nurses notes, lab test result(s), radiologic studies, plain jr8 

      films. Data interpreted: Pulse oximetry: on room air is 100 %. Interpretation: normal.      

      Counseling: I had a detailed discussion with the patient and/or guardian regarding: the     

      historical points, exam findings, and any diagnostic results supporting the                 

      discharge/admit diagnosis, lab results, radiology results, the need for outpatient          

      follow up, a orthopedic surgeon, Nephrology . ED course: Discussed bicarbonate level        

      with Dr. Charles who agrees it is safe to start patient on bicarbonate PO at home.         

      Will start this and have her f/u with his group if she cannot get into [facility]     

      where her PCP is. Patient to f/u with orthopedics as well from the traumatic                

      hemarthrosis .                                                                              

                                                                                                  

                                                                                             

10:48 Order name: CBC with Diff; Complete Time: 12:24                                         [facility] 

                                                                                             

10:48 Order name: Basic Metabolic Panel; Complete Time: 11:55                                 [facility] 

                                                                                             

12:02 Order name: Urine Dipstick-Ancillary; Complete Time: 12:02                              [facility]

                                                                                             

12:02 Order name: Urine Microscopic Only; Complete Time: 13:03                                [facility] 

                                                                                             

12:39 Order name: Urine Culture                                                               [facility]

                                                                                             

13:03 Order name: BMP                                                                         [facility] 

                                                                                             

10:48 Order name: XRAY Knee LEFT 3 view; Complete Time: 12:10                                 [facility] 

                                                                                             

13:04 Order name: Basic Metabolic Panel; Complete Time: 13:46                                 [facility]

                                                                                             

10:48 Order name: Straight Cath; Complete Time: 12:03                                         jr8 

                                                                                             

10:48 Order name: IV; Complete Time: 12:03                                                    8 

                                                                                             

10:49 Order name: Urine Dipstick-Ancillary (obtain specimen); Complete Time: 12:03            8 

                                                                                             

13:56 Order name: Knee Immobilizer; Complete Time: 14:44                                      jr8 

                                                                                                  

Administered Medications:                                                                         

12:21 Drug: Insulin Regular Human 10 units {Co-Signature: jd3 (John Mcgrath RN).} Route:   hb  

      IVP; Site: right antecubital;                                                               

13:07 Follow up: Response: No adverse reaction                                                hb  

12:22 Drug: NS 0.9% 500 ml Route: IV; Rate: bolus; Site: right forearm;                       hb  

12:40 Follow up: Response: No adverse reaction; IV Status: Completed infusion; IV Intake:     hb  

      500ml                                                                                       

12:22 Drug: D50W 50 ml Route: IVP; Site: right forearm;                                       hb  

13:07 Follow up: Response: No adverse reaction                                                hb  

12:22 Drug: Albuterol 2.5 mg Route: Inhalation;                                               hb  

12:22 Drug: fentaNYL (PF) 50 mcg Route: IVP; Site: right forearm;                             hb  

13:07 Follow up: Response: No adverse reaction                                                hb  

12:50 Drug: Lidocaine (1 %) 1 vials {Note: administered to left lateral knee .} Volume: 20    jr8 

      ml; Route: Infiltration;                                                                    

14:28 Drug: Rocephin (cefTRIAXone) 1 grams Route: IV; Rate: calculated rate; Site: right      hb  

      antecubital;                                                                                

14:29 Follow up: IV Status: Completed infusion; IV Intake: 10ml                               hb  

14:29 Drug: fentaNYL (PF) 50 mcg Route: IVP; Site: right antecubital;                         hb  

                                                                                                  

                                                                                                  

Disposition:                                                                                      

16:37 Co-signature as Attending Physician, Isaiah Gomez MD I agree with the assessment and   kdr 

      plan of care.                                                                               

                                                                                                  

Disposition:                                                                                      

21 14:00 Discharged to Home. Impression: Chronic kidney disease (CKD), Urinary tract        

  infection, site not specified, Hemarthrosis, left knee.                                         

- Condition is Stable.                                                                            

- Discharge Instructions: Urinary Tract Infection, Adult, Knee Arthrocentesis, Chronic            

  Kidney Disease, Adult.                                                                          

- Prescriptions for sodium bicarbonate - take 650 milligram by ORAL route 2 times per             

  day; 60 tablet. Amoxicillin 875 mg Oral Tablet - take 1 tablet by ORAL route every 12           

  hours for 7 days; 14 tablet.                                                                    

- Medication Reconciliation Form, Thank You Letter, Antibiotic Education, Prescription            

  Opioid Use form.                                                                                

- Follow up: Arpan Tubbs MD; When: 5 - 6 days; Reason: Recheck today's complaints,           

  Continuance of care, Re-evaluation by your physician. Follow up: Alfonso Theodore MD; When: 5 - 6 days; Reason: Recheck today's complaints, Continuance of care,                  

  Re-evaluation by your physician.                                                                

- Problem is new.                                                                                 

- Symptoms have improved.                                                                         

                                                                                                  

                                                                                                  

                                                                                                  

Signatures:                                                                                       

Dispatcher [employer]                           EDMS                                                 

Isaiah Gomez MD MD   [facility]                                                  

Truong Suazo PA PA   jr8                                                  

Raissa Villanueva RN                     RN                                                      

Inés Acosta RN                       RN   ll1                                                  

John Mcgrath RN                           jd3                                                  

                                                                                                  

Corrections: (The following items were deleted from the chart)                                    

11:36 11:28 Patient was being transferred vial wheelchair van. When at home off loading from  [address] patient footing during transfer and twisted left knee under her. Patient since then     

      has had pain with swelling. Patient non mobile from previous injuries and weakness . jr8    

14:50 14:00 2021 14:00 Discharged to Home. Impression: Chronic kidney disease (CKD);    ll1 

      Urinary tract infection, site not specified; Hemarthrosis, left knee. Condition is          

      Stable. Forms are Medication Reconciliation Form, Thank You Letter, Antibiotic              

      Education, Prescription Opioid Use. Follow up: Arpan Tubbs; When: 5 - 6 days;            

      Reason: Recheck today's complaints, Continuance of care, Re-evaluation by your              

      physician. Follow up: Alfonso Theodore; When: 5 - 6 days; Reason: Recheck today's           

      complaints, Continuance of care, Re-evaluation by your physician. Problem is new.           

      Symptoms have improved. jr8                                                                 

                                                                                                  

**************************************************************************************************

## 2021-05-28 NOTE — ER
Nurse's Notes                                                                                     

 [facility]                                                                 

Name: Gladys Ramos                                                                                    

Age: 81 yrs                                                                                       

Sex: Female                                                                                       

: 1940                                                                                   

MRN: [id number]                                                                                   

Arrival Date: 2021                                                                          

Time: 10:20                                                                                       

Account#: [id number]                                                                            

Bed 14                                                                                            

Private MD:                                                                                       

Diagnosis: Chronic kidney disease (CKD);Urinary tract infection, site not specified;Hemarthrosis, 

  left knee                                                                                       

                                                                                                  

Presentation:                                                                                     

                                                                                             

10:20 Chief complaint: EMS states: Left knee pain after left leg got twisted under the          

      wheelchair yesterday. Coronavirus screen: At this time, the client does not indicate        

      any symptoms associated with coronavirus-19. Ebola Screen: No symptoms or risks             

      identified at this time. Initial Sepsis Screen: Does the patient meet any 2 criteria?       

      No. Patient's initial sepsis screen is negative. Does the patient have a suspected          

      source of infection? No. Patient's initial sepsis screen is negative. Risk Assessment:      

      Do you want to hurt yourself or someone else? Patient reports no desire to harm self or     

      others. Onset of symptoms was May 27, 2021.                                                 

10:20 Method Of Arrival: EMS: [facility]                                                hb  

10:20 Acuity: ANATOLY 4                                                                           hb  

10:44 Acuity: ANATOLY 3                                                                           hb  

                                                                                                  

Historical:                                                                                       

- Allergies:                                                                                      

10:22 ambien;                                                                                 hb  

10:22 Levaquin;                                                                               hb  

- PMHx:                                                                                           

10:22 AAA; CHF; Diabetes - IDDM; Hyperlipidemia; Hypertension; OA; Pneumonia;                 hb  

- PSHx:                                                                                           

10:22 Kidney stents; ; Cholecystectomy;                                              hb  

                                                                                                  

- Immunization history:: Adult Immunizations up to date.                                          

- Social history:: Smoking status: Patient denies any tobacco usage or history of.                

                                                                                                  

                                                                                                  

Screening:                                                                                        

10:45 Abuse screen: Denies threats or abuse. Denies injuries from another. Nutritional        hb  

      screening: No deficits noted. Tuberculosis screening: No symptoms or risk factors           

      identified. Fall Risk None identified.                                                      

                                                                                                  

Assessment:                                                                                       

11:00 General: Appears in no apparent distress. Behavior is calm, cooperative. Pain: Pain     hb  

      currently is 10 out of 10 on a pain scale. Neuro: Level of Consciousness is awake,          

      alert, obeys commands, Oriented to person, place, time, situation. Cardiovascular:          

      Patient's skin is warm and dry. Respiratory: Respiratory effort is even, unlabored,         

      Respiratory pattern is regular, symmetrical. GI: Reports bloating. : No signs and/or      

      symptoms were reported regarding the genitourinary system. EENT: No signs and/or            

      symptoms were reported regarding the EENT system. Derm: Skin is pink, warm \T\ dry.         

      swelling noted to left knee. Musculoskeletal: Reports severe left knee pain.                

12:00 Reassessment: Patient appears in no apparent distress at this time. Patient and/or      hb  

      family updated on plan of care and expected duration. Pain level reassessed. Patient is     

      alert, oriented x 3, equal unlabored respirations, skin warm/dry/pink.                      

13:00 Reassessment: Patient appears in no apparent distress at this time. Patient and/or      hb  

      family updated on plan of care and expected duration. Pain level reassessed. Patient is     

      alert, oriented x 3, equal unlabored respirations, skin warm/dry/pink.                      

14:00 Reassessment: Patient appears in no apparent distress at this time. Patient and/or      hb  

      family updated on plan of care and expected duration. Pain level reassessed. Patient is     

      alert, oriented x 3, equal unlabored respirations, skin warm/dry/pink.                      

                                                                                                  

Vital Signs:                                                                                      

10:20  / 73; Pulse 90; Resp 16; Temp 98.3; Pulse Ox 100% on R/A; Pain 10/10;            hb  

12:00  / 78; Pulse 88; Resp 15; Pulse Ox 99% on R/A;                                    hb  

14:00  / 51; Pulse 78; Resp 14; Pulse Ox 97% on R/A; Pain 9/10;                         hb  

                                                                                                  

ED Course:                                                                                        

10:20 Patient arrived in ED.                                                                  hb  

10:21 Truong Suazo PA is PHCP.                                                               [id number] 

10:21 Isaiah Gomez MD is Attending Physician.                                              [email or web address] 

10:21 Triage completed.                                                                       hb  

10:22 Arm band placed on.                                                                     hb  

10:44 Raissa Villanueva, RN is Primary Nurse.                                                   hb  

10:45 Patient has correct armband on for positive identification. Bed in low position. Call   hb  

      light in reach. Side rails up X2.                                                           

11:32 Inserted saline lock: 22 gauge in right forearm, using aseptic technique. Blood         hb  

      collected.                                                                                  

11:37 XRAY Knee LEFT 3 view In Process Unspecified.                                           EDMS

13:15 Assist provider with aspiration of left knee using 18 gauge needle, Lidocaine, fluid    hb  

      removed was bloody, Performed by Truong Roszak PA Dressed with 4X4s, kerlix, ace wrap         

      Patient tolerated well.                                                                     

13:59 Arpan Tubbs MD is Referral Physician.                                              [email or web address] 

13:59 Alfonso Theodore MD is Referral Physician.                                            [email or web address] 

14:32 IV discontinued, intact, bleeding controlled, No redness/swelling at site.              hb  

                                                                                                  

Administered Medications:                                                                         

12:21 Drug: Insulin Regular Human 10 units {Co-Signature: jd3 (John Mcgrath RN).} Route:   hb  

      IVP; Site: right antecubital;                                                               

13:07 Follow up: Response: No adverse reaction                                                hb  

12:22 Drug: NS 0.9% 500 ml Route: IV; Rate: bolus; Site: right forearm;                       hb  

12:40 Follow up: Response: No adverse reaction; IV Status: Completed infusion; IV Intake:     hb  

      500ml                                                                                       

12:22 Drug: D50W 50 ml Route: IVP; Site: right forearm;                                       hb  

13:07 Follow up: Response: No adverse reaction                                                hb  

12:22 Drug: Albuterol 2.5 mg Route: Inhalation;                                               hb  

12:22 Drug: fentaNYL (PF) 50 mcg Route: IVP; Site: right forearm;                             hb  

13:07 Follow up: Response: No adverse reaction                                                hb  

12:50 Drug: Lidocaine (1 %) 1 vials {Note: administered to left lateral knee .} Volume: 20    jr8 

      ml; Route: Infiltration;                                                                    

14:28 Drug: Rocephin (cefTRIAXone) 1 grams Route: IV; Rate: calculated rate; Site: right      hb  

      antecubital;                                                                                

14:29 Follow up: IV Status: Completed infusion; IV Intake: 10ml                               hb  

14:29 Drug: fentaNYL (PF) 50 mcg Route: IVP; Site: right antecubital;                         hb  

                                                                                                  

                                                                                                  

Intake:                                                                                           

12:40 IV: 500ml; Total: 500ml.                                                                hb  

14:29 IV: 10ml; Total: 510ml.                                                                 hb  

                                                                                                  

Outcome:                                                                                          

14:00 Discharge ordered by MD.                                                                [id number] 

14:32 Discharged to home via ambulance.                                                       hb  

14:32 Condition: stable                                                                           

14:32 Discharge instructions given to patient, family, Instructed on discharge instructions,      

      follow up and referral plans. medication usage, Demonstrated understanding of               

      instructions, follow-up care, medications, Prescriptions given X 2.                         

14:50 Patient left the ED.                                                                    ll1 

                                                                                                  

Addendum:                                                                                         

2021                                                                                        

     09:28 Addendum: Culture Results: Positive urine culture. Bacteria is resistant to, has        s
v

           intermediate sensitivity, or is not tested against prescribed antibiotics. Report given

           to GONZÁLEZ for further evaluation and then to ED Manager for follow up with patient. Phone 

           call Attempt #1 left voicemail.                                                        

2021                                                                                        

     09:08 Addendum: Culture Results: Prescription called-in to pharmacy of choice. to [facility] in [address]. Called in Macrobid 100mg PO BID x 7 days per BUZZ Mariano.            

                                                                                                  

Signatures:                                                                                       

Dispatcher [employer]                           EDMS                                                 

Felicity Mireles, RN                    RN   Mercedez Wade RN                     RN   [id number]                                                  

Truong Suazo PA PA   [id number]                                                  

Raissa Villanueva RN                     RN   Inés Perez RN                       RN   ll1                                                  

John Mcgrath RN                           jd3                                                  

                                                                                                  

**************************************************************************************************

## 2021-06-01 ENCOUNTER — HOSPITAL ENCOUNTER (INPATIENT)
Dept: HOSPITAL 97 - ER | Age: 81
LOS: 5 days | Discharge: HOME HEALTH | DRG: 69 | End: 2021-06-06
Attending: HOSPITALIST | Admitting: HOSPITALIST
Payer: COMMERCIAL

## 2021-06-01 VITALS — BODY MASS INDEX: 36.5 KG/M2

## 2021-06-01 DIAGNOSIS — R31.0: ICD-10-CM

## 2021-06-01 DIAGNOSIS — R29.810: ICD-10-CM

## 2021-06-01 DIAGNOSIS — R47.02: ICD-10-CM

## 2021-06-01 DIAGNOSIS — N25.81: ICD-10-CM

## 2021-06-01 DIAGNOSIS — N39.0: ICD-10-CM

## 2021-06-01 DIAGNOSIS — Z20.822: ICD-10-CM

## 2021-06-01 DIAGNOSIS — Z74.01: ICD-10-CM

## 2021-06-01 DIAGNOSIS — I27.20: ICD-10-CM

## 2021-06-01 DIAGNOSIS — Z79.82: ICD-10-CM

## 2021-06-01 DIAGNOSIS — N13.6: ICD-10-CM

## 2021-06-01 DIAGNOSIS — R33.9: ICD-10-CM

## 2021-06-01 DIAGNOSIS — E83.39: ICD-10-CM

## 2021-06-01 DIAGNOSIS — I13.0: ICD-10-CM

## 2021-06-01 DIAGNOSIS — I25.10: ICD-10-CM

## 2021-06-01 DIAGNOSIS — Z88.1: ICD-10-CM

## 2021-06-01 DIAGNOSIS — R29.700: ICD-10-CM

## 2021-06-01 DIAGNOSIS — M19.90: ICD-10-CM

## 2021-06-01 DIAGNOSIS — I50.32: ICD-10-CM

## 2021-06-01 DIAGNOSIS — N18.32: ICD-10-CM

## 2021-06-01 DIAGNOSIS — Z90.710: ICD-10-CM

## 2021-06-01 DIAGNOSIS — N17.9: ICD-10-CM

## 2021-06-01 DIAGNOSIS — E11.40: ICD-10-CM

## 2021-06-01 DIAGNOSIS — R47.81: ICD-10-CM

## 2021-06-01 DIAGNOSIS — G45.9: Primary | ICD-10-CM

## 2021-06-01 DIAGNOSIS — E78.5: ICD-10-CM

## 2021-06-01 DIAGNOSIS — Z79.4: ICD-10-CM

## 2021-06-01 DIAGNOSIS — Z88.8: ICD-10-CM

## 2021-06-01 DIAGNOSIS — D50.9: ICD-10-CM

## 2021-06-01 DIAGNOSIS — D63.1: ICD-10-CM

## 2021-06-01 DIAGNOSIS — E11.22: ICD-10-CM

## 2021-06-01 DIAGNOSIS — Z79.899: ICD-10-CM

## 2021-06-01 DIAGNOSIS — E87.2: ICD-10-CM

## 2021-06-01 DIAGNOSIS — N13.9: ICD-10-CM

## 2021-06-01 DIAGNOSIS — E87.5: ICD-10-CM

## 2021-06-01 DIAGNOSIS — E86.0: ICD-10-CM

## 2021-06-01 DIAGNOSIS — R41.82: ICD-10-CM

## 2021-06-01 DIAGNOSIS — E87.1: ICD-10-CM

## 2021-06-01 LAB
BUN BLD-MCNC: 63 MG/DL (ref 7–18)
GLUCOSE SERPLBLD-MCNC: 160 MG/DL (ref 74–106)
HCT VFR BLD CALC: 28.6 % (ref 36–45)
INR BLD: 1.03
LYMPHOCYTES # SPEC AUTO: 0.9 K/UL (ref 0.7–4.9)
MAGNESIUM SERPL-MCNC: 2.2 MG/DL (ref 1.8–2.4)
PMV BLD: 8.7 FL (ref 7.6–11.3)
POTASSIUM SERPL-SCNC: 5.5 MMOL/L (ref 3.5–5.1)
RBC # BLD: 3.14 M/UL (ref 3.86–4.86)

## 2021-06-01 PROCEDURE — 82550 ASSAY OF CK (CPK): CPT

## 2021-06-01 PROCEDURE — 99285 EMERGENCY DEPT VISIT HI MDM: CPT

## 2021-06-01 PROCEDURE — 83540 ASSAY OF IRON: CPT

## 2021-06-01 PROCEDURE — 85025 COMPLETE CBC W/AUTO DIFF WBC: CPT

## 2021-06-01 PROCEDURE — 84443 ASSAY THYROID STIM HORMONE: CPT

## 2021-06-01 PROCEDURE — 82607 VITAMIN B-12: CPT

## 2021-06-01 PROCEDURE — 70551 MRI BRAIN STEM W/O DYE: CPT

## 2021-06-01 PROCEDURE — 82947 ASSAY GLUCOSE BLOOD QUANT: CPT

## 2021-06-01 PROCEDURE — 70544 MR ANGIOGRAPHY HEAD W/O DYE: CPT

## 2021-06-01 PROCEDURE — 96366 THER/PROPH/DIAG IV INF ADDON: CPT

## 2021-06-01 PROCEDURE — 82570 ASSAY OF URINE CREATININE: CPT

## 2021-06-01 PROCEDURE — 97161 PT EVAL LOW COMPLEX 20 MIN: CPT

## 2021-06-01 PROCEDURE — 92610 EVALUATE SWALLOWING FUNCTION: CPT

## 2021-06-01 PROCEDURE — 70450 CT HEAD/BRAIN W/O DYE: CPT

## 2021-06-01 PROCEDURE — 93005 ELECTROCARDIOGRAM TRACING: CPT

## 2021-06-01 PROCEDURE — 85044 MANUAL RETICULOCYTE COUNT: CPT

## 2021-06-01 PROCEDURE — 82533 TOTAL CORTISOL: CPT

## 2021-06-01 PROCEDURE — 83735 ASSAY OF MAGNESIUM: CPT

## 2021-06-01 PROCEDURE — 96375 TX/PRO/DX INJ NEW DRUG ADDON: CPT

## 2021-06-01 PROCEDURE — 87086 URINE CULTURE/COLONY COUNT: CPT

## 2021-06-01 PROCEDURE — 80048 BASIC METABOLIC PNL TOTAL CA: CPT

## 2021-06-01 PROCEDURE — 85610 PROTHROMBIN TIME: CPT

## 2021-06-01 PROCEDURE — 85730 THROMBOPLASTIN TIME PARTIAL: CPT

## 2021-06-01 PROCEDURE — 51702 INSERT TEMP BLADDER CATH: CPT

## 2021-06-01 PROCEDURE — 80053 COMPREHEN METABOLIC PANEL: CPT

## 2021-06-01 PROCEDURE — 82746 ASSAY OF FOLIC ACID SERUM: CPT

## 2021-06-01 PROCEDURE — 80061 LIPID PANEL: CPT

## 2021-06-01 PROCEDURE — 80069 RENAL FUNCTION PANEL: CPT

## 2021-06-01 PROCEDURE — 81003 URINALYSIS AUTO W/O SCOPE: CPT

## 2021-06-01 PROCEDURE — 76770 US EXAM ABDO BACK WALL COMP: CPT

## 2021-06-01 PROCEDURE — 36415 COLL VENOUS BLD VENIPUNCTURE: CPT

## 2021-06-01 PROCEDURE — 84466 ASSAY OF TRANSFERRIN: CPT

## 2021-06-01 PROCEDURE — 87088 URINE BACTERIA CULTURE: CPT

## 2021-06-01 PROCEDURE — 84156 ASSAY OF PROTEIN URINE: CPT

## 2021-06-01 PROCEDURE — 94640 AIRWAY INHALATION TREATMENT: CPT

## 2021-06-01 PROCEDURE — 96365 THER/PROPH/DIAG IV INF INIT: CPT

## 2021-06-01 PROCEDURE — 93306 TTE W/DOPPLER COMPLETE: CPT

## 2021-06-01 PROCEDURE — 94760 N-INVAS EAR/PLS OXIMETRY 1: CPT

## 2021-06-01 PROCEDURE — 81015 MICROSCOPIC EXAM OF URINE: CPT

## 2021-06-01 PROCEDURE — 82728 ASSAY OF FERRITIN: CPT

## 2021-06-01 PROCEDURE — 84550 ASSAY OF BLOOD/URIC ACID: CPT

## 2021-06-01 PROCEDURE — 71045 X-RAY EXAM CHEST 1 VIEW: CPT

## 2021-06-01 NOTE — RAD REPORT
EXAM DESCRIPTION:  CT - Ct Stroke Brain Wo Cont - 6/1/2021 8:51 pm

 

CLINICAL HISTORY:  SLURRED SPEECH, stroke protocol

 

COMPARISON:  CT head November 2019

 

TECHNIQUE:  Axial 5 millimeter thick images of the head were obtained without IV contrast.

 

All CT scans are performed using dose optimization technique as appropriate and may include automated
 exposure control or mA/KV adjustment according to patient size.

 

FINDINGS:  No intracranial hemorrhage, mass, or cerebral edema. No acute cortical based infarction id
entified. No cortical edema or sulcal effacement. Moderate atrophy changes match comparison. Scattere
d chronic ischemic changes are present. Focal old ischemic insult seen along the anterior limb intern
al capsule on the left. Brainstem chronic ischemic change present. No extra-axial fluid collections. 
 Gray matter-white matter differentiation is preserved.

 

 

Visualized portions of the mastoid air cells, paranasal sinuses, and orbits are unremarkable.

 

Findings telephoned to Dr. Murillo 8:52 p.m.

 

IMPRESSION:  No CT evidence of acute intracranial process.

 

The patient has atrophy, chronic ischemic change and old infarction changes match 2019 imaging.

 

 Chronic ischemic changes can mask nonhemorrhagic acute infarction. MR brain followup can be obtained
 if there is ongoing concern for acute ischemia.

## 2021-06-01 NOTE — XMS REPORT
Continuity of Care Document

                             Created on:2021



Patient:PAKO ENG

Sex:Female

:1940

External Reference #:[id number]





Demographics







                          Address                   [address] [place] [address]

 

                          Home Phone                [phone]

 

                          Mobile Phone              [phone]

 

                          Email Address             [email or web address]

 

                          Preferred Language        English

 

                          Marital Status            Unknown

 

                          Yarsanism Affiliation     Unknown

 

                          Race                      Unknown

 

                          Additional Race(s)        Unavailable



                                                    White

 

                          Ethnic Group              Unknown









Author







                          Organization              [facility]

t

 

                          Address                   [address]



                                                    [place] [address]

 

                          Phone                     [phone]









Support







                Name            Relationship    Address         Phone

 

                Solo Navas           Unavailable     [phone]









Care Team Providers







                    Name                Role                Phone

 

                    Juan Carlos Dennis DO Primary Care Physician [phone]

 

                    Dave MONTANA            Attending Clinician Unavailable

 

                    Oleksandr ANDERSON            Attending Clinician Unavailable

 

                    Juan Carlos Dennis DO Attending Clinician [phone]

 

                    Laura BERRY             Attending Clinician [phone]









Payers







           Payer Name Policy Type Policy Number Effective Date Expiration Date S

marcell

 

           [id number]            [id number]            [place]



           [id number]                       00:00:00              [employer]



           /2004-[employer]en                                             



           [employer]                                              







Problems







       Condition Condition Condition Status Onset  Resolution Last   Treating Co

mments 

Source



       Name   Details Category        Date   Date   Treatment Clinician        



                                                 Date                 

 

       CKD    CKD    Disease Active                              [place]



       (chronic (chronic                                              Method

i



       kidney kidney               00:00:                             st



       disease) disease)               00                                 



       stage 4, stage 4,                                                  



       GFR 15-29 GFR 15-29                                                  



       ml/min ml/min                                                  

 

       Hyperkalem Hyperkalem Disease Active                              [facility]

[facility]



       ia     ia                                                  [facility]



                                   00:00:                             st



                                   00                                 

 

       Anemia due Anemia due Disease Active                              [facility]

[place]



       to stage 4 to stage 4                                              Me

thodi



       chronic chronic               00:00:                             st



       kidney kidney               00                                 



       disease disease                                                  

 

       Recurrent Recurrent Disease Active                              [facility]

[place]



       UTI    UTI                                                 [facility]



                                   00:00:                             st



                                   00                                 

 

       Cachexia Cachexia Disease Active                              [place]

[facility]



                                                                  [facility]



                                   00:00:                             st



                                   00                                 

 

       Venous Venous Disease Active                              [place]



       stasis stasis                                              [facility]



       dermatitis dermatitis               00:00:                             st



       of left of left               00                                 



       lower  lower                                                   



       extremity extremity                                                  

 

       Hydronephr Hydronephr Disease Active 2019                             [facility]

[place]



       osis with osis with               1-19                               [facility]

gabriella



       ureteral ureteral               00:00:                             st



       stricture, stricture,               00                                 



       not    not                                                     



       elsewhere elsewhere                                                  



       classified classified                                                  

 

       Sepsis Sepsis Disease Active 2019                             [place]



       without without               1-19                               [facility]



       acute  acute                00:00:                             st



       organ  organ                00                                 



       dysfunctio dysfunctio                                                  



       n      n                                                       

 

       Acute  Acute  Disease Active 2019                             [place]



       renal  renal                1-19                               [facility]



       failure failure               00:00:                             st



                                   00                                 

 

       Ingrown Ingrown Disease Active 2019                             [place]



       toenail toenail               0-01                               [facility]



                                   00:00:                             st



                                   00                                 

 

       Urine  Urine  Disease Active 2019                             [place]



       retention retention               0-01                               [facility]

gabriella



                                   00:00:                             st



                                   00                                 

 

       Anemia Anemia Disease Active                              [place]



                                   8-02                               [facility]



                                   00:00:                             st



                                   00                                 

 

       Does not Does not Disease Active                              [place]

[facility]



       transfer transfer                                              Method

i



       from bed from bed               00:00:                             st



       to     to                   00                                 



       wheelchair wheelchair                                                  

 

       Hallucinat Hallucinat Disease Active                              [facility]

[place]



       ions   ions                                                [facility]



                                   00:00:                             st



                                   00                                 

 

       Tremor of Tremor of Disease Active                              [facility]

[place]



       both hands both hands                                              Me

thodi



                                   00:00:                             st



                                   00                                 

 

       Administra Administra Disease Active 2017                             [facility]

[place]



       tive   tive                                                [facility]



       encounter encounter               00:00:                             st



                                   00                                 

 

       Fall   Fall   Disease Active                              [place]



                                                                  Method[facility]



                                   00:00:                             st



                                   00                                 

 

       Leg    Leg    Disease Active                              [place]



       injuries injuries                                              Method

i



                                   00:00:                             st



                                   00                                 

 

       Abrasion Abrasion Disease Active                              [place]

[facility]



                                                                  [facility]



                                   00:00:                             st



                                   00                                 

 

       Cellulitis Cellulitis Disease Active                              [facility]

[place]



       of left of left               -20                               [facility]



       lower  lower                00:00:                             st



       extremity extremity               00                                 

 

       Acute  Acute  Disease Active                              [place]



       cystitis cystitis               5                               Method

i



       without without               00:00:                             st



       hematuria hematuria               00                                 

 

       Urinary Urinary Disease Active                              [place]



       tract  tract                5-                               [facility]



       infection infection               00:00:                             st



       associated associated               00                                 



       with   with                                                    



       catheteriz catheteriz                                                  



       ation of ation of                                                  



       urinary urinary                                                  



       tract  tract                                                   

 

       Anxiety Anxiety Disease Active                              [place]



                                   2                               [facility]



                                   00:00:                             st



                                   00                                 

 

       Arthritis Arthritis Disease Active                              [facility]

[facility]



       of knee of knee               -                               [facility]



                                   00:00:                             st



                                   00                                 

 

       Dysuria Dysuria Disease Active                              [place]



                                                                  [facility]



                                   00:00:                             st



                                   00                                 

 

       Edema of Edema of Disease Active                              [place]

[facility]



       lower  lower                                               [facility]



       extremity extremity               00:00:                             st



                                   00                                 

 

       Hip pain Hip pain Disease Active                              [place]

[facility]



                                                                  [facility]



                                   00:00:                             st



                                   00                                 

 

       Hypertensi Hypertensi Disease Active                              [facility]

[place]



       on     on                                                  [facility]



                                   00:00:                             st



                                   00                                 

 

       Knee pain Knee pain Disease Active                              [facility]

[place]



                                                                  [facility]



                                   00:00:                             st



                                   00                                 

 

       Neuropathy Neuropathy Disease Active                              [facility]

[place]



                                                                  [facility]



                                   00:00:                             st



                                   00                                 

 

       Polyuria Polyuria Disease Active                              [place]

[facility]



                                                                  [facility]



                                   00:00:                             st



                                   00                                 

 

       Toothache Toothache Disease Active                              [facility]

[place]



                                                                  [facility]



                                   00:00:                             st



                                   00                                 

 

       Type 2 Type 2 Disease Active                              [place]



       diabetes diabetes                                              Method

i



       mellitus mellitus               00:00:                             st



       with stage with stage               00                                 



       3 chronic 3 chronic                                                  



       kidney kidney                                                  



       disease disease                                                  

 

       Unsteady Unsteady Disease Active                              [place]

[facility]



       gait   gait                                                [facility]



                                   00:00:                             st



                                   00                                 

 

       Vitamin D Vitamin D Disease Active                              [facility]

[place]



       deficiency deficiency                                              Me

thodi



                                   00:00:                             st



                                   00                                 

 

       Murmur Murmur Disease Active                       Overview: [place]

n



                                   2-06                        Formattin Methodi



                                   00:00:                      g of this                           note   



                                                               might be 



                                                               different 



                                                               from the 



                                                               original. 



                                                               "aortic 



                                                               aneurysm" 







Allergies, Adverse Reactions, Alerts







       Allergy Allergy Status Severity Reaction(s) Onset  Inactive Treating Comm

ents 

Source



       Name   Type                        Date   Date   Clinician        

 

       Levoflox Propensi Active                              Other  [place]

n



       acin   ty to                       6-19                 reaction( [facility]



              adverse                      00:00:               s):    st



              reaction                      00                   Anaphylax 



              s to                                             is     



              drug                                                    







Family History







           Family Member Diagnosis  Comments   Start Date Stop Date  Source

 

           Natural father Heart disease                                  [facility]

 

           Natural father Hypertension                                  [facility] 

[employer]

 

           Maternal grandmother Diabetes                                    [facility]

[place] [facility]







Social History







           Social Habit Start Date Stop Date  Quantity   Comments   Source

 

           Exposure to                       Not sure              [place] [facility]

dist



           SARS-CoV-2                                             



           (event)                                                

 

           Tobacco use and 2021 Never used            [facility]

ethodist



           exposure   00:00:00   00:00:00                         

 

           Alcohol intake 2021 Current               [facility] Me

thodist



                      00:00:00   00:00:00   non-drinker of            



                                            alcohol               



                                            (finding)             

 

           Sex Assigned At 1940                       [facility] BLACKWELL

ethodist



           Birth      00:00:00   00:00:00                         









                Smoking Status  Start Date      Stop Date       Source

 

                Never smoker                                    [facility]

t







Medications







       Ordered Filled Start  Stop   Current Ordering Indication Dosage Frequency

 Signature

                    Comments            Components          Source



     Medication Medication Date Date Medication? Clinician                (SIG) 

          



     Name Name                                                   

 

     INSULIN            Yes                      Inject           [facility]



     SUBCUTANEOU      5-28                               under the           Met

[facility]



     S PUMP,      09:25:                               skin           st



     NOVOLOG,SANAM      13                                 continuous           



     SP, 100                                         ly.            



     UNIT/ML                                         Sliding           



     INSULIN                                         scale           



     PUMP                                                        



     INFUSION                                                        



     (NovoLOG)                                                        

 

     docusate            Yes            100mg QD   Take 100           [place]

[place]



     sodium 100      5-28                               mg by           [facility]



     mg capsule      09:25:                               mouth           st



               13                                 daily.           

 

     tamsulosin            Yes            .4mg QD   Take 0.4           [facility]

[facility]



     (FLOMAX)      5-28                               mg by           [facility]



     0.4 mg      09:25:                               mouth           st



     capsule      13                                 daily with           



                                                  dinner.           

 

     thiamine            Yes            100mg Q.5D Take 100           [place]

[place]



     mononitrate      5-28                               mg by           [facility]



     , vit B1,      09:25:                               mouth 2           st



     (B-1) 100      13                                 (two)           



     mg tablet                                         times a           



                                                  day.           

 

     cyanocobala            Yes                      Take by           [facility]erman



     min,      5-28                               mouth. 1           [facility]



     vitamin      09:25:                               capsule po           st



     B-12, 5,000      13                                 Qd             



     mcg capsule                                                        

 

     insulin            Yes                      Inject           [facility]



     ASPART      -                               under the           Methodi



     (NovoLOG)      13:57:                               skin.           st



     100 unit/mL      42                                 Sliding           



     (3 mL)                                         scale           



     insulin pen                                                        

 

     lancets       Yes                      Check 4           [place]

[place]



     gauge misc      5-27                               times           Methodi



               00:00:                               daily           st



               00                                                

 

     pen needle,            Yes                      Use once           [facility]

[place]



     diabetic      5-27                               daily           Methodi



     (BD       00:00:                                              st



     Ultra-Fine      00                                                



     Short Pen                                                        



     Needle) 31                                                        



     gauge x                                                        



     5/16"                                                        



     needle                                                        

 

     insulin            Yes            4U   Q.5D Inject 4           [place]

n



     aspart,      5-27                               Units           Method[facility]



     niacinamide      00:00:                               under the           s

t



     , (Fiasp      00                                 skin 2           



     Penfill                                         (two)           



     U-100                                         times a           



     Insulin)                                         day.           



     100 unit/mL                                         Sliding           



     (3 mL)                                         scale.           



     cartridge                                                        

 

     atorvastati            Yes                      Take 1           [place]

[place]



     n (LIPITOR)                                     tablet by           Met

rafaela



     10 mg      00:00:                               mouth           st



     tablet      00                                 daily           

 

     acetaminoph      2021- Yes                      Take 1           [facility]

[facility]



     en-codeine       07-26                          tablet by           [facility]



     (TYLENOL      00:00: 23:59                          mouth           st



     WITH      00   :00                           every 8           



     CODEINE #3)                                         hours as           



     300-30 mg                                         needed for           



     per tablet                                         moderate           



                                                  pain           

 

     predniSONE      2021- No             10mg Q.5D Take 10 mg           

[place]



     (DELTASONE)                                by mouth 2           M

ethodi



     10 mg      15:13: 00:00                          (two)           st



     tablet      31   :00                           times a           



                                                  day.           

 

     INSULIN      2021- No                       Inject           [facility]



     SUBCUTANEOU                                under the           Me

[employer]



     S PUMP,      15:13: 00:00                          skin           st



     NOVOLOG,SANAM      13   :00                           continuous           



     SP, 100                                         ly.            



     UNIT/ML                                         Sliding           



     INSULIN                                         scale           



     PUMP                                                        



     INFUSION                                                        



     (NovoLOG)                                                        

 

     gabapentin            Yes            400mg Q.[id number] Take 1         

  [facility]



     (NEURONTIN)                                [id number] capsule          

 [facility]



     400 mg      00:00:                          3D   (400 mg           st



     capsule      00                                 total) by           



                                                  mouth 3           



                                                  (three)           



                                                  times a           



                                                  day.           

 

     hydrALAZINE            Yes                      Two P.O.           [facility]



     (APRESOLINE                                     T.I.D.           Method

i



     ) 10 MG      00:00:                                              st



     tablet      00                                                

 

     carvediloL            Yes            6.25mg Q.5D Take 1           [facility] zimmerman



     (COREG)                                     tablet           [facility]



     6.25 MG      00:00:                               (6.25 mg           st



     tablet      00                                 total) by           



                                                  mouth 2           



                                                  (two)           



                                                  times a           



                                                  day with           



                                                  meals.           

 

     ciprofloxac      2021- No             500mg Q.5D Take 1           [facility] stewart



     in (Cipro)                                tablet           Method

i



     500 MG      00:00: 23:59                          (500 mg           st



     tablet      00   :00                           total) by           



                                                  mouth 2           



                                                  (two)           



                                                  times a           



                                                  day for 3           



                                                  days.           

 

     ciprofloxac      2021- No             500mg Q.5D Take 1           [facility] stewart



     in (Cipro)                                tablet           Method

i



     500 MG      00:00: 00:00                          (500 mg           st



     tablet      00   :00                           total) by           



                                                  mouth 2           



                                                  (two)           



                                                  times a           



                                                  day for 3           



                                                  days.           

 

     fluconazole      2021- No             150mg      Take 1           [facility]



     (Diflucan)                                tablet           Method

i



     150 MG      00:00: 00:00                          (150 mg           st



     tablet      00   :00                           total) by           



                                                  mouth once           



                                                  for 1           



                                                  dose.           

 

     ciprofloxac       No             500mg Q.5D Take 1           [facility]illip stewart



     in (Cipro)                                tablet           Method

i



     500 MG      00:00: 00:00                          (500 mg           st



     tablet      00   :00                           total) by           



                                                  mouth 2           



                                                  (two)           



                                                  times a           



                                                  day for 3           



                                                  days.           

 

     fluconazole      2021- No             150mg      Take 1           Phillip stewart



     (Diflucan)                                tablet           Method

i



     150 MG      00:00: 23:59                          (150 mg           st



     tablet      00   :00                           total) by           



                                                  mouth once           



                                                  for 1           



                                                  dose.           

 

     nitrofurant       No             100mg Q.5D Take 1           [facility] stew[facility]



     oin,      3-30 04-06                          capsule           [facility]



     macrocrysta      00:00: 23:59                          (100 mg           st



     l-monohydra      00   :00                           total) by           



     te,                                          mouth 2           



     (Macrobid)                                         (two)           



     100 MG                                         times a           



     capsule                                         day for 7           



                                                  days.           

 

     fluconazole       No             150mg      Take 1           [facility]ill[facility] stewart



     (Diflucan)                                tablet           Method

i



     150 MG      00:00: 23:59                          (150 mg           st



     tablet      00   :00                           total) by           



                                                  mouth once           



                                                  for 1           



                                                  dose.           

 

     hydrALAZINE      2021- No                       Two P.O.           H

david



     (APRESOLINE                                T.I.D.           [facility]

di



     ) 10 MG      00:00: 00:00                                         st



     tablet      00   :00                                          

 

     insulin       No                       Inject           [facility]



     aspart,                                under the           Method

i



     niacinamide      14:31: 00:00                          skin.           st



     , (Fiasp      43   :00                           Sliding           



     Penfill                                         scale.           



     U-100                                                        



     Insulin)                                                        



     100 unit/mL                                                        



     (3 mL)                                                        



     cartridge                                                        

 

     insulin      2021- No             4U   Q.5D Inject 4           [facility]

[facility]



     aspart,       05-27                          Units           [facility]



     niacinamide      00:00: 00:00                          under the           

st



     , (Fiasp      00   :00                           skin 2           



     Penfill                                         (two)           



     U-100                                         times a           



     Insulin)                                         day.           



     100 unit/mL                                         Sliding           



     (3 mL)                                         scale.           



     cartridge                                                        

 

     metFORMIN      2021- No             500mg Q.[id number] Take 1         

  [facility]



     (GLUCOPHAGE                           [id number] tablet          

 [facility]



     ) 500 mg      00:00: 00:00                     3D   (500 mg           st



     tablet      00   :00                           total) by           



                                                  mouth 3           



                                                  (three)           



                                                  times a           



                                                  day.           

 

     acetaminoph      2021- No        chronic 1{tbl} Q8H  Take 1         

  [facility]



     en-codeine                 pain           tablet by           [facility]



     (TYLENOL      00:00: 00:00                          mouth           st



     WITH      00   :00                           every 8           



     CODEINE #3)                                         (eight)           



     300-30 mg                                         hours as           



     per tablet                                         needed for           



                                                  moderate           



                                                  pain for           



                                                  up to 30           



                                                  days           



                                                  .chronic           



                                                  pain.           

 

     atorvastati      2021- No             10mg QD   Take 1           [facility]

erasmo chowdary (LIPITOR)                                tablet (10           M

ethodi



     10 mg      00:00: 00:00                          mg total)           st



     tablet      00   :00                           by mouth           



                                                  daily.           

 

     carvediloL      2021- No             6.25mg Q.5D Take 1           [facility]

terry



     (COREG)                                tablet           [facility]



     6.25 MG      00:00: 00:00                          (6.25 mg           st



     tablet      00   :00                           total) by           



                                                  mouth 2           



                                                  (two)           



                                                  times a           



                                                  day with           



                                                  meals.           

 

     DULoxetine      2021- No             20mg QD   Take 1           [facility]

[place]



     (CYMBALTA)                                capsule           [facility]

di



     20 MG      00:00: 00:00                          (20 mg           st



     capsule      00   :00                           total) by           



                                                  mouth           



                                                  daily.           

 

     gabapentin      2021- No             400mg Q.[id number] Take 1        

   [facility]



     (NEURONTIN)                           [id number] capsule         

  [facility]



     400 mg      00:00: 00:00                     3D   (400 mg           st



     capsule      00   :00                           total) by           



                                                  mouth 3           



                                                  (three)           



                                                  times a           



                                                  day.           

 

     hydrALAZINE      2021- No             100mg Q.[id number] Take 10      

     [facility]



     (APRESOLINE                           [id number] tablets         

  [facility]



     ) 10 MG      00:00: 00:00                     3D   (100 mg           st



     tablet      00   :00                           total) by           



                                                  mouth 3           



                                                  (three)           



                                                  times a           



                                                  day.           

 

     nitrofurant      2020 No        Urinary 100mg Q.5D Take 1          

 [facility] monroe,      0-04           tract           capsule           [facility]



     macrocrysta      00:00: 23:59           infection           (100 mg        

   st



     l-monohydra      00   :00            without           total) by           



     te,                           hematuria,           mouth 2           



     (Macrobid)                          site           (two)           



     100 MG                          unspecified           times a           



     capsule                                         day for 7           



                                                  days.           

 

     gabapentin      2020- No                       Take 1           [facility]

[facility]



     (NEURONTIN)                                capsule by           RISHI gray



     400 mg      00:00: 00:00                          mouth           st



     capsule      00   :00                           three           



                                                  times           



                                                  daily           

 

     hydrALAZINE      2020- No                       Take 2           [facility]

[facility]



     (APRESOLINE                                tablets by           RISHI gray



     ) 10 MG      00:00: 00:00                          mouth           st



     tablet      00   :00                           three           



                                                  times           



                                                  daily           

 

     atorvastati      2020- No                       Take 1           [facility]

[facility]



     n (LIPITOR)                                tablet by           Milka orozco



     10 mg      00:00: 00:00                          mouth           st



     tablet      00   :00                           daily           

 

     DULoxetine      2020- No                       Take 1           [facility]

[facility]



     (CYMBALTA)                                capsule by           Mil[facility] orozco



     20 MG      00:00: 00:00                          mouth           st



     capsule      00   :00                           daily           

 

     metFORMIN      2020- No                       Take 1           [facility]

[facility]



     (GLUCOPHAGE                                tablet by           Milka orozco



     ) 500 mg      00:00: 00:00                          mouth           st



     tablet      00   :00                           three           



                                                  times           



                                                  daily           

 

     carvediloL      2020- No                       Take 1           [facility]

[facility]



     (COREG)                                tablet by           Method

i



     6.25 MG      00:00: 00:00                          mouth           st



     tablet      00   :00                           twice           



                                                  daily with           



                                                  meals           

 

     acetaminoph      2020- No                       Take 1           [facility]

[facility]



     en-codeine                                tablet by           [facility]



     (TYLENOL      00:00: 00:00                          mouth           st



     WITH      00   :00                           every 8           



     CODEINE #3)                                         hours as           



     300-30 mg                                         needed for           



     per tablet                                         moderate           



                                                  pain           

 

     nitrofurant      2020- No             100mg Q.5D Take 1           [facility]illip monroe,      6 07-06                          capsule           [facility]



     macrocrysta      00:00: 23:59                          (100 mg           st



     l-monohydra      00   :00                           total) by           



     te,                                          mouth 2           



     (Macrobid)                                         (two)           



     100 MG                                         times a           



     capsule                                         day for 7           



                                                  days.           

 

     hydrALAZINE      2020- No                       Take 2           [facility]

[facility]



     (APRESOLINE      6-23 10-22                          tablets by           RISHI gray



     ) 10 MG      00:00: 00:00                          mouth           st



     tablet      00   :00                           three           



                                                  times           



                                                  daily           

 

     atorvastati      2020- No                       Take 1           [facility]

[facility]



     n (LIPITOR)      6-23 10-22                          tablet by           [facility]



     10 mg      00:00: 00:00                          mouth           st



     tablet      00   :00                           daily           

 

     DULoxetine      2020- No                       Take 1           [facility]

[facility]



     (CYMBALTA)      6-23 10-22                          capsule by           Me

ernesto



     20 MG      00:00: 00:00                          mouth           st



     capsule      00   :00                           daily           

 

     gabapentin      2020- No                       Take 1           [facility]

[facility]



     (NEURONTIN)      6-23 10-22                          capsule by           RISHI gray



     400 mg      00:00: 00:00                          mouth           st



     capsule      00   :00                           three           



                                                  times           



                                                  daily           

 

     metFORMIN      2020- No                       Take 1           [facility]

[facility]



     (GLUCOPHAGE      6-23 10-22                          tablet by           [facility]



     ) 500 mg      00:00: 00:00                          mouth           st



     tablet      00   :00                           three           



                                                  times           



                                                  daily           

 

     carvediloL      2020- No                       Take 1           [facility]

[facility]



     (COREG)      6-23 10-22                          tablet by           [facility]

i



     6.25 MG      00:00: 00:00                          mouth           st



     tablet      00   :00                           twice           



                                                  daily with           



                                                  meals           

 

     acetaminoph      2020- No                       Take 1           [facility]

[facility]



     en-codeine                                tablet by           [facility]



     (TYLENOL      00:00: 23:59                          mouth           st



     WITH      00   :00                           every 8           



     CODEINE #3)                                         hours as           



     300-30 mg                                         needed for           



     per tablet                                         moderate           



                                                  pain           

 

     hydrALAZINE      2020- No                       Take 2           [facility]

[facility]



     (APRESOLINE      3-24 06-23                          tablets by           RISHI gray



     ) 10 MG      00:00: 00:00                          mouth           st



     tablet      00   :00                           three           



                                                  times           



                                                  daily           

 

     atorvastati      2020- No                       Take 1           [facility]

[facility]



     n (LIPITOR)      3-24 06-23                          tablet by           Me

ernesto



     10 MG      00:00: 00:00                          mouth           st



     tablet      00   :00                           daily           

 

     DULoxetine      2020- No                       Take 1           [facility]

[facility]



     (CYMBALTA)      3-24 06-23                          capsule by           Milka orozco



     20 MG      00:00: 00:00                          mouth           st



     capsule      00   :00                           daily           

 

     gabapentin      2020- No                       Take 1           [facility]

[facility]



     (NEURONTIN)      3-24 06-23                          capsule by           RISHI gray



     400 mg      00:00: 00:00                          mouth           st



     capsule      00   :00                           three           



                                                  times           



                                                  daily           

 

     metFORMIN      2020- No                       Take 1           [facility]

[facility]



     (GLUCOPHAGE      3-24 06-23                          tablet by           Milka orozco



     ) 500 mg      00:00: 00:00                          mouth           st



     tablet      00   :00                           three           



                                                  times           



                                                  daily           

 

     carvediloL      2020- No                       Take 1           [facility]

[facility]



     (COREG)      3-24 06-23                          tablet by           [facility]

i



     6.25 MG      00:00: 00:00                          mouth           st



     tablet      00   :00                           twice           



                                                  daily with           



                                                  meals           

 

     acetaminoph      2020- No                       Take 1           [facility]

[facility]



     en-codeine      3-24 06-23                          tablet by           [facility]



     (TYLENOL      00:00: 00:00                          mouth           st



     WITH      00   :00                           every 8           



     CODEINE #3)                                         hours as           



     300-30 mg                                         needed for           



     per tablet                                         moderate           



                                                  pain           

 

     furosemide      2019- No             40mg Q.5D Take 1           [facility]

[facility]



     (LASIX) 40      0-01 09-30                          tablet (40           Me

[facility]odi



     mg tablet      00:00: 23:59                          mg total)           st



               00   :00                           by mouth 2           



                                                  (two)           



                                                  times a           



                                                  day.           

 

     hydrocolloi            Yes            1{packa Q.5D Apply 1           

[facility]



     d dressing      5-20                     ge}       Package           [facility]

i



     (DUODERM      00:00:                               topically           st



     CGF BORDER      00                                 2 (two)           



     DRESSING) 4                                         times a           



     X 4 "                                         day.           



     bandage                                                        

 

     soft lens            Yes            20mL QD   20 mL           [facility]



     rinse,store      4-25                               daily.           [facility]

i



     solution      00:00:                                              st



     (SALINE      00                                                



     SOLUTION)                                                        



     solution                                                        

 

     insulin      2021- No             30U  QD   Inject 30           [facility]

[facility]



     glargine-li      25 04-21                          Units           [facility]

i



     xisenatide      00:00: 00:00                          under the           s

t



     (SOLIQUA      00   :00                           skin           



     100/33) 100                                         daily.           



     unit-33                                                        



     mcg/mL                                                        



     insulin pen                                                        

 

     pen needle,      2021- No                       Use once           [facility]

[place]



     diabetic      7-18 05-27                          daily           [facility]



     (BD INSULIN      00:00: 00:00                                         st



     PEN NEEDLE      00   :00                                          



     UF SHORT)                                                        



     31 gauge x                                                        



     5/16"                                                        



     needle                                                        

 

     blood sugar            Yes                      Check four           

[place]



     diagnostic      2-07                               strips           [facility]



     strips      00:00:                               daily           st



     (FREESTYLE      00                                                



     LITE                                                        



     STRIPS)                                                        



     strip test                                                        



     strips                                                        

 

     lancets 28      2021- No                       Check 4           [facility]

[place]



     gauge misc      2-07                           times           [facility]



               00:00: 00:00                          daily           st



               00   :00                                          







Immunizations







           Ordered Immunization Filled Immunization Date       Status     Commen

ts   Source



           Name       Name                                        

 

           Pneumococcal            2020 Completed             [facility]



           Conjugate 13-Valent            00:00:00                         [facility]

dist

 

           Influenza (IM)            2019 Completed             [facility]



           Preservative Free            00:00:00                         [facility]

st

 

           Pneumococcal            2019 Completed             [facility]



           Polysaccharide            00:00:00                         [facility]

 

           Influenza (IM)            2017-10-01 Completed             [facility]



           Preservative Free            00:00:00                         [facility]

st

 

           FLUZONE HIGH-DOSE PF            2016-10-06 Completed             [facility]

[place]



                                 00:00:00                         [facility]

 

           FLUZONE HIGH-DOSE PF            2015-10-15 Completed             [facility]

[place]



                                 00:00:00                         [facility]







Vital Signs







             Vital Name   Observation Time Observation Value Comments     Source

 

             Systolic blood 2021 13:47:00 135 mm[Hg]                [place]

n [facility]



             pressure                                            

 

             Diastolic blood 2021 13:47:00 79 mm[Hg]                 [place]

[facility]



             pressure                                            

 

             Heart rate   2021 13:47:00 73 /min                   [facility]

 

             Body temperature 2021 13:47:00 36.78 Meredith                 [facility]

 

             Body height  2021 13:47:00 160 cm                    [facility]

 

             Oxygen saturation in 2021 13:47:00 97 /min                   

[facility] Ley



             Arterial blood by                                        



             Pulse oximetry                                        

 

             Respiratory rate 2021 15:20:00 24 /min                   [facility] Ley

 

             Body weight  2021 13:34:00 95.255 kg                 [facility] Ley

 

             BMI          2021 13:34:00 37.20 kg/m2               [facility] Ley







Procedures







                Procedure       Date / Time Performed Performing Clinician Sour

e

 

                HEMOGLOBIN A1C  2021 16:11:00 MELLO Dennis

 

                COMPREHENSIVE METABOLIC 2021 16:11:00 MELLO Dennis



                PANEL                                           

 

                CBC WITH PLATELET AND 2021 16:11:00 MELLO Dennis



                DIFFERENTIAL                                    

 

                LIPID PANEL     2021 16:11:00 MELLO Dennis

 

                URINE CULTURE   2020 13:33:00 MELLO Dennis

 

                URINALYSIS, MICRO UA 2020 13:33:00 MELLO Dennis

[place] [facility]



                SCREEN WITH MICROSCOPY                                 







Plan of Care







             Planned Activity Planned Date Details      Comments     Source

 

             Future Scheduled 2022   DIABETIC FOOT EXAM              [place]

[facility]



             Test         00:00:00     [code = DIABETIC              



                                       FOOT EXAM]                

 

             Future Scheduled 2021   INFLUENZA VACCINE              [place]

n [facility]



             Test         00:00:00     [code = INFLUENZA              



                                       VACCINE]                  

 

             Future Scheduled 1989   SHINGLES VACCINES              [place]

n [facility]



             Test         00:00:00     (#1) [code =              



                                       SHINGLES VACCINES              



                                       (#1)]                     

 

             Future Scheduled 1951   COVID-19 VACCINE (1)              [place]

[facility]



             Test         00:00:00     [code = COVID-19              



                                       VACCINE (1)]              

 

             Future Scheduled 1949   DIABETES: RETINAL              [place]

n [facility]



             Test         00:00:00     EYE EXAM [code =              



                                       DIABETES: RETINAL              



                                       EYE EXAM]                 







Encounters







        Start   End     Encounter Admission Attending Care    Care    Encounter 

Source



        Date/Time Date/Time Type    Type    Clinicians Facility Department ID   

   

 

        2021 Outpatient         [facility]     210

[id number] [place]



        00:00:00 00:00:00                 R, R.                   937     [facility]

i



                                                                        st

 

        2021 Outpatient         [facility]ITT[facility]     210

[id number] [place]



        00:00:00 00:00:00                 R, R.                   970     [facility]

i



                                                                        st

 

        2021 Outpatient         [facility]     210

[id number] [place]



        00:00:00 00:00:00                 R, R.                   861     [facility]

i



                                                                        st

 

        2020 Outpatient         [facility]     210

[id number] [place]



        00:00:00 00:00:00                 R, R                    757     [facility]

i



                                                                        st







Results







           Test Description Test Time  Test Comments Results    Result Comments 

Source









                    Comprehensive metabolic panel 2021 05:14:00 









                      Test Item  Value      Reference Range Interpretation Comme

nts









             Glucose (test code = 105 mg/dL                               

 Non-fasting



             2345-7)                                             reference inter

sowmya

 

             BUN (test code = 3094-0) 40 mg/dL     7-25         H            

 

             Creatinine (test code = 1.31 mg/dL   0.60-0.88    H            For 

patients >49 years of



             2160-0)                                             age, the refere

nce



                                                                 limitfor Creati

nine is



                                                                 approximately 1

3% higher



                                                                 for peopleident

ified as



                                                                 -Jacqui

n.

 

             EGFR [employer] 38           See_Comment  L             [Aut

omated message] The



             (test code = 2775)                                        system wh

ich generated



                                                                 this result tra

nsmitted



                                                                 reference range

: > OR =



                                                                 60 mL/min/1.73m

2. The



                                                                 reference range

 was not



                                                                 used to interpr

et this



                                                                 result as



                                                                 normal/abnormal

.

 

             EGFR  44           See_Comment  L             [Auto

mated message] The



             (test code = 36038-9)                                        system

 which generated



                                                                 this result tra

nsmitted



                                                                 reference range

: > OR =



                                                                 60 mL/min/1.73m

2. The



                                                                 reference range

 was not



                                                                 used to interpr

et this



                                                                 result as



                                                                 normal/abnormal

.

 

             BUN/creatinine ratio 31           See_Comment  H             [Autom

ated message] The



             (test code = 3097-3)                                        system 

which generated



                                                                 this result tra

nsmitted



                                                                 reference range

: 6 - 22



                                                                 (calc). The ref

erence



                                                                 range was not u

sed to



                                                                 interpret this 

result as



                                                                 normal/abnormal

.

 

             Sodium (test code = 143 mmol/L   135-146                   



             2951-2)                                             

 

             Potassium (test code = 5.4 mmol/L   3.5-5.3      H            



             2823-3)                                             

 

             Chloride (test code = 109 mmol/L                       



             5-0)                                             

 

             CO2 (test code = -9) 22 mmol/L    20-32                     

 

             Calcium (test code = 9.4 mg/dL    8.6-10.4                  



             70522-9)                                            

 

             Protein (test code = 6.7 g/dL     6.1-8.1                   



             2885-2)                                             

 

             Albumin, S (test code = 3.8 g/dL     3.6-5.1                   



             1751-7)                                             

 

             Globulin, total (test 2.9          See_Comment                [Auto

mated message] The



             code = 17149-7)                                        system which

 generated



                                                                 this result tra

nsmitted



                                                                 reference range

: 1.9 -



                                                                 3.7 g/dL (calc)

. The



                                                                 reference range

 was not



                                                                 used to interpr

et this



                                                                 result as



                                                                 normal/abnormal

.

 

             Albumin/globulin ratio 1.3          See_Comment                [Aut

omated message] The



             (test code = 1759-0)                                        system 

which generated



                                                                 this result tra

nsmitted



                                                                 reference range

: 1.0 -



                                                                 2.5 (calc). The

 reference



                                                                 range was not u

sed to



                                                                 interpret this 

result as



                                                                 normal/abnormal

.

 

             Total bilirubin (test 0.3 mg/dL    0.2-1.2                   



             code = 1975-2)                                        

 

             Alkaline phosphatase 61 U/L                           



             (test code = 6768-6)                                        

 

             AST (test code = 1920-8) 9 U/L        10-35        L            

 

             ALT (test code = 1742-6) 7 U/L        6-29                      

 

             ELADIO (test code = ELADIO) FASTING:NOFASTING: NO                        

   

 

             RAC (test code = RAC) Performing                             



                          Organization                           



                          Information:    Site                           



                          ID: [id number]    Name: [facility]                           



                          Lab    Address: [address] [place] [id number]                           



                             Director: Viet Sosa                           

 

             Lab Interpretation (test Abnormal                               



             code = 54078-5)                                        



[place] [facility] [id number] 05:14:00





             Test Item    Value        Reference Range Interpretation Comments

 

             Cholesterol, total 113 mg/dL    <200                      



             (test code = 2093-3)                                        

 

             HDL cholesterol 46 mg/dL     See_Comment  L             [Automated



             (test code = -9)                                        message

] The



                                                                 system which



                                                                 generated this



                                                                 result



                                                                 transmitted



                                                                 reference range

:



                                                                 > OR = 50. The



                                                                 reference range



                                                                 was not used to



                                                                 interpret this



                                                                 result as



                                                                 normal/abnormal

.

 

             Triglycerides (test 134 mg/dL    <150                      



             code = 2571-8)                                        

 

             LDL cholesterol 45           mg/dL (calc)              Reference ra

nge:



             calculated (test                                        <100 [place]

ble



             code = 63267-2)                                        range <100 m

g/dL



                                                                 for primary



                                                                 prevention;  <7

0



                                                                 mg/dL for



                                                                 patients with C

HD



                                                                 or diabetic



                                                                 patients with >



                                                                 or = 2 CHD risk



                                                                 factors. LDL-C 

is



                                                                 now calculated



                                                                 using the



                                                                 [place]



                                                                 calculation,



                                                                 which is a



                                                                 validated novel



                                                                 method providin

g



                                                                 better accuracy



                                                                 than the



                                                                 Friedewald



                                                                 equation in the



                                                                 estimation of



                                                                 LDL-C. Rob JONES

S



                                                                 et al. JD.



                                                                 2013;310(19):



                                                                 2768-1757



                                                                 (http://educati

on



                                                                 .[employer]



                                                                 .com/faq/GZC833

)

 

             Cholesterol/HDL 2.5          See_Comment                [Automated



             ratio (test code =                                        message] 

The



             9830-1)                                             system which



                                                                 generated this



                                                                 result



                                                                 transmitted



                                                                 reference range

:



                                                                 <5.0 (calc). Th

e



                                                                 reference range



                                                                 was not used to



                                                                 interpret this



                                                                 result as



                                                                 normal/abnormal

.

 

             Non-HDL cholesterol 67           See_Comment               For beto

ents with



             (test code =                                        diabetes plus 1



             64553-6)                                            major ASCVD ris

k



                                                                 factor, treatin

g



                                                                 to a non-HDL-C



                                                                 goal of <100



                                                                 mg/dL (LDL-C of



                                                                 <70 mg/dL) is



                                                                 considered a



                                                                 therapeutic



                                                                 option.



                                                                 [Automated



                                                                 message] The



                                                                 system which



                                                                 generated this



                                                                 result



                                                                 transmitted



                                                                 reference range

:



                                                                 <130 mg/dL



                                                                 (calc). The



                                                                 reference range



                                                                 was not used to



                                                                 interpret this



                                                                 result as



                                                                 normal/abnormal

.

 

             ELADIO (test code = FASTING:NOFASTING:                           



             ELADIO)         NO                                     

 

             RAC (test code = Performing                             



             RAC)         Organization                           



                          Information:                           



                          Site ID: [facility]                           



                          Name: [employer]                           



                          [facility]    Address:                           



                          [address] [place] [address]                           



                          [phone]                             



                          Director: Viet Sosa                           

 

             Lab Interpretation Abnormal                               



             (test code =                                        



             32571-8)                                            



[place] [facility] [id number] 05:14:00





             Test Item    Value        Reference Range Interpretation Comments

 

             Hemoglobin A1C 4.9          See_Comment               For the purpo

se of



             (test code =                                        screening for t

he



             4548-4)                                             presence ofdiab

etes:



                                                                 <5.7%



                                                                 Consistent with

 the



                                                                 absence of



                                                                 diabetes5.7-6.4

%



                                                                 Consistent with



                                                                 increased risk 

for



                                                                 diabetes



                                                                 (prediabetes)> 

or



                                                                 =6.5%  Consiste

nt



                                                                 with diabetes T

his



                                                                 assay result is



                                                                 consistent with

 a



                                                                 decreased risko

f



                                                                 diabetes. Curre

ntly,



                                                                 no consensus ex

ists



                                                                 regarding use



                                                                 ofhemoglobin A1

c for



                                                                 diagnosis of di

abetes



                                                                 in children.



                                                                 According to Am

eric[place]



                                                                 Diabetes Associ

ation



                                                                 (ADA)guidelines

,



                                                                 hemoglobin A1c 

<7.0%



                                                                 represents



                                                                 optimalcontrol 

in



                                                                 non-pregnant di

abetic



                                                                 patients.



                                                                 Differentmetric

s may



                                                                 apply to specif

ic



                                                                 patient populat

ions.



                                                                 Standards of Me

dical



                                                                 Care in



                                                                 Diabetes(ADA).



                                                                 [Automated mess

age]



                                                                 The system whic

h



                                                                 generated this 

result



                                                                 transmitted ref

erence



                                                                 range: <5.7 % o

f



                                                                 total Hgb. The



                                                                 reference range

 was



                                                                 not used to int

erpret



                                                                 this result as



                                                                 normal/abnormal

.

 

             ELADIO (test code = FASTING:NOFASTING:                           



             ELADIO)         NO                                     

 

             RAC (test code = Performing                             



             RAC)         Organization                           



                          Information:                           



                          Site ID: [facility]                           



                          Name: [facility]    Address:                           



                          [address] [place] [address]                           



                          [id number]                             



                          Director: Viet Sosa                           



[facility] with platelet and [id number] 05:14:00





             Test Item    Value        Reference Range Interpretation Comments

 

             WBC (test code = 10.9         See_Comment  H             [Automated



             6690-2)                                             message] The



                                                                 system which



                                                                 generated this



                                                                 result



                                                                 transmitted



                                                                 reference range

:



                                                                 3.8 - 10.8



                                                                 Thousand/uL. Th

e



                                                                 reference range



                                                                 was not used to



                                                                 interpret this



                                                                 result as



                                                                 normal/abnormal

.

 

             RBC (test code = 3.47         See_Comment  L             [Automated



             789-8)                                              message] The



                                                                 system which



                                                                 generated this



                                                                 result



                                                                 transmitted



                                                                 reference range

:



                                                                 3.80 - 5.10



                                                                 Million/uL. The



                                                                 reference range



                                                                 was not used to



                                                                 interpret this



                                                                 result as



                                                                 normal/abnormal

.

 

             HGB (test code = 10.2 g/dL    11.7-15.5    L            



             718-7)                                              

 

             HCT (test code = 32.5 %       35.0-45.0    L            



             4544-3)                                             

 

             MCV (test code = 93.7 fL      80.0-100.0                



             787-2)                                              

 

             MCH (test code = 29.4 pg      27.0-33.0                 



             785-6)                                              

 

             MCHC (test code = 31.4 g/dL    32.0-36.0    L            



             786-4)                                              

 

             RDW (test code = 12.3 %       11.0-15.0                 



             788-0)                                              

 

             Platelet count (test 287          See_Comment                [Autom

ated



             code = 777-3)                                        message] The



                                                                 system which



                                                                 generated this



                                                                 result



                                                                 transmitted



                                                                 reference range

:



                                                                 140 - 400



                                                                 Thousand/uL. Th

e



                                                                 reference range



                                                                 was not used to



                                                                 interpret this



                                                                 result as



                                                                 normal/abnormal

.

 

             MPV (test code = 11.0 fL      7.5-12.5                  



             776-5)                                              

 

             Neutrophils, 8491         See_Comment  H             [Automated



             absolute (test code                                        message]

 The



             = 751-8)                                            system which



                                                                 generated this



                                                                 result



                                                                 transmitted



                                                                 reference range

:



                                                                 1,500 - 7,800



                                                                 cells/uL. The



                                                                 reference range



                                                                 was not used to



                                                                 interpret this



                                                                 result as



                                                                 normal/abnormal

.

 

             Lymphocytes, 1406         See_Comment                [Automated



             absolute (test code                                        message]

 The



             = 731-0)                                            system which



                                                                 generated this



                                                                 result



                                                                 transmitted



                                                                 reference range

:



                                                                 850 - 3,900



                                                                 cells/uL. The



                                                                 reference range



                                                                 was not used to



                                                                 interpret this



                                                                 result as



                                                                 normal/abnormal

.

 

             Monocytes, absolute 785          See_Comment                [Automa

onofre



             (test code = 742-7)                                        message]

 The



                                                                 system which



                                                                 generated this



                                                                 result



                                                                 transmitted



                                                                 reference range

:



                                                                 200 - 950



                                                                 cells/uL. The



                                                                 reference range



                                                                 was not used to



                                                                 interpret this



                                                                 result as



                                                                 normal/abnormal

.

 

             Eosinophils, 164          See_Comment                [Automated



             absolute (test code                                        message]

 The



             = 711-2)                                            system which



                                                                 generated this



                                                                 result



                                                                 transmitted



                                                                 reference range

:



                                                                 15 - 500



                                                                 cells/uL. The



                                                                 reference range



                                                                 was not used to



                                                                 interpret this



                                                                 result as



                                                                 normal/abnormal

.

 

             Basophils, absolute 55           See_Comment                [Automa

onofre



             (test code = 704-7)                                        message]

 The



                                                                 system which



                                                                 generated this



                                                                 result



                                                                 transmitted



                                                                 reference range

:



                                                                 0 - 200 cells/u

L.



                                                                 The reference



                                                                 range was not



                                                                 used to interpr

et



                                                                 this result as



                                                                 normal/abnormal

.

 

             Neutrophils (test 77.9 %                                 



             code = 770-8)                                        

 

             Lymphocytes (test 12.9 %                                 



             code = 736-9)                                        

 

             Monocytes (test code 7.2 %                                  



             = 5905-5)                                           

 

             Eosinophils (test 1.5 %                                  



             code = 713-8)                                        

 

             Basophils + RC (test 0.5 %                                  



             code = 706-2)                                        

 

             ELADIO (test code = FASTING:NOFASTING:                           



             ELADIO)         NO                                     

 

             RAC (test code = Performing                             



             RAC)         Organization                           



                          Information:                           



                          Site ID: [facility]                           



                          [facility]: [facility]    Address:                           



                          [address] [place] [address]                           



                          [phone]                             



                          Director: Viet Sosa                           

 

             Lab Interpretation Abnormal                               



             (test code =                                        



             87125-4)                                            



[facility] [id number] 19:43:00





             Test Item    Value        Reference    Interpretation Comments



                                       Range                     

 

             Urine culture (test SEE NOTE                  A              CULTUR

E, URINE,



             code = 630-4)                                        ROUTINE     [facility]



                                                                 Number:



                                                                 [id number]  Test



                                                                 Status:       F

inal



                                                                  Specimen Sourc

e:



                                                                 URINE  Specimen



                                                                 Quality:  Adequ

ate



                                                                 Result:



                                                                 Greater than



                                                                 100,000 CFU/mL 

of



                                                                 Enterococcus



                                                                 faecalis  COMME

NT:



                                                                          Additi

onal



                                                                 organism(s) les

s



                                                                 than 10,000 CFU

/mL



                                                                 



                                                                 isolated. These



                                                                 organisms, comm

only



                                                                 found on



                                                                          extern

al



                                                                 and internal



                                                                 genitalia, are



                                                                 considered



                                                                 



                                                                 colonizers. No



                                                                 further testing



                                                                 performed.



                                                                 



                                                                 E.faecalis



                                                                 



                                                                 ---------------

-



                                                                 



                                                                   INT   MOISES



                                                                 AMPICILLIN



                                                                    S     <=2



                                                                 CIPROFLOXACIN



                                                                    R     >=8



                                                                 LEVOFLOXACIN



                                                                    R     >=8



                                                                 NITROFURANTOIN



                                                                    S     <=16



                                                                 TETRACYCLINE



                                                                    R     >=16



                                                                 VANCOMYCIN



                                                                    S



                                                                 1S=Susceptible



                                                                 I=Intermediate



                                                                 R=Resistant  * 

=



                                                                 Not TestedNR = 

Not



                                                                 Reported  **NN 

=



                                                                 See Therapy



                                                                 Comments

 

             RAC (test code = Performing                             



             RAC)         Organization                           



                          Information:                           



                          Site ID: [facility]                           



                          Name: [facility]                            



                          [facility]                           



                          on Lab                                 



                          Address: [address] [place] [address]                            



                          [phone]                             



                          Director: Viet Sosa                           

 

             Lab Interpretation Abnormal                               



             (test code =                                        



             51214-6)                                            



[facility] with [id number] 19:43:00





             Test Item    Value        Reference Range Interpretation Comments

 

             WBC, UA (test code = PACKED       See_Comment  A             [Autom

ated



             5821-4)                                             message] The



                                                                 system which



                                                                 generated this



                                                                 result



                                                                 transmitted



                                                                 reference range

:



                                                                 < OR = 5 /HPF.



                                                                 The reference



                                                                 range was not



                                                                 used to interpr

et



                                                                 this result as



                                                                 normal/abnormal

.

 

             RBC, UA (test code = 3-10         See_Comment  A             [Autom

ated



             23908-9)                                            message] The



                                                                 system which



                                                                 generated this



                                                                 result



                                                                 transmitted



                                                                 reference range

:



                                                                 < OR = 2 /HPF.



                                                                 The reference



                                                                 range was not



                                                                 used to interpr

et



                                                                 this result as



                                                                 normal/abnormal

.

 

             Squamous epithelial 6-10         See_Comment  A             [Automa

onofre



             cells, UA (test code                                        message

] The



             = 65989-3)                                          system which



                                                                 generated this



                                                                 result



                                                                 transmitted



                                                                 reference range

:



                                                                 < OR = 5 /HPF.



                                                                 The reference



                                                                 range was not



                                                                 used to interpr

et



                                                                 this result as



                                                                 normal/abnormal

.

 

             Bacteria, UA (test MODERATE     NONE SEEN /HPF A            



             code = 5769-5)                                        

 

             Hyaline casts, UA NONE SEEN    NONE SEEN /LPF              



             (test code = 5796-8)                                        

 

             Note: (test code =                                        This urin

e was



             8251-1)                                             analyzed for th

e



                                                                 presence of WBC

,



                                                                 RBC, bacteria,



                                                                 casts, and othe

r



                                                                 formed elements

.



                                                                 Only those



                                                                 elements seen



                                                                 were reported.

 

             [facility]AC (test code = Performing                             



             RAC)         Organization                           



                          Information:                           



                          Site ID: [id number]                           



                          Name: [facility]                            



                          [facility]                           



                          n Lab    Address:                           



                          [address] [place], [place]                           



                          [id number]                             



                          Director: Viet Sosa                           

 

             Lab Interpretation Abnormal                               



             (test code =                                        



             40331-2)                                            



[facility]

## 2021-06-01 NOTE — EDPHYS
Physician Documentation                                                                           

 [facility]                                                                 

Name: Gladys Ramos                                                                                    

Age: 81 yrs                                                                                       

Sex: Female                                                                                       

: 1940                                                                                   

MRN: [id number]                                                                                   

Arrival Date: 2021                                                                          

Time: 20:32                                                                                       

Account#: [id number]                                                                            

Bed 24                                                                                            

Private MD:                                                                                       

ED Physician Nima Murillo                                                                         

HPI:                                                                                              

                                                                                             

20:47 This 81 yrs old  Female presents to ER via Unassigned with complaints of S/S   rn  

      of Possible Stroke.                                                                         

20:47 The patient's problem is reported as altered mental status, a facial droop, dysphasia,  rn  

      slurred speech. Onset: The symptoms/episode began/occurred just prior to arrival.           

      Duration: This was a single incident. The symptoms are alleviated by nothing. The           

      symptoms are aggravated by nothing. Severity of symptoms: At their worst the symptoms       

      were moderate in the emergency department the symptoms have resolved. The patient has       

      not experienced similar symptoms in the past. The patient has been recently seen at the     

      [facility] Emergency Department. Daughter reports last known         

      normal 1800, was getting ready to go to work, checked on her again at 1930, couldn't        

      speak, had slurred speech, confused, and words incomprehensible. Upon returning from CT     

      scan in ER, and my initial exam, symptoms have resolved. Daughter confirms back to          

      baseline mentally. Patient reports generalized weakness currently and fatigue. Denies       

      pain. No headache. NO cough/sob. + decreased UOP. .                                         

                                                                                                  

Historical:                                                                                       

- Allergies:                                                                                      

23:52 ambien;                                                                                 wh  

23:52 Levaquin;                                                                               wh  

- PMHx:                                                                                           

23:52 AAA; CHF; Diabetes - IDDM; Hyperlipidemia; Hypertension; OA; Pneumonia;                 wh  

                                                                                                  

- Immunization history:: Adult Immunizations up to date.                                          

- Social history:: Smoking status: Patient denies any tobacco usage or history of.                

- Family history:: not pertinent.                                                                 

- Hospitalizations: : No recent hospitalization is reported.                                      

                                                                                                  

                                                                                                  

ROS:                                                                                              

20:47 Constitutional: Negative for fever, chills, and weight loss, Eyes: Negative for injury, rn  

      pain, redness, and discharge, Neck: Negative for injury, pain, and swelling,                

      Cardiovascular: Negative for chest pain, palpitations, and edema, Respiratory: Negative     

      for shortness of breath, cough, wheezing, and pleuritic chest pain, Abdomen/GI:             

      Negative for abdominal pain, nausea, vomiting, diarrhea Back: Negative for injury and       

      pain, : + decreased UOP MS/Extremity: Negative for injury and deformity, Skin:            

      Negative for injury, rash, and discoloration, Neuro: Negative for headache, numbness,       

      tingling, and seizure.                                                                      

                                                                                                  

Exam:                                                                                             

20:47 Constitutional:  This is a well developed, well nourished patient who is awake, alert,  rn  

      and in no acute distress. Head/Face:  Normocephalic, atraumatic. Eyes:  Periorbital         

      areas with no swelling, redness, or edema. + cataract left eye. ENT:  dry MM                

      Cardiovascular:  Regular rate and rhythm.  No pulse deficits. Respiratory:  No              

      increased work of breathing, no retractions or nasal flaring. Abdomen/GI:  soft,            

      non-tender, no masses Skin:  Warm, dry MS/ Extremity:  Pulses equal, no cyanosis.           

      Neurovascular intact.  Full, normal range of motion.  Equal circumference. Neuro:           

      Awake and alert, GCS 15, oriented to person, place, time, and situation.  Cranial           

      nerves II-XII grossly intact.  Motor strength 4/5 in all extremities.  Sensory grossly      

      intact.                                                                                     

                                                                                                  

Vital Signs:                                                                                      

20:35  / 65; Pulse 86; Resp 18; Temp 97.7; Pulse Ox 95% ;                                 

22:00  / 64; Pulse 84; Resp 18; Pulse Ox 94% on R/A;                                      

22:00  / 52; Pulse 87; Resp 18; Pulse Ox 95% on R/A;                                      

06/02                                                                                             

00:23  / 62; Pulse 83; Resp 18; Pulse Ox 95% on R/A;                                      

02:00  / 46; Pulse 79; Resp 18; Pulse Ox 93% on R/A;                                      

03:00  / 55; Pulse 77; Resp 18; Pulse Ox 93% on R/A;                                      

                                                                                                  

NIH Stroke Scale Scores:                                                                          

                                                                                             

20:40 NIHSS Score: 0                                                                            

20:47 NIHSS Score: 0                                                                          rn  

                                                                                                  

MDM:                                                                                              

20:46 Patient medically screened.                                                             rn  

20:54 ED course: Dr. Ward reports no acute findings on CT head. No indication for TPA      rn  

      given NIH 0, and symptoms have resolved now. .                                              

21:48 Differential diagnosis: CVA, TIA, metabolic disorder. Data reviewed: vital signs,       rn  

      nurses notes, lab test result(s), EKG, radiologic studies, CT scan, and as a result, I      

      will admit patient. Counseling: I had a detailed discussion with the patient and/or         

      guardian regarding: the historical points, exam findings, and any diagnostic results        

      supporting the discharge/admit diagnosis, lab results, radiology results, the need for      

      further work-up and treatment in the hospital. Response to treatment: the patient's         

      symptoms have markedly improved after treatment, the patient's symptoms have resolved       

      after treatment, the patient's condition has returned to base line, and as a result, I      

      will admit patient. Admission orders: after a detailed discussion of the patient's          

      condition and case, the admit orders are written by me. ED course: Pt back to baseline,     

      no acute findings on CT head, also with UTI, susceptible to rocephin, and                   

      hyperkalemia/CKD. Will admit for further workup/abx/fluids..                                

                                                                                                  

                                                                                             

20:47 Order name: Magnesium; Complete Time: 21:44                                             rn  

                                                                                             

20:47 Order name: CPK; Complete Time: 21:44                                                   rn  

                                                                                             

20:47 Order name: Basic Metabolic Panel; Complete Time: 21:44                                 rn  

                                                                                             

20:47 Order name: CBC with Diff; Complete Time: 21:44                                         rn  

                                                                                             

20:47 Order name: Protime (+inr); Complete Time: 21:44                                        rn  

                                                                                             

20:47 Order name: Ptt, Activated; Complete Time: 21:44                                        rn  

                                                                                             

21:24 Order name: Glucose, Ancillary Testing; Complete Time: 21:44                            EDMS

                                                                                             

21:26 Order name: Urine Microscopic Only                                                        

                                                                                             

21:27 Order name: Urine Microscopic Only; Complete Time: 05:42                                EDMS

                                                                                             

21:34 Order name: Urine Dipstick-Ancillary; Complete Time: 21:44                              EDMS

                                                                                             

22:18 Order name: Urine Culture                                                               EDMS

                                                                                             

23:46 Order name: SARS-COV-2 RT PCR; Complete Time: 05:42                                     EDMS

                                                                                             

05:37 Order name: CBC with Automated Diff; Complete Time: 05:42                               EDMS

                                                                                             

20:47 Order name: CT Stroke Brain w/o Contrast; Complete Time: 21:44                          rn  

                                                                                             

20:47 Order name: Stroke CXR 1 View                                                           rn  

                                                                                             

05:38 Order name: Urinalysis                                                                  EDMS

                                                                                             

05:42 Order name: Comprehensive Metabolic Panel                                               EDMS

                                                                                             

05:42 Order name: Lipid Profile                                                               [facility]

                                                                                             

05:56 Order name: Urine Microscopic Only                                                      EDMS

                                                                                             

07:54 Order name: Glucose, Ancillary Testing                                                  [facility]

                                                                                             

08:57 Order name: MRI                                                                         [facility]

                                                                                             

09:01 Order name: MRI                                                                         [facility]

                                                                                             

20:47 Order name: EKG; Complete Time: 20:47                                                   rn  

                                                                                             

20:47 Order name: Accucheck; Complete Time: 21:14                                             rn  

                                                                                             

20:47 Order name: Cardiac monitoring; Complete Time: 21:14                                    rn  

                                                                                             

20:47 Order name: EKG - Nurse/Tech; Complete Time: 21:14                                      rn  

                                                                                             

20:47 Order name: IV Saline Lock; Complete Time: 20:52                                        rn  

                                                                                             

20:47 Order name: Labs collected and sent; Complete Time: 20:52                               rn  

                                                                                             

20:47 Order name: NPO; Complete Time: 20:52                                                   rn  

                                                                                             

20:47 Order name: O2 Per Protocol; Complete Time: 21:14                                       rn  

                                                                                             

20:47 Order name: O2 Sat Monitoring; Complete Time: 21:14                                     rn  

                                                                                             

20:47 Order name: Stroke Swallow Screen; Complete Time: 21:38                                 rn  

                                                                                             

20:47 Order name: Dinero; Complete Time: 21:14                                                 rn  

                                                                                             

21:26 Order name: Urine Dipstick-Ancillary (obtain specimen); Complete Time: 21:38              

                                                                                             

00:40 Order name: CONS Physician Consult                                                      [facility]

                                                                                                  

Administered Medications:                                                                         

22:04 Drug: NS 0.9% 1000 ml Route: IV; Rate: 1000 ml; Site: right forearm;                      

23:54 Follow up: Response: No adverse reaction; IV Status: Completed infusion                 wh  

22:06 Drug: Sodium Bicarbonate 1 amp Route: IVP; Site: right forearm;                         wh  

23:54 Follow up: Response: No adverse reaction                                                  

22:08 Drug: Kayexalate (polystyrene) 15 grams Route: PO;                                      wh  

23:54 Follow up: Response: No adverse reaction                                                  

22:10 Drug: Aspirin Chewable Tablet 324 mg Route: PO;                                         wh  

23:55 Follow up: Response: No adverse reaction                                                  

22:12 Drug: Rocephin (cefTRIAXone) 1 grams Route: IV; Rate: calculated rate; Site: right        

      forearm;                                                                                    

23:54 Follow up: Response: No adverse reaction; IV Status: Completed infusion                   

                                                                                             

05:48 Not Given (Unavailable, MD notified): foLIC Acid 1 mg IVPB once                           

07:04 Drug: Kayexalate (polystyrene) 15 grams Route: PO;                                        

                                                                                                  

                                                                                                  

Disposition:                                                                                      

21 21:49 Hospitalization ordered by Deepak Frances for Inpatient Admission. Preliminary     

  diagnosis are Transient cerebral ischemic attack, unspecified, Urinary tract                    

  infection, site not specified, Dehydration, Hyperkalemia.                                       

- Bed requested for Telemetry/MedSurg (Inpatient).                                                

- Status is Inpatient Admission.                                                              aa5 

- Condition is Stable.                                                                            

- Problem is new.                                                                                 

- Symptoms have improved.                                                                         

                                                                                                  

                                                                                                  

                                                                                                  

                                                                                                  

NIH Stroke Scale - NIH Stroke Score                                                               

Date: 2021                                                                                  

Time: 20:40                                                                                       

Total Score = 0                                                                                   

  1a. Level of Consciousness (LOC) - 0(Alert)                                                     

  1b. Level of Consciousness (LOC) (Year \T\ Age) - 0(Both)                                       

  1c. LOC Commands (Open \T\ Closes Eyes/) - 0(Both)                                          

   2. Best Gaze (Lateral Gaze Paresis) - 0(Normal)                                                

   3. Visual Field Loss - 0(No visual loss)                                                       

   4. Facial Palsy - 0(Normal)                                                                    

  5a. Left Arm: Motor (10-second hold) - 0(No drift)                                              

  5b. Right Arm: Motor (10-second hold) - 0(No drift)                                             

  6a. Left Leg: Motor (5-second hold - always test supine) - 0(No drift)                          

  6b. Right Leg: Motor (5-second hold - always test supine) - 0(No drift)                         

   7. Limb Ataxia (finger/nose \T\ heel/shin - test with eyes open) - 0(Absent)                   

   8. Sensory Loss (pinprick arms/legs/face) - 0(Normal)                                          

   9. Best Language: Aphasia (description/naming/reading) - 0(No aphasia)                         

  10. Dysarthria (speech clarity - read or repeat words) - 0(Normal)                              

  11. Extinction and Inattention (visual/tactile/auditory/spatial/personal) - 0(No                

      abnormality)                                                                                

Initials:                                                                                       

                                                                                                  

                                                                                                  

NIH Stroke Scale - NIH Stroke Score                                                               

Date: 2021                                                                                  

Time: 20:47                                                                                       

Total Score = 0                                                                                   

  1a. Level of Consciousness (LOC) - 0(Alert)                                                     

  1b. Level of Consciousness (LOC) (Year \T\ Age) - 0(Both)                                       

  1c. LOC Commands (Open \T\ Closes Eyes/) - 0(Both)                                          

   2. Best Gaze (Lateral Gaze Paresis) - 0(Normal)                                                

   3. Visual Field Loss - 0(No visual loss)                                                       

   4. Facial Palsy - 0(Normal)                                                                    

  5a. Left Arm: Motor (10-second hold) - 0(No drift)                                              

  5b. Right Arm: Motor (10-second hold) - 0(No drift)                                             

  6a. Left Leg: Motor (5-second hold - always test supine) - 0(No drift)                          

  6b. Right Leg: Motor (5-second hold - always test supine) - 0(No drift)                         

   7. Limb Ataxia (finger/nose \T\ heel/shin - test with eyes open) - 0(Absent)                   

   8. Sensory Loss (pinprick arms/legs/face) - 0(Normal)                                          

   9. Best Language: Aphasia (description/naming/reading) - 0(No aphasia)                         

  10. Dysarthria (speech clarity - read or repeat words) - 0(Normal)                              

  11. Extinction and Inattention (visual/tactile/auditory/spatial/personal) - 0(No                

      abnormality)                                                                                

Initials: rn                                                                                      

                                                                                                  

Signatures:                                                                                       

Dispatcher [employer]                           EDMS                                                 

Irma Tovar Roman, MD MD rn Calderon, Audri, RN                     RN   aa5                                                  

Irma Sun RN RN cg Habalo, Winsy, RN RN                                                      

                                                                                                  

Corrections: (The following items were deleted from the chart)                                    

                                                                                             

22:41 22:29 CORONAVIRUS+MR.LAB.BRZ ordered. EDMS                                      EDMS        

                                                                                             

02:23  21:49 Hospitalization Ordered by Deepak Frances for Inpatient Admission.             

      Preliminary diagnosis is Transient cerebral ischemic attack, unspecified;                   

      Urinary tract infection, site not specified; Dehydration; Hyperkalemia. Bed                 

      requested for Telemetry/MedSurg (Inpatient). Status is Inpatient Admission.                 

      Condition is Stable. Problem is new. Symptoms have improved. rn                             

                                                                                             

10:46 02:23 2021 21:49 Hospitalization Ordered by Deepak Frances for Inpatient   bd          

      Admission. Preliminary diagnosis is Transient cerebral ischemic attack,                     

      unspecified; Urinary tract infection, site not specified; Dehydration;                      

      Hyperkalemia. Bed requested for [facility] ER HOLD. Status is Inpatient Admission.                

      Condition is Stable. Problem is new. Symptoms have improved. cg                             

11:22 10:46 2021 21:49 Hospitalization Ordered by Deepak Frances for Inpatient   aa5         

      Admission. Preliminary diagnosis is Transient cerebral ischemic attack,                     

      unspecified; Urinary tract infection, site not specified; Dehydration;                      

      Hyperkalemia. Bed requested for Telemetry/MedSurg (Inpatient). Status is                    

      Inpatient Admission. Condition is Stable. Problem is new. Symptoms have                     

      improved. bd                                                                                

                                                                                                  

**************************************************************************************************

## 2021-06-01 NOTE — ER
Nurse's Notes                                                                                     

 [facility]                                                                 

Name: Gladys Ramos                                                                                    

Age: 81 yrs                                                                                       

Sex: Female                                                                                       

: 1940                                                                                   

MRN: [id number]                                                                                   

Arrival Date: 2021                                                                          

Time: 20:32                                                                                       

Account#: [id number]                                                                            

Bed 24                                                                                            

Private MD:                                                                                       

Diagnosis: Transient cerebral ischemic attack, unspecified;Urinary tract infection, site not      

  specified;Dehydration;Hyperkalemia                                                              

                                                                                                  

Presentation:                                                                                     

                                                                                             

20:35 Chief complaint: EMS states: Per daughter PT had stroke like symptoms, hard difficulty  wh  

      understanding instructions, right side droop of the face and AMS. Per Daughter Pt was       

      last seen normal around 18:00. Coronavirus screen: Client denies travel out of the [place]     

      in the last 14 days. At this time, the client does not indicate any symptoms associated     

      with coronavirus-19. Ebola Screen: Patient negative for fever greater than or equal to      

      101.5 degrees Fahrenheit, and additional compatible Ebola Virus Disease symptoms            

      Patient denies exposure to infectious person. No acute neurological deficit is noted.       

      The patients blood glucose was checked before arriving to the hospital and was found to     

      be normal. Initial Sepsis Screen: Does the patient meet any 2 criteria? No. Patient's       

      initial sepsis screen is negative. Does the patient have a suspected source of              

      infection? No. Patient's initial sepsis screen is negative. Risk Assessment: Do you         

      want to hurt yourself or someone else? Patient reports no desire to harm self or            

      others. Onset of symptoms was 2021.                                                

20:35 Method Of Arrival: EMS: [facility]                                                  

20:35 Acuity: ANATOLY 2                                                                             

                                                                                                  

Triage Assessment:                                                                                

20:35 The onset of the patients symptoms was 2021 at 19:00.                            

20:35 Neuro: Reports daughter states AMS.                                                       

                                                                                             

03:23 The onset of the patients symptoms was less than three hours ago. General: Appears in     

      no apparent distress.                                                                       

                                                                                                  

Stroke Activation:                                                                                

 Physician: Stroke Attending; Name: Chidi; Notified At:  20:35; Arrived At:             

   20:35                                                                                

 Physician: Chief Stroke Resident; Name: ; Notified At:  20:35; Arrived At:             

 Physician: Stroke Resident; Name: ; Notified At:  20:35; Arrived At:                   

 Physician: ED Attending; Name: ; Notified At:  20:35; Arrived At:                      

 Physician: ED Resident; Name: ; Notified At:  20:35; Arrived At:                       

                                                                                                  

Historical:                                                                                       

- Allergies:                                                                                      

                                                                                             

23:52 ambien;                                                                                 wh  

23:52 Levaquin;                                                                               wh  

- PMHx:                                                                                           

23:52 AAA; CHF; Diabetes - IDDM; Hyperlipidemia; Hypertension; OA; Pneumonia;                 wh  

                                                                                                  

- Immunization history:: Adult Immunizations up to date.                                          

- Social history:: Smoking status: Patient denies any tobacco usage or history of.                

- Family history:: not pertinent.                                                                 

- Hospitalizations: : No recent hospitalization is reported.                                      

                                                                                                  

                                                                                                  

Screenin:35 Abuse screen: Denies threats or abuse. Denies injuries from another. Nutritional        wh  

      screening: No deficits noted. Tuberculosis screening: No symptoms or risk factors           

      identified. Fall Risk Secondary diagnosis (15 points) impaired mobility.                    

                                                                                                  

Assessment:                                                                                       

20:40 Reassessment: Pt was taken straight to CT scan for imaging.                               

20:40 VAN Scoring: Arm Drift: Patients demonstrates NO arm weakness. Patient is VAN Negative. wh  

      Visual Disturbance: No visual disturbance noted. Aphasia: No aphasia noted. Neglect: No     

      neglect noted.                                                                              

20:50 General: Appears in no apparent distress. Behavior is calm, cooperative. Pain: Denies     

      pain. Neuro: Level of Consciousness is awake, alert, obeys commands, Oriented to            

      person, place, time, situation,  are equal bilaterally Moves all extremities.          

      Speech is normal, Facial symmetry appears normal, Pupils are PERRLA, Intact.                

      Cardiovascular: Heart tones S1 S2 Rhythm is sinus rhythm. Cardiovascular: Edema is 2+       

      to left ankle, left foot, right ankle and right foot. Respiratory: Airway is patent         

      Respiratory effort is even, unlabored, Respiratory pattern is regular, symmetrical,         

      Breath sounds are clear bilaterally. GI: Abdomen is round distended. : No signs           

      and/or symptoms were reported regarding the genitourinary system. EENT: No signs and/or     

      symptoms were reported regarding the EENT system. Derm: Skin is intact, Skin is pink,       

      warm \T\ dry. Musculoskeletal: Circulation, motion, and sensation intact.                   

22:00 Patient has been NPO before screening. The patient is alert, and able to follow           

      commands. The patient does not exhibit slurred or garbled speech. The patient is not        

      exhibiting difficulty speaking. The patient does not exhibit difficulty understanding       

      words. The patient is able to swallow own secretions with no drooling or need for           

      suction. Patient tolerated one teaspoon of water. No drooling, immediate coughing,          

      gurgling, or clearing of the throat was noted. The patient tolerated 90mL of water. No      

      drooling, immediate coughing, gurgling, or clearing of the throat was noted. The            

      patient passed the bedside swallow screening. Oral medications may be given as ordered.     

      Contact Physician for further diet orders. Provider notified of bedside swallow             

      screening results: Nima Murillo MD.                                                          

22:00 Reassessment: Patient appears in no apparent distress at this time. No changes from       

      previously documented assessment. Patient and/or family updated on plan of care and         

      expected duration. Pain level reassessed. Patient is alert, oriented x 3, equal             

      unlabored respirations, skin warm/dry/pink.                                                 

                                                                                             

00:22 Reassessment: Patient appears in no apparent distress at this time. Patient and/or        

      family updated on plan of care and expected duration. Pain level reassessed. Patient is     

      alert, oriented x 3, equal unlabored respirations, skin warm/dry/pink. Provider at          

      bedside explaining POC need for admit.                                                      

02:00 Reassessment: Patient appears in no apparent distress at this time. Patient and/or        

      family updated on plan of care and expected duration. Pain level reassessed. Patient is     

      alert, oriented x 3, equal unlabored respirations, skin warm/dry/pink.                      

                                                                                                  

Vital Signs:                                                                                      

                                                                                             

20:35  / 65; Pulse 86; Resp 18; Temp 97.7; Pulse Ox 95% ;                                 

22:00  / 64; Pulse 84; Resp 18; Pulse Ox 94% on R/A;                                      

22:00  / 52; Pulse 87; Resp 18; Pulse Ox 95% on R/A;                                      

                                                                                             

00:23  / 62; Pulse 83; Resp 18; Pulse Ox 95% on R/A;                                      

02:00  / 46; Pulse 79; Resp 18; Pulse Ox 93% on R/A;                                      

03:00  / 55; Pulse 77; Resp 18; Pulse Ox 93% on R/A;                                      

                                                                                                  

NIH Stroke Scale Scores:                                                                          

                                                                                             

20:40 NIHSS Score: 0                                                                            

20:47 NIHSS Score: 0                                                                          rn  

                                                                                                  

ED Course:                                                                                        

20:32 Patient arrived in ED.                                                                  mw2 

20:45 Patient has correct armband on for positive identification. Placed in gown. Bed in low  wh  

      position. Call light in reach. Side rails up X 1. Cardiac monitor on. Pulse ox on. NIBP     

      on.                                                                                         

20:45 Arm band placed on right wrist.                                                           

20:46 Nima Murillo MD is Attending Physician.                                                rn  

20:51 Karol Dominguez, RN is Primary Nurse.                                                  ap3 

20:51 CT Stroke Brain w/o Contrast In Process Unspecified.                                    EDMS

20:51 Inserted saline lock: 20 gauge in right wrist, using aseptic technique. Blood collected.ap3 

21:00 Dinero cath inserted, using sterile technique, 18 Fr., by me, balloon inflated, to         

      gravity drainage, urine specimen collected. returned matthew urine. Patient tolerated         

      well.                                                                                       

21:09 Darius Bhakta, RN is Primary Nurse.                                                     wh  

21:42 Stroke CXR 1 View In Process Unspecified.                                               EDMS

21:49 Deepak Frances is Hospitalizing Provider.                                               rn  

23:51 Triage completed.                                                                         

                                                                                             

03:00 No provider procedures requiring assistance completed. Patient admitted, IV remains in    

      place.                                                                                      

                                                                                                  

Administered Medications:                                                                         

                                                                                             

22:04 Drug: NS 0.9% 1000 ml Route: IV; Rate: 1000 ml; Site: right forearm;                      

23:54 Follow up: Response: No adverse reaction; IV Status: Completed infusion                   

22:06 Drug: Sodium Bicarbonate 1 amp Route: IVP; Site: right forearm;                           

23:54 Follow up: Response: No adverse reaction                                                  

22:08 Drug: Kayexalate (polystyrene) 15 grams Route: PO;                                        

23:54 Follow up: Response: No adverse reaction                                                  

22:10 Drug: Aspirin Chewable Tablet 324 mg Route: PO;                                           

23:55 Follow up: Response: No adverse reaction                                                  

22:12 Drug: Rocephin (cefTRIAXone) 1 grams Route: IV; Rate: calculated rate; Site: right        

      forearm;                                                                                    

23:54 Follow up: Response: No adverse reaction; IV Status: Completed infusion                   

06                                                                                             

05:48 Not Given (Unavailable, MD notified): foLIC Acid 1 mg IVPB once                           

07:04 Drug: Kayexalate (polystyrene) 15 grams Route: PO;                                        

                                                                                                  

                                                                                                  

Outcome:                                                                                          

                                                                                             

21:49 Decision to Hospitalize by Provider.                                                    rn  

                                                                                             

03:00 Admitted to ER Hold.  Please see [facility] for further documentation.                      

      Condition: stable                                                                           

      Instructed on the need for admit.                                                           

11:22 Patient left the ED.                                                                    aa5 

                                                                                                  

                                                                                                  

NIH Stroke Scale - NIH Stroke Score                                                               

Date: 2021                                                                                  

Time: 20:40                                                                                       

Total Score = 0                                                                                   

  1a. Level of Consciousness (LOC) - 0(Alert)                                                     

  1b. Level of Consciousness (LOC) (Year \T\ Age) - 0(Both)                                       

  1c. LOC Commands (Open \T\ Closes Eyes/) - 0(Both)                                          

   2. Best Gaze (Lateral Gaze Paresis) - 0(Normal)                                                

   3. Visual Field Loss - 0(No visual loss)                                                       

   4. Facial Palsy - 0(Normal)                                                                    

  5a. Left Arm: Motor (10-second hold) - 0(No drift)                                              

  5b. Right Arm: Motor (10-second hold) - 0(No drift)                                             

  6a. Left Leg: Motor (5-second hold - always test supine) - 0(No drift)                          

  6b. Right Leg: Motor (5-second hold - always test supine) - 0(No drift)                         

   7. Limb Ataxia (finger/nose \T\ heel/shin - test with eyes open) - 0(Absent)                   

   8. Sensory Loss (pinprick arms/legs/face) - 0(Normal)                                          

   9. Best Language: Aphasia (description/naming/reading) - 0(No aphasia)                         

  10. Dysarthria (speech clarity - read or repeat words) - 0(Normal)                              

  11. Extinction and Inattention (visual/tactile/auditory/spatial/personal) - 0(No                

      abnormality)                                                                                

Initials:                                                                                       

                                                                                                  

                                                                                                  

NIH Stroke Scale - NIH Stroke Score                                                               

Date: 2021                                                                                  

Time: 20:47                                                                                       

Total Score = 0                                                                                   

  1a. Level of Consciousness (LOC) - 0(Alert)                                                     

  1b. Level of Consciousness (LOC) (Year \T\ Age) - 0(Both)                                       

  1c. LOC Commands (Open \T\ Closes Eyes/) - 0(Both)                                          

   2. Best Gaze (Lateral Gaze Paresis) - 0(Normal)                                                

   3. Visual Field Loss - 0(No visual loss)                                                       

   4. Facial Palsy - 0(Normal)                                                                    

  5a. Left Arm: Motor (10-second hold) - 0(No drift)                                              

  5b. Right Arm: Motor (10-second hold) - 0(No drift)                                             

  6a. Left Leg: Motor (5-second hold - always test supine) - 0(No drift)                          

  6b. Right Leg: Motor (5-second hold - always test supine) - 0(No drift)                         

   7. Limb Ataxia (finger/nose \T\ heel/shin - test with eyes open) - 0(Absent)                   

   8. Sensory Loss (pinprick arms/legs/face) - 0(Normal)                                          

   9. Best Language: Aphasia (description/naming/reading) - 0(No aphasia)                         

  10. Dysarthria (speech clarity - read or repeat words) - 0(Normal)                              

  11. Extinction and Inattention (visual/tactile/auditory/spatial/personal) - 0(No                

      abnormality)                                                                                

Initials: rn                                                                                      

                                                                                                  

Signatures:                                                                                       

Dispatcher [employer]                           EDNima Allen MD MD rn Calderon, Audri, RN                     RN   aa5                                                  

Darius Bhakta, RN                       KEVYN                                                      

Karol Dominguez RN                    RN   ap3                                                  

Edwin Hooker                            mw2                                                  

                                                                                                  

**************************************************************************************************

## 2021-06-02 LAB
ALBUMIN SERPL BCP-MCNC: 2.9 G/DL (ref 3.4–5)
ALP SERPL-CCNC: 75 U/L (ref 45–117)
ALT SERPL W P-5'-P-CCNC: 15 U/L (ref 12–78)
AST SERPL W P-5'-P-CCNC: 10 U/L (ref 15–37)
BUN BLD-MCNC: 66 MG/DL (ref 7–18)
BUN BLD-MCNC: 66 MG/DL (ref 7–18)
CREAT UR-SCNC: < 13 MG/DL (ref 20–320)
GLUCOSE SERPLBLD-MCNC: 133 MG/DL (ref 74–106)
GLUCOSE SERPLBLD-MCNC: 147 MG/DL (ref 74–106)
HCT VFR BLD CALC: 28.9 % (ref 36–45)
HDLC SERPL-MCNC: 45 MG/DL (ref 40–60)
LDLC SERPL CALC-MCNC: 31 MG/DL (ref ?–130)
LYMPHOCYTES # SPEC AUTO: 0.8 K/UL (ref 0.7–4.9)
PMV BLD: 9.1 FL (ref 7.6–11.3)
POTASSIUM SERPL-SCNC: 5.4 MMOL/L (ref 3.5–5.1)
POTASSIUM SERPL-SCNC: 5.8 MMOL/L (ref 3.5–5.1)
PROT UR-MCNC: 703 MG/DL (ref ?–11.9)
RBC # BLD: 3.14 M/UL (ref 3.86–4.86)
UA DIPSTICK W REFLEX MICRO PNL UR: (no result)
URINE UROTHELIAL CELLS: <5 /HPF

## 2021-06-02 RX ADMIN — HUMAN INSULIN SCH: 100 INJECTION, SOLUTION SUBCUTANEOUS at 16:30

## 2021-06-02 RX ADMIN — CEFTRIAXONE SCH MLS: 1 INJECTION, SOLUTION INTRAVENOUS at 20:30

## 2021-06-02 RX ADMIN — SODIUM BICARBONATE TAB 325 MG SCH MG: 325 TAB at 20:30

## 2021-06-02 RX ADMIN — ASPIRIN SCH MG: 81 TABLET, COATED ORAL at 09:00

## 2021-06-02 RX ADMIN — HUMAN INSULIN SCH: 100 INJECTION, SOLUTION SUBCUTANEOUS at 20:35

## 2021-06-02 RX ADMIN — Medication SCH ML: at 09:00

## 2021-06-02 RX ADMIN — HUMAN INSULIN SCH: 100 INJECTION, SOLUTION SUBCUTANEOUS at 11:10

## 2021-06-02 RX ADMIN — ATORVASTATIN CALCIUM SCH MG: 20 TABLET, FILM COATED ORAL at 20:30

## 2021-06-02 RX ADMIN — FOLIC ACID SCH MG: 1 TABLET ORAL at 09:00

## 2021-06-02 RX ADMIN — HEPARIN SODIUM SCH UNIT: 5000 INJECTION, SOLUTION INTRAVENOUS; SUBCUTANEOUS at 03:57

## 2021-06-02 RX ADMIN — HEPARIN SODIUM SCH: 5000 INJECTION, SOLUTION INTRAVENOUS; SUBCUTANEOUS at 17:00

## 2021-06-02 RX ADMIN — HEPARIN SODIUM SCH UNIT: 5000 INJECTION, SOLUTION INTRAVENOUS; SUBCUTANEOUS at 09:00

## 2021-06-02 RX ADMIN — Medication SCH ML: at 20:31

## 2021-06-02 RX ADMIN — HUMAN INSULIN SCH: 100 INJECTION, SOLUTION SUBCUTANEOUS at 07:30

## 2021-06-02 NOTE — CON
Date of Consultation:  2021



Reason For Consultation:  Elevated BUN and creatinine, fluid management, 
hyperkalemia, hyponatremia.



History Of Present Illness:  This is a pleasant 81-year-old female, well known 
to me from previous admission with significant past medical history of diabetes 
complicated with neuropathy, hypertension, coronary artery disease complicated 
with congestive heart failure, osteoarthritis, recent admission to the hospital 
with acute kidney injury.  The patient was in her regular state of health after 
discharge from the hospital.  The patient was brought by daughter because of 
altered mental status and slurred speech.  Primary workup for the patient showed
that she has hyponatremia, hyperkalemia, and elevated BUN and creatinine.  
Creatinine 2.2 with GFR of 21.  For that reason, we have been consulted.  
Reviewing the record for the patient upon discharge last month, creatinine 1.4 
with GFR of 35.  The patient denied taking any nonsteroidal.  No IV contrast.  
Over the night, the patient was started on treatment for the hyperkalemia.  The 
patient received 2 doses of Kayexalate.  Dinero was inserted.  She has hematuria.
 The patient still did not have any bowel movement.



Past Medical History:  Includes;

1.   Hypertension.

2.   Hyperlipidemia.

3.   Diabetes complicated with neuropathy.

4.   Chronic kidney disease secondary to chronic obstruction with bilateral 
hydronephrosis.

5.   Coronary artery disease complicated with congestive heart failure.

6.   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 
creatinine 1.4, GFR of 35 as of May 2021.

7.   Degenerative disease.



Past Surgical History:  Includes;

1.   Hysterectomy.

2.   .

3.   Hernia repair.

4.   Cataract surgery.



Family History:  Positive for hypertension and coronary artery disease.



Social History:  Lives with family.  Denied smoking.  Denied drinking.  Denies 
drugs abuse.



Review of Systems:

Head and Neck:  No red eye.  No ear pain. 

GI:  No nausea.  No vomiting. 

:  Has dysuria. 

Gyn:  No vaginal discharge. 

Respiratory:  No shortness of breath. 

Cardiovascular:  Has leg swelling. 

Endocrine:  No polydipsia. 

Skin:  No rash. 

Neuro:  Slightly confused. 

Musculoskeletal:  No joint pain.



Physical Examination:

General:  When I saw the patient, the patient sitting in the bed, on nasal 
cannula. 

Vital Signs:  Blood pressure 156/53, pulse of 92.  The patient had a Dinero with 
500 bloody urine. 

Chest:  Crackles bilateral. 

Heart S1, S2.  Systolic murmur. 

Abdomen:  Soft, nontender.  No guarding or rebound.  No organomegaly. 

Extremities:  +1 edema. 

Neuro:  Alert, pleasantly confused.  No focality.  Moving 4 extremities.



Laboratory Data:  Upon admission yesterday; sodium 129, potassium 5.5, bicarb 
19, chloride 101, BUN 63, creatinine 2.2, calcium 8.5.  Back in May 2021, 
creatinine 1.4, GFR of 35.  Today's lab data; sodium 131, potassium 5.8, bicarb 
18, BUN 66, creatinine 2.1, calcium 8.1.  Chest x-ray showing cardiomegaly with 
congestion, compared to the chest x-ray before it is more congested.  Reviewing 
the record from previous admission; , serum protein electrophoresis has 
M-spike back in 2019 in the same admission.  No monoclonal on the UPEP.
 PC ratio 1.2 g.  Serology; positive MIRTHA, normal complement.  Hepatitis was 
negative.  HIV was negative.



Assessment And Plan:  

1.   Acute kidney injury on chronic kidney disease, multifactorial, secondary to
toxic ATN, complicated with hyperkalemia.  I am going to go ahead and start the 
patient on IV fluid to establish better potassium flow and we will diurese the 
patient and we will follow up.  Given the worsening kidney function, I am going 
to proceed with repeating ultrasound to further evaluate the obstruction.

2.   Urinary tract infection, recurrent, complicated with obstructive uropathy. 
The patient is going to need Urology evaluation.  Currently, the patient had 
from the previous admission enterococ sensitive to cephalosporin.  I agree with 
current cover.  We will follow up Urology.

3.   Hyponatremia, mostly secondary to dilutional.  We will resume diuresis.  I 
am going to decrease IV fluid.  We will give the patient 500 bolus, then we will
diurese the patient to establish more potassium diuresis.

4.   Hyperkalemia with the presence of hyponatremia.  Adrenal insufficiency 
needs to be ruled out.  I am going to send for cortisol level.  Reviewing the 
record back in 2019 was within normal limit.  We will repeat cortisol 
level.  I am going to go ahead and diurese the patient more aggressively to have
more potassium diuresis and we will follow up.

5.   Hypertension, not controlled.  We will utilize blood pressure for more 
diuresis.

6.   Diabetes as by primary.

7.   Proteinuric, nonnephrotic, serum protein electrophoresis positive for M-
spike before IPAP was negative.  We will continue to monitor.  I am going to 
hold on any ACE inhibitor or ARB for the time being as the patient had acute 
kidney injury and hyperkalemia.

8.   Secondary hyperparathyroidism secondary to renal failure.  We will start 
the patient on calcitriol.

9.   Anemia, possibly secondary to chronic kidney disease with the presence of 
hyperkalemia to rule out any GI loss.  I am going to send for iron study and we 
will follow up.  We will send for fecal occult.

10.   Altered mental status.  MRI was negative.  Mostly secondary to metabolic 
secondary to urinary tract infection.  We will follow up with primary.

11.   Non-anion gap metabolic acidosis, mostly secondary to renal failure.  I am
going to start the patient on oral bicarb. 

Thank you Dr. Frances for allowing us to participate in the care of your patient.



time spent exam  the patient face to face, placing order , discussing the case 
with the patient and family, reviewing the data , discussing the case with other
team Member including hopitalist and other consultant 45 min.

GINA

DD:  2021 11:13:22   Voice ID:  [id number]

DT:  2021 12:11:38   Report ID:  [id number]

ANGELI

## 2021-06-02 NOTE — RAD REPORT
EXAM DESCRIPTION:  RAD - Chest Single View - 6/1/2021 9:42 pm

 

CLINICAL HISTORY:  slurred speech

Chest pain.

 

COMPARISON:  Chest Single View dated 11/12/2019; Chest Single View dated 11/8/2019; Chest Single View
 dated 11/5/2019; Chest Single View dated 11/4/2019

 

FINDINGS:  Portable technique limits examination quality.

 

Mild-to-moderate interstitial pulmonary edema is seen. The heart is mildly enlarged in size. No displ
aced fractures.

 

IMPRESSION:  Mild CHF pattern.

## 2021-06-02 NOTE — CON
Reason For Consultation:  Consultation called because of difficult in expressing herself and possibil
ity of a transient ischemic attack.



History Of Present Illness:  Ms. Ramos is an 81-year-old right-handed  patient with history of
 abdominal aortic aneurysm, congestive heart failure, diabetes mellitus, dyslipidemia, hypertension, 
osteoarthritis, who was at home when reportedly her daughter thought she could not express herself pr
operly pronouncing a word.  They also felt she had right facial drooping.  This was around 6 p.m. on 
the 1st.  The patient was brought to [facility] and at that time had a head CT scan, which s
howed no ischemic or hemorrhagic change.  She had resolved her symptoms in the emergency room and had
 an NIH Stroke Scale of 0.  She was admitted for stroke workup.  Subsequent brain MRI done the follow
ing day showed no acute ischemic or hemorrhagic stroke.  Brain MRA showed no significant abnormalitie
s.  Echocardiogram of the heart showed pulmonary hypertension that was moderate with no wall motion a
bnormality in the left ventricle.  Throughout the hospitalization, her symptoms have not recurred and
 she is fully oriented to person, place, time, and situation.  Follows commands appropriately and has
 no deficits.



Past Medical History:  As indicated.



Allergies:  LEVOFLOXACIN AND ZOLPIDEM.



Family History:  Noncontributory.



Social History:  No alcohol, tobacco, or IV drug use.  The patient's family is involved in her care.



Medications:  She is on aspirin 81 mg daily, Lipitor 20 mg at bedtime, Rocaltrol 0.25 mcg daily.  She
 did receive 1 g of Rocephin for possible urinary tract infection.  She is on folic acid 1 mg daily, 
Lasix 40 mg as needed daily.



Review of Systems:

Aside from mentioned above, she denies any recent fevers or chills, nausea, vomiting, myalgias, arthr
algias.  __________ headache, weight change or rash.



Physical Examination:

Vital Signs:  Blood pressure 164/66, pulse 84, respiratory rate 16, temperature 97.4, oxygen saturati
on 96% on room air, weight 210 pounds, height 5 feet 3 inches, BMI 37.2. 

General:  Ms. Ramos is resting in bed.  She is in no acute distress.  She has actually been mostly gett
ing around by wheelchair with short distances for ambulation due to chronic pain in the left lower ex
tremity, which she said actually did hurt somewhat.  She was being transported once in a transport va
n when the person pulled her left leg as she was transferring.  She does not have any obvious open wo
und in that area.  

HEENT:  Otherwise, in terms of her examination, she is normocephalic, atraumatic.  Her sclerae are an
icteric.  Oropharynx is moist and pink. 

Neck:  Supple. 

Chest:  Clear. 

Heart:  Regular. 

Extremities:  Show in the left lower extremity, areas of vascular stasis, mild bruising noted with no
 open wounds and no obvious other abnormalities such as significant edema in the extremities that is 
mild. 

Neurological:  She is alert and oriented to person, place, and situation.  Follows commands appropria
tely.  Cranial nerves again no focal deficits in the arms and legs in terms of proximally and distall
y.  In upper extremities, she has 5/5 and symmetric in lower extremities.  She does have some mild we
akness around 4/5 proximally and down to about 4- to 3+/5 distally and the left more than the right s
heather.  Sensory examination shows decreased to light touch temperature more in the lower extremity for 
__________ upper extremity.  Coordination is intact in upper extremity, some difficulty due to weakne
ss in the lower extremity.  Reflexes are depressed in all extremities.  She was noted actually to be 
nonambulatory for quite a long time and the physical therapist noted she required 2 persons to get he
r to stand, and she has not been able to stand for quite a long while.  She does have 2 daughters wit
h her and they help with transfers, baths and meals, and she does have a sliding board for transfer a
t home.



Assessment:  Ms. Ramos is an 81-year-old patient with long-standing nonambulatory state, mostly wheelch
air with just transfer with assistance, 2 people required.  She does not have an evidence of an acute
 ischemic hemorrhagic stroke on brain MRI, possibly she may have had a TIA.  She also has possibly a 
urinary tract infection with 1+ esterase, too numerous to count red blood cells.  She did have greate
r than 50 bacteria and 1+ urine protein and has received antibiotics and 1 g of Rocephin.



Plan:  

1.Aspirin 81 mg daily, Lipitor 20 mg at bedtime.  At this point, she should have improved blood pres
sure control.

2.She may benefit from occupational therapy to help with improving her activities of daily living.  
She is likely to not recover much in ability to stand and ambulate, but can have range of motion exer
cises of the lower extremities.

3.She may be discharged home.  Follow up in Dr. Blanco's clinic as needed.





MARQUES

DD:  06/02/2021 18:40:18Voice ID:  [id number]

DT:  06/02/2021 21:36:34Report ID:  [id number]

## 2021-06-02 NOTE — P.HP
Certification for Inpatient


Patient admitted to: Inpatient


With expected LOS: >2 Midnights


Patient will require the following post-hospital care: None


Practitioner: I am a practitioner with admitting privileges, knowledge of 

patient current condition, hospital course, and medical plan of care.


Services: Services provided to patient in accordance with Admission requirements

found in Title 42 Section 412.3 of the Code of Federal Regulations





Patient History


Date of Service: 21


Reason for admission: TIA


History of Present Illness: 


Ms. Ramos is an 82 yo F with HTN, HLD, T2DM, chronic hydropnephrosis w/ 

hydroureter and recurrent UTIs here today for speech difficulty. Ms. Ramos reports

that nothing out of the ordinary happened today. Per her daughter, her last 

known normal was at 6pm. She says Ms. Ramos was having difficulty with speech, 

facial droop (unknown laterality), was confused and was drooling. On arrival to 

ED, symptoms had resolved. Ms. Ramos was diagnosed with a UTI last week and today 

cultures came back that the UTI is sensitive to ceftriaxone. Na 129, K 5.5, BUN 

63, Cr 2.2, GFR 21, Glu 160. Ua + leukocyte esterase and WBC. 


Allergies





levofloxacin [From Levaquin] Allergy (Verified 19 00:48)


   Anaphylaxis


zolpidem [From Ambien] Allergy (Verified 10/01/17 03:02)


   Unknown





Home Medications: 








Atorvastatin Calcium [Lipitor*] 1 tab PO DAILY 21 


Codeine/APAP [Tylenol #3*] 1 tab PO Q8H PRN 21 


Duloxetine [Cymbalta *] 1 tab PO DAILY 21 


Gabapentin [Neurontin*] 1 cap PO TID 21 


Hydralazine [Apresoline*] 2 tab PO TID 21 


Insulin Aspart (Niacinamide) [Fiasp 100 Unit/ml Flextouch] See Protocol SQ ACHS 

21 


Iron,Carb/Vit C/Vit B12/Folic [Iron 100 Plus Tablet] 55 mg PO DAILY 21 


Metformin HCl [Glucophage] 1 tab PO TID 21 


carvediloL [Carvedilol] 1 tab PO BID 21 


Amox/Clavulanate [Augmentin 500-125 mg Tab] 500 mg PO BID #28 tab 21 


Cyanocobalamin/Cobamamide [Vitamin B-12 5,000 Mcg Tab Sl] 1 each SL DAILY #30 

tab.subl 21 


Docusate [Colace Cap*] 100 mg PO DAILY #30 cap 21 


Tamsulosin [Flomax*] 0.4 mg PO BEDTIME #30 cap 21 


Thiamine HCl [Vitamin B-1*] 100 mg PO BID #60 tablet 21 








- Past Medical/Surgical History


Diabetic: Yes


-: HTN


-: Diabetes mellitus type 2


-: Chronic hydronephrosis/hydroureter with prior stents


-: Arthritis to the knees and hips


-: Abdominal aortic aneurysm


-: Hyperlipidemia


-: Diabetic neuropathy


-: Iron deficiency anemia


-: Degenerative disc and joint disease


-: Hysterectomy


-: 


-: Hernia repair


-: Cataract surgery


Psychosocial/ Personal History: Patient lives with daughter.  She is a .





- Family History


  ** Father


-: Heart disease


Notes: MI





  ** Mother


-: Kidney disease





- Social History


Smoking Status: Never smoker


Alcohol use: No


CD- Drugs: No


Caffeine use: Yes


Place of Residence: Home





Review of Systems


Unremarkable





Physical Examination





- Physical Exam


General: Alert, In no apparent distress, Cooperative


HEENT: Atraumatic, Normocephalic, PERRLA, Mucous membr. moist/pink, EOMI, 

Sclerae nonicteric


Neck: Supple, 2+ carotid pulse no bruit, JVD not distended, No Thyromegaly, No 

LAD


Respiratory: Clear to auscultation bilaterally, Normal air movement


Cardiovascular: No edema, Normal pulses, Regular rate/rhythm, Normal S1 S2, No 

gallops, No rubs, No murmurs


Capillary refill: <2 Seconds


Gastrointestinal: Normal bowel sounds, Soft and benign, Non-distended, No 

ascites, No tenderness, No masses, No rebound, No guarding


Musculoskeletal: No clubbing, No swelling, No contractures, No erythema, No 

tenderness, No warmth


Integumentary: No rashes, No breakdown, No significant lesion, No 

tenderness/swelling, No erythema, No warmth, No cyanosis


Neurological: Normal speech, Normal strength at 5/5 x4 extr, Normal tone, 

Sensation intact, Cranial nerves 3-12 intact, Normal affect


Lymphatics: No axilla or inguinal lymphadenopathy





- Studies


Laboratory Data (last 24 hrs)





21 20:50: PT 11.9, INR 1.03, APTT 31.7


21 20:50: WBC 7.90, Hgb 9.4 L, Hct 28.6 L D, Plt Count 240


21 20:50: Sodium 129 L, Potassium 5.5 H, BUN 63 H, Creatinine 2.20 H, 

Glucose 160 H, Magnesium 2.2








Assessment and Plan





- Plan





Assessment


TIA 


ANA on CKD


UTI sensitive to Rocephin


Hyperkalemia, Hyponatremia


T2DM


HTN


HLD





Plan


neurology consulted, MRI stroke protocol in the AM


folic acid, statin, aspirin daily


lipid panel pending, ECHO pending


NPO, speech consult, PT consult


nephrology consulted, continue low dose fluids


recheck electrolytes in the AM


ceftriaxone 1g IV daily


BP stable, continue to monitor


sliding scale insulin and accuchecks


reconcile and continue home medications 


Discharge Plan: Home


Plan to discharge in: 48 Hours





- Advance Directives


Does patient have a Living Will: No


Does patient have a Durable POA for Healthcare: Yes





- Code Status/Comfort Care


Code Status Assessed: Yes (full code)


Critical Care: No


Time Spent Managing Pts Care (In Minutes): 70

## 2021-06-02 NOTE — ECHO
HEIGHT: 5 ft 3 in   WEIGHT: 210 lb 0 oz   DATE OF STUDY: 06/02/2021   REFER DR: Arnold Hdz

2-DIMENSIONAL: YES

     M.MODE: YES

 DOPPLER: YES

COLOR FLOW: YES



                    TDS:  NO

PORTABLE: NO

 DEFINITY:  NO

BUBBLE STUDY: NO





DIAGNOSIS:  TRANSIENT ISCHEMIC ATTACK



CARDIAC HISTORY:  

CATHERIZATION: NO

SURGERY: NO

PROSTHETIC VALVE: NO

PACEMAKER: NO





MEASUREMENTS (cm)

    DIASTOLIC (NORMALS)                 SYSTOLIC (NORMALS)

IVSd                 0.8 (0.6-1.2)                    LA Diam 2.6 (1.9-4.0)     LVEF       
  52%  

LVIDd               2.8 (3.5-5.7)                        LVIDs      2.1 (2.0-3.5)     %FS  
        25%

LVPWd             0.9 (0.6-1.2)

Ao Diam           2.6 (2.0-3.7)



2 DIMENSIONAL ASSESSMENT:

RIGHT ATRIUM:                   NORMAL

LEFT ATRIUM:       NORMAL



RIGHT VENTRICLE:            NORMAL

LEFT VENTRICLE: NORMAL



TRICUSPID VALVE:             NORMAL

MITRAL VALVE:    MITRAL ANNULAR CALCIFICATION



PULMONIC VALVE:             NORMAL

AORTIC VALVE:     NORMAL



PERICARDIAL EFFUSION: NONE

AORTIC ROOT:      NORMAL





LEFT VENTRICULAR WALL MOTION:     PARADOXICAL SEPTUM.



DOPPLER/COLOR FLOW:     MILD TRICUSPID REGURGITATION.



COMMENTS:      MITRAL ANNULAR CALCIFICATION. PARADOXICAL SEPTUM. NORMAL LEFT VENTRICULAR 
EJECTION FRACTION. NO THROMBUS. MODERATE PULMONARY HYPERTENSION. RIGHT VENTRICULAR 
SYSTOLIC PRESSURE 50 mmHg.



TECHNOLOGIST:   VALDO RODRIGUEZ

## 2021-06-02 NOTE — EKG
Test Date:    2021-06-01               Test Time:    21:03:51

Technician:   JOVAN                                    

                                                     

MEASUREMENT RESULTS:                                       

Intervals:                                           

Rate:         86                                     

UT:           250                                    

QRSD:         150                                    

QT:           418                                    

QTc:          500                                    

Axis:                                                

P:            76                                     

UT:           250                                    

QRS:          28                                     

T:            151                                    

                                                     

INTERPRETIVE STATEMENTS:                                       

                                                     

Sinus rhythm with 1st degree AV block

Left bundle branch block

Abnormal ECG

Compared to ECG 11/20/2019 17:51:37

Left bundle-branch block now present

Ventricular premature complex(es) no longer present

Left ventricular hypertrophy no longer present

Early repolarization no longer present

Myocardial infarct finding no longer present



Electronically Signed On 06-02-21 11:53:40 CDT by Lewis Deras

## 2021-06-02 NOTE — RAD REPORT
EXAM DESCRIPTION:  MRI - Brain Wo Cont - 6/2/2021 8:31 am

 

CLINICAL HISTORY:  TIA

Headache, drowsiness

 

COMPARISON:  MRA Head Wo Cont dated 6/2/2021

 

TECHNIQUE:  Multi-sequence, multiplanar MR imaging of the brain was performed without contrast.

 

FINDINGS:  No intracranial hemorrhage, hydrocephalus or extra-axial fluid collections.Mild-to-moderat
e periventricular and deep white matter chronic microvascular ischemic changes. No edema or shift of 
midline structures. No findings to suspect brain mass. DWI is negative for acute CVA.

 

Midline structures are normally formed.

 

Mastoid air cells and paranasal sinuses are clear.

 

IMPRESSION:  Negative for acute CVA or other acute intracranial abnormality.

## 2021-06-02 NOTE — P.PN
Date of Service: 06/02/21


Urine back with blood stained urine.


No fever.


Neurologic symptoms resolved.


MRI of the brain:  Negative for acute CVA





Diagnosis:


TIA


UTI


Hematuria


History of bilateral hydronephrosis.


History of recurrent urinary retention.





Renal ultrasound reports persistent mild to moderate hydronephrosis.


Nephrology input appreciated.


Diurese with IV Lasix.


Urology consult.


IV Rocephin


Follow cultures.

## 2021-06-02 NOTE — RAD REPORT
EXAM DESCRIPTION:  MRI - MRA Head Wo Cont - 6/2/2021 8:21 am

 

CLINICAL HISTORY:  TIA

CVA

 

COMPARISON:  Ct Stroke Brain Wo Cont dated 6/1/2021

 

FINDINGS:  3D noncontrast time-of-flight MR angiography of the [place] was performed.

 

No aneurysm, flow-limiting stenosis or vascular malformation is seen. Forward flow seen in both verte
bral arteries.

 

The visualized dural venous sinuses appear patent.

 

IMPRESSION:  No significant flow abnormality of the [place] is identified.

## 2021-06-03 LAB
ALBUMIN SERPL BCP-MCNC: 2.7 G/DL (ref 3.4–5)
ALP SERPL-CCNC: 70 U/L (ref 45–117)
ALT SERPL W P-5'-P-CCNC: 14 U/L (ref 12–78)
AST SERPL W P-5'-P-CCNC: 8 U/L (ref 15–37)
BUN BLD-MCNC: 69 MG/DL (ref 7–18)
FERRITIN SERPL-MCNC: 49.1 NG/ML (ref 8–388)
GLUCOSE SERPLBLD-MCNC: 130 MG/DL (ref 74–106)
HCT VFR BLD CALC: 27.6 % (ref 36–45)
IRON SERPL-MCNC: 39 UG/DL (ref 50–170)
LYMPHOCYTES # SPEC AUTO: 0.5 K/UL (ref 0.7–4.9)
MAGNESIUM SERPL-MCNC: 1.8 MG/DL (ref 1.8–2.4)
PMV BLD: 9.1 FL (ref 7.6–11.3)
POTASSIUM SERPL-SCNC: 5.2 MMOL/L (ref 3.5–5.1)
RBC # BLD: 3.02 M/UL (ref 3.86–4.86)
TRANSFERRIN SERPL-MCNC: 171 MG/DL (ref 200–360)
TSH SERPL DL<=0.05 MIU/L-ACNC: 1.49 UIU/ML (ref 0.36–3.74)
URATE SERPL-MCNC: 6.4 MG/DL (ref 2.6–6)

## 2021-06-03 RX ADMIN — SODIUM BICARBONATE TAB 325 MG SCH MG: 325 TAB at 20:54

## 2021-06-03 RX ADMIN — Medication SCH ML: at 20:54

## 2021-06-03 RX ADMIN — Medication SCH ML: at 08:52

## 2021-06-03 RX ADMIN — Medication SCH ML: at 20:53

## 2021-06-03 RX ADMIN — HUMAN INSULIN SCH: 100 INJECTION, SOLUTION SUBCUTANEOUS at 16:18

## 2021-06-03 RX ADMIN — ALBUMIN (HUMAN) SCH MG: 5 SOLUTION INTRAVENOUS at 20:53

## 2021-06-03 RX ADMIN — CEFTRIAXONE SCH MLS: 1 INJECTION, SOLUTION INTRAVENOUS at 20:54

## 2021-06-03 RX ADMIN — FOLIC ACID SCH MG: 1 TABLET ORAL at 08:51

## 2021-06-03 RX ADMIN — HUMAN INSULIN SCH: 100 INJECTION, SOLUTION SUBCUTANEOUS at 11:30

## 2021-06-03 RX ADMIN — ANTACID TABLETS SCH MG: 500 TABLET, CHEWABLE ORAL at 16:18

## 2021-06-03 RX ADMIN — HUMAN INSULIN SCH: 100 INJECTION, SOLUTION SUBCUTANEOUS at 20:54

## 2021-06-03 RX ADMIN — HUMAN INSULIN SCH: 100 INJECTION, SOLUTION SUBCUTANEOUS at 07:30

## 2021-06-03 RX ADMIN — ASPIRIN SCH MG: 81 TABLET, COATED ORAL at 08:51

## 2021-06-03 RX ADMIN — ATORVASTATIN CALCIUM SCH MG: 20 TABLET, FILM COATED ORAL at 20:54

## 2021-06-03 RX ADMIN — ANTACID TABLETS SCH MG: 500 TABLET, CHEWABLE ORAL at 11:55

## 2021-06-03 RX ADMIN — SODIUM BICARBONATE TAB 325 MG SCH MG: 325 TAB at 08:51

## 2021-06-03 RX ADMIN — SODIUM FERRIC GLUCONATE COMPLEX IN SUCROSE SCH MLS: 12.5 INJECTION INTRAVENOUS at 09:59

## 2021-06-03 RX ADMIN — CALCITRIOL CAPSULES 0.25 MCG SCH MCG: 0.25 CAPSULE ORAL at 08:51

## 2021-06-03 NOTE — PN
Date of Progress Note:  06/03/2021



Subjective:  The patient was admitted with acute kidney injury, over volume, 
altered mental status.  The patient was found to have chronic hydronephrosis 
with UTI.



Physical Examination:

Vital Signs:  Blood pressure 151/67, pulse of 82, afebrile.  The patient had 
good urine output of 2500, negative of 800. 

Chest:  Faint thrills bilateral base. 

Heart:  S1, S2.  Systolic murmur. 

Abdomen:  Soft, nontender. 

Extremities:  Plus edema. 

Neuro:  Alert.  No focality.



Laboratory Data:  Renal ultrasound showing 11/12, bilateral hydronephrosis 
__________.  Urine culture still pending.



Current Medications:  The patient on include;

1.   Ceftriaxone.

2.   Aspirin.

3.   IV iron.

4.   Lasix 40 daily.

5.   Sodium bicarb 650 b.i.d.

6.   Magnesium sulfate.

7.   Calcitriol.



Laboratory Data:  WBC 7.2, H and H 8.8/27.6.  Sodium 132, potassium 5.2, bicarb 
17, BUN 69, creatinine 2.1, uric acid 6.4, calcium 8.7, phosphorus 7.7, iron 
saturation of 16, protein of 49, cortisol of 20, TSH 1.4.



Assessment And Plan:  

1.   Acute kidney injury secondary to cardiorenal/toxic ATN, still on the wet 
side, complicated with hyperkalemia.  I am going to go ahead and increase her 
Lasix to 40 mg b.i.d. and we will continue to monitor the patient.

2.   Recurrent urinary tract infection secondary to obstructive uropathy, 
bilateral hydro.  We will follow up with Urology.  Continue current antibiotic. 
We will follow up culture.

3.   Dilutional hyponatremia.  Cortisol level within normal limits.  TSH normal.
 I am going to go ahead and try to optimize the fluid status by increasing the 
Lasix to b.i.d.

4.   Hyperkalemia, resolved.  We will continue low potassium diet, increase 
Lasix.

5.   Hypertension, controlled, not optimal.  We will try to utilize blood 
pressure for more diuresis.  Increase Lasix to 40 mg b.i.d.

6.   Proteinuric, nonnephrotic with the presence of hyperkalemia, acute kidney 
injury.  Keep holding ACE inhibitor or ARB.

7.   Secondary hyperparathyroidism.  I am going to continue calcitriol, add 
Tums.

8.   Acidosis.  Continue current sodium bicarb dose.  We are adding calcium 
carbonate as Tums and we will follow up.

9.   Iron deficiency anemia with the presence of hyperkalemia.  We will start IV
iron.  I am going to send for fecal occult.

10.   Altered mental status as by Neurology and Primary.

11.   Congestive heart failure, diastolic.  Continue diuresis.  We will optimize
fluid status.  Case discussed with Dr. Frances, discussed with the family 
including the 2 daughters by bedside.  Time spent discussing with the patient, 
examining the patient face-to-face, discussing with family and 2 daughters by 
bedside, discussing with other staff member including nursing and other 
consultant, Neurology and hospitalist 45 minutes.



time spent exam  the patient face to face, placing order , discussing the case 
with the patient and family, reviewing the data , discussing the case with other
team Member including hopitalist and other consultant 45 min.

GINA

DD:  06/03/2021 10:37:43   Voice ID:  [id number]

DT:  06/03/2021 11:34:42   Report ID:  [id number]

ANGELI

## 2021-06-03 NOTE — CON
Date of Consultation: 6/3/21



Reason For Consultation:  Urinary retention and hydronephrosis bilaterally.



History Of Present Illness:  Ms. Ramos is an 81-year-old woman with hypertension, 
hyperlipidemia, type 2 diabetes mellitus, who presented to the hospital at this 
time because of speech difficulties and concern for possible TIA or CVA.  She 
does not feel like she was speaking in any way out of the ordinary, noting she 
had just eaten something and this is what may have caused her disordered speech,
but her daughter and children were concerned and brought her in.  She had 
previously been diagnosed with a urinary tract infection the following week that
was sensitive to multiple antimicrobials. 



05/03/2021, Enterococcus, ampicillin sensitive, otherwise resistant. 



05/28/2021, Enterobacter, sensitive to Bactrim, Levaquin, ceftriaxone and 
resistant to Augmentin/Keflex and Unasyn, otherwise sensitive to IV 
antimicrobials.  She had noted that she had some dysuria over the last 2 months 
and some gross hematuria within the last 1-2 months.  She denied any associated 
flank pain and she denied any fever or chills.  She has a history of bilateral 
hydronephrosis that has been noted since 2019.  She does note that she has seen 
Dr. Lyman in the past and suggests that perhaps he may have placed stents at 
some point for the hydronephrosis.  She then suggested she went to another 
doctor, presumably a urologist, who removed those stents.  She is a poor 
historian and so the specific variability of the details is somewhat in 
question. 



Renal ultrasound obtained 06/02/2021 revealed bilateral hydronephrosis with 
marked left renal cortical thinning, worse on the left than on the right.  Her 
creatinine is 2.12 with an EGFR of 22 and she has mild anemia with hemoglobin of
8.8.  She had no elevation in her white blood count at 7.2 suggesting the 
absence of any overwhelming infection or sepsis. 



06/01/2021, urine culture was unremarkable for infection. 



On exam, she has a Dinero catheter in place at present and there is some light 
pink urine with some blood sediment within the tubing.  She otherwise is 
comfortable and well-appearing, sitting recumbent in the bed and eating lunch at
the time of my visit with her. 



On review of CT imaging obtained 05/02/2021, the chronic bilateral 
hydronephrosis was noted with hydroureter extending all the way down to the 
level of the bladder on my direct review of the images.  The bladder was also 
noted to be thickened and even with an enhancing appearance though no IV 
contrast was given, which would suggest even the potential for blood or 
calcification within the tissues.



Assessment And Recommendations:  This is an 81-year-old woman with hypertension,
hyperlipidemia, type 2 diabetes mellitus, hydronephrosis and hydroureter down to
the UVJ bilaterally with chronic kidney disease associated with evidence of 
renal atrophy with recurrent urinary tract infections, gross hematuria 
potentially due to the underlying cystitis and chronic kidney disease. 



She now has a urethral Dinero catheter in place and I think it is best that she 
keep it at this point while IV or oral antimicrobial therapy is continued for at
least 10-14 days.  This is prudent to try to resolve any component of cystitis 
that may be causing the UVJ obstruction.  If the UVJ obstruction fails to 
resolve with Dinero catheterization and antimicrobial therapy as I suspected 
will, while I counseled the patient at the visit that it is likely her kidney 
disease is chronic at this point and irreversible, perhaps placing ureteral 
stents may prevent further deterioration of her renal function due to loss of 
nephron.  As a result, I recommended she follow up with me as an outpatient 
where we can have the discussion of the pros and cons of ureteral stent 
placement and need for chronic ureteral stent exchanges versus failing to manage
the potential persistent obstruction and deterioration of her renal function 
over time.  She also would likely benefit from urodynamic evaluation to discover
the underlying etiology of her urinary retention and voiding dysfunction. 



She will require cystoscopy given the gross hematuria and the recurrent 
infections to rule out underlying malignancy or source for the infections.





KARINE/RAMYA

DD:  06/03/2021 19:06:45   Voice ID:  [id number]

DT:  06/03/2021 19:32:14   Report ID:  [id number]

ANGELI

## 2021-06-03 NOTE — P.PN
Subjective


Date of Service: 06/03/21


Chief Complaint: TIA


Patient has blood stained urine in the urine bag.


Urine culture:  No growth.


She remain alert.


She denies any pain.








Physical Examination





- Vital Signs


Temperature: 97.5 F


Blood Pressure: 168/72


Pulse: 83


Respirations: 17


Pulse Ox (%): 97





- Physical Exam


General: Alert, In no apparent distress, Oriented x3


HEENT: PERRLA, Mucous membr. moist/pink, Sclerae nonicteric


Neck: Supple, JVD not distended


Respiratory: Clear to auscultation bilaterally, Normal air movement


Cardiovascular: No edema, Regular rate/rhythm, Normal S1 S2


Gastrointestinal: Soft and benign, Non-distended, No tenderness


Musculoskeletal: No swelling, No tenderness


Integumentary: No rashes, No erythema


Neurological: Normal speech, Normal strength at 5/5 x4 extr, Cranial nerves 3-12

intact





- Studies


Microbiology Data (last 24 hrs): 








06/01/21 21:30   Clean Catch Urine   [place] Count - Final


                            No growth.


06/01/21 21:30   Clean Catch Urine    - Final


                            No growth.








Assessment And Plan





- Current Problems (Diagnosis)


(1) TIA (transient ischemic attack)


Current Visit: Yes   Status: Acute   





(2) Hydronephrosis


Current Visit: Yes   Status: Acute   





(3) Hematuria


Current Visit: Yes   Status: Acute   





(4) History of urinary retention


Current Visit: Yes   Status: Acute   





(5) Complicated UTI (urinary tract infection)


Current Visit: No   Status: Acute   





(6) Chronic anemia


Current Visit: Yes   Status: Acute   





- Plan


MRI of the brain:  Negative for acute CVA.


Renal ultrasound:  Bilateral mild to moderate hydronephrosis.


Echo:  Moderate pulmonary hypertension, paradoxical septum.


Continue current antibiotics.


Urology consult given the recurrent urinary retention and recurrent UTI.


Maintain Dinero catheter.


Nephrology input appreciated.  Patient being diuresed with Lasix.


Sodium bicarb for metabolic acidosis and hyperkalemia per nephrology.


Monitor renal function.


Follow cultures.


Patient is bedridden.


Monitor CBC to follow hemoglobin.


Transfuse p.r.n.

## 2021-06-04 LAB
ALBUMIN SERPL BCP-MCNC: 2.5 G/DL (ref 3.4–5)
BUN BLD-MCNC: 65 MG/DL (ref 7–18)
GLUCOSE SERPLBLD-MCNC: 136 MG/DL (ref 74–106)
POTASSIUM SERPL-SCNC: 4.4 MMOL/L (ref 3.5–5.1)

## 2021-06-04 RX ADMIN — Medication SCH ML: at 21:00

## 2021-06-04 RX ADMIN — SEVELAMER CARBONATE SCH MG: 800 TABLET, FILM COATED ORAL at 17:17

## 2021-06-04 RX ADMIN — FOLIC ACID SCH MG: 1 TABLET ORAL at 08:15

## 2021-06-04 RX ADMIN — ALBUMIN (HUMAN) SCH MG: 5 SOLUTION INTRAVENOUS at 21:01

## 2021-06-04 RX ADMIN — CEFTRIAXONE SCH MLS: 1 INJECTION, SOLUTION INTRAVENOUS at 21:01

## 2021-06-04 RX ADMIN — ALBUMIN (HUMAN) SCH MG: 5 SOLUTION INTRAVENOUS at 08:14

## 2021-06-04 RX ADMIN — ATORVASTATIN CALCIUM SCH MG: 20 TABLET, FILM COATED ORAL at 21:01

## 2021-06-04 RX ADMIN — ANTACID TABLETS SCH MG: 500 TABLET, CHEWABLE ORAL at 08:15

## 2021-06-04 RX ADMIN — ACETAMINOPHEN PRN MG: 500 TABLET, FILM COATED ORAL at 16:01

## 2021-06-04 RX ADMIN — Medication SCH ML: at 08:17

## 2021-06-04 RX ADMIN — HYDRALAZINE HYDROCHLORIDE PRN MG: 20 INJECTION INTRAMUSCULAR; INTRAVENOUS at 08:14

## 2021-06-04 RX ADMIN — ASPIRIN SCH MG: 81 TABLET, COATED ORAL at 08:16

## 2021-06-04 RX ADMIN — SODIUM BICARBONATE TAB 325 MG SCH MG: 325 TAB at 08:15

## 2021-06-04 RX ADMIN — Medication SCH ML: at 08:16

## 2021-06-04 RX ADMIN — SODIUM BICARBONATE TAB 325 MG SCH MG: 325 TAB at 21:00

## 2021-06-04 RX ADMIN — HUMAN INSULIN SCH: 100 INJECTION, SOLUTION SUBCUTANEOUS at 20:19

## 2021-06-04 RX ADMIN — HUMAN INSULIN SCH: 100 INJECTION, SOLUTION SUBCUTANEOUS at 07:30

## 2021-06-04 RX ADMIN — SEVELAMER CARBONATE SCH MG: 800 TABLET, FILM COATED ORAL at 12:37

## 2021-06-04 RX ADMIN — HUMAN INSULIN SCH: 100 INJECTION, SOLUTION SUBCUTANEOUS at 11:30

## 2021-06-04 RX ADMIN — CALCITRIOL CAPSULES 0.25 MCG SCH MCG: 0.25 CAPSULE ORAL at 08:15

## 2021-06-04 RX ADMIN — HUMAN INSULIN SCH: 100 INJECTION, SOLUTION SUBCUTANEOUS at 16:30

## 2021-06-04 NOTE — P.PN
Subjective


Date of Service: 06/04/21


Chief Complaint: TIA


Urine bag containing clear urine.  Hematuria has resolved.


Urine culture:  No growth.


She remain alert.


She denies any pain.








Physical Examination





- Vital Signs


Temperature: 96.9 F


Blood Pressure: 136/60


Pulse: 59


Respirations: 16


Pulse Ox (%): 93





- Physical Exam


General: Alert, In no apparent distress, Oriented x3


HEENT: Mucous membr. moist/pink


Neck: JVD not distended


Respiratory: Clear to auscultation bilaterally, Normal air movement


Cardiovascular: No edema, Regular rate/rhythm, Normal S1 S2


Gastrointestinal: Normal bowel sounds, Soft and benign, Non-distended, No 

tenderness


Musculoskeletal: No swelling


Integumentary: No rashes


Neurological: Normal strength at 5/5 x4 extr, Cranial nerves 3-12 intact


Urinary: Dinero catheter





Assessment And Plan





- Current Problems (Diagnosis)


(1) TIA (transient ischemic attack)


Current Visit: Yes   Status: Acute   





(2) Hydronephrosis


Current Visit: Yes   Status: Acute   





(3) Hematuria


Current Visit: Yes   Status: Acute   





(4) History of urinary retention


Current Visit: Yes   Status: Acute   





(5) Complicated UTI (urinary tract infection)


Current Visit: No   Status: Acute   





(6) Chronic anemia


Current Visit: Yes   Status: Acute   





- Plan


MRI of the brain:  Negative for acute CVA.


Renal ultrasound:  Bilateral mild to moderate hydronephrosis.


Echo:  Moderate pulmonary hypertension, paradoxical septum.


Continue IV Rocephin.


Urology input appreciated. Dr. Leonard recommend maintaining Dinero catheter for 

a couple of weeks, follow up in the office for cystoscopy and possible bilateral

ureteral stent placement.


Maintain Dinero catheter.


Nephrology input appreciated.  Continue Lasix per nephrology.


Sodium bicarb for metabolic acidosis and hyperkalemia per nephrology.


Monitor renal function.


Patient is bedridden.


Monitor CBC to follow hemoglobin.


Transfuse p.r.n.

## 2021-06-04 NOTE — P.PN
Subjective


Date of Service: 06/04/21


Chief Complaint: TIA


Subjective: No new changes





Physical Examination





- Vital Signs


Temperature: 96.9 F


Blood Pressure: 136/60


Pulse: 59


Respirations: 16


Pulse Ox (%): 93





- Physical Exam


General: Other (Appears as her stated age.)


HEENT: Atraumatic, Normocephalic


Neck: Supple


Respiratory: Clear to auscultation bilaterally


Gastrointestinal: Soft and benign, Non-distended





- Studies


Microbiology Data (last 24 hrs): 








06/01/21 21:30   Clean Catch Urine   [place] Count - Final


                            No growth.


06/01/21 21:30   Clean Catch Urine    - Final


                            No growth.








Assessment And Plan





- Plan





1.   Acute kidney injury secondary to cardiorenal/toxic ATN.  SCr plateauing.  

Cont Lasix 40 mg IV b.i.d..  Monitor I/O, renal panel.


2.                     CKD 3b.  Baseline eGFR around 35.  Monitor renal panel.


3.   Recurrent urinary tract infection secondary to obstructive uropathy, 

bilateral hydro.  Appreciate Urology input.  Continue antibiotic x 2 wks.  Cont 

sewell.  For outpt urodynamic studies, cysto, & likely bilateral ureteral 

stenting.


4.   Dilutional hyponatremia.  Resolved.  Monitor.


5.   Hyperkalemia, resolved.  Continue low potassium diet.  Cont Lasix as above.


6.   Hypertension.  BP better controlled.  Cont current BP regimen.


7.   Proteinuric, nonnephrotic with the presence of hyperkalemia, acute kidney 

injury.  Keep holding ACE inhibitor or ARB.


8.   Secondary hyperparathyroidism.  Continue calcitriol.


9.                     HyperPO4.  Tums switched to sevelamer po tidwm.


10.   Acidosis.  Oral sodium bicarb dose increased to 1950 mg po bid.


11.   Iron deficiency anemia with the presence of hyperkalemia.  We will start 

IV iron.  I am going to send for fecal occult.


12.   Altered mental status as by Neurology and Primary.


13.   Congestive heart failure, diastolic.  Continue diuresis as above.

## 2021-06-05 LAB
ALBUMIN SERPL BCP-MCNC: 2.6 G/DL (ref 3.4–5)
BUN BLD-MCNC: 63 MG/DL (ref 7–18)
GLUCOSE SERPLBLD-MCNC: 153 MG/DL (ref 74–106)
HCT VFR BLD CALC: 26.7 % (ref 36–45)
LYMPHOCYTES # SPEC AUTO: 0.8 K/UL (ref 0.7–4.9)
PMV BLD: 8.5 FL (ref 7.6–11.3)
POTASSIUM SERPL-SCNC: 4.1 MMOL/L (ref 3.5–5.1)
RBC # BLD: 2.94 M/UL (ref 3.86–4.86)

## 2021-06-05 RX ADMIN — FOLIC ACID SCH MG: 1 TABLET ORAL at 08:21

## 2021-06-05 RX ADMIN — Medication SCH ML: at 08:22

## 2021-06-05 RX ADMIN — HUMAN INSULIN SCH: 100 INJECTION, SOLUTION SUBCUTANEOUS at 20:50

## 2021-06-05 RX ADMIN — ONDANSETRON PRN MG: 2 INJECTION INTRAMUSCULAR; INTRAVENOUS at 10:26

## 2021-06-05 RX ADMIN — ATORVASTATIN CALCIUM SCH MG: 20 TABLET, FILM COATED ORAL at 20:49

## 2021-06-05 RX ADMIN — CEFTRIAXONE SCH MLS: 1 INJECTION, SOLUTION INTRAVENOUS at 20:50

## 2021-06-05 RX ADMIN — CALCITRIOL CAPSULES 0.25 MCG SCH MCG: 0.25 CAPSULE ORAL at 08:21

## 2021-06-05 RX ADMIN — Medication SCH ML: at 20:48

## 2021-06-05 RX ADMIN — Medication SCH ML: at 08:30

## 2021-06-05 RX ADMIN — HUMAN INSULIN SCH: 100 INJECTION, SOLUTION SUBCUTANEOUS at 11:30

## 2021-06-05 RX ADMIN — ALBUMIN (HUMAN) SCH MG: 5 SOLUTION INTRAVENOUS at 08:21

## 2021-06-05 RX ADMIN — SODIUM BICARBONATE TAB 325 MG SCH MG: 325 TAB at 13:18

## 2021-06-05 RX ADMIN — ONDANSETRON PRN MG: 2 INJECTION INTRAMUSCULAR; INTRAVENOUS at 20:49

## 2021-06-05 RX ADMIN — SEVELAMER CARBONATE SCH MG: 800 TABLET, FILM COATED ORAL at 17:15

## 2021-06-05 RX ADMIN — HUMAN INSULIN SCH: 100 INJECTION, SOLUTION SUBCUTANEOUS at 16:30

## 2021-06-05 RX ADMIN — ACETAMINOPHEN PRN MG: 500 TABLET, FILM COATED ORAL at 20:47

## 2021-06-05 RX ADMIN — ACETAMINOPHEN PRN MG: 500 TABLET, FILM COATED ORAL at 17:14

## 2021-06-05 RX ADMIN — ASPIRIN SCH MG: 81 TABLET, COATED ORAL at 08:21

## 2021-06-05 RX ADMIN — ALBUMIN (HUMAN) SCH MG: 5 SOLUTION INTRAVENOUS at 20:48

## 2021-06-05 RX ADMIN — SEVELAMER CARBONATE SCH MG: 800 TABLET, FILM COATED ORAL at 13:17

## 2021-06-05 RX ADMIN — SODIUM BICARBONATE TAB 325 MG SCH MG: 325 TAB at 20:51

## 2021-06-05 RX ADMIN — SEVELAMER CARBONATE SCH MG: 800 TABLET, FILM COATED ORAL at 08:21

## 2021-06-05 RX ADMIN — Medication SCH ML: at 20:50

## 2021-06-05 RX ADMIN — HUMAN INSULIN SCH: 100 INJECTION, SOLUTION SUBCUTANEOUS at 07:30

## 2021-06-05 NOTE — RAD REPORT
EXAM DESCRIPTION:  ******** ADDENDUM #1 ********

THIS REPORT CONTAINS FINDINGS THAT MAY BE CRITICAL TO PATIENT CARE: Verbal report of exam results was
 given to Jaret Sun RN by Benito Hdz on 6/5/2021 1:17 AM CDT .The results were acknowledged and
 understood.

Electronically signed by:   Lalo Jimenez MD   6/5/2021 1:25 AM CDT Workstation: [id number]

*** End of Addendum ***

 

EXAM DESCRIPTION:  CT Head Without Intravenous Contrast

 

CLINICAL HISTORY:  The patient is 81 years old and is Female; stroke

 

TECHNIQUE:  Axial computed tomography images of the head/brain without intravenous contrast.   Sagitt
al and coronal reformatted images were created and reviewed.   This CT exam was performed using one o
r more of the following dose reduction techniques:   automated exposure control, adjustment of the mA
 and/or kV according to patient size, and/or use of iterative reconstruction technique.

 

COMPARISON:  CT head 6/1/2021.

 

FINDINGS:  Brain:   Remote lacunar infarct involving the left anterior limb of the internal capsule.

        Mild nonspecific white matter changes likely related to chronic microvascular ischemic diseas
e.

        Mild cerebral atrophy.

        No hemorrhage.

  Ventricles:   No ventriculomegaly.

  Bones/joints:   Unremarkable.   No acute fracture.

  Soft tissues:   Unremarkable.

  Sinuses:   Unremarkable as visualized.

  Mastoid air cells:   Unremarkable as visualized.   No mastoid effusion.

 

IMPRESSION:  No acute intracranial abnormality.

 

Electronically signed by:   Lalo Jimenez MD   6/5/2021 1:12 AM CDT Workstation: [id number]

 

 

 

Due to temporary technical issues with the PACS/Fluency reporting system, reports are being signed by
 the in house radiologists without review as a courtesy to insure prompt reporting. The interpreting 
radiologist is fully responsible for the content of the report.

## 2021-06-05 NOTE — P.PN
Subjective


Date of Service: 06/05/21


Chief Complaint: TIA


There is a report patient developed mild facial droop overnight.  CT head done 

overnight was unremarkable.


Patient noted to have mild right facial droop.


She also reports gas pain.


Urine culture:  No growth.


Urine bag contain clear urine.  No hematuria.











Physical Examination





- Vital Signs


Temperature: 97.9 F


Blood Pressure: 163/70


Pulse: 83


Respirations: 16


Pulse Ox (%): 93





- Physical Exam


General: Alert, In no apparent distress, Oriented x3


HEENT: Mucous membr. moist/pink, Sclerae nonicteric


Neck: JVD not distended


Respiratory: Clear to auscultation bilaterally, Normal air movement


Cardiovascular: No edema, Regular rate/rhythm, Normal S1 S2


Gastrointestinal: Normal bowel sounds, Soft and benign, Non-distended, No 

tenderness


Musculoskeletal: No swelling


Integumentary: No rashes


Neurological: Other (Mild right facial droop.)





Assessment And Plan





- Current Problems (Diagnosis)


(1) TIA (transient ischemic attack)


Current Visit: Yes   Status: Acute   





(2) Hydronephrosis


Current Visit: Yes   Status: Acute   





(3) Hematuria


Current Visit: Yes   Status: Acute   





(4) History of urinary retention


Current Visit: Yes   Status: Acute   





(5) Complicated UTI (urinary tract infection)


Current Visit: No   Status: Acute   





(6) Chronic anemia


Current Visit: Yes   Status: Acute   





- Plan


MRI of the brain:  Negative for acute CVA.


Renal ultrasound:  Bilateral mild to moderate hydronephrosis.


Echo:  Moderate pulmonary hypertension, paradoxical septum.


Continue IV Rocephin.  Patient to complete at least 10 days of treatment for 

UTI.  Will transition to oral antibiotics on discharge.


Urology input appreciated. Dr. Leonard recommend maintaining Dinero catheter for 

a couple of weeks, follow up in the office for cystoscopy and possible bilateral

ureteral stent placement.


Maintain Dinero catheter.


Nephrology input appreciated.  Lasix per nephrology.


Sodium bicarb for metabolic acidosis and hyperkalemia per nephrology.


Monitor renal function.


Patient is bedridden.


Monitor CBC to follow hemoglobin.


Transfuse p.r.n. for hemoglobin less than 7.


Plavix resumed.  Continue Aspirin.

## 2021-06-05 NOTE — P.PN
Subjective


Date of Service: 06/05/21


Chief Complaint: TIA





Reports SOB better today than yesterday.





Physical Examination





- Vital Signs


Temperature: 97.9 F


Blood Pressure: 163/70


Pulse: 83


Respirations: 16


Pulse Ox (%): 93





- Physical Exam


General: Other (appears as her stated age)


HEENT: Atraumatic, Normocephalic


Neck: JVD not distended


Respiratory: Diminished


Cardiovascular: No rubs, No murmurs


Gastrointestinal: Soft and benign, Non-distended


Neurological: Normal speech





Assessment And Plan





- Plan





1.   Acute kidney injury secondary to cardiorenal/toxic ATN.  SCr plateaued.  

Cont Lasix 40 mg IV b.i.d..  Monitor I/O, renal panel.


2.                     CKD 3b.  Baseline eGFR around 35.  Monitor renal panel.


3.   Recurrent urinary tract infection secondary to obstructive uropathy, 

bilateral hydro.  Appreciate Urology input.  Continue antibiotic x 2 wks.  Cont 

sewell.  For outpt urodynamic studies, cysto, & likely bilateral ureteral 

stenting.


4.   Dilutional hyponatremia.  Resolved.  Monitor.


5.   Hyperkalemia, resolved.  Continue low potassium diet.  Cont Lasix as above.


6.   Hypertension.  BP better controlled.  Cont current BP regimen.


7.   Proteinuric, nonnephrotic with the presence of hyperkalemia, acute kidney 

injury.  Keep holding ACE inhibitor or ARB.


8.   Secondary hyperparathyroidism.  Continue calcitriol.


9.                     HyperPO4.  Cont Sevelamer po tidwm.


10.   Acidosis.  Cont same dose sodium bicarb 1950 mg po bid.


11.   Iron deficiency anemia with the presence of hyperkalemia.  We will start 

IV iron.  I am going to send for fecal occult.


12.   Altered mental status as by Neurology and Primary.


13.   Congestive heart failure, diastolic.  Continue diuresis as above.

## 2021-06-06 VITALS — OXYGEN SATURATION: 96 %

## 2021-06-06 LAB
ALBUMIN SERPL BCP-MCNC: 2.7 G/DL (ref 3.4–5)
BUN BLD-MCNC: 63 MG/DL (ref 7–18)
GLUCOSE SERPLBLD-MCNC: 132 MG/DL (ref 74–106)
HCT VFR BLD CALC: 26.3 % (ref 36–45)
LYMPHOCYTES # SPEC AUTO: 1 K/UL (ref 0.7–4.9)
MAGNESIUM SERPL-MCNC: 1.9 MG/DL (ref 1.8–2.4)
PMV BLD: 8.1 FL (ref 7.6–11.3)
POTASSIUM SERPL-SCNC: 4.3 MMOL/L (ref 3.5–5.1)
RBC # BLD: 2.88 M/UL (ref 3.86–4.86)

## 2021-06-06 RX ADMIN — SEVELAMER CARBONATE SCH MG: 800 TABLET, FILM COATED ORAL at 12:22

## 2021-06-06 RX ADMIN — Medication SCH ML: at 08:13

## 2021-06-06 RX ADMIN — HYDRALAZINE HYDROCHLORIDE SCH MG: 10 TABLET, FILM COATED ORAL at 13:26

## 2021-06-06 RX ADMIN — SEVELAMER CARBONATE SCH MG: 800 TABLET, FILM COATED ORAL at 08:12

## 2021-06-06 RX ADMIN — SODIUM BICARBONATE TAB 325 MG SCH MG: 325 TAB at 08:11

## 2021-06-06 RX ADMIN — ASPIRIN SCH MG: 81 TABLET, COATED ORAL at 08:12

## 2021-06-06 RX ADMIN — Medication SCH ML: at 08:22

## 2021-06-06 RX ADMIN — SODIUM FERRIC GLUCONATE COMPLEX IN SUCROSE SCH MLS: 12.5 INJECTION INTRAVENOUS at 10:43

## 2021-06-06 RX ADMIN — HYDRALAZINE HYDROCHLORIDE PRN MG: 20 INJECTION INTRAMUSCULAR; INTRAVENOUS at 08:16

## 2021-06-06 RX ADMIN — ALBUMIN (HUMAN) SCH MG: 5 SOLUTION INTRAVENOUS at 08:13

## 2021-06-06 RX ADMIN — HUMAN INSULIN SCH: 100 INJECTION, SOLUTION SUBCUTANEOUS at 11:30

## 2021-06-06 RX ADMIN — HUMAN INSULIN SCH: 100 INJECTION, SOLUTION SUBCUTANEOUS at 07:30

## 2021-06-06 RX ADMIN — CALCITRIOL CAPSULES 0.25 MCG SCH MCG: 0.25 CAPSULE ORAL at 08:11

## 2021-06-06 RX ADMIN — FOLIC ACID SCH MG: 1 TABLET ORAL at 08:12

## 2021-06-06 RX ADMIN — HYDRALAZINE HYDROCHLORIDE SCH MG: 10 TABLET, FILM COATED ORAL at 14:00

## 2021-06-06 NOTE — P.PN
Subjective


Date of Service: 06/07/21


Chief Complaint: TIA





Reports SOB better again today than yesterday.





Physical Examination





- Vital Signs


Temperature: 96.4 F


Blood Pressure: 138/65


Pulse: 78


Respirations: 18


Pulse Ox (%): 91





- Physical Exam


General: Other (appears as her stated age; frail looking)


HEENT: Atraumatic, Normocephalic


Neck: Supple


Respiratory: Diminished


Cardiovascular: No rubs, No murmurs


Gastrointestinal: Soft and benign, Non-distended


Musculoskeletal: No clubbing


Neurological: Normal speech, Normal tone





Assessment And Plan





- Plan





1.   Acute kidney injury secondary to cardiorenal/toxic ATN.  SCr plateaued.  

Decrease Lasix to 40 mg po b.i.d.  Urine culture negative.  Monitor I/O, renal 

panel.


2.                     CKD 3b.  Baseline eGFR around 35.  Monitor renal panel.


3.   Recurrent urinary tract infection secondary to obstructive uropathy, 

bilateral hydro.  Appreciate Urology input.  Continue antibiotic x 2 wks.  Cont 

sewell.  For outpt urodynamic studies, cysto, & likely bilateral ureteral 

stenting.


4.   Dilutional hyponatremia.  Resolved.  Monitor.


5.   Hyperkalemia, resolved.  Continue low potassium diet.  Cont Lasix as above.


6.   Hypertension.  BP better controlled.  Cont current BP regimen.


7.   Proteinuric, nonnephrotic with the presence of hyperkalemia, acute kidney 

injury.  Keep holding ACE inhibitor or ARB.


8.   Secondary hyperparathyroidism.  Continue calcitriol.


9.                     HyperPO4.  improving.  Cont Sevelamer po tidwm.


10.   Acidosis.  improved.  Decrease sodium bicarb dose to 1300 mg po bid.


11.   Iron deficiency anemia with the presence of hyperkalemia.  We will start 

IV iron.  I am going to send for fecal occult.


12.   Altered mental status as by Neurology and Primary.


13.   Congestive heart failure, diastolic.  Continue diuresis as above.

## 2021-06-06 NOTE — P.DS
Admission Date: 06/02/21


Discharge Date: 06/06/21


Disposition: [facility]


Discharge Condition: FAIR


Reason for Admission: TIA





- Problems


(1) TIA (transient ischemic attack)


Current Visit: Yes   Status: Acute   





(2) Hydronephrosis


Current Visit: Yes   Status: Acute   





(3) Hematuria


Current Visit: Yes   Status: Acute   





(4) History of urinary retention


Current Visit: Yes   Status: Acute   





(5) Complicated UTI (urinary tract infection)


Current Visit: No   Status: Acute   





(6) Chronic anemia


Current Visit: Yes   Status: Acute   


Brief History of Present Illness: 


81-year-old woman with a history of hypertension, hyperlipidemia and type 2 

diabetes, chronic hydronephrosis with hydroureter and recurrent UTIs presented 

to the emergency department with a complaint of speech difficulty.  Patient 

reported having difficulty with his speech, facial droop, was confused and she 

was drooling.  Head CT done in the emergency department showed no acute changes.

 Her UA suggested the presence of UTI.  Patient was taking Augmentin at home for

upper part of the UTI prior.  Noted the bacteria isolated at that time is 

resistance to augmentin.  Blood work also showed creatinine of 2.2, BUN of 63 

and potassium of 5.5.  Dinero catheter was placed for obstructive uropathy, 

patient started on IV Rocephin and admitted for further management.





Hospital Course: 


Patient admitted to the medical floor.  She was given a dose of Plavix and put 

on aspirin.  Lipid profile check showed LDL of 31. Patient is on Lipitor which 

was continued during the hospital stay.  UTI was treated with IV Rocephin.  

Patient received 4 days of treatment.  Urology was consulted, patient was seen 

by Dr. Llanos who recommended maintaining Dinero catheterization for 2 weeks, 

follow up with him in the office for cystoscopy.  Dr. Llanos recommended 

bilateral ureteral stents given the hydronephrosis and hydroureter and a kidney 

disease failure.  Patient is suspected to have developed another facial droop 

during the hospital stay.  Repeat CT head was negative.  She had hematuria which

resolved during the hospital stay.  She has clinically improved.  Patient is 

discharged with home her.  She is informed to follow with Dr. Llanos as an 

outpatient.  She was also seen by nephrology for the kidney disease.  Patient 

was placed on sodium bicarb for metabolic acidosis.  She is prescribed Vantin to

complete at least 2 weeks of antibiotics.





Vital Signs/Physical Exam: 














Temp Pulse Resp BP Pulse Ox


 


 97.6 F   73   18   178/70 H  94 


 


 06/06/21 08:00  06/06/21 08:13  06/06/21 08:00  06/06/21 08:13  06/06/21 08:00








General: Alert, In no apparent distress, Oriented x3


HEENT: PERRLA, Mucous membr. moist/pink, EOMI, Sclerae nonicteric


Neck: JVD not distended


Respiratory: Clear to auscultation bilaterally, Normal air movement


Cardiovascular: No edema, Regular rate/rhythm, Normal S1 S2


Gastrointestinal: Soft and benign, Non-distended, No tenderness


Musculoskeletal: No swelling


Integumentary: No rashes, No erythema


Neurological: Other (Mild left facial droop)


Urinary: Dinero catheter


Laboratory Data at Discharge: 














WBC  8.60 K/uL (4.3-10.9)   06/06/21  05:34    


 


Hgb  8.5 g/dL (12.0-15.0)  L  06/06/21  05:34    


 


Hct  26.3 % (36.0-45.0)  L  06/06/21  05:34    


 


Plt Count  278 K/uL (152-406)   06/06/21  05:34    


 


PT  11.9 SECONDS (9.5-12.5)   06/01/21  20:50    


 


INR  1.03   06/01/21  20:50    


 


APTT  31.7 SECONDS (24.3-36.9)   06/01/21  20:50    


 


Sodium  142 mmol/L (136-145)   06/06/21  05:34    


 


Potassium  4.3 mmol/L (3.5-5.1)   06/06/21  05:34    


 


BUN  63 mg/dL (7-18)  H  06/06/21  05:34    


 


Creatinine  2.19 mg/dL (0.55-1.3)  H  06/06/21  05:34    


 


Glucose  132 mg/dL ()  H  06/06/21  05:34    


 


Uric Acid  6.4 mg/dL (2.6-6.0)  H  06/03/21  02:57    


 


Phosphorus  6.3 mg/dL (2.5-4.9)  H  06/06/21  05:34    


 


Magnesium  1.9 mg/dL (1.8-2.4)   06/06/21  05:34    


 


Total Bilirubin  0.2 mg/dL (0.2-1.0)   06/03/21  02:57    


 


AST  8 U/L (15-37)  L  06/03/21  02:57    


 


ALT  14 U/L (12-78)   06/03/21  02:57    


 


Alkaline Phosphatase  70 U/L ()   06/03/21  02:57    


 


Triglycerides  112 mg/dL (<150)   06/02/21  05:08    


 


Cholesterol  98 mg/dL (<200)   06/02/21  05:08    


 


HDL Cholesterol  45 mg/dL (40-60)   06/02/21  05:08    


 


Cholesterol/HDL Ratio  2.18   06/02/21  05:08    








Home Medications: 








Atorvastatin Calcium [Lipitor*] 1 tab PO DAILY 05/02/21 


Codeine/APAP [Tylenol #3*] 1 tab PO Q8H PRN 05/02/21 


Duloxetine [Cymbalta *] 1 tab PO DAILY 05/02/21 


Gabapentin [Neurontin*] 1 cap PO TID 05/02/21 


Hydralazine [Apresoline*] 2 tab PO TID 05/02/21 


Insulin Aspart (Niacinamide) [Fiasp 100 Unit/ml Flextouch] See Protocol SQ ACHS 

05/02/21 


carvediloL [Carvedilol] 1 tab PO BID 05/02/21 


Docusate [Colace Cap*] 100 mg PO DAILY #30 cap 05/05/21 


Tamsulosin [Flomax*] 0.4 mg PO BEDTIME #30 cap 05/05/21 


Thiamine HCl [Vitamin B-1*] 100 mg PO BID #60 tablet 05/05/21 


Aspirin [Aspirin EC 81 MG] 81 mg PO DAILY #30 tablet. 06/06/21 


Calcitrol [Rocaltrol*] 0.25 mcg PO DAILY #30 cap 06/06/21 


Cefpodoxime Proxetil 100 mg PO BID #20 tablet 06/06/21 


Glucerna Shake [Glucerna*] 237 ml PO BID #60 can 06/06/21 


Sevelamer Carbonate [Renvela*] 1,600 mg PO TIDWM #180 tablet 06/06/21 


Sodium Bicarbonate 1,300 mg PO BID #120 tablet 06/06/21 





New Medications: 


Aspirin [Aspirin EC 81 MG] 81 mg PO DAILY #30 tablet.


Cefpodoxime Proxetil 100 mg PO BID #20 tablet


Glucerna Shake [Glucerna*] 237 ml PO BID #60 can


Sevelamer Carbonate [Renvela*] 1,600 mg PO TIDWM #180 tablet


Calcitrol [Rocaltrol*] 0.25 mcg PO DAILY #30 cap


Sodium Bicarbonate 1,300 mg PO BID #120 tablet


Diet: AHA


Activity: Fall precautions


Followup: 


Unknown,U [Primary Care Provider] - 


Nick Leonard [ACTIVE - CAN ADMIT] -  (1- 2 weeks)


Arpan Tubbs MD [ACTIVE - CAN ADMIT] - 1-2 Weeks


Time spent managing pt's care (in minutes): 40

## 2021-06-07 VITALS — SYSTOLIC BLOOD PRESSURE: 138 MMHG | TEMPERATURE: 96.4 F | DIASTOLIC BLOOD PRESSURE: 65 MMHG

## 2021-06-30 ENCOUNTER — HOSPITAL ENCOUNTER (INPATIENT)
Dept: HOSPITAL 97 - ER | Age: 81
LOS: 4 days | Discharge: HOME HEALTH | DRG: 812 | End: 2021-07-04
Attending: HOSPITALIST | Admitting: HOSPITALIST
Payer: COMMERCIAL

## 2021-06-30 VITALS — BODY MASS INDEX: 36.4 KG/M2

## 2021-06-30 DIAGNOSIS — E66.9: ICD-10-CM

## 2021-06-30 DIAGNOSIS — D62: Primary | ICD-10-CM

## 2021-06-30 DIAGNOSIS — E11.40: ICD-10-CM

## 2021-06-30 DIAGNOSIS — N13.30: ICD-10-CM

## 2021-06-30 DIAGNOSIS — I10: ICD-10-CM

## 2021-06-30 DIAGNOSIS — K56.41: ICD-10-CM

## 2021-06-30 DIAGNOSIS — E86.0: ICD-10-CM

## 2021-06-30 DIAGNOSIS — Z20.828: ICD-10-CM

## 2021-06-30 DIAGNOSIS — N39.0: ICD-10-CM

## 2021-06-30 DIAGNOSIS — N28.9: ICD-10-CM

## 2021-06-30 DIAGNOSIS — E78.5: ICD-10-CM

## 2021-06-30 DIAGNOSIS — E11.8: ICD-10-CM

## 2021-06-30 LAB
ALBUMIN SERPL BCP-MCNC: 2.6 G/DL (ref 3.4–5)
ALP SERPL-CCNC: 87 U/L (ref 45–117)
ALT SERPL W P-5'-P-CCNC: 14 U/L (ref 12–78)
AMYLASE SERPL-CCNC: 17 U/L (ref 25–115)
AST SERPL W P-5'-P-CCNC: 7 U/L (ref 15–37)
BLD SMEAR INTERP: (no result)
BUN BLD-MCNC: 62 MG/DL (ref 7–18)
CKMB CREATINE KINASE MB: < 1 NG/ML (ref 1–3.6)
GLUCOSE SERPLBLD-MCNC: 116 MG/DL (ref 74–106)
HCT VFR BLD CALC: 21.8 % (ref 36–45)
INR BLD: 1.25
LIPASE SERPL-CCNC: 26 U/L (ref 73–393)
LYMPHOCYTES # SPEC AUTO: 0.7 K/UL (ref 0.7–4.9)
MORPHOLOGY BLD-IMP: (no result)
PMV BLD: 9 FL (ref 7.6–11.3)
POTASSIUM SERPL-SCNC: 4.9 MMOL/L (ref 3.5–5.1)
RBC # BLD: 2.37 M/UL (ref 3.86–4.86)
TROPONIN (EMERG DEPT USE ONLY): < 0.02 NG/ML (ref 0–0.04)

## 2021-06-30 PROCEDURE — 71045 X-RAY EXAM CHEST 1 VIEW: CPT

## 2021-06-30 PROCEDURE — 85730 THROMBOPLASTIN TIME PARTIAL: CPT

## 2021-06-30 PROCEDURE — 36415 COLL VENOUS BLD VENIPUNCTURE: CPT

## 2021-06-30 PROCEDURE — 82746 ASSAY OF FOLIC ACID SERUM: CPT

## 2021-06-30 PROCEDURE — 84145 PROCALCITONIN (PCT): CPT

## 2021-06-30 PROCEDURE — 82553 CREATINE MB FRACTION: CPT

## 2021-06-30 PROCEDURE — 87088 URINE BACTERIA CULTURE: CPT

## 2021-06-30 PROCEDURE — 83540 ASSAY OF IRON: CPT

## 2021-06-30 PROCEDURE — 83036 HEMOGLOBIN GLYCOSYLATED A1C: CPT

## 2021-06-30 PROCEDURE — 93970 EXTREMITY STUDY: CPT

## 2021-06-30 PROCEDURE — 85025 COMPLETE CBC W/AUTO DIFF WBC: CPT

## 2021-06-30 PROCEDURE — 81003 URINALYSIS AUTO W/O SCOPE: CPT

## 2021-06-30 PROCEDURE — 83605 ASSAY OF LACTIC ACID: CPT

## 2021-06-30 PROCEDURE — 86900 BLOOD TYPING SEROLOGIC ABO: CPT

## 2021-06-30 PROCEDURE — 74019 RADEX ABDOMEN 2 VIEWS: CPT

## 2021-06-30 PROCEDURE — 87086 URINE CULTURE/COLONY COUNT: CPT

## 2021-06-30 PROCEDURE — 84443 ASSAY THYROID STIM HORMONE: CPT

## 2021-06-30 PROCEDURE — 83735 ASSAY OF MAGNESIUM: CPT

## 2021-06-30 PROCEDURE — 99285 EMERGENCY DEPT VISIT HI MDM: CPT

## 2021-06-30 PROCEDURE — 84439 ASSAY OF FREE THYROXINE: CPT

## 2021-06-30 PROCEDURE — 80048 BASIC METABOLIC PNL TOTAL CA: CPT

## 2021-06-30 PROCEDURE — 82550 ASSAY OF CK (CPK): CPT

## 2021-06-30 PROCEDURE — 85610 PROTHROMBIN TIME: CPT

## 2021-06-30 PROCEDURE — 84100 ASSAY OF PHOSPHORUS: CPT

## 2021-06-30 PROCEDURE — 83690 ASSAY OF LIPASE: CPT

## 2021-06-30 PROCEDURE — 86901 BLOOD TYPING SEROLOGIC RH(D): CPT

## 2021-06-30 PROCEDURE — 82607 VITAMIN B-12: CPT

## 2021-06-30 PROCEDURE — 74176 CT ABD & PELVIS W/O CONTRAST: CPT

## 2021-06-30 PROCEDURE — 0T9B70Z DRAINAGE OF BLADDER WITH DRAINAGE DEVICE, VIA NATURAL OR ARTIFICIAL OPENING: ICD-10-PCS

## 2021-06-30 PROCEDURE — 81015 MICROSCOPIC EXAM OF URINE: CPT

## 2021-06-30 PROCEDURE — 86850 RBC ANTIBODY SCREEN: CPT

## 2021-06-30 PROCEDURE — 80053 COMPREHEN METABOLIC PANEL: CPT

## 2021-06-30 PROCEDURE — 85044 MANUAL RETICULOCYTE COUNT: CPT

## 2021-06-30 PROCEDURE — 87186 SC STD MICRODIL/AGAR DIL: CPT

## 2021-06-30 PROCEDURE — 87077 CULTURE AEROBIC IDENTIFY: CPT

## 2021-06-30 PROCEDURE — 84484 ASSAY OF TROPONIN QUANT: CPT

## 2021-06-30 PROCEDURE — 83880 ASSAY OF NATRIURETIC PEPTIDE: CPT

## 2021-06-30 PROCEDURE — 82150 ASSAY OF AMYLASE: CPT

## 2021-06-30 PROCEDURE — 87040 BLOOD CULTURE FOR BACTERIA: CPT

## 2021-06-30 PROCEDURE — 80076 HEPATIC FUNCTION PANEL: CPT

## 2021-06-30 RX ADMIN — Medication SCH ML: at 21:55

## 2021-06-30 NOTE — EDPHYS
Physician Documentation                                                                           

 [facility]                                                                 

Name: Gladys Ramos                                                                                    

Age: 81 yrs                                                                                       

Sex: Female                                                                                       

: 1940                                                                                   

MRN: [id number]                                                                                   

Arrival Date: 2021                                                                          

Time: 11:41                                                                                       

Account#: [id number]                                                                            

Bed 6                                                                                             

Private MD:                                                                                       

ED Physician Isaiah Gomez                                                                       

HPI:                                                                                              

                                                                                             

19:25 This 81 yrs old  Female presents to ER via EMS with complaints of Fever,       kdr 

      Abdominal Distention, Leg Swelling.                                                         

19:25 Daughter reports two days of fever and abdominal pain and swelling. She thought that    kdr 

      the patient may have a UTI so she had home health attempt to replace the Sewell but once     

      they had the Sewell out, they were unable to get the new Sewell back in. The patient was      

      then brought to the ED. Onset: The symptoms/episode began/occurred gradually, 2 day(s)      

      ago. Severity of symptoms: At their worst the symptoms were mild in the emergency           

      department the symptoms are unchanged. The patient has not experienced similar symptoms     

      in the past. The patient has not recently seen a physician.                                 

                                                                                                  

Historical:                                                                                       

- Allergies:                                                                                      

11:45 ambien;                                                                                 hb  

11:45 Levaquin;                                                                               hb  

- Home Meds:                                                                                      

18:18 calcitriol 0.25 mcg oral cap 1 cap [Active]; duloxetine 20 mg oral CDRS [Active];       hb  

      hydralazine 10 mg Oral tab 1 tab 2 times per day [Active]; aspirin 81 mg oral TbEC          

      [Active]; tamsulosin 0.4 mg oral cap 1 cap once daily [Active]; carvedilol 6.25 mg oral     

      tab 1 tab 2 times per day [Active]; gabapentin 400 mg oral cap 1 cap 3 times per day        

      [Active]; atorvastatin 10 mg oral tab 1 tab once daily [Active]; Stool Softener 50 mg       

      oral cap 1 cap 2 times per day [Active]; multivitamin oral chew [Active]; B12 Active        

      1,000 mcg oral chew [Active]; thiamine HCl (vitamin B1) 250 mg Oral tab [Active];           

      flaxseed oil 1,000 mg oral cap [Active]; iron 27 mg iron oral tab [Active]; Sodium          

      Bicarbonate Oral [Active]; sevelamer HCl 800 mg oral tab 2 tabs 3 times per day             

      [Active]; Tylenol PM Extra Strength  mg oral tab 2 tabs once daily [Active];          

      insulin aspart U-100 100 unit/mL (3 mL) subcutaneous inpn [Active];                         

- PMHx:                                                                                           

11:45 CHF; Diabetes - IDDM; Hyperlipidemia; Hypertension; OA; Pneumonia; AAA;                 hb  

                                                                                                  

- Immunization history:: Adult Immunizations up to date.                                          

- Social history:: Smoking status: Patient denies any tobacco usage or history of.                

                                                                                                  

                                                                                                  

ROS:                                                                                              

19:25 ENT: Negative for injury, pain, and discharge, Neck: Negative for injury, pain, and     kdr 

      swelling, Cardiovascular: Negative for chest pain, palpitations, and edema,                 

      Respiratory: Negative for shortness of breath, cough, wheezing, and pleuritic chest         

      pain, Back: Negative for injury and pain, : Negative for injury, bleeding, discharge,     

      and swelling, MS/Extremity: Negative for injury and deformity, Skin: Negative for           

      injury, rash, and discoloration, Neuro: Negative for headache, weakness, numbness,          

      tingling, and seizure activity. Psych: Negative for depression, anxiety, suicide            

      ideation, homicidal ideation, and hallucinations, Allergy/Immunology: Negative for          

      hives, rash, and allergies, Endocrine: Negative for neck swelling, polydipsia,              

      polyuria, polyphagia, and marked weight changes, Hematologic/Lymphatic: Negative for        

      swollen nodes, abnormal bleeding, and unusual bruising.                                     

19:25 Constitutional: Positive for body aches, chills, fever, malaise.                            

19:25 Abdomen/GI: Positive for abdominal pain, abdominal distension, Negative for black/tarry     

      stool, rectal pain, rectal bleeding, bowel incontinence.                                    

19:25 : Positive for urinary symptoms, Unable to insert sewell.                                  

                                                                                                  

Exam:                                                                                             

19:36 Constitutional:  This is a well developed, well nourished patient who is awake, alert,  kdr 

      and in no acute distress. Head/Face:  Normocephalic, atraumatic. Eyes:  Pupils equal        

      round and reactive to light, extra-ocular motions intact.  Lids and lashes normal.          

      Conjunctiva and sclera are non-icteric and not injected.  Cornea within normal limits.      

      Periorbital areas with no swelling, redness, or edema. Neck:  Trachea midline, no           

      thyromegaly or masses palpated, and no cervical lymphadenopathy.  Supple, full range of     

      motion without nuchal rigidity, or vertebral point tenderness.  No Meningismus.             

      Chest/axilla:  Normal chest wall appearance and motion.  Nontender with no deformity.       

      No lesions are appreciated. Cardiovascular:  Regular rate and rhythm with a normal S1       

      and S2.  No gallops, murmurs, or rubs.  Normal PMI, no JVD.  No pulse deficits.             

      Respiratory:  Lungs have equal breath sounds bilaterally, clear to auscultation and         

      percussion.  No rales, rhonchi or wheezes noted.  No increased work of breathing, no        

      retractions or nasal flaring. Back:  No spinal tenderness.  No costovertebral               

      tenderness.  Full range of motion. Skin:  Warm, dry with normal turgor.  Normal color       

      with no rashes, no lesions, and no evidence of cellulitis.                                  

19:36 Abdomen/GI: Inspection: obese Bowel sounds: active, all quadrants, Palpation: soft,         

      nontender.                                                                                  

                                                                                                  

Vital Signs:                                                                                      

11:44  / 59; Pulse 77; Resp 15; Temp 97.6; Pulse Ox 97% on R/A; Pain 0/10;              hb  

13:00  / 48; Pulse 75; Resp 17; Pulse Ox 99% on R/A;                                    hb  

14:30 Weight 124.74 kg;                                                                       hb  

15:00  / 53; Pulse 75; Resp 18; Pulse Ox 97% ;                                          hb  

18:00  / 72; Pulse 62; Resp 15; Pulse Ox 98% on R/A;                                    hb  

19:23  / 48; Pulse 67; Resp 16; Temp 97.4; Pulse Ox 99% ;                               ea  

                                                                                                  

MDM:                                                                                              

12:59 Patient medically screened.                                                             kdr 

19:36 Data reviewed: vital signs, nurses notes, lab test result(s), radiologic studies.       kdr 

      Counseling: I had a detailed discussion with the patient and/or guardian regarding: the     

      historical points, exam findings, and any diagnostic results supporting the                 

      discharge/admit diagnosis, lab results, radiology results, the need for further work-up     

      and treatment in the hospital.                                                              

                                                                                                  

                                                                                             

12:03 Order name: Basic Metabolic Panel; Complete Time: 14:37                                 kdr 

                                                                                             

12:03 Order name: CBC with Diff; Complete Time: 14:37                                         kdr 

                                                                                             

12:03 Order name: Hepatic Function; Complete Time: 14:37                                      kdr 

                                                                                             

12:03 Order name: Lipase; Complete Time: 14:37                                                kdr 

                                                                                             

12:14 Order name: Amylase, Serum; Complete Time: 14:37                                        [facility] 

                                                                                             

12:14 Order name: Blood Culture Adult (2)                                                     kdr 

                                                                                             

12:14 Order name: CPK; Complete Time: 14:37                                                   [facility] 

                                                                                             

12:14 Order name: Ckmb; Complete Time: 14:37                                                  [facility] 

                                                                                             

12:14 Order name: Lactate; Complete Time: 14:37                                               [facility] 

                                                                                             

12:14 Order name: Procalcitonin; Complete Time: 14:37                                         [facility] 

                                                                                             

12:14 Order name: Protime (+inr); Complete Time: 14:37                                        [facility] 

                                                                                             

12:14 Order name: Ptt, Activated; Complete Time: 14:37                                        [facility] 

                                                                                             

12:14 Order name: Troponin (emerg Dept Use Only); Complete Time: 14:37                        kdr 

                                                                                             

12:14 Order name: Urine Microscopic Only; Complete Time: 14:37                                [facility] 

                                                                                             

12:45 Order name: PRBC                                                                        [facility] 

                                                                                             

12:46 Order name: ABO/RH typing                                                               [facility]

                                                                                             

12:46 Order name: Antibody Screen                                                             [facility]

                                                                                             

12:50 Order name: Bb Add On                                                                   bd  

                                                                                             

13:01 Order name: Urine Dipstick-Ancillary; Complete Time: 14:37                              [facility]

                                                                                             

13:26 Order name: CBC Smear Scan; Complete Time: 14:37                                        [facility]

                                                                                             

13:29 Order name: Urine Culture                                                               [facility]

                                                                                             

17:13 Order name: Comprehensive Metabolic Panel                                               [facility]

                                                                                             

17:13 Order name: Comprehensive Metabolic Panel                                               [facility]

                                                                                             

17:13 Order name: Protime (+INR)                                                              [facility]

                                                                                             

17:13 Order name: Protime (+INR)                                                              [facility]

                                                                                             

17:13 Order name: PTT, Activated Partial Thromb                                               [facility]

                                                                                             

17:13 Order name: PTT, Activated Partial Thromb                                               [facility]

                                                                                             

17:14 Order name: CBC with Automated Diff                                                     [facility]

                                                                                             

17:14 Order name: CBC with Automated Diff                                                     [facility]

                                                                                             

12:03 Order name: IV Saline Lock; Complete Time: 13:06                                        [facility] 

                                                                                             

12:03 Order name: Labs collected and sent; Complete Time: 13:06                               [facility] 

                                                                                             

12:04 Order name: Sewell; Complete Time: 13:06                                                 [facility] 

                                                                                             

12:14 Order name: Chest Single View XRAY; Complete Time: 14:37                                [facility] 

                                                                                             

12:14 Order name: Accucheck; Complete Time: 13:06                                             kdr 

                                                                                             

12:14 Order name: Cardiac monitoring; Complete Time: 13:06                                    kdr 

                                                                                             

12:14 Order name: EKG - Nurse/Tech; Complete Time: 16:44                                      kdr 

                                                                                             

12:14 Order name: IV Saline Lock - Large Bore; Complete Time: 13:06                           kdr 

                                                                                             

12:14 Order name: O2 Per Protocol; Complete Time: 13:06                                       kdr 

                                                                                             

12:14 Order name: O2 Sat Monitoring; Complete Time: 13:                                     kdr 

                                                                                             

12:14 Order name: Urine Dipstick-Ancillary (obtain specimen); Complete Time: 13:06            kdr 

                                                                                             

13:08 Order name: Abdomen ; Complete Time: 14:37                                              [facility]

                                                                                             

14:37 Order name: US Extremity Venous W Compression Gerhard                                       kdr 

                                                                                             

16:46 Order name: Diet Regular; Complete Time: 16:46                                          hb  

                                                                                             

17:15 Order name: Folic Acid, (Folate)                                                        [facility]

                                                                                             

17:15 Order name: Iron                                                                        [facility]

                                                                                             

17:15 Order name: Magnesium                                                                   [facility]

                                                                                             

17:15 Order name: Phosphorus                                                                  [facility]

                                                                                             

17:15 Order name: Retic Count                                                                 [facility]

                                                                                             

17:15 Order name: Vitamin B12 Level                                                           [facility]

                                                                                             

17:41 Order name: COVID-19 : Document "Date of Symptom Onset" if Symptomatic.                 hb  

                                                                                             

18:00 Order name: CORONAVIRUS                                                                 [facility]

                                                                                             

19:05 Order name: SARS-COV-2 RT PCR                                                           [facility]

                                                                                                  

Administered Medications:                                                                         

21:04 Not Given (Physician Discretion): NS 0.9% (30 ml/kg) 30 ml/kg IV at bolus once; Sepsis  ea  

      Protocol                                                                                    

                                                                                                  

                                                                                                  

Disposition Summary:                                                                              

21 12:59                                                                                    

Hospitalization Ordered                                                                           

      Hospitalization Status: Inpatient Admission                                             kdr 

      Provider: Earline Sanchez 

      Location: Telemetry/MedSurg (Inpatient)                                                 kdr 

      Condition: Fair                                                                         kdr 

      Problem: an acute exacerbation                                                          kdr 

      Symptoms: have improved                                                                 kdr 

      Bed/Room Type: Standard                                                                 kdr 

      Room Assignment: 430(21 19:53)                                                    dw  

      Diagnosis                                                                                   

        - Altered mental status, unspecified                                                  kdr 

        - Facial weakness                                                                     kdr 

        - UTI/ Urinary tract infection, site not specified                                    kdr 

      Forms:                                                                                      

        - Medication Reconciliation Form                                                      kdr 

        - SBAR form                                                                           kdr 

Signatures:                                                                                       

[facility]                                                 

Priyanka Crain RN                        RN   Isaiah Hahn MD MD kdr Baxter, Heather, RN RN hb Antunez, Elena RN ea                                                   

                                                                                                  

Corrections: (The following items were deleted from the chart)                                    

13:08 12:56 Abdomen Pelvis W Con+CT.RAD.BRZ ordered. EDMS                                     EDMS

13:40 12:46 TYPE AND SCREEN+BB.LAB.BRZ ordered. EDMS                                          EDMS

19:52 12:59 kdr                                                                               dw  

19:53 19:52 431 dw                                                                              

                                                                                                  

**************************************************************************************************

## 2021-06-30 NOTE — RAD REPORT
EXAM DESCRIPTION:  US - Extrem Venous W Compress Gerhard - 6/30/2021 4:04 pm

 

CLINICAL HISTORY:  Pain;Swelling, bilateral

 

COMPARISON:  None.

 

TECHNIQUE:  Real-time sonographic evaluation of the bilateral lower extremity common femoral, superfi
cial femoral, popliteal and posterior tibial veins was performed.

 

FINDINGS:  Normal compressibility, flow augmentation, phasic flow and spontaneous flow are identified
 in the left and right lower extremity common femoral, superficial femoral, popliteal and posterior t
ibial veins. No intraluminal filling defects seen.

 

IMPRESSION:  No DVT in either lower extremity.

## 2021-06-30 NOTE — RAD REPORT
EXAM DESCRIPTION:  CT - Abdomen   Pelvis Wo Contrast - 6/30/2021 1:17 pm

 

CLINICAL HISTORY:  distention;Abd pain

 

COMPARISON:  Abdomen   Pelvis Wo Contrast dated 5/2/2021; Abdomen   Pelvis Wo Contrast dated 11/20/20
19

 

TECHNIQUE:  Axial 5 mm thick CT imaging of the abdomen and pelvis was performed without IV contrast. 
No IV contrast was given because of allergy, abnormal renal function, patient refusal or physician re
quest.

 

No oral contrast administered.

 

All CT scans are performed using dose optimization technique as appropriate and may include automated
 exposure control or mA/KV adjustment according to patient size.

 

FINDINGS:  Trace amounts of fluid are seen in each posterior gutter of the lung bases along with trac
e amounts of atelectasis. No cardiomegaly or pericardial effusion. There is significant elevation the
 left hemidiaphragm to the level of the hilum. Numerous granulomatous calcifications are present at t
he left hilum.

 

Liver is prominent in size but stable. No focal liver lesion on noncontrast imaging. Cholecystectomy 
clips are present. No biliary tree dilatation. There is atrophy of the pancreatic parenchyma with no 
acute pancreatic or peripancreatic process identifiable. There is a 3.5 centimeter duodenal diverticu
lum containing what appears to be fecalized bowel content. No acute splenic finding.

 

Marked atrophy of the left kidney is again noted. Large exophytic upper pole right renal cyst has not
 changed. The most recent examination shows substantial bilateral hydronephrosis. The pelvic in urete
r dilatation has substantially improved but not fully resolved. There is mild dilatation remaining on
 the right. Pelvis and ureter walls appear slightly thickened and edematous. There is stranding in th
e perinephric fat adjacent to the right kidney that is similar to comparison. No obstructing calculi 
seen.  No significant adrenal finding. Isodense renal masses and pyelonephritis cannot be excluded in
 the absence of IV contrast. Urinary bladder is fully contracted around a Dinero catheter in which saxena
its assessment.

 

Both ovaries are identifiable and closely approximated to the atrophic uterus. There is air density i
n the fundal portion of the uterus. Air within the endometrial cavity was seen on the 2 preceding fabian
dies. Patient may have an incompetent or patent cervical canal allowing vaginal air into the endometr
ial cavity. Small bowel loops and the cecum abuts the fundus. Margins are somewhat irregular. Possibi
lity of an entero uterine fistula is not entirely excluded. Correlation is needed with any history or
 findings of bowel content within the vaginal canal.

 

No gastric dilatation or gastric wall thickening. Rectum is distended to 6 cm with by bowel content. 
Tortuous and redundant sigmoid colon is present extending to the anterior mid abdomen. Moderate stool
 volume elsewhere in the remainder of the colon.

 

  No free air, free fluid or inflammatory stranding. No bulky lymphadenopathy.

 

Postsurgical changes are noted to the low midline abdominal wall. Patient has fluid retention in the 
subcutaneous fatty tissues of the lower abdomen and pelvis.

 

Advanced bony degenerative changes are present. No pathologic bone process.

 

 

IMPRESSION:  No bowel obstruction, free air or surgically emergent finding identifiable.

 

Urinary bladder is fully contracted around a Dinero catheter. The pronounced bilateral hydronephrosis 
seen on the May 2nd CT study has mostly resolved. There is mild remaining dilatation of the right lui
e pelvis and ureter as well as the pelvis of the markedly atrophic left kidney. Pyelonephritis is not
 excluded on a noncontrast study.

 

Moderate stool volume throughout the colon. Rectum is dilated by stool up to 6 cm.

 

Air is present within the fundal portion of the uterus with air present within the endometrial cavity
 on the 2 prior studies. This is probably due to incompetent cervical canal. The fundus of the uterus
 does abut cecum of the colon and distal small bowel loops. Margins are somewhat shaggy at this inter
face. An entero uterine fistula cannot be excluded and can be correlated with any evidence for bowel 
content in the vaginal canal.

 

Moderate amount of fluid retention present throughout the subcutaneous fatty tissues of the abdomen a
nd pelvis.

 

Full assessment is limited is the absence of IV contrast.

## 2021-06-30 NOTE — XMS REPORT
Continuity of Care Document

                            Created on:2021



Patient:PAKO ENG

Sex:Female

:1940

External Reference #:[id number]





Demographics







                          Address                   [address] [place] [address]

 

                          Home Phone                [phone]

 

                          Mobile Phone              [phone]

 

                          Email Address             [email or web address]

 

                          Preferred Language        English

 

                          Marital Status            Unknown

 

                          Jew Affiliation     Unknown

 

                          Race                      Unknown

 

                          Additional Race(s)        Unavailable



                                                    White

 

                          Ethnic Group              Unknown









Author







                          Organization              [facility]

t

 

                          Address                   [address] [place] [address] [address]



                                                    [place] [address]

 

                          Phone                     [phone]









Support







                Name            Relationship    Address         Phone

 

                Solo          Child           Unavailable     [phone]









Care Team Providers







                    Name                Role                Phone

 

                    Juan Carlos Dennis DO Primary Care Physician [phone]

 

                    Oleksandr ANDERSON            Attending Clinician Unavailable

 

                    KWADWO Hdz RN      Attending Clinician Unavailable

 

                    Juan Carlos Dennis DO Attending Clinician [phone]

 

                    Dave MONTANA            Attending Clinician Unavailable

 

                    Laura BERRY             Attending Clinician [phone]









Payers







           Payer Name Policy Type Policy Number Effective Date Expiration Date S

marcell

 

           [id number]            [id number]            [place]



           [id number]                       00:00:00              [employer]



           /Staci-Kirstie                                             



           [employer]                                              







Problems







       Condition Condition Condition Status Onset  Resolution Last   Treating Co

mments 

Source



       Name   Details Category        Date   Date   Treatment Clinician        



                                                 Date                 

 

       TIA    TIA    Disease Active                              [place]



       (transient (transient               6-17                               Me

thodi



       ischemic ischemic               00:00:                             st



       attack) attack)               00                                 

 

       Acute  Acute  Disease Active                              [facility]



       cystitis cystitis               6-17                               Method

i



       with   with                 00:00:                             st



       hematuria hematuria               00                                 

 

       CKD    CKD    Disease Active                              [facility]



       (chronic (chronic               -27                               Method

i



       kidney kidney               00:00:                             st



       disease) disease)               00                                 



       stage 4, stage 4,                                                  



       GFR 15-29 GFR 15-29                                                  



       ml/min ml/min                                                  

 

       Hyperkalem Hyperkalem Disease Active                              [facility]

[place]



       ia     ia                   -27                               Methodi



                                   00:00:                             st



                                   00                                 

 

       Anemia due Anemia due Disease Active                              [facility]

[place]



       to stage 4 to stage 4               5-27                               Me

thodi



       chronic chronic               00:00:                             st



       kidney kidney               00                                 



       disease disease                                                  

 

       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

[facility]



       UTI    UTI                                                 Methodi



                                   00:00:                             st



                                   00                                 

 

       Cachexia Cachexia Disease Active                              [place]

on



                                                                  Methodi



                                   00:00:                             st



                                   00                                 

 

       Venous Venous Disease Active                              [place]



       stasis stasis                                              Methodi



       dermatitis dermatitis               00:00:                             st



       of left of left               00                                 



       lower  lower                                                   



       extremity extremity                                                  

 

       Hydronephr Hydronephr Disease Active 2019                             [facility]

[place]



       osis with osis with               1-19                               [facility]

gabriella



       ureteral ureteral               00:00:                             st



       stricture, stricture,               00                                 



       not    not                                                     



       elsewhere elsewhere                                                  



       classified classified                                                  

 

       Sepsis Sepsis Disease Active 2019                             [place]



       without without                                              Method[facility]



       acute  acute                00:00:                             st



       organ  organ                00                                 



       dysfunctio dysfunctio                                                  



       n      n                                                       

 

       Acute  Acute  Disease Active 2019                             [place]



       renal  renal                                               Method[facility]



       failure failure               00:00:                             st



                                   00                                 

 

       Ingrown Ingrown Disease Active 2019                             [facility]



       toenail toenail               0-01                               Methodi



                                   00:00:                             st



                                   00                                 

 

       Urine  Urine  Disease Active 2019                             [place]



       retention retention               0-01                               [facility]

gabriella



                                   00:00:                             st



                                   00                                 

 

       Anemia Anemia Disease Active                              [place]



                                   8-02                               [facility]



                                   00:00:                             st



                                   00                                 

 

       Does not Does not Disease Active                              [place]

on



       transfer transfer                                              Method

i



       from bed from bed               00:00:                             st



       to     to                   00                                 



       wheelchair wheelchair                                                  

 

       Hallucinat Hallucinat Disease Active                              [facility]

[place]



       ions   ions                 7-                               Methodi



                                   00:00:                             st



                                   00                                 

 

       Tremor of Tremor of Disease Active                              [facility]

[facility]



       both hands both hands               7-25                               Me

thodi



                                   00:00:                             st



                                   00                                 

 

       Administra Administra Disease Active 2017                             [facility]

[place]



       tive   tive                 2-29                               Methodi



       encounter encounter               00:00:                             st



                                   00                                 

 

       Fall   Fall   Disease Active                              [place]



                                   7-                               Method[facility]



                                   00:00:                             st



                                   00                                 

 

       Leg    Leg    Disease Active                              [place]



       injuries injuries               7-20                               Method

i



                                   00:00:                             st



                                   00                                 

 

       Abrasion Abrasion Disease Active                              [place]

[facility]



                                                                  Method[facility]



                                   00:00:                             st



                                   00                                 

 

       Cellulitis Cellulitis Disease Active                              [facility]

[place]



       of left of left               7-20                               Methodi



       lower  lower                00:00:                             st



       extremity extremity               00                                 

 

       Acute  Acute  Disease Active                              [place]



       cystitis cystitis               5-20                               Method

i



       without without               00:00:                             st



       hematuria hematuria               00                                 

 

       Urinary Urinary Disease Active                              [place]



       tract  tract                5-20                               Methodi



       infection infection               00:00:                             st



       associated associated               00                                 



       with   with                                                    



       catheteriz catheteriz                                                  



       ation of ation of                                                  



       urinary urinary                                                  



       tract  tract                                                   

 

       Anxiety Anxiety Disease Active                              [place]



                                                                  Method[facility]



                                   00:00:                             st



                                   00                                 

 

       Arthritis Arthritis Disease Active                              [facility]

[facility]



       of knee of knee                                              Methodi



                                   00:00:                             st



                                   00                                 

 

       Dysuria Dysuria Disease Active                              [place]



                                                                  Methodi



                                   00:00:                             st



                                   00                                 

 

       Edema of Edema of Disease Active                              [place]

on



       lower  lower                                               Methodi



       extremity extremity               00:00:                             st



                                   00                                 

 

       Hip pain Hip pain Disease Active                              [place]

[facility]



                                                                  Method[facility]



                                   00:00:                             st



                                   00                                 

 

       Hypertensi Hypertensi Disease Active                              [facility]

[place]



       on     on                                                  Methodi



                                   00:00:                             st



                                   00                                 

 

       Knee pain Knee pain Disease Active                              [facility]

[facility]



                                                                  Methodi



                                   00:00:                             st



                                   00                                 

 

       Neuropathy Neuropathy Disease Active                              [facility]

[place]



                                                                  Methodi



                                   00:00:                             st



                                   00                                 

 

       Polyuria Polyuria Disease Active                              [place]

on



                                                                  Methodi



                                   00:00:                             st



                                   00                                 

 

       Toothache Toothache Disease Active                              [facility]

[facility]



                                                                  Methodi



                                   00:00:                             st



                                   00                                 

 

       Type 2 Type 2 Disease Active                              [facility]



       diabetes diabetes                                              Method

i



       mellitus mellitus               00:00:                             st



       with stage with stage               00                                 



       3 chronic 3 chronic                                                  



       kidney kidney                                                  



       disease disease                                                  

 

       Unsteady Unsteady Disease Active                              [place]

on



       gait   gait                                                Method[facility]



                                   00:00:                             st



                                   00                                 

 

       Vitamin D Vitamin D Disease Active                              [facility]

[facility]



       deficiency deficiency                                              Me

thodi



                                   00:00:                             st



                                   00                                 

 

       Murmur Murmur Disease Active                       Overview: [place]

n



                                   06                        Formattin Methodi



                                   00:00:                      g of this                           note   



                                                               might be 



                                                               different 



                                                               from the 



                                                               original. 



                                                               "aortic 



                                                               aneurysm" 







Allergies, Adverse Reactions, Alerts







       Allergy Allergy Status Severity Reaction(s) Onset  Inactive Treating Comm

ents 

Source



       Name   Type                        Date   Date   Clinician        

 

       Levoflox Propensi Active                              Other  [place]

n



       acin   ty to                       6-19                 reaction( Methodi



              adverse                      00:00:               s):    st



              reaction                      00                   Anaphylax 



              s to                                             is     



              drug                                                    







Family History







           Family Member Diagnosis  Comments   Start Date Stop Date  Source

 

           Natural father Heart disease                                  [facility] Ley

 

           Natural father Hypertension                                  [facility] 

[employer]

 

           Maternal grandmother Diabetes                                    [facility]

[facility] [employer]







Social History







           Social Habit Start Date Stop Date  Quantity   Comments   Source

 

           Exposure to                       Not sure              [facility] Metho

dist



           SARS-CoV-2                                             



           (event)                                                

 

           Tobacco use and 2021 Never used            [facility] BLACKWELL

ethodist



           exposure   00:00:00   00:00:00                         

 

           Alcohol intake 2021 Current               [facility] Me

thodist



                      00:00:00   00:00:00   non-drinker of            



                                            alcohol               



                                            (finding)             

 

           Sex Assigned At 1940                       [facility] barksdaleodist



           Birth      00:00:00   00:00:00                         









                Smoking Status  Start Date      Stop Date       Source

 

                Never smoker                                    [facility]

t







Medications







       Ordered Filled Start  Stop   Current Ordering Indication Dosage Frequency

 Signature

                    Comments            Components          Source



     Medication Medication Date Date Medication? Clinician                (SIG) 

          



     Name Name                                                   

 

     aspirin            Yes            81mg QD   Take 81 mg           [facility]

ton



     (ECOTRIN)      6-30                               by mouth           Method

i



     81 MG      09:07:                               daily.           st



     enteric      26                                                



     coated                                                        



     tablet                                                        

 

     tamsulosin      2021- No             .4mg QD   Take 0.4           Ho

terry



     (FLOMAX)       06-17                          mg by           Methodi



     0.4 mg      15:10: 00:00                          mouth           st



     capsule      35   :00                           daily with           



                                                  dinner.           

 

     thiamine      2021- No             100mg Q.5D Take 100           [facility]

[facility]



     mononitrate       06-17                          mg by           Method

i



     , vit B1,      15:10: 00:00                          mouth 2           st



     (B-1) 100      35   :00                           (two)           



     mg tablet                                         times a           



                                                  day.           

 

     cyanocobala      2021- No                       Take by           Phillip stewart



     min,      -17 06-17                          mouth. 1           Methodi



     vitamin      15:10: 00:00                          capsule po           st



     B-12, 5,000      35   :00                           Qd             



     mcg capsule                                                        

 

     calcitrioL      2021- No             .25ug QD   Take 0.25           

[facility]



     (ROCALTROL)      -17 06-17                          mcg by           [facility]

di



     0.25 MCG      15:10: 00:00                          mouth           st



     capsule      35   :00                           daily.           

 

     sevelamer      2021- No             1600mg [id number] Take 1,600    

       [facility]



     (RENAGEL)      -17 06-17                     [id number] mg by           Me

thodi



     800 MG      15:10: 00:00                     3D   mouth 3           st



     tablet      35   :00                           (three)           



                                                  times a           



                                                  day with           



                                                  meals.           

 

     sodium      -0 - No             650mg Q.5D Take 650           [facility]

on



     bicarbonate      6-17 06-17                          mg by           Method

i



     650 mg      15:10: 00:00                          mouth 2           st



     tablet      35   :00                           (two)           



                                                  times a           



                                                  day.           

 

     insulin      0      Yes                      Inject           [facility]



     ASPART      6-17                               under the           Methodi



     (NovoLOG)      14:32:                               skin.           st



     100 unit/mL      28                                 Sliding           



     (3 mL)                                         scale           



     insulin pen                                                        

 

     INSULIN            Yes                      Inject           [facility]



     SUBCUTANEOU      6-17                               under the           Met

hodi



     S PUMP,      14:32:                               skin           st



     NOVOLOG,SANAM      28                                 continuous           



     SP, 100                                         ly.            



     UNIT/ML                                         Sliding           



     INSULIN                                         scale           



     PUMP                                                        



     INFUSION                                                        



     (NovoLOG)                                                        

 

     docusate      0      Yes            100mg QD   Take 100           [facility]

ton



     sodium 100      6-17                               mg by           Methodi



     mg capsule      14:32:                               mouth           st



               28                                 daily.           

 

     atorvastati      0      Yes            10mg QD   Take 1           [place]

ton



     n (LIPITOR)      6-17                               tablet (10           Me

thodi



     10 mg      00:00:                               mg total)           st



     tablet      00                                 by mouth           



                                                  daily.           

 

     calcitrioL            Yes            .25ug QD   Take 1           [facility]

ton



     (ROCALTROL)      6-17                               capsule           [facility]

di



     0.25 MCG      00:00:                               (0.25 mcg           st



     capsule      00                                 total) by           



                                                  mouth           



                                                  daily.           

 

     carvediloL            Yes            6.25mg Q.5D Take 1           [facility]

[facility]



     (COREG)      6-17                               tablet           [facility]



     6.25 MG      00:00:                               (6.25 mg           st



     tablet      00                                 total) by           



                                                  mouth 2           



                                                  (two)           



                                                  times a           



                                                  day with           



                                                  meals.           

 

     gabapentin            Yes            400mg Q.[id number] Take 1         

  [facility]



     (NEURONTIN)      6-17                          [id number] capsule          

 [facility]



     400 mg      00:00:                          3D   (400 mg           st



     capsule      00                                 total) by           



                                                  mouth 3           



                                                  (three)           



                                                  times a           



                                                  day.           

 

     hydrALAZINE      0      Yes                      Two P.O.           [facility]

[facility]



     (APRESOLINE      6-17                               T.I.D.           [facility]

i



     ) 10 MG      00:00:                                              st



     tablet      00                                                

 

     sevelamer      0      Yes                      Two P.O. -           [facility]



     (RENAGEL)      6-17                               T.I.D.           [facility]



     800 MG      00:00:                                              st



     tablet      00                                                

 

     tamsulosin      0      Yes            .4mg QD   Take 1           [facility]

[facility]



     (FLOMAX)      6-17                               capsule           [facility]



     0.4 mg      00:00:                               (0.4 mg           st



     capsule      00                                 total) by           



                                                  mouth           



                                                  daily with           



                                                  dinner.           

 

     cyanocobala      2021- Yes            1{capsu QD   Take 1           

[facility]



     min,                      le}       capsule by           [facility]



     vitamin      00:00: 23:59                          mouth           st



     B-12, 5,000      00   :00                           daily for           



     mcg capsule                                         92 days. 1           



                                                  capsule po           



                                                  Qd             

 

     sodium      2021- Yes            650mg Q.5D Take 1           [facility]



     bicarbonate                                tablet           [facility]

[facility]



     650 mg      00:00: 23:59                          (650 mg           st



     tablet      00   :00                           total) by           



                                                  mouth 2           



                                                  (two)           



                                                  times a           



                                                  day for 92           



                                                  days.           

 

     thiamine      2021- Yes            100mg Q.5D Take 1           [facility]

[facility]



     mononitrate                                tablet           [facility]

[facility]



     , vit B1,      00:00: 23:59                          (100 mg           st



     (B-1) 100      00   :00                           total) by           



     mg tablet                                         mouth 2           



                                                  (two)           



                                                  times a           



                                                  day for 92           



                                                  days.           

 

     lancets 28            Yes                      Check 4           [facility]

[facility]



     gauge misc      5-27                               times           [facility]



               00:00:                               daily           st



               00                                                

 

     pen needle,            Yes                      Use once           [facility]

[place]



     diabetic      5-27                               daily           [facility]



     (BD       00:00:                                              st



     Ultra-Fine      00                                                



     Short Pen                                                        



     Needle) 31                                                        



     gauge x                                                        



     5/16"                                                        



     needle                                                        

 

     insulin            Yes            4U   Q.5D Inject 4           [facility]

n



     aspart,      5-27                               Units           [facility]



     niacinamide      00:00:                               under the           s

t



     , (Fiasp      00                                 skin 2           



     Penfill                                         (two)           



     U-100                                         times a           



     Insulin)                                         day.           



     100 unit/mL                                         Sliding           



     (3 mL)                                         scale.           



     cartridge                                                        

 

     acetaminoph      2021- Yes                      Take 1           [facility]

[facility]



     en-codeine      -26                          tablet by           [facility]



     (TYLENOL      00:00: 23:59                          mouth           st



     WITH      00   :00                           every 8           



     CODEINE #3)                                         hours as           



     300-30 mg                                         needed for           



     per tablet                                         moderate           



                                                  pain           

 

     atorvastati      2021- No                       Take 1           [facility]

[facility]



     n (LIPITOR)                                tablet by           [facility]



     10 mg      00:00: 00:00                          mouth           st



     tablet      00   :00                           daily           

 

     predniSONE      2021- No             10mg Q.5D Take 10 mg           

[facility]



     (DELTASONE)                                by mouth 2           M

[facility]



     10 mg      15:13: 00:00                          (two)           st



     tablet      31   :00                           times a           



                                                  day.           

 

     INSULIN                             Inject           [facility]



     SUBCUTANEOU                                under the           Me

thodi



     S PUMP,      15:13: 00:00                          skin           st



     NOVOLOG,SANAM      13   :00                           continuous           



     SP, 100                                         ly.            



     UNIT/ML                                         Sliding           



     INSULIN                                         scale           



     PUMP                                                        



     INFUSION                                                        



     (NovoLOG)                                                        

 

     gabapentin       No             400mg Q.[id number] Take 1        

   [facility]



     (NEURONTIN)                           [id number] capsule         

  [facility]



     400 mg      00:00: 00:00                     3D   (400 mg           st



     capsule      00   :00                           total) by           



                                                  mouth 3           



                                                  (three)           



                                                  times a           



                                                  day.           

 

     hydrALAZINE       No                       [facility] P[facility].           [facility]

[place]



     (APRESOLINE                                T.I.D.           [facility]

[facility]



     ) 10 MG      00:00: 00:00                                         st



     tablet      00   :00                                          

 

     carvediloL       No             6.25mg Q.5D Take 1           [facility]

[place]



     (COREG)                                tablet           [facility]



     6.25 MG      00:00: 00:00                          (6.25 mg           st



     tablet      00   :00                           total) by           



                                                  mouth 2           



                                                  (two)           



                                                  times a           



                                                  day with           



                                                  meals.           

 

     ciprofloxac       No             500mg Q.5D Take 1           [facility]

[place]



     in (Cipro)                                tablet           [facility]

i



     500 MG      00:00: 23:59                          (500 mg           st



     tablet      00   :00                           total) by           



                                                  mouth 2           



                                                  (two)           



                                                  times a           



                                                  day for 3           



                                                  days.           

 

     ciprofloxac       No             500mg Q.5D Take 1           [facility]

[place]



     in (Cipro)                                tablet           [facility]

i



     500 MG      00:00: 00:00                          (500 mg           st



     tablet      00   :00                           total) by           



                                                  mouth 2           



                                                  (two)           



                                                  times a           



                                                  day for 3           



                                                  days.           

 

     fluconazole       No             150mg      Take 1           [facility]

[place]



     (Diflucan)                                tablet           [facility]

i



     150 MG      00:00: 00:00                          (150 mg           st



     tablet      00   :00                           total) by           



                                                  mouth once           



                                                  for 1           



                                                  dose.           

 

     ciprofloxac      2021- No             500mg Q.5D Take 1           [facility]



     in (Cipro)                                tablet           [facility]

i



     500 MG      00:00: 00:00                          (500 mg           st



     tablet      00   :00                           total) by           



                                                  mouth 2           



                                                  (two)           



                                                  times a           



                                                  day for 3           



                                                  days.           

 

     fluconazole      2021- No             150mg      Take 1           [facility]



     (Diflucan)                                tablet           Method

i



     150 MG      00:00: 23:59                          (150 mg           st



     tablet      00   :00                           total) by           



                                                  mouth once           



                                                  for 1           



                                                  dose.           

 

     nitrofurant      2021- No             100mg Q.5D Take 1           [facility]



     oin,      3-30 04-06                          capsule           [facility]



     macrocrysta      00:00: 23:59                          (100 mg           st



     l-monohydra      00   :00                           total) by           



     te,                                          mouth 2           



     (Macrobid)                                         (two)           



     100 MG                                         times a           



     capsule                                         day for 7           



                                                  days.           

 

     fluconazole      2021- No             150mg      Take 1           [facility]



     (Diflucan)                                tablet           Method

i



     150 MG      00:00: 23:59                          (150 mg           st



     tablet      00   :00                           total) by           



                                                  mouth once           



                                                  for 1           



                                                  dose.           

 

     hydrALAZINE      2021- No                       Two P.O.           [facility]

[facility]



     (APRESOLINE                                T.I.D.           [facility]

di



     ) 10 MG      00:00: 00:00                                         st



     tablet      00   :00                                          

 

     insulin      2021- No                       Inject           [facility]



     aspart,                                under the           [facility]

i



     niacinamide      14:31: 00:00                          skin.           st



     , (Fiasp      43   :00                           Sliding           



     Penfill                                         scale.           



     U-100                                                        



     Insulin)                                                        



     100 unit/mL                                                        



     (3 mL)                                                        



     cartridge                                                        

 

     insulin      2021- No             4U   Q.5D Inject 4           [facility]

[facility]



     aspart,                                Units           [facility]



     niacinamide      00:00: 00:00                          under the           

st



     , (Fiasp      00   :00                           skin 2           



     Penfill                                         (two)           



     U-100                                         times a           



     Insulin)                                         day.           



     100 unit/mL                                         Sliding           



     (3 mL)                                         scale.           



     cartridge                                                        

 

     metFORMIN      2021- No             500mg Q.[id number] Take 1         

  [facility]



     (GLUCOPHAGE                           [id number] tablet          

 [facility]



     ) 500 mg      00:00: 00:00                     3D   (500 mg           st



     tablet      00   :00                           total) by           



                                                  mouth 3           



                                                  (three)           



                                                  times a           



                                                  day.           

 

     acetaminoph       No        chronic 1{tbl} Q8H  Take 1         

  [facility]



     en-codeine                 pain           tablet by           [facility]



     (TYLENOL      00:00: 00:00                          mouth           st



     WITH      00   :00                           every 8           



     CODEINE #3)                                         (eight)           



     300-30 mg                                         hours as           



     per tablet                                         needed for           



                                                  moderate           



                                                  pain for           



                                                  up to 30           



                                                  days           



                                                  .chronic           



                                                  pain.           

 

     atorvastati       No             10mg QD   Take 1           [facility]

[facility]



     n (LIPITOR)                                tablet (10           M

[facility]



     10 mg      00:00: 00:00                          mg total)           st



     tablet      00   :00                           by mouth           



                                                  daily.           

 

     carvediloL       No             6.25mg Q.5D Take 1           

[facility]



     (COREG)                                tablet           [facility]



     6.25 MG      00:00: 00:00                          (6.25 mg           st



     tablet      00   :00                           total) by           



                                                  mouth 2           



                                                  (two)           



                                                  times a           



                                                  day with           



                                                  meals.           

 

     DULoxetine       No             20mg QD   Take 1           [facility]

[facility]



     (CYMBALTA)                                capsule           [facility]

[facility]



     20 MG      00:00: 00:00                          (20 mg           st



     capsule      00   :00                           total) by           



                                                  mouth           



                                                  daily.           

 

     gabapentin       No             400mg Q.[id number] Take 1        

   [facility]



     (NEURONTIN)                           [id number] capsule         

  [facility]



     400 mg      00:00: 00:00                     3D   (400 mg           st



     capsule      00   :00                           total) by           



                                                  mouth 3           



                                                  (three)           



                                                  times a           



                                                  day.           

 

     hydrALAZINE       No             100mg Q.[id number] Take 10      

     [facility]



     (APRESOLINE                           [id number] tablets         

  [facility]



     ) 10 MG      00:00: 00:00                     3D   (100 mg           st



     tablet      00   :00                           total) by           



                                                  mouth 3           



                                                  (three)           



                                                  times a           



                                                  day.           

 

     nitrofurant      2020 No        Urinary 100mg Q.5D Take 1          

 [facility]



     oin,      0-28            tract           capsule           [facility]



     macrocrysta      00:00: 23:59           infection           (100 mg        

   st



     l-monohydra      00   :00            without           total) by           



     te,                           hematuria,           mouth 2           



     (Macrobid)                          site           (two)           



     100 MG                          unspecified           times a           



     capsule                                         day for 7           



                                                  days.           

 

     gabapentin      2020- No                       Take 1           [facility]

[facility]



     (NEURONTIN)                                capsule by           [facility]



     400 mg      00:00: 00:00                          mouth           st



     capsule      00   :00                           three           



                                                  times           



                                                  daily           

 

     hydrALAZINE      2020- No                       Take 2           [facility]

[facility]



     (APRESOLINE                                tablets by           [facility]



     ) 10 MG      00:00: 00:00                          mouth           st



     tablet      00   :00                           three           



                                                  times           



                                                  daily           

 

     atorvastati      2020- No                       Take 1           [facility]

[facility]



     n (LIPITOR)                                tablet by           Me

[facility]



     10 mg      00:00: 00:00                          mouth           st



     tablet      00   :00                           daily           

 

     DULoxetine      2020- No                       Take 1           [facility]

[facility]



     (CYMBALTA)                                capsule by           [facility]



     20 MG      00:00: 00:00                          mouth           st



     capsule      00   :00                           daily           

 

     metFORMIN      2020- No                       Take 1           [facility]

[facility]



     (GLUCOPHAGE                                tablet by           Me

[facility]



     ) 500 mg      00:00: 00:00                          mouth           st



     tablet      00   :00                           three           



                                                  times           



                                                  daily           

 

     carvediloL      2020- No                       Take 1           [facility]

[facility]



     (COREG)                                tablet by           [facility]

i



     6.25 MG      00:00: 00:00                          mouth           st



     tablet      00   :00                           twice           



                                                  daily with           



                                                  meals           

 

     acetaminoph      2020- No                       Take 1           [facility]

[facility]



     en-codeine                                tablet by           [facility]



     (TYLENOL      00:00: 00:00                          mouth           st



     WITH      00   :00                           every 8           



     CODEINE #3)                                         hours as           



     300-30 mg                                         needed for           



     per tablet                                         moderate           



                                                  pain           

 

     nitrofurant       No             100mg Q.5D Take 1           [facility]

[place]



     oin,      6 07-06                          capsule           [facility]



     macrocrysta      00:00: 23:59                          (100 mg           st



     l-monohydra      00   :00                           total) by           



     te,                                          mouth 2           



     (Macrobid)                                         (two)           



     100 MG                                         times a           



     capsule                                         day for 7           



                                                  days.           

 

     hydrALAZINE      2020- No                       Take 2           [facility]

[facility]



     (APRESOLINE      6-23 10-22                          tablets by           RISHI gray



     ) 10 MG      00:00: 00:00                          mouth           st



     tablet      00   :00                           three           



                                                  times           



                                                  daily           

 

     atorvastati      2020- No                       Take 1           [facility]

[facility]



     n (LIPITOR)      6-23 10-22                          tablet by           Me

thodi



     10 mg      00:00: 00:00                          mouth           st



     tablet      00   :00                           daily           

 

     DULoxetine      2020- No                       Take 1           [facility]

[facility]



     (CYMBALTA)      6-23 10-22                          capsule by           Me

thodi



     20 MG      00:00: 00:00                          mouth           st



     capsule      00   :00                           daily           

 

     gabapentin      2020- No                       Take 1           [facility]

[facility]



     (NEURONTIN)      6-23 10-22                          capsule by           

ethgabriella



     400 mg      00:00: 00:00                          mouth           st



     capsule      00   :00                           three           



                                                  times           



                                                  daily           

 

     metFORMIN      2020- No                       Take 1           [place]

[facility]



     (GLUCOPHAGE      6-23 10-22                          tablet by           Me

thodi



     ) 500 mg      00:00: 00:00                          mouth           st



     tablet      00   :00                           three           



                                                  times           



                                                  daily           

 

     carvediloL      2020- No                       Take 1           [facility]

[facility]



     (COREG)      6-23 10-22                          tablet by           Method

i



     6.25 MG      00:00: 00:00                          mouth           st



     tablet      00   :00                           twice           



                                                  daily with           



                                                  meals           

 

     acetaminoph      2020- No                       Take 1           [facility]

[facility]



     en-codeine                                tablet by           [facility]



     (TYLENOL      00:00: 23:59                          mouth           st



     WITH      00   :00                           every 8           



     CODEINE #3)                                         hours as           



     300-30 mg                                         needed for           



     per tablet                                         moderate           



                                                  pain           

 

     furosemide      2019- No             40mg Q.5D Take 1           [facility]

[facility]



     (LASIX) 40      0-01 09-30                          tablet (40           Me

thodi



     mg tablet      00:00: 23:59                          mg total)           st



               00   :00                           by mouth 2           



                                                  (two)           



                                                  times a           



                                                  day.           

 

     hydrocolloi            Yes            1{packa Q.5D Apply 1           

[facility]



     d dressing      5-20                     ge}       Package           Method

i



     (DUODERM      00:00:                               topically           st



     CGF BORDER      00                                 2 (two)           



     DRESSING) 4                                         times a           



     X 4 "                                         day.           



     bandage                                                        

 

     soft lens            Yes            20mL QD   20 mL           [facility]



     rinse,store      4-25                               daily.           Method

i



     solution      00:00:                                              st



     (SALINE      00                                                



     SOLUTION)                                                        



     solution                                                        

 

     insulin      2021- No             30U  QD   Inject 30           [facility]

[facility]



     glargine-li      4-25 04-21                          Units           Method

i



     xisenatide      00:00: 00:00                          under the           s

t



     (SOLIQUA      00   :00                           skin           



     100/33) 100                                         daily.           



     unit-33                                                        



     mcg/mL                                                        



     insulin pen                                                        

 

     pen needle,      2021- No                       Use once           H

david



     diabetic                                daily           [facility]



     (BD INSULIN      00:00: 00:00                                         st



     PEN NEEDLE      00   :00                                          



     UF SHORT)                                                        



     31 gauge x                                                        



     5/16"                                                        



     needle                                                        

 

     blood sugar            Yes                      Check four           

[facility]



     diagnostic      -                               strips           [facility]



     strips      00:00:                               daily           st



     (FREESTYLE      00                                                



     LITE                                                        



     STRIPS)                                                        



     strip test                                                        



     strips                                                        

 

     lancets 28      2021- No                       Check 4           [facility]

[facility]



     gauge misc      2--                          times           [facility]



               00:00: 00:00                          daily           st



               00   :00                                          







Immunizations







           Ordered Immunization Filled Immunization Date       Status     Commen

ts   Source



           Name       Name                                        

 

           Pneumococcal            2020 Completed             [facility]



           Conjugate 13-Valent            00:00:00                         [facility]

dist

 

           Influenza (IM)            2019 Completed             [facility]



           Preservative Free            00:00:00                         [facility]

st

 

           Pneumococcal            2019 Completed             [facility]



           Polysaccharide            00:00:00                         [facility]

 

           Influenza (IM)            2017-10-01 Completed             [facility]



           Preservative Free            00:00:00                         [facility]

st

 

           FLUZONE HIGH-DOSE PF            2016-10-06 Completed             [facility]

[facility]



                                 00:00:00                         [facility]

 

           FLUZONE HIGH-DOSE PF            2015-10-15 Completed             [facility]

[facility]



                                 00:00:00                         [facility]







Vital Signs







             Vital Name   Observation Time Observation Value Comments     Source

 

             Systolic blood 2021 14:24:00 135 mm[Hg]                [place]

n [facility]



             pressure                                            

 

             Diastolic blood 2021 14:24:00 76 mm[Hg]                 [facility]

on [facility]



             pressure                                            

 

             Heart rate   2021 14:24:00 69 /min                   Rodney Ley

 

             Body temperature 2021 14:24:00 37.11 Meredith                 [place] [facility]

 

             Body height  2021 14:24:00 160 cm                    Rodney Ley

 

             Oxygen saturation in 2021 14:24:00 95 /min                   

Rodney Ley



             Arterial blood by                                        



             Pulse oximetry                                        

 

             Respiratory rate 2021 15:20:00 24 /min                   Kenyon Ley

 

             Body weight  2021 13:34:00 95.255 kg                 Rodney Ley

 

             BMI          2021 13:34:00 37.20 kg/m2               Rodney Ley







Procedures







                Procedure       Date / Time Performed Performing Clinician Sourc

e

 

                CBC WITH PLATELET AND 2021 15:12:00 MELLO Dennis



                DIFFERENTIAL                                    

 

                COMPREHENSIVE METABOLIC 2021 15:12:00 MELLO Dennis



                PANEL                                           

 

                LIPID PANEL     2021 15:12:00 MELLO Dennis

 

                HEMOGLOBIN A1C  2021 16:11:00 MELLO Dennis

 

                COMPREHENSIVE METABOLIC 2021 16:11:00 MELLO Dennis



                PANEL                                           

 

                CBC WITH PLATELET AND 2021 16:11:00 MELLO Dennis



                DIFFERENTIAL                                    

 

                LIPID PANEL     2021 16:11:00 MELLO Dennis







Plan of Care







             Planned Activity Planned Date Details      Comments     Source

 

             Future Scheduled 2022   DIABETIC FOOT EXAM              [place]

on [facility]



             Test         00:00:00     [code = DIABETIC              



                                       FOOT EXAM]                

 

             Future Scheduled 2021   INFLUENZA VACCINE              [place]

n [facility]



             Test         00:00:00     [code = INFLUENZA              



                                       VACCINE]                  

 

             Future Scheduled 1989   SHINGLES VACCINES              [place]

n [facility]



             Test         00:00:00     (#1) [code =              



                                       SHINGLES VACCINES              



                                       (#1)]                     

 

             Future Scheduled 1951   COVID-19 VACCINE (1)              [facility]

[facility]



             Test         00:00:00     [code = COVID-19              



                                       VACCINE (1)]              

 

             Future Scheduled 1949   DIABETES: RETINAL              [place]

n [facility]



             Test         00:00:00     EYE EXAM [code =              



                                       DIABETES: RETINAL              



                                       EYE EXAM]                 







Encounters







        Start   End     Encounter Admission Attending Care    Care    Encounter 

Source



        Date/Time Date/Time Type    Type    Clinicians Facility Department ID   

   

 

        2021 Outpatient         [facility]     210

[id number] [facility]



        00:00:00 00:00:00                 MELLO SEWELL                   [phone]     Method

i



                                                                        st

 

        2021 Outpatient         [facility]     210

[id number] [facility]



        00:00:00 00:00:00                 MELLO SEWELL                   937     Method

i



                                                                        st

 

        2021 Outpatient         [facility]     210

[id number] [facility]



        00:00:00 00:00:00                 R, R.                   970     Method

i



                                                                        

 

        2021 Outpatient         [facility]     210

[id number] [facility]



        00:00:00 00:00:00                 R, R.                   861     Method

i



                                                                        st

 

        2020 Outpatient         [facility]     210

[id number] [facility]



        00:00:00 00:00:00                 R, R                    757     Method

i



                                                                        







Results







           Test Description Test Time  Test Comments Results    Result Comments 

Source









                    Comprehensive metabolic panel 2021 06:28:00 









                      Test Item  Value      Reference Range Interpretation Comme

nts









             Glucose (test code = 88 mg/dL                                

 Non-fasting



             2345-7)                                             reference inter

sowmya

 

             BUN (test code = 3094-0) 66 mg/dL     7-25         H            

 

             Creatinine (test code = 2.06 mg/dL   0.60-0.88    H            For 

patients >49 years of



             2160-0)                                             age, the refere

nce



                                                                 limitfor Creati

nine is



                                                                 approximately 1

3% higher



                                                                 for peopleident

ified as



                                                                 -Jacqui

n.

 

             EGFR [employer] 22           See_Comment  L             [Aut

omated message] The



             (test code = 2775)                                        system 

[employer] generated



                                                                 this result tra

nsmitted



                                                                 reference range

: > OR =



                                                                 60 mL/min/1.73m

2. The



                                                                 reference range

 was not



                                                                 used to interpr

et this



                                                                 result as



                                                                 normal/abnormal

.

 

             EGFR  25           See_Comment  L             [Auto

mated message] The



             (test code = 2774)                                        system 

[employer] generated



                                                                 this result tra

nsmitted



                                                                 reference range

: > OR =



                                                                 60 mL/min/1.73m

2. The



                                                                 reference range

 was not



                                                                 used to interpr

et this



                                                                 result as



                                                                 normal/abnormal

.

 

             BUN/creatinine ratio 32           See_Comment  H             [Autom

ated message] The



             (test code = 3097-3)                                        system 

which generated



                                                                 this result tra

nsmitted



                                                                 reference range

: 6 - 22



                                                                 (calc). The ref

erence



                                                                 range was not u

sed to



                                                                 interpret this 

result as



                                                                 normal/abnormal

.

 

             Sodium (test code = 138 mmol/L   135-146                   



             2951-2)                                             

 

             Potassium (test code = 6.0 mmol/L   3.5-5.3      H            



             2823-3)                                             

 

             Chloride (test code = 107 mmol/L                       



             5-0)                                             

 

             CO2 (test code = -9) 24 mmol/L    20-32                     

 

             Calcium (test code = 8.7 mg/dL    8.6-10.4                  



             71703-2)                                            

 

             Protein (test code = 6.9 g/dL     6.1-8.1                   



             2885-2)                                             

 

             Albumin, S (test code = 3.5 g/dL     3.6-5.1      L            



             1751-7)                                             

 

             Globulin, total (test 3.4          See_Comment                [Auto

mated message] The



             code = 00783-1)                                        system which

 generated



                                                                 this result tra

nsmitted



                                                                 reference range

: 1.9 -



                                                                 3.7 g/dL (calc)

. The



                                                                 reference range

 was not



                                                                 used to interpr

et this



                                                                 result as



                                                                 normal/abnormal

.

 

             Albumin/globulin ratio 1.0          See_Comment                [Aut

omated message] The



             (test code = 1759-0)                                        system 

which generated



                                                                 this result tra

nsmitted



                                                                 reference range

: 1.0 -



                                                                 2.5 (calc). The

 reference



                                                                 range was not u

sed to



                                                                 interpret this 

result as



                                                                 normal/abnormal

.

 

             Total bilirubin (test 0.3 mg/dL    0.2-1.2                   



             code = -2)                                        

 

             Alkaline phosphatase 69 U/L                           



             (test code = 6768-6)                                        

 

             AST (test code = 1920-8) 13 U/L       10-35                     

 

             ALT (test code = 1742-6) 9 U/L        6-29                      

 

             ELADIO (test code = ELADIO) FASTING:NO FASTING: NO                       

    

 

             RAC (test code = RAC) Performing                             



                          Organization                           



                          Information:    Site                           



                          ID: [id number]    Name: [facility]                           



                          Lab    Address: [address] [place] [id number]                           



                             Director: Viet Sosa                           

 

             Lab Interpretation (test Abnormal                               



             code = 18698-7)                                        



[id number] 06:28:00





             Test Item    Value        Reference Range Interpretation Comments

 

             Cholesterol, total 97 mg/dL     <200                      



             (test code = 2093-3)                                        

 

             HDL cholesterol 49 mg/dL     See_Comment  L             [Automated



             (test code = -)                                        message

] The



                                                                 system which



                                                                 generated this



                                                                 result



                                                                 transmitted



                                                                 reference range

:



                                                                 > OR = 50. The



                                                                 reference range



                                                                 was not used to



                                                                 interpret this



                                                                 result as



                                                                 normal/abnormal

.

 

             Triglycerides (test 94 mg/dL     <150                      



             code = 2571-8)                                        

 

             LDL cholesterol 30           mg/dL (calc)              Reference ra

nge:



             calculated (test                                        <100 Desira

ble



             code = 82466-9)                                        range <100 m

g/dL



                                                                 for primary



                                                                 prevention;  <7

0



                                                                 mg/dL for



                                                                 patients with C

HD



                                                                 or diabetic



                                                                 patients with >



                                                                 or = 2 CHD risk



                                                                 factors. LDL-C 

is



                                                                 now calculated



                                                                 using the



                                                                 Rob-Deyanira



                                                                 calculation,



                                                                 which is a



                                                                 validated novel



                                                                 method munain

g



                                                                 better accuracy



                                                                 than the



                                                                 Friedewald



                                                                 equation in the



                                                                 estimation of



                                                                 LDL-C. Rob S

S



                                                                 et al. JD.



                                                                 2013;310(19):



                                                                 [id number]



                                                                 (http://educati

on



                                                                 .[employer]



                                                                 .com/faq/GNA892

)

 

             Cholesterol/HDL 2.0          See_Comment                [Automated



             ratio (test code =                                        message] 

The



             9830-1)                                             system which



                                                                 generated this



                                                                 result



                                                                 transmitted



                                                                 reference range

:



                                                                 <5.0 (calc). Th

e



                                                                 reference range



                                                                 was not used to



                                                                 interpret this



                                                                 result as



                                                                 normal/abnormal

.

 

             Non-HDL cholesterol 48           See_Comment               For beto

ents with



             (test code =                                        diabetes plus 1



             31113-7)                                            major ASCVD ris

k



                                                                 factor, treatin

g



                                                                 to a non-HDL-C



                                                                 goal of <100



                                                                 mg/dL (LDL-C of



                                                                 <70 mg/dL) is



                                                                 considered a



                                                                 therapeutic



                                                                 option.



                                                                 [Automated



                                                                 message] The



                                                                 system which



                                                                 generated this



                                                                 result



                                                                 transmitted



                                                                 reference range

:



                                                                 <130 mg/dL



                                                                 (calc). The



                                                                 reference range



                                                                 was not used to



                                                                 interpret this



                                                                 result as



                                                                 normal/abnormal

.

 

             ELADIO (test code = FASTING:NO                             



             ELADIO)         FASTING: NO                            

 

             RAC (test code = Performing                             



             RAC)         Organization                           



                          Information:                           



                          Site ID: [facility]                           



                          Name: [facility]                           



                           Lab    Address:                           



                          [address] [place] [address]                           



                          [id number]                             



                          Director: Viet Sosa                           

 

             Lab Interpretation Abnormal                               



             (test code =                                        



             70911-2)                                            



[facility] with platelet and [id number] 06:28:00





             Test Item    Value        Reference Range Interpretation Comments

 

             WBC (test code = 9.6          See_Comment                [Automated



             7353-2)                                             message] The



                                                                 system which



                                                                 generated this



                                                                 result



                                                                 transmitted



                                                                 reference range

:



                                                                 3.8 - 10.8



                                                                 Thousand/uL. Th

e



                                                                 reference range



                                                                 was not used to



                                                                 interpret this



                                                                 result as



                                                                 normal/abnormal

.

 

             RBC (test code = 3.10         See_Comment  L             [Automated



             044-1)                                              message] The



                                                                 system which



                                                                 generated this



                                                                 result



                                                                 transmitted



                                                                 reference range

:



                                                                 3.80 - 5.10



                                                                 Million/uL. The



                                                                 reference range



                                                                 was not used to



                                                                 interpret this



                                                                 result as



                                                                 normal/abnormal

.

 

             HGB (test code = 9.1 g/dL     11.7-15.5    L            



             718-7)                                              

 

             HCT (test code = 28.8 %       35.0-45.0    L            



             4544-3)                                             

 

             MCV (test code = 92.9 fL      80.0-100.0                



             787-2)                                              

 

             MCH (test code = 29.4 pg      27.0-33.0                 



             785-6)                                              

 

             MCHC (test code = 31.6 g/dL    32.0-36.0    L            



             786-4)                                              

 

             RDW (test code = 12.8 %       11.0-15.0                 



             788-0)                                              

 

             Platelet count (test 301          See_Comment                [Autom

ated



             code = 777-3)                                        message] The



                                                                 system which



                                                                 generated this



                                                                 result



                                                                 transmitted



                                                                 reference range

:



                                                                 140 - 400



                                                                 Thousand/uL. Th

e



                                                                 reference range



                                                                 was not used to



                                                                 interpret this



                                                                 result as



                                                                 normal/abnormal

.

 

             MPV (test code = 10.7 fL      7.5-12.5                  



             776-5)                                              

 

             Neutrophils, 7402         See_Comment                [Automated



             absolute (test code                                        message]

 The



             = 751-8)                                            system which



                                                                 generated this



                                                                 result



                                                                 transmitted



                                                                 reference range

:



                                                                 1,500 - 7,800



                                                                 cells/uL. The



                                                                 reference range



                                                                 was not used to



                                                                 interpret this



                                                                 result as



                                                                 normal/abnormal

.

 

             Lymphocytes, 1133         See_Comment                [Automated



             absolute (test code                                        message]

 The



             = 731-0)                                            system which



                                                                 generated this



                                                                 result



                                                                 transmitted



                                                                 reference range

:



                                                                 850 - 3,900



                                                                 cells/uL. The



                                                                 reference range



                                                                 was not used to



                                                                 interpret this



                                                                 result as



                                                                 normal/abnormal

.

 

             Monocytes, absolute 566          See_Comment                [Automa

onofre



             (test code = 742-7)                                        message]

 The



                                                                 system which



                                                                 generated this



                                                                 result



                                                                 transmitted



                                                                 reference range

:



                                                                 200 - 950



                                                                 cells/uL. The



                                                                 reference range



                                                                 was not used to



                                                                 interpret this



                                                                 result as



                                                                 normal/abnormal

.

 

             Eosinophils, 442          See_Comment                [Automated



             absolute (test code                                        message]

 The



             = 711-2)                                            system which



                                                                 generated this



                                                                 result



                                                                 transmitted



                                                                 reference range

:



                                                                 15 - 500



                                                                 cells/uL. The



                                                                 reference range



                                                                 was not used to



                                                                 interpret this



                                                                 result as



                                                                 normal/abnormal

.

 

             Basophils, absolute 58           See_Comment                [Automa

onofre



             (test code = 704-7)                                        message]

 The



                                                                 system which



                                                                 generated this



                                                                 result



                                                                 transmitted



                                                                 reference range

:



                                                                 0 - 200 cells/u

L.



                                                                 The reference



                                                                 range was not



                                                                 used to interpr

et



                                                                 this result as



                                                                 normal/abnormal

.

 

             Neutrophils (test 77.1 %                                 



             code = 770-8)                                        

 

             Lymphocytes (test 11.8 %                                 



             code = 736-9)                                        

 

             Monocytes (test code 5.9 %                                  



             = 5905-5)                                           

 

             Eosinophils (test 4.6 %                                  



             code = 713-8)                                        

 

             Basophils + RC (test 0.6 %                                  



             code = 706-2)                                        

 

             ELADIO (test code = FASTING:NO                             



             ELADIO)         FASTING: NO                            

 

             RAC (test code = Performing                             



             RAC)         Organization                           



                          Information:                           



                          Site ID: [facility]                           



                          Name: [facility]                           



                          n Lab    Address:                           



                          [address] [place] [address]                           



                          [phone]                             



                          Director: Viet Sosa                           

 

             Lab Interpretation Abnormal                               



             (test code =                                        



             00545-2)                                            



[id number]





             Test Item    Value        Reference Range Interpretation Comments

 

             Hemoglobin A1C 4.9          See_Comment               For the purpo

se of



             (test code =                                        screening for t

he



             4548-4)                                             presence ofdiab

etes:



                                                                 <5.7%



                                                                 Consistent with

 the



                                                                 absence of



                                                                 diabetes5.7-6.4

%



                                                                 Consistent with



                                                                 increased risk 

for



                                                                 diabetes



                                                                 (prediabetes)> 

or



                                                                 =6.5%  Consiste

nt



                                                                 with diabetes T

his



                                                                 assay result is



                                                                 consistent with

 a



                                                                 decreased risko

f



                                                                 diabetes. Curre

ntly,



                                                                 no consensus ex

ists



                                                                 regarding use



                                                                 ofhemoglobin A1

c for



                                                                 diagnosis of di

abetes



                                                                 in children.



                                                                 According to Am

erican



                                                                 Diabetes Associ

ation



                                                                 (ADA)guidelines

,



                                                                 hemoglobin A1c 

<7.0%



                                                                 represents



                                                                 optimalcontrol 

in



                                                                 non-pregnant di

abetic



                                                                 patients.



                                                                 Differentmetric

s may



                                                                 apply to specif

ic



                                                                 patient populat

ions.



                                                                 Standards of Me

dical



                                                                 Care in



                                                                 Diabetes(ADA).



                                                                 [Automated mess

age]



                                                                 The system [email or web address]



                                                                 generated this 

result



                                                                 transmitted ref

erence



                                                                 range: <5.7 % o

f



                                                                 total Hgb. The



                                                                 reference range

 was



                                                                 not used to int

erpret



                                                                 this result as



                                                                 normal/abnormal

.

 

             ELADIO (test code = FASTING:NOFASTING:                           



             ELADIO)         NO                                     

 

             RAC (test code = Performing                             



             RAC)         Organization                           



                          Information:                           



                          Site ID: [facility]                           



                          Name: [facility]                           



                          n Lab    Address:                           



                          [address] [place] [address]                           



                          [phone]                             



                          Director: Viet Sosa                           



[employer]

## 2021-06-30 NOTE — RAD REPORT
EXAM DESCRIPTION:  RAD - Chest Single View - 6/30/2021 12:43 pm

 

CLINICAL HISTORY:  AMS

 

COMPARISON:  Portable June 1

 

TECHNIQUE:  AP portable chest image was obtained 6/30/2021 12:43 pm .

 

FINDINGS:  Lung volumes remain low. Interstitial pattern is prominent but slightly decreased from the
 comparison. Left hemidiaphragm elevation again noted. Mediastinum is distorted by rotation. Heart si
ze and mediastinum are not clearly different. No new hilar process identifiable. No pneumothorax seen
. No large pleural effusion. No acute bony abnormality seen. No acute aortic findings suspected.

 

IMPRESSION:  Patient has a mild baseline interstitial pattern diminished from the June 1 study.

 

No significant failure or volume overload finding.

## 2021-06-30 NOTE — ER
Nurse's Notes                                                                                     

 [facility]                                                                 

Name: Gladys Ramos                                                                                    

Age: 81 yrs                                                                                       

Sex: Female                                                                                       

: 1940                                                                                   

MRN: [id number]                                                                                   

Arrival Date: 2021                                                                          

Time: 11:41                                                                                       

Account#: [id number]                                                                            

Bed 6                                                                                             

Private MD:                                                                                       

Diagnosis: Altered mental status, unspecified;Facial weakness;UTI/ Urinary tract infection, site  

  not specified                                                                                   

                                                                                                  

Presentation:                                                                                     

                                                                                             

11:44 Chief complaint: EMS states: Home health nurse was unable to get new catheter back in     

      today, daughter reported abdominal swelling and fever x 2 days. Coronavirus screen: At      

      this time, the client does not indicate any symptoms associated with coronavirus-19.        

      Ebola Screen: No symptoms or risks identified at this time. Initial Sepsis Screen: Does     

      the patient meet any 2 criteria? No. Patient's initial sepsis screen is negative. Does      

      the patient have a suspected source of infection? No. Patient's initial sepsis screen       

      is negative. Risk Assessment: Do you want to hurt yourself or someone else? Patient         

      reports no desire to harm self or others. Onset of symptoms was 2021.              

11:44 Method Of Arrival: EMS: [facility]  

11:44 Acuity: ANATOLY 3                                                                           hb  

                                                                                                  

Triage Assessment:                                                                                

11:45 General: Appears in no apparent distress. ill, Behavior is cooperative, flat. Pain:     hb  

      Denies pain. EENT: No signs and/or symptoms were reported regarding the EENT system.        

      Neuro: Level of Consciousness is obeys commands, lethargic, Oriented to person, place,      

      situation. Cardiovascular: Patient's skin is warm and dry. Rhythm is regular.               

      Respiratory: Respiratory effort is even, unlabored, Respiratory pattern is regular,         

      symmetrical. GI: Parent/caregiver reports the patient having bloating. :                  

      Parent/caregiver report the patient having catheter not in place. Derm: Skin is pink,       

      warm \T\ dry. Musculoskeletal: No signs and/or symptoms reported regarding the              

      musculoskeletal system.                                                                     

                                                                                                  

Historical:                                                                                       

- Allergies:                                                                                      

11:45 ambien;                                                                                 hb  

11:45 Levaquin;                                                                                 

- Home Meds:                                                                                      

18:18 calcitriol 0.25 mcg oral cap 1 cap [Active]; duloxetine 20 mg oral CDRS [Active];       hb  

      hydralazine 10 mg Oral tab 1 tab 2 times per day [Active]; aspirin 81 mg oral TbEC          

      [Active]; tamsulosin 0.4 mg oral cap 1 cap once daily [Active]; carvedilol 6.25 mg oral     

      tab 1 tab 2 times per day [Active]; gabapentin 400 mg oral cap 1 cap 3 times per day        

      [Active]; atorvastatin 10 mg oral tab 1 tab once daily [Active]; Stool Softener 50 mg       

      oral cap 1 cap 2 times per day [Active]; multivitamin oral chew [Active]; B12 Active        

      1,000 mcg oral chew [Active]; thiamine HCl (vitamin B1) 250 mg Oral tab [Active];           

      flaxseed oil 1,000 mg oral cap [Active]; iron 27 mg iron oral tab [Active]; Sodium          

      Bicarbonate Oral [Active]; sevelamer HCl 800 mg oral tab 2 tabs 3 times per day             

      [Active]; Tylenol PM Extra Strength  mg oral tab 2 tabs once daily [Active];          

      insulin aspart U-100 100 unit/mL (3 mL) subcutaneous inpn [Active];                         

- PMHx:                                                                                           

11:45 CHF; Diabetes - IDDM; Hyperlipidemia; Hypertension; OA; Pneumonia; AAA;                 hb  

                                                                                                  

- Immunization history:: Adult Immunizations up to date.                                          

- Social history:: Smoking status: Patient denies any tobacco usage or history of.                

                                                                                                  

                                                                                                  

Screenin:47 Abuse screen: Denies threats or abuse. Denies injuries from another. Nutritional        hb  

      screening: No deficits noted. Tuberculosis screening: No symptoms or risk factors           

      identified. Fall Risk Total Vásquez Fall Scale indicates High Risk Score (45 or more          

      points). Fall prevention measures have been instituted. Side Rails Up X 2 Frequent          

      Obs/Assessments Occuring Family Present and informed to notify staff if the need to         

      leave the bedside As available patient and family educated on Fall Prevention Program       

      and Strategies.                                                                             

                                                                                                  

Assessment:                                                                                       

11:47 General: see triage assessment .                                                        hb  

12:45 Reassessment: Patient appears in no apparent distress at this time. No changes from     hb  

      previously documented assessment. Patient and/or family updated on plan of care and         

      expected duration. Pain level reassessed.                                                   

13:45 Reassessment: Patient appears in no apparent distress at this time. No changes from     hb  

      previously documented assessment. Patient and/or family updated on plan of care and         

      expected duration. Pain level reassessed.                                                   

14:45 Reassessment: Patient appears in no apparent distress at this time. No changes from     hb  

      previously documented assessment. Patient and/or family updated on plan of care and         

      expected duration. Pain level reassessed.                                                   

15:45 Reassessment: Patient appears in no apparent distress at this time. No changes from     hb  

      previously documented assessment. Patient and/or family updated on plan of care and         

      expected duration. Pain level reassessed.                                                   

16:30 Reassessment: Patient appears in no apparent distress at this time. No changes from     hb  

      previously documented assessment. Patient and/or family updated on plan of care and         

      expected duration. Pain level reassessed.                                                   

17:00 Reassessment: First unit PRBCs started. See transfusion flow sheet. Family remains at     

      bedside.                                                                                    

17:51 Reassessment: Patient appears in no apparent distress at this time. No changes from     hb  

      previously documented assessment. Patient and/or family updated on plan of care and         

      expected duration. Pain level reassessed.                                                   

18:18 Reassessment: Daughter Ирина Banda [phone].                                    hb  

18:44 Reassessment: Patient appears in no apparent distress at this time. No changes from     hb  

      previously documented assessment. Patient and/or family updated on plan of care and         

      expected duration. Pain level reassessed. Blood continues. Pt resting with eyes closed,     

      VSS.                                                                                        

19:22 Reassessment: Patient and/or family updated on plan of care and expected duration. Pain ea  

      level reassessed. Pt resting with eyes closed, respirations even and unlabored, chest       

      expansions even and symmetrical. No s/s of pain or discomfort noted at this time.           

21:08 Reassessment: Patient and/or family updated on plan of care and expected duration. Pain ea  

      level reassessed. Patient is alert, oriented x 3, equal unlabored respirations, skin        

      warm/dry/pink. Pt admitted to fourth floor. Pt left ED via stretcher per ED nurse. Pt       

      tolerating well.                                                                            

                                                                                                  

Vital Signs:                                                                                      

11:44  / 59; Pulse 77; Resp 15; Temp 97.6; Pulse Ox 97% on R/A; Pain 0/10;              hb  

13:00  / 48; Pulse 75; Resp 17; Pulse Ox 99% on R/A;                                    hb  

14:30 Weight 124.74 kg;                                                                       hb  

15:00  / 53; Pulse 75; Resp 18; Pulse Ox 97% ;                                          hb  

18:00  / 72; Pulse 62; Resp 15; Pulse Ox 98% on R/A;                                    hb  

19:23  / 48; Pulse 67; Resp 16; Temp 97.4; Pulse Ox 99% ;                               ea  

                                                                                                  

ED Course:                                                                                        

11:41 Patient arrived in ED.                                                                  am2 

11:45 Triage completed.                                                                       hb  

11:45 Arm band placed on.                                                                     hb  

11:47 Patient has correct armband on for positive identification. Bed in low position. Call   hb  

      light in reach. Side rails up X2.                                                           

12:02 Isaiah Gomez MD is Attending Physician.                                              kdr 

12:43 Chest Single View XRAY In Process Unspecified.                                          EDMS

12:57 Earline Sanchez MD is Hospitalizing Provider.                                           kdr 

13:06 Raissa Villanueva, RN is Primary Nurse.                                                   hb  

13:17 Abdomen In Process Unspecified.                                                         EDMS

15:08 Assist provider with pelvic exam: Set up pelvic tray. Performed by Isaiah Gomez MD     hb  

      Patient tolerated well.                                                                     

16:03 US Extremity Venous W Compression Gerhard In Process Unspecified.                           EDMS

19:35 Primary Nurse role handed off by Raissa Villanueva, RN                                    mw2 

20:03 Patient admitted, IV remains in place.                                                  ea  

                                                                                                  

Administered Medications:                                                                         

21:04 Not Given (Physician Discretion): NS 0.9% (30 ml/kg) 30 ml/kg IV at bolus once; Sepsis  ea  

      Protocol                                                                                    

                                                                                                  

                                                                                                  

Outcome:                                                                                          

12:59 Decision to Hospitalize by Provider.                                                    kdr 

20:03 Condition: stable                                                                       ea  

20:03 Instructed on the need for admit, Demonstrated understanding of instructions.               

21:08 Admitted to Med/surg accompanied by nurse, via stretcher, with chart, Report called to  ea  

      Receiving nurse on fourth floor                                                             

21:09 Patient left the ED.                                                                    ea  

                                                                                                  

Signatures:                                                                                       

Dispatcher [employer]                           EDMS                                                 

Isaiah Gomez MD MD   kdr                                                  

Raissa Villanueva, RN                     RN                                                      

Karol Lozano                               am2                                                  

Mitzy Newsome RN                      RN   Edwin Serrano                            mw2                                                  

                                                                                                  

**************************************************************************************************

## 2021-07-01 LAB
ALBUMIN SERPL BCP-MCNC: 2.6 G/DL (ref 3.4–5)
ALP SERPL-CCNC: 92 U/L (ref 45–117)
ALT SERPL W P-5'-P-CCNC: 15 U/L (ref 12–78)
AST SERPL W P-5'-P-CCNC: 8 U/L (ref 15–37)
BUN BLD-MCNC: 61 MG/DL (ref 7–18)
GLUCOSE SERPLBLD-MCNC: 113 MG/DL (ref 74–106)
HCT VFR BLD CALC: 27.9 % (ref 36–45)
INR BLD: 1.28
IRON SERPL-MCNC: 29 UG/DL (ref 50–170)
LYMPHOCYTES # SPEC AUTO: 0.6 K/UL (ref 0.7–4.9)
MAGNESIUM SERPL-MCNC: 2.1 MG/DL (ref 1.8–2.4)
PMV BLD: 9 FL (ref 7.6–11.3)
POTASSIUM SERPL-SCNC: 4.7 MMOL/L (ref 3.5–5.1)
RBC # BLD: 3.02 M/UL (ref 3.86–4.86)
RBC # BLD: 3.06 M/UL (ref 3.86–4.86)

## 2021-07-01 RX ADMIN — Medication SCH: at 21:00

## 2021-07-01 RX ADMIN — SODIUM CHLORIDE SCH MLS: 0.9 INJECTION, SOLUTION INTRAVENOUS at 14:10

## 2021-07-01 RX ADMIN — Medication SCH: at 07:47

## 2021-07-01 RX ADMIN — METOPROLOL TARTRATE PRN MG: 5 INJECTION INTRAVENOUS at 16:51

## 2021-07-01 NOTE — P.HP
Certification for Inpatient


Patient admitted to: Inpatient


With expected LOS: >2 Midnights


Patient will require the following post-hospital care: None


Practitioner: I am a practitioner with admitting privileges, knowledge of 

patient current condition, hospital course, and medical plan of care.


Services: Services provided to patient in accordance with Admission requirements

found in Title 42 Section 412.3 of the Code of Federal Regulations





Patient History


Date of Service: 21


Reason for admission: Severe anemia/fecal impaction/hydronephrosis with acute 

renal insufficiency


History of Present Illness: 





Patient is an 81-year-old female who I have known from the past who comes into 

the hospital with severe anemia.  Patient was recently in the hospital for 

hydronephrosis and after having a Dinero catheter placed her renal function and 

her hydronephrosis resolved.  Family is been going for a follow-up in the 

[place] area as the insurance she has is not accepted by anyone in the area 

except for the hospital.  She also had a CT scan which revealed fecal impaction.

 Patient looks like she has stool throughout the colon and will go ahead and sta

rt her on laxatives and possible enema if no bowel movement.  Her hydronephrosis

looks to have improved significantly in her renal function is stable.  At this 

time, she will be admitted to the hospital for blood transfusion as well as 

gentle hydration and monitoring her renal function closely.  Also will try to 

disimpact patient and try to prevent fecal impaction from occurring again.


Allergies





levofloxacin [From Levaquin] Allergy (Verified 21 22:11)


   Anaphylaxis


zolpidem [From Ambien] Allergy (Verified 21 22:11)


   Unknown





Home Medications: 








Atorvastatin Calcium [Lipitor*] 1 tab PO BEDTIME 21 


Duloxetine [Cymbalta *] 1 tab PO DAILY 21 


Gabapentin [Neurontin*] 1 cap PO TID 21 


Hydralazine [Apresoline*] 2 tab PO TID 21 


Insulin Aspart (Niacinamide) [Fiasp 100 Unit/ml Flextouch] See Protocol SQ ACHS 

21 


carvediloL [Carvedilol] 1 tab PO BID 21 


Docusate [Colace Cap*] 100 mg PO DAILY #30 cap 21 


Tamsulosin [Flomax*] 0.4 mg PO BEDTIME #30 cap 21 


Thiamine HCl [Vitamin B-1*] 100 mg PO BID #60 tablet 21 


Aspirin [Aspirin EC 81 MG] 81 mg PO DAILY #30 tablet. 21 


Calcitrol [Rocaltrol*] 0.25 mcg PO DAILY #30 cap 21 


Sevelamer Carbonate [Renvela*] 1,600 mg PO TIDWM #180 tablet 21 


Sodium Bicarbonate 1,300 mg PO BID #120 tablet 21 


Acetaminophen/Diphenhydramine [Tylenol Pm Ex-Strength Caplet] 1 each PO BEDTIME 

PRN 21 


Iron 27 Mg 1 tab PO DAILY 21 








- Past Medical/Surgical History


Has patient received pneumonia vaccine in the past: Yes


Diabetic: Yes


-: HTN


-: Diabetes mellitus type 2


-: Chronic hydronephrosis/hydroureter with prior stents


-: Arthritis to the knees and hips


-: Abdominal aortic aneurysm


-: Hyperlipidemia


-: Diabetic neuropathy


-: Iron deficiency anemia


-: Degenerative disc and joint disease


-: Hysterectomy


-: 


-: Hernia repair


-: Cataract surgery


Psychosocial/ Personal History: Patient lives with daughter.  She is a .





- Family History


  ** Father


Medical History: Heart disease


Notes: MI





  ** Mother


Medical History: Kidney disease





- Social History


Smoking Status: Never smoker


Alcohol use: No


CD- Drugs: No


Caffeine use: Yes


Place of Residence: Home





Review of Systems


10-point ROS is otherwise unremarkable





Physical Examination





- Vital Signs


Temperature: 97.3 F


Blood Pressure: 151/72


Pulse: 73


Respirations: 16


Pulse Ox (%): 97





- Physical Exam


General: Alert, In no apparent distress, Oriented x3


HEENT: Atraumatic, PERRLA, Mucous membr. moist/pink, EOMI, Sclerae nonicteric


Neck: Supple, 2+ carotid pulse no bruit, No LAD, Without JVD or thyroid 

abnormality


Respiratory: Clear to auscultation bilaterally, Normal air movement


Cardiovascular: Regular rate/rhythm, Normal S1 S2, Systolic murmur


Gastrointestinal: Normal bowel sounds, Soft and benign, Non-distended, No 

tenderness, No rebound, No guarding


Musculoskeletal: No clubbing, No swelling, No tenderness


Integumentary: No rashes


Neurological: Normal speech, Normal tone, Sensation intact, Cranial nerves 3-12 

intact, Normal affect, Abnormal gait, Abnormal strength


Lymphatics: No axilla or inguinal lymphadenopathy





Assessment & Plan





- Problems (Diagnosis)


(1) Anemia due to acute blood loss


Current Visit: Yes   Status: Acute   





(2) Dehydration


Current Visit: No   Status: Acute   





(3) Renal insufficiency


Onset Date: 18   Current Visit: No   Status: Acute   





(4) TIA (transient ischemic attack)


Current Visit: No   Status: Acute   





(5) Diabetic neuropathy


Onset Date: 18   Current Visit: No   Status: Chronic   


Qualifiers: 


 





(6) Hyperlipidemia


Onset Date: 11/30/15   Current Visit: No   Status: Chronic   


Qualifiers: 


 





(7) Hypertension


Onset Date: 18   Current Visit: No   Status: Chronic   





(8) Obesity (BMI 30.0-34.9)


Onset Date: 18   Current Visit: No   Status: Chronic   





(9) Urinary tract infectious disease


Onset Date: 11/30/15   Current Visit: No   Status: Resolved   





- Plan





Plan:


1. Transfuse 2 units of packed red blood cells


2. Monitor H&H closely


3. Monitor renal function with gentle hydration


4. IV antibiotics pending UA with microscopy


5. Laxatives and enema if patient is not cleared out


6. Repeat abdominal x-ray


7. Repeat labs to check renal function


8. Resume cardiac medications


9. GI and DVT prophylaxis


Discharge Plan: Home


Plan to discharge in: Greater than 2 days





- Advance Directives


Does patient have a Living Will: Yes


Does patient have a Durable POA for Healthcare: Yes





- Code Status/Comfort Care


Code Status Assessed: Yes


Code Status: Full Code


Critical Care: No


Time Spent Managing PTS Care (In Minutes): 45

## 2021-07-02 LAB
BUN BLD-MCNC: 57 MG/DL (ref 7–18)
GLUCOSE SERPLBLD-MCNC: 106 MG/DL (ref 74–106)
HCT VFR BLD CALC: 29.7 % (ref 36–45)
LYMPHOCYTES # SPEC AUTO: 0.7 K/UL (ref 0.7–4.9)
MAGNESIUM SERPL-MCNC: 2 MG/DL (ref 1.8–2.4)
PMV BLD: 8.9 FL (ref 7.6–11.3)
POTASSIUM SERPL-SCNC: 4.2 MMOL/L (ref 3.5–5.1)
RBC # BLD: 3.24 M/UL (ref 3.86–4.86)

## 2021-07-02 RX ADMIN — METOPROLOL TARTRATE PRN MG: 5 INJECTION INTRAVENOUS at 20:43

## 2021-07-02 RX ADMIN — Medication SCH: at 20:44

## 2021-07-02 RX ADMIN — SODIUM CHLORIDE SCH MLS: 0.9 INJECTION, SOLUTION INTRAVENOUS at 13:12

## 2021-07-02 RX ADMIN — METOPROLOL TARTRATE PRN MG: 5 INJECTION INTRAVENOUS at 08:39

## 2021-07-02 RX ADMIN — Medication SCH: at 07:46

## 2021-07-02 RX ADMIN — SODIUM CHLORIDE SCH MLS: 0.9 INJECTION, SOLUTION INTRAVENOUS at 03:00

## 2021-07-02 RX ADMIN — Medication SCH PKT: at 20:44

## 2021-07-02 NOTE — RAD REPORT
EXAM DESCRIPTION:  RAD - Abdomen  W Erect - 7/2/2021 6:36 am

 

CLINICAL HISTORY:  Impaction

Pain

 

COMPARISON:  No comparisons

 

FINDINGS:  The bowel gas pattern is non-obstructive. No evidence of free air or pneumatosis. No suspi
cious calcifications.

 

No significant bony findings.

 

Moderate fecal retention.

 

IMPRESSION:  Moderate fecal retention.

## 2021-07-02 NOTE — P.PN
Subjective


Date of Service: 07/01/21


Subjective: No new changes, No C/O voiced, Improving





Review of Systems


10-point ROS is otherwise unremarkable





Physical Examination





- Vital Signs


Temperature: 97.4 F


Blood Pressure: 161/71


Pulse: 77


Respirations: 16


Pulse Ox (%): 94





- Physical Exam


General: Alert, In no apparent distress, Oriented x3


Respiratory: Clear to auscultation bilaterally, Normal air movement


Cardiovascular: Regular rate/rhythm, Normal S1 S2, Systolic murmur


Gastrointestinal: Normal bowel sounds, Soft and benign, Non-distended, No 

tenderness


Musculoskeletal: No clubbing, No swelling, No tenderness


Neurological: Normal speech, Normal tone, Normal affect


Lymphatics: No axilla or inguinal lymphadenopathy





- Studies


Microbiology Data (last 24 hrs): 








06/30/21 12:52   Clean Catch Urine   [place] Count - Final


                            BETWEEN 10,000 & 100,000 CFU/ML


06/30/21 12:52   Clean Catch Urine    - Final


                            Enterobacter Cloacae





Medications List Reviewed: Yes





Assessment & Plan





- Problems (Diagnosis)


(1) Anemia due to acute blood loss


Current Visit: Yes   Status: Acute   





(2) Dehydration


Current Visit: No   Status: Acute   





(3) Renal insufficiency


Onset Date: 01/05/18   Current Visit: No   Status: Acute   





(4) TIA (transient ischemic attack)


Current Visit: No   Status: Acute   





(5) Diabetic neuropathy


Onset Date: 01/05/18   Current Visit: No   Status: Chronic   


Qualifiers: 


 





(6) Hyperlipidemia


Onset Date: 11/30/15   Current Visit: No   Status: Chronic   


Qualifiers: 


 





(7) Hypertension


Onset Date: 01/05/18   Current Visit: No   Status: Chronic   





(8) Obesity (BMI 30.0-34.9)


Onset Date: 01/05/18   Current Visit: No   Status: Chronic   





(9) Urinary tract infectious disease


Onset Date: 11/30/15   Current Visit: No   Status: Resolved   





- Plan





Plan:


1. Transfused 2 units of packed red blood cells; hemoglobin has improved.  

Continue monitoring H&H


2. Laxatives as needed


3. Monitor renal function with gentle hydration; renal function stable


4. Continue with antibiotic therapy


5. Repeat abdominal fan on


6. GI and DVT prophylaxis


Discharge Plan: Home


Plan to discharge in: Greater than 2 days





- Advance Directives


Does patient have a Living Will: Yes


Does patient have a Durable POA for Healthcare: Yes





- Code Status/Comfort Care


Code Status: Full Code


Critical Care: No


Time Spent Managing PTS Care (In Minutes): 35

## 2021-07-02 NOTE — P.PN
Date of Service: 07/02/21











Subjective


Subjective: 


Patient continues to improve with no new complaints.  Patient still with some 

fecal retention.  Hemoglobin is stable.  Renal function stabilized; anticipate 

discharge home in the morning





Review of Systems


10-point ROS is otherwise unremarkable





Physical Examination





- Vital Signs


Reviewed





- Physical Exam


General: Alert, In no apparent distress, Oriented x3


Respiratory: Clear to auscultation bilaterally, Normal air movement


Cardiovascular: Regular rate/rhythm, Normal S1 S2, Systolic murmur


Gastrointestinal: Normal bowel sounds, Soft and benign, Non-distended, No 

tenderness


Musculoskeletal: No clubbing, No swelling, No tenderness


Neurological: Normal speech, Normal tone, Normal affect





Assessment & Plan





- Problems (Diagnosis)


(1) Anemia due to acute blood loss


Current Visit: Yes   Status: Acute   





(2) Dehydration


Current Visit: No   Status: Acute   





(3) Renal insufficiency


Onset Date: 01/05/18   Current Visit: No   Status: Acute   





(4) TIA (transient ischemic attack)


Current Visit: No   Status: Acute   





(5) Diabetic neuropathy


Onset Date: 01/05/18   Current Visit: No   Status: Chronic   





(6) Hyperlipidemia


Onset Date: 11/30/15   Current Visit: No   Status: Chronic   





(7) Hypertension


Onset Date: 01/05/18   Current Visit: No   Status: Chronic   





(8) Obesity (BMI 30.0-34.9)


Onset Date: 01/05/18   Current Visit: No   Status: Chronic   





(9) Urinary tract infectious disease


Onset Date: 11/30/15   Current Visit: No   Status: Resolved   





- Plan





Plan:


Continue with plan of care as mentioned below


1. Hemoglobin is stable.  Actually, it has improved.


2. GoLYTELY.


3. Monitor renal function with continued gentle hydration; renal function stable


4. Continue with antibiotic therapy


5.repeat abdominal x-ray in the morning


6. GI and DVT prophylaxis

## 2021-07-03 RX ADMIN — SULFAMETHOXAZOLE AND TRIMETHOPRIM SCH TAB: 800; 160 TABLET ORAL at 08:11

## 2021-07-03 RX ADMIN — SODIUM CHLORIDE SCH MLS: 0.9 INJECTION, SOLUTION INTRAVENOUS at 11:07

## 2021-07-03 RX ADMIN — HYDRALAZINE HYDROCHLORIDE SCH MG: 10 TABLET, FILM COATED ORAL at 15:08

## 2021-07-03 RX ADMIN — METOPROLOL TARTRATE PRN MG: 5 INJECTION INTRAVENOUS at 23:57

## 2021-07-03 RX ADMIN — GABAPENTIN SCH MG: 400 CAPSULE ORAL at 20:05

## 2021-07-03 RX ADMIN — Medication SCH: at 20:07

## 2021-07-03 RX ADMIN — SODIUM BICARBONATE TAB 325 MG SCH MG: 325 TAB at 20:05

## 2021-07-03 RX ADMIN — Medication SCH PKT: at 21:11

## 2021-07-03 RX ADMIN — Medication SCH: at 08:12

## 2021-07-03 RX ADMIN — SEVELAMER CARBONATE SCH MG: 800 TABLET, FILM COATED ORAL at 17:11

## 2021-07-03 RX ADMIN — Medication SCH MG: at 20:06

## 2021-07-03 RX ADMIN — HYDRALAZINE HYDROCHLORIDE SCH MG: 10 TABLET, FILM COATED ORAL at 20:07

## 2021-07-03 RX ADMIN — Medication SCH PKT: at 08:12

## 2021-07-03 RX ADMIN — METOPROLOL TARTRATE PRN MG: 5 INJECTION INTRAVENOUS at 08:11

## 2021-07-03 NOTE — RAD REPORT
EXAM DESCRIPTION:  RAD - Abdomen  W Erect - 7/3/2021 5:19 pm

 

CLINICAL HISTORY:  fecal retention

Pain

 

COMPARISON:  07/02/2021

 

FINDINGS:  The bowel gas pattern is non-obstructive. No evidence of free air or pneumatosis. Moderate
 retention of stool is seen particularly in the left colon.

 

Cholecystectomy clips. No pathologic calcifications seen.

 

 

IMPRESSION:  Moderate stool retention in the colon noted, greatest in the left colon.

## 2021-07-04 VITALS — OXYGEN SATURATION: 92 %

## 2021-07-04 VITALS — SYSTOLIC BLOOD PRESSURE: 153 MMHG | DIASTOLIC BLOOD PRESSURE: 68 MMHG | TEMPERATURE: 97.5 F

## 2021-07-04 LAB
BUN BLD-MCNC: 56 MG/DL (ref 7–18)
GLUCOSE SERPLBLD-MCNC: 139 MG/DL (ref 74–106)
HCT VFR BLD CALC: 30.2 % (ref 36–45)
LYMPHOCYTES # SPEC AUTO: 0.6 K/UL (ref 0.7–4.9)
MAGNESIUM SERPL-MCNC: 1.8 MG/DL (ref 1.8–2.4)
MORPHOLOGY BLD-IMP: (no result)
PMV BLD: 7.8 FL (ref 7.6–11.3)
POTASSIUM SERPL-SCNC: 3.8 MMOL/L (ref 3.5–5.1)
RBC # BLD: 3.32 M/UL (ref 3.86–4.86)
TSH SERPL DL<=0.05 MIU/L-ACNC: 1.26 UIU/ML (ref 0.36–3.74)

## 2021-07-04 RX ADMIN — SEVELAMER CARBONATE SCH: 800 TABLET, FILM COATED ORAL at 11:21

## 2021-07-04 RX ADMIN — SODIUM CHLORIDE SCH MLS: 0.9 INJECTION, SOLUTION INTRAVENOUS at 05:33

## 2021-07-04 RX ADMIN — GABAPENTIN SCH MG: 400 CAPSULE ORAL at 13:45

## 2021-07-04 RX ADMIN — HYDRALAZINE HYDROCHLORIDE PRN MG: 20 INJECTION INTRAMUSCULAR; INTRAVENOUS at 11:55

## 2021-07-04 RX ADMIN — SEVELAMER CARBONATE SCH MG: 800 TABLET, FILM COATED ORAL at 07:54

## 2021-07-04 RX ADMIN — SODIUM BICARBONATE TAB 325 MG SCH MG: 325 TAB at 07:53

## 2021-07-04 RX ADMIN — HYDRALAZINE HYDROCHLORIDE PRN MG: 20 INJECTION INTRAMUSCULAR; INTRAVENOUS at 04:26

## 2021-07-04 RX ADMIN — HYDRALAZINE HYDROCHLORIDE PRN MG: 20 INJECTION INTRAMUSCULAR; INTRAVENOUS at 00:49

## 2021-07-04 RX ADMIN — GABAPENTIN SCH MG: 400 CAPSULE ORAL at 07:54

## 2021-07-04 RX ADMIN — Medication SCH MG: at 07:54

## 2021-07-04 RX ADMIN — HYDRALAZINE HYDROCHLORIDE SCH MG: 10 TABLET, FILM COATED ORAL at 07:54

## 2021-07-04 RX ADMIN — SULFAMETHOXAZOLE AND TRIMETHOPRIM SCH TAB: 800; 160 TABLET ORAL at 07:54

## 2021-07-04 RX ADMIN — Medication SCH: at 07:55

## 2021-07-04 RX ADMIN — HYDRALAZINE HYDROCHLORIDE SCH MG: 10 TABLET, FILM COATED ORAL at 13:45

## 2021-07-04 RX ADMIN — Medication SCH PKT: at 07:55

## 2021-07-04 RX ADMIN — SEVELAMER CARBONATE SCH MG: 800 TABLET, FILM COATED ORAL at 16:13

## 2021-07-04 NOTE — RAD REPORT
EXAM DESCRIPTION:  CT - Abdomen   Pelvis Wo Contrast - 7/4/2021 1:32 pm

 

CLINICAL HISTORY:  fecal retention

Abdominal pain

 

COMPARISON:  Abdomen   Pelvis Wo Contrast dated 6/30/2021

 

TECHNIQUE:  Axial 5 mm thick CT imaging of the abdomen and pelvis was performed without IV contrast. 
No IV contrast was given because of allergy, abnormal renal function, patient refusal or physician re
quest.

 

Oral contrast was given.

 

All CT scans are performed using dose optimization technique as appropriate and may include automated
 exposure control or mA/KV adjustment according to patient size.

 

FINDINGS:  Small bilateral pleural effusions have developed since June 30. New posterior gutter paren
chymal opacification on the left could be atelectasis, infiltrate or a combination. Infiltrate is fav
ored slightly. Left hemidiaphragm elevation again noted. No cardiomegaly or pericardial effusion.

 

The liver, spleen and pancreas show no suspicious findings on non-contrast imaging. Cholecystectomy c
lips are present. No change to the biliary tree.

 

Very small atrophic left kidney identified again with pelvic and calyx dilatation. No ureteral calcul
us confirmed on the left. The larger and incompletely rotated right kidney shows a congested or edema
tous appearance is accentuated by slight motion. Perinephric stranding and stranding along the anteri
or renal fascia are increased slightly. Dilation of the pelvis and calices on the right not substanti
ally different. The right ureter has dilated since June 30 down to just above the urinary bladder. No
 obstructing calculus or mass identifiable.  No significant adrenal finding. Isodense renal masses an
d pyelonephritis cannot be excluded in the absence of IV contrast. Urinary bladder is contracted arou
nd a Dinero catheter.

 

No gastric dilatation or gastric wall thickening. There is a small amount of remnant contrast in the 
fundus. The fecalized content within a duodenal diverticulum has not changed. Small bowel loops have 
not become dilated. No free air or pneumatosis. The terminal ileum, ileocecal valve and cecum are wel
l opacified by contrast. The cecum abuts the fundus of the uterus with a poor tissue plane separation
. As noted previously there is a punctate collection of air in the fundal portion of the endometrial 
cavity. There is no oral contrast within the endometrial cavity. No direct visualization of a fistula
. Small bowel loops also abut the fundus.

 

Increased stool volume in the rectum and distal sigmoid colon. Walls of the rectum appear more thicke
luzmaria than on the June 30 examination. Slight increase in congestion or edema in the perirectal fat. To
rtuous and redundant sigmoid colon extends to the anterior upper mid abdomen.

 

 No mass or bulky lymphadenopathy. Postsurgical changes noted to the lower midline abdominal wall. Fl
uid retention within the subcutaneous fatty tissues has not changed.

 

No new bone findings.

 

 

IMPRESSION:  Increased wall thickness in the rectum and distal sigmoid colon compared to the June 30 
study. There is minimal edema or congestion of the perirectal fat. Patient can be evaluated for possi
ble early rectosigmoid colitis.

 

The right ureter now shows mild hydroureter to near the bladder level with no obstructing calculus or
 mass identified. An occult mass, blood or inflammatory debris in the ureter can create hydronephrosi
s. Urinary bladder is fully contracted around a Dinero catheter.

 

The fully contrast opacified cecum shows no direct evidence of fistula with the uterus. There remains
 a small focus of air in the fundal portion of the endometrial cavity.

 

Full assessment is limited is the absence of IV contrast.

## 2021-07-05 NOTE — P.DS
Discharge Date: 07/04/21


Disposition: ROUTINE DISCHARGE


Discharge Condition: GOOD


Reason for Admission: Severe anemia/fecal impaction/hydronephrosis with acute 

renal insufficiency





- Problems


(1) Anemia due to acute blood loss


Status: Acute   





(2) Dehydration


Status: Acute   





(3) Renal insufficiency


Onset Date: 01/05/18   Status: Acute   





(4) TIA (transient ischemic attack)


Status: Acute   





(5) Diabetic neuropathy


Onset Date: 01/05/18   Status: Chronic   


Qualifiers: 


 





(6) Hyperlipidemia


Onset Date: 11/30/15   Status: Chronic   


Qualifiers: 


 





(7) Hypertension


Onset Date: 01/05/18   Status: Chronic   





(8) Obesity (BMI 30.0-34.9)


Onset Date: 01/05/18   Status: Chronic   





(9) Urinary tract infectious disease


Onset Date: 11/30/15   Status: Resolved   


Brief History of Present Illness: 





Patient is an 81-year-old female who I have known from the past who comes into 

the hospital with severe anemia.  Patient was recently in the hospital for 

hydronephrosis and after having a Dinero catheter placed her renal function and 

her hydronephrosis resolved.  Family is been going for a follow-up in the 

[place] area as the insurance she has is not accepted by anyone in the area 

except for the hospital.  She also had a CT scan which revealed fecal impaction.

 Patient looks like she has stool throughout the colon and will go ahead and 

start her on laxatives and possible enema if no bowel movement.  Her 

hydronephrosis looks to have improved significantly in her renal function is 

stable.  At this time, she will be admitted to the hospital for blood 

transfusion as well as gentle hydration and monitoring her renal function 

closely.  Also will try to disimpact patient and try to prevent fecal impaction 

from occurring again.


Hospital Course: 





Patient had multiple bowel movements after he was given GoLYTELY.  Patient had 3

large bowel movements which allowed her to feel a lot better.  KUB look much 

better.  CT scan did show that the stool no longer was involving the complete 

colon.  There was just some fecal retention left in the sigmoid.  Will discharge

patient with mineral oil and a bowel regimen to use Colace and MiraLax 

regularly.  Patient will need follow with Gastroenterology.  At this time, 

patient will be discharged with outpatient follow-up.  Renal function is stable.

 Outpatient Urology follow-up.


Vital Signs/Physical Exam: 














Temp Pulse Resp BP Pulse Ox


 


 97.5 F   79   16   153/68 H  92 


 


 07/04/21 16:00  07/04/21 16:00  07/04/21 16:00  07/04/21 16:00  07/04/21 16:00








General: Alert, In no apparent distress, Oriented x3


Laboratory Data at Discharge: 














WBC  7.50 K/uL (4.3-10.9)  D 07/04/21  06:32    


 


Hgb  9.6 g/dL (12.0-15.0)  L  07/04/21  06:32    


 


Hct  30.2 % (36.0-45.0)  L  07/04/21  06:32    


 


Plt Count  246 K/uL (152-406)   07/04/21  06:32    


 


PT  14.8 SECONDS (9.5-12.5)  H  07/01/21  03:23    


 


INR  1.28   07/01/21  03:23    


 


APTT  30.8 SECONDS (24.3-36.9)   07/01/21  03:23    


 


Sodium  143 mmol/L (136-145)   07/04/21  06:32    


 


Potassium  3.8 mmol/L (3.5-5.1)   07/04/21  06:32    


 


BUN  56 mg/dL (7-18)  H  07/04/21  06:32    


 


Creatinine  1.54 mg/dL (0.55-1.3)  H  07/04/21  06:32    


 


Glucose  139 mg/dL ()  H  07/04/21  06:32    


 


Phosphorus  3.8 mg/dL (2.5-4.9)   07/02/21  06:12    


 


Magnesium  1.8 mg/dL (1.8-2.4)   07/04/21  06:32    


 


Total Bilirubin  Cancelled   07/01/21  05:00    


 


AST  Cancelled   07/01/21  05:00    


 


ALT  Cancelled   07/01/21  05:00    


 


Alkaline Phosphatase  Cancelled   07/01/21  05:00    


 


Amylase  17 U/L ()  L  06/30/21  12:27    


 


Lipase  26 U/L ()  L  06/30/21  12:27    








Home Medications: 








Atorvastatin Calcium [Lipitor*] 1 tab PO BEDTIME 05/02/21 


Duloxetine [Cymbalta *] 1 tab PO DAILY 05/02/21 


Gabapentin [Neurontin*] 1 cap PO TID 05/02/21 


Hydralazine [Apresoline*] 2 tab PO TID 05/02/21 


Insulin Aspart (Niacinamide) [Fiasp 100 Unit/ml Flextouch] See Protocol SQ ACHS 

05/02/21 


carvediloL [Carvedilol] 1 tab PO BID 05/02/21 


Docusate [Colace Cap*] 100 mg PO DAILY #30 cap 05/05/21 


Tamsulosin [Flomax*] 0.4 mg PO BEDTIME #30 cap 05/05/21 


Thiamine HCl [Vitamin B-1*] 100 mg PO BID #60 tablet 05/05/21 


Aspirin [Aspirin EC 81 MG] 81 mg PO DAILY #30 tablet. 06/06/21 


Calcitrol [Rocaltrol*] 0.25 mcg PO DAILY #30 cap 06/06/21 


Sevelamer Carbonate [Renvela*] 1,600 mg PO TIDWM #180 tablet 06/06/21 


Sodium Bicarbonate 1,300 mg PO BID #120 tablet 06/06/21 


Acetaminophen/Diphenhydramine [Tylenol Pm Ex-Strength Caplet] 1 each PO BEDTIME 

PRN 06/30/21 


Iron 27 Mg 1 tab PO DAILY 06/30/21 


Mineral Oil 30 ml PO DAILY #1000 ml 07/04/21 


Sulfamethoxazole/Trimethoprim [Bactrim Ds Tablet] 1 each PO DAILY #5 tablet 

07/04/21 


metroNIDAZOLE [Flagyl] 500 mg PO Q8H #20 tablet 07/04/21 





New Medications: 


Sulfamethoxazole/Trimethoprim [Bactrim Ds Tablet] 1 each PO DAILY #5 tablet


metroNIDAZOLE [Flagyl] 500 mg PO Q8H #20 tablet


Mineral Oil 30 ml PO DAILY #1000 ml


Physician Discharge Instructions: 


OK TO DC IV AND DC HOME


FOLLOW-UP WITH PRIMARY CARE PROVIDER IN 1-2 WEEKS


FOLLOW-UP WITH GI for possible colonoscopy in 2-4 weeks


RETURN TO THE ER IF symptoms worsen


CALL or TEXT DR. LOPEZ [phone] IF ANY QUESTIONS REGARDING HOSPITAL STAY.


PLEASE CALL THE FLOOR [phone] IF ANY MEDICATION OR NURSING QUESTIONS.


Diet: AHA


Activity: Fall precautions


Followup: 


OOT,OOT [Primary Care Provider] - 


Time spent managing pt's care (in minutes): 35

## 2021-07-05 NOTE — P.PN
Date of Service: 07/03/21











Subjective


Subjective: 


Patient with numerous bowel movements.  Patient had 3 large bowel movements 

according to nursing staff and to the patient.  She feels much better.  Will 

repeating imaging studies and anticipate discharge either later today or in the 

morning.





Review of Systems


10-point ROS is otherwise unremarkable





Physical Examination





- Vital Signs


Reviewed





- Physical Exam


General: Alert, In no apparent distress, Oriented x3


Respiratory: Clear to auscultation bilaterally, Normal air movement


Cardiovascular: Regular rate/rhythm, Normal S1 S2, Systolic murmur


Gastrointestinal: Normal bowel sounds, Soft and benign, Non-distended, No 

tenderness


Musculoskeletal: No clubbing, No swelling, No tenderness


Neurological: Normal speech, Normal tone, Normal affect





Assessment & Plan





- Problems (Diagnosis)


(1) Anemia due to acute blood loss


Current Visit: Yes   Status: Acute   





(2) Dehydration


Current Visit: No   Status: Acute   





(3) Renal insufficiency


Onset Date: 01/05/18   Current Visit: No   Status: Acute   





(4) TIA (transient ischemic attack)


Current Visit: No   Status: Acute   





(5) Diabetic neuropathy


Onset Date: 01/05/18   Current Visit: No   Status: Chronic   





(6) Hyperlipidemia


Onset Date: 11/30/15   Current Visit: No   Status: Chronic   





(7) Hypertension


Onset Date: 01/05/18   Current Visit: No   Status: Chronic   





(8) Obesity (BMI 30.0-34.9)


Onset Date: 01/05/18   Current Visit: No   Status: Chronic   





(9) Urinary tract infectious disease


Onset Date: 11/30/15   Current Visit: No   Status: Resolved   





- Plan





Plan:


Continue with plan of care as mentioned below


1. Hemoglobin is stable.  Actually, it has improved. monitor H&H


2. GoLYTELY benefit of the patient quite a bit as she had 3 large bowel 

movements.  Repeat x-ray in the morning.


3. Monitor renal function with continued gentle hydration; renal function stable


4. Continue with antibiotic therapy


5. Bowel regimen at discharge & Gastroenterology follow-up for colonoscopy


6. GI and DVT prophylaxis

## 2021-08-15 ENCOUNTER — HOSPITAL ENCOUNTER (INPATIENT)
Dept: HOSPITAL 97 - ER | Age: 81
LOS: 1 days | DRG: 682 | End: 2021-08-16
Attending: HOSPITALIST | Admitting: HOSPITALIST
Payer: COMMERCIAL

## 2021-08-15 VITALS — BODY MASS INDEX: 33.9 KG/M2

## 2021-08-15 DIAGNOSIS — N25.81: ICD-10-CM

## 2021-08-15 DIAGNOSIS — E87.2: ICD-10-CM

## 2021-08-15 DIAGNOSIS — N18.4: ICD-10-CM

## 2021-08-15 DIAGNOSIS — J18.9: ICD-10-CM

## 2021-08-15 DIAGNOSIS — E11.21: ICD-10-CM

## 2021-08-15 DIAGNOSIS — N13.6: ICD-10-CM

## 2021-08-15 DIAGNOSIS — R09.2: ICD-10-CM

## 2021-08-15 DIAGNOSIS — Z20.822: ICD-10-CM

## 2021-08-15 DIAGNOSIS — Z96.0: ICD-10-CM

## 2021-08-15 DIAGNOSIS — G93.40: ICD-10-CM

## 2021-08-15 DIAGNOSIS — E11.40: ICD-10-CM

## 2021-08-15 DIAGNOSIS — D64.9: ICD-10-CM

## 2021-08-15 DIAGNOSIS — J96.00: ICD-10-CM

## 2021-08-15 DIAGNOSIS — N17.9: Primary | ICD-10-CM

## 2021-08-15 DIAGNOSIS — I46.9: ICD-10-CM

## 2021-08-15 DIAGNOSIS — J81.1: ICD-10-CM

## 2021-08-15 DIAGNOSIS — I12.9: ICD-10-CM

## 2021-08-15 DIAGNOSIS — E87.5: ICD-10-CM

## 2021-08-15 DIAGNOSIS — D69.6: ICD-10-CM

## 2021-08-15 DIAGNOSIS — Z66: ICD-10-CM

## 2021-08-15 DIAGNOSIS — E11.22: ICD-10-CM

## 2021-08-15 LAB
ALBUMIN SERPL BCP-MCNC: 3.1 G/DL (ref 3.4–5)
ALP SERPL-CCNC: 98 U/L (ref 45–117)
ALT SERPL W P-5'-P-CCNC: 12 U/L (ref 12–78)
AST SERPL W P-5'-P-CCNC: 5 U/L (ref 15–37)
BLD SMEAR INTERP: (no result)
BUN BLD-MCNC: 65 MG/DL (ref 7–18)
BUN BLD-MCNC: 66 MG/DL (ref 7–18)
GLUCOSE SERPLBLD-MCNC: 128 MG/DL (ref 74–106)
GLUCOSE SERPLBLD-MCNC: 97 MG/DL (ref 74–106)
HCT VFR BLD CALC: 32.3 % (ref 36–45)
INR BLD: 1.18
LIPASE SERPL-CCNC: 27 U/L (ref 73–393)
LYMPHOCYTES # SPEC AUTO: 0.6 K/UL (ref 0.7–4.9)
MORPHOLOGY BLD-IMP: (no result)
PMV BLD: 9.1 FL (ref 7.6–11.3)
POTASSIUM SERPL-SCNC: 6.3 MMOL/L (ref 3.5–5.1)
POTASSIUM SERPL-SCNC: 6.3 MMOL/L (ref 3.5–5.1)
RBC # BLD: 3.39 M/UL (ref 3.86–4.86)
URINE UROTHELIAL CELLS: (no result) /HPF

## 2021-08-15 PROCEDURE — 86706 HEP B SURFACE ANTIBODY: CPT

## 2021-08-15 PROCEDURE — 84484 ASSAY OF TROPONIN QUANT: CPT

## 2021-08-15 PROCEDURE — 86803 HEPATITIS C AB TEST: CPT

## 2021-08-15 PROCEDURE — 94760 N-INVAS EAR/PLS OXIMETRY 1: CPT

## 2021-08-15 PROCEDURE — 71045 X-RAY EXAM CHEST 1 VIEW: CPT

## 2021-08-15 PROCEDURE — 87186 SC STD MICRODIL/AGAR DIL: CPT

## 2021-08-15 PROCEDURE — 84100 ASSAY OF PHOSPHORUS: CPT

## 2021-08-15 PROCEDURE — 70450 CT HEAD/BRAIN W/O DYE: CPT

## 2021-08-15 PROCEDURE — 93005 ELECTROCARDIOGRAM TRACING: CPT

## 2021-08-15 PROCEDURE — 93306 TTE W/DOPPLER COMPLETE: CPT

## 2021-08-15 PROCEDURE — 81003 URINALYSIS AUTO W/O SCOPE: CPT

## 2021-08-15 PROCEDURE — 84132 ASSAY OF SERUM POTASSIUM: CPT

## 2021-08-15 PROCEDURE — 80076 HEPATIC FUNCTION PANEL: CPT

## 2021-08-15 PROCEDURE — 99285 EMERGENCY DEPT VISIT HI MDM: CPT

## 2021-08-15 PROCEDURE — 36415 COLL VENOUS BLD VENIPUNCTURE: CPT

## 2021-08-15 PROCEDURE — 85025 COMPLETE CBC W/AUTO DIFF WBC: CPT

## 2021-08-15 PROCEDURE — 90935 HEMODIALYSIS ONE EVALUATION: CPT

## 2021-08-15 PROCEDURE — 87340 HEPATITIS B SURFACE AG IA: CPT

## 2021-08-15 PROCEDURE — 87088 URINE BACTERIA CULTURE: CPT

## 2021-08-15 PROCEDURE — 80053 COMPREHEN METABOLIC PANEL: CPT

## 2021-08-15 PROCEDURE — 94640 AIRWAY INHALATION TREATMENT: CPT

## 2021-08-15 PROCEDURE — 80048 BASIC METABOLIC PNL TOTAL CA: CPT

## 2021-08-15 PROCEDURE — 83690 ASSAY OF LIPASE: CPT

## 2021-08-15 PROCEDURE — 85610 PROTHROMBIN TIME: CPT

## 2021-08-15 PROCEDURE — 83735 ASSAY OF MAGNESIUM: CPT

## 2021-08-15 PROCEDURE — 86704 HEP B CORE ANTIBODY TOTAL: CPT

## 2021-08-15 PROCEDURE — 76770 US EXAM ABDO BACK WALL COMP: CPT

## 2021-08-15 PROCEDURE — 81015 MICROSCOPIC EXAM OF URINE: CPT

## 2021-08-15 PROCEDURE — 82947 ASSAY GLUCOSE BLOOD QUANT: CPT

## 2021-08-15 PROCEDURE — 87040 BLOOD CULTURE FOR BACTERIA: CPT

## 2021-08-15 PROCEDURE — 87086 URINE CULTURE/COLONY COUNT: CPT

## 2021-08-15 PROCEDURE — 87077 CULTURE AEROBIC IDENTIFY: CPT

## 2021-08-15 RX ADMIN — ALBUTEROL SULFATE SCH MG: 2.5 SOLUTION RESPIRATORY (INHALATION) at 20:51

## 2021-08-15 RX ADMIN — SODIUM CHLORIDE SCH MLS: 0.45 INJECTION, SOLUTION INTRAVENOUS at 23:45

## 2021-08-15 RX ADMIN — FLUDROCORTISONE ACETATE SCH: 0.1 TABLET ORAL at 21:00

## 2021-08-15 NOTE — EDPHYS
Physician Documentation                                                                           

 [facility]                                                                 

Name: Gladys Ramos                                                                                    

Age: 81 yrs                                                                                       

Sex: Female                                                                                       

: 1940                                                                                   

MRN: [id number]                                                                                   

Arrival Date: 08/15/2021                                                                          

Time: 08:45                                                                                       

Account#: [id number]                                                                            

Bed 20                                                                                            

Private MD:                                                                                       

ED Physician Rafiq Hugo                                                                     

Historical:                                                                                       

- Allergies:                                                                                      

08/15                                                                                             

09:00 ambien;                                                                                 ss  

09:00 Levaquin;                                                                               ss  

- PMHx:                                                                                           

09:00 CHF; AAA; Diabetes - IDDM; Hyperlipidemia; Hypertension; Pneumonia; OA;                 ss  

                                                                                                  

- Immunization history:: Client reports receiving the 2nd dose of the Covid vaccine.              

- Social history:: Smoking status: Patient denies any tobacco usage or history of.                

                                                                                                  

                                                                                                  

Vital Signs:                                                                                      

08:47  / 57; Pulse 96; Resp 16; Temp 97.0(TE); Pulse Ox 95% on R/A; Weight 92.53 kg;    ss  

      Pain 0/10;                                                                                  

09:45  / 61; Pulse 65; Resp 13; Pulse Ox 92% on R/A;                                    ae4 

10:30  / 88; Pulse 73; Resp 15; Pulse Ox 97% on R/A;                                    ae4 

11:15  / 58; Pulse 83; Resp 13; Pulse Ox 95% on R/A;                                    ae4 

12:03  / 76; Pulse 72; Resp 13; Pulse Ox 98% on R/A; Pain 0/10;                         ae4 

13:36  / 74; Pulse 67; Resp 13; Pulse Ox 99% on 8% Nebulizer Mask;                      ae4 

16:44  / 60; Pulse 66; Resp 21; Pulse Ox 95% on R/A;                                    ae4 

17:15  / 59; Pulse 67; Resp 15; Pulse Ox 99% on R/A;                                    ae4 

18:45  / 68; Pulse 69; Resp 15; Pulse Ox 99% on R/A;                                    ae4 

19:30  / 58; Pulse 65; Resp 16; Pulse Ox 99% on R/A;                                    lp1 

20:00  / 81; Pulse 65; Resp 13; Pulse Ox 98% on R/A;                                    lp1 

20:45  / 59; Pulse 66; Resp 16; Pulse Ox 98% on R/A;                                    lp1 

22:00  / 42; Pulse 65; Resp 13; Pulse Ox 100% on R/A;                                   lp1 

                                                                                                  

MDM:                                                                                              

13:18 Patient medically screened.                                                             tw4 

                                                                                                  

08/15                                                                                             

08:56 Order name: Basic Metabolic Panel; Complete Time: 10:29                                 tw 

08/15                                                                                             

10:30 Interpretation: Abnormal: K 6.3; ; CO2 17; GLUC 128; BUN 65; GFR 20; CRE 2.35; NA tw4 

      133.                                                                                        

08/15                                                                                             

08:56 Order name: CBC with Diff                                                               tw4 

08/15                                                                                             

10:30 Interpretation: Normal except: WBC 11.60; RBC 3.39; HGB 10.1; HCT 32.3; MCV 95.3; LYM%  tw4 

      5.3; JONAS% 86.9; RDW 17.8; MCHC 31.2.                                                        

08/15                                                                                             

08:56 Order name: Hepatic Function; Complete Time: 10:29                                      [facility] [place] 

08/15                                                                                             

10:29 Interpretation: Normal except: AST 5; A/G 0.8; GLOB 4.1; ALB 3.1.                       [facility] [place] 

08/15                                                                                             

08:56 Order name: Lipase; Complete Time: 10:29                                                [facility] [place] 

08/15                                                                                             

10:30 Interpretation: Abnormal: LIP 27.                                                       tw4 

08/15                                                                                             

10:52 Order name: CBC Smear Scan                                                              EDMS

08/15                                                                                             

11:15 Order name: Urine Microscopic Only                                                      tw 

08/15                                                                                             

11:21 Order name: Glucose, Ancillary Testing; Complete Time: 12:49                            EDMS

08/15                                                                                             

12:13 Order name: SARS-COV-2 RT PCR; Complete Time: 12:49                                     EDMS

08/15                                                                                             

12:31 Order name: Glucose, Ancillary Testing; Complete Time: 12:49                            EDMS

08/15                                                                                             

13:01 Order name: Urine Dipstick-Ancillary                                                    EDMS

08/15                                                                                             

13:32 Order name: Urine Culture                                                               EDMS

08/15                                                                                             

15:47 Order name: Potassium                                                                   ae4 

08/15                                                                                             

15:47 Order name: Potassium                                                                   EDMS

08/15                                                                                             

16:52 Order name: Glucose, Ancillary Testing                                                  EDMS

08/15                                                                                             

19:51 Order name: Glucose, Ancillary Testing                                                  EDMS

08/15                                                                                             

19:51 Order name: Glucose, Ancillary Testing                                                  EDMS

08/15                                                                                             

20:35 Order name: BMP                                                                         bb  

08/15                                                                                             

20:47 Order name: Troponin I                                                                  bb  

08/15                                                                                             

20:55 Order name: Protime (+INR)                                                              EDMS

08/15                                                                                             

20:57 Order name: Blood Culture                                                               EDMS

                                                                                             

03:46 Order name: CBC with Automated Diff                                                     EDMS

                                                                                             

03:50 Order name: Comprehensive Metabolic Panel                                               EDMS

                                                                                             

03:50 Order name: Phosphorus                                                                  EDMS

                                                                                             

03:50 Order name: Magnesium                                                                   EDMS

                                                                                             

07:53 Order name: Glucose, Ancillary Testing                                                  EDMS

                                                                                             

11:41 Order name: Glucose, Ancillary Testing                                                  EDMS

                                                                                             

12:49 Order name: Basic Metabolic Panel                                                       EDMS

08/15                                                                                             

08:56 Order name: IV Saline Lock; Complete Time: 09:29                                        tw4 

08/15                                                                                             

08:56 Order name: Labs collected and sent; Complete Time: 09:29                               tw4 

08/15                                                                                             

10:10 Order name: EKG; Complete Time: 10:10                                                   tw4 

08/15                                                                                             

11:15 Order name: Urine Dipstick-Ancillary (obtain specimen); Complete Time: 12:03            tw4 

08/15                                                                                             

11:16 Order name: CXR XRAY; Complete Time: 11:56                                              tw4 

08/15                                                                                             

20:56 Order name: Renal Ultrasound-Complete                                                   EDMS

08/15                                                                                             

22:28 Order name: CONS Physician Consult                                                      EDMS

                                                                                             

13:28 Order name: CT                                                                          EDMS

                                                                                                  

Administered Medications:                                                                         

07:30 Drug: Sodium Bicarbonate 50 mEq Route: IVP; Site: left antecubital;                     ae4 

18:20 Follow up: Response: No adverse reaction                                                ae4 

09:21 Drug: NS 0.9% 500 ml Route: IV; Rate: bolus; Site: left antecubital;                    ae4 

10:30 Follow up: IV Intake: 500ml                                                             ae4 

10:30 Follow up: IV Status: Completed infusion                                                ae4 

10:43 Follow up: Response: No adverse reaction; IV Intake: 500ml                              ae4 

11:09 Drug: D50W 50 ml Route: IVP; Site: left antecubital;                                    ae4 

12:03 Follow up: Response: No adverse reaction                                                ae4 

11:14 Drug: Insulin Regular Human 10 units {Co-Signature: ae4 (Jayden Price RN).} Route:    ss  

      IVP; Site: left antecubital;                                                                

18:16 Follow up: Response: FSBS 113                                                           ae4 

11:17 Drug: Sodium Bicarbonate 50 mEq Route: IVP; Site: left antecubital;                     ae4 

12:03 Follow up: Response: No adverse reaction                                                ae4 

11:21 Drug: Calcium Chloride 1 grams Route: IVP; Site: left antecubital;                      ae4 

12:03 Follow up: Response: No adverse reaction                                                ae4 

13:31 Drug: Albuterol 2.5 mg Route: Inhalation;                                               ae4 

17:27 Drug: D50W 50 ml Route: IVP; Site: left antecubital;                                    ae4 

18:20 Follow up: Response: No adverse reaction                                                ae4 

17:38 Drug: Kayexalate (polystyrene) 60 grams Route: PO;                                      ae4 

22:05 Follow up: Response: No adverse reaction                                                lp1 

17:39 Drug: Insulin Regular Human 10 units {Co-Signature: ss (Kelly Dunbar RN).} Route: IVP; ae4 

      Site: left antecubital;                                                                     

19:41 Follow up: FSBS 111                                                                     ae4 

17:50 Drug: Albuterol 2.5 mg Route: Inhalation;                                               ae4 

18:14 Drug: Albuterol 2.5 mg Route: Inhalation;                                               ae4 

19:29 Drug: Albuterol 2.5 mg Route: Inhalation;                                               ae4 

21:00 Drug: Calcium Gluconate 1 grams Route: IVPB; Infused Over: 60 mins; Site: left          lp1 

      antecubital;                                                                                

22:20 Follow up: IV Status: Completed infusion; IV Intake: 100ml                              lp1 

21:30 Drug: Fleet Enema (sodium phosphate) 133 ml Route: AR;                                  lp1 

22:36 Follow up: Response: No adverse reaction                                                lp1 

22:00 Drug: Albuterol 2.5 mg Route: Inhalation;                                               lp1 

22:00 Drug: Insulin Regular Human 10 units {Co-Signature: ea (Mitzy Newsome RN).} Route: IVP; lp1 

      Site: right wrist;                                                                          

22:36 Follow up: Response: No adverse reaction                                                lp1 

22:00 Drug: D50W 50 ml Route: IVP; Site: right wrist;                                         lp1 

22:36 Follow up: Response: No adverse reaction                                                lp1 

22:05 Drug: Kayexalate (polystyrene) 60 grams Route: PO;                                      lp1 

22:36 Follow up: Response: No adverse reaction                                                lp1 

                                                                                                  

                                                                                                  

Disposition Summary:                                                                              

08/15/21 13:18                                                                                    

Hospitalization Ordered                                                                           

      Hospitalization Status: Inpatient Admission                                             tw4 

      Provider: Deepak Frances                                                                tw4 

      Condition: Fair                                                                         tw4 

      Problem: an ongoing problem                                                             tw4 

      Symptoms: have worsened                                                                 tw4 

      Bed/Room Type: Standard                                                                 tw4 

      Location: Telemetry/MedSurg (Inpatient)(21 15:12)                                 bd  

      Room Assignment: 211(21 15:12)                                                    bd  

      Diagnosis                                                                                   

        - Chronic kidney disease, stage 4 (severe)                                            tw4 

        - Hyperkalemia                                                                        tw4 

        - Acute pulmonary edema                                                               tw4 

      Forms:                                                                                      

        - Medication Reconciliation Form                                                      tw4 

        - SBAR form                                                                           tw 

Signatures:                                                                                       

Dispatcher [employer]                           EDMS                                                 

Irma Tovar                                                                                 

Sallie Ryder RN RN   bb                                                   

Kelly Dunbar RN RN   ss                                                   

Lizzy Sanchez RN RN   lp1                                                  

Rafiq Hugo MD MD   tw4                                                  

Jayden Price RN                     RN   ae4                                                  

Jayden Price RN                            ae4                                                  

Kelly Dunbar RN                             ss                                                   

Mitzy Newsome RN, ea                                                   

                                                                                                  

Corrections: (The following items were deleted from the chart)                                    

10:30 10:29 Abnormal: K 6.3; ; CO2 17; GLUC 128; BUN 65; GFR 20; CRE 2.35. tw 

11:20 10:29 CORONAVIRUS+MR.LAB.LANDONZ ordered. EDMS                                              EDMS

18:26 13:18 Telemetry/MedSurg (Inpatient) tw4                                                 ss  

18:26 13:18 tw4                                                                               ss  

21:14 20:53 Basic Metabolic Panel ordered. EDMS                                               EDMS

21:15 20:55 Basic Metabolic Panel ordered. EDMS                                               EDMS

                                                                                             

15:12 [facility]15 [facility]                                                             bd  

                                                                                             

15:12 08/15 18:26 ERHOLD- ss                                                                  bd  

                                                                                                  

**************************************************************************************************

## 2021-08-15 NOTE — RAD REPORT
EXAM DESCRIPTION:  RAD - Chest Single View - 8/15/2021 11:38 am

 

CLINICAL HISTORY:  hypoxic

 

COMPARISON:  Chest Single View dated 6/30/2021; Chest Single View dated 6/1/2021; Chest Single View d
ated 11/12/2019; Chest Single View dated 11/8/2019

 

FINDINGS:  Diffuse used decreased prominence of the pulmonary interstitium. Cardiomegaly.No acute oss
eous abnormality. Difficult to exclude a bilateral pleural effusions.

 

IMPRESSION:  Findings most likely representing pulmonary edema though pneumonia could appear similar.

## 2021-08-15 NOTE — XMS REPORT
Continuity of Care Document

                           Created on:August 15, 2021



Patient:PAKO ENG

Sex:Female

:1940

External Reference #:[id number]





Demographics







                          Address                   [address] [place] [address]

 

                          Home Phone                [phone]

 

                          Mobile Phone              [phone]

 

                          Email Address             [email or web address]

 

                          Preferred Language        English

 

                          Marital Status            Unknown

 

                          Yazidi Affiliation     Unknown

 

                          Race                      Unknown

 

                          Additional Race(s)        Unavailable



                                                    White

 

                          Ethnic Group              Unknown









Author







                          Organization              [facility]

t

 

                          Address                   [address]



                                                    [place] [address]

 

                          Phone                     [phone]









Support







                Name            Relationship    Address         Phone

 

                Solo          Child           Unavailable     [phone]









Care Team Providers







                    Name                Role                Phone

 

                    Juan Carlos Dennis DO Primary Care Physician [phone]

 

                    Oleksandr ANDERSON            Attending Clinician Unavailable

 

                    Juan Carlos Dennis DO Attending Clinician [phone]

 

                    Latasha Edwards MD Attending Clinician [phone]

 

                    Wilder BAGLEY  J      Attending Clinician Unavailable

 

                    Dave MONTANA            Attending Clinician Unavailable

 

                    Laura BERRY             Attending Clinician [phone]









Payers







           Payer Name Policy Type Policy Number Effective Date Expiration Date S

marcell

 

           [id number]            [id number] 2004            [facility]



           [id number]                       00:00:00              [facility]ospital



           /2004-[facility]                                             



           [employer]                                              







Problems







       Condition Condition Condition Status Onset  Resolution Last   Treating Co

mments 

Source



       Name   Details Category        Date   Date   Treatment Clinician        



                                                 Date                 

 

       Cystocele Cystocele Disease Active                              Met

hodi



       with   with                 806                               st



       prolapse prolapse               00:00:                             Hospit

a



                                   00                                 l

 

       Intestinal Intestinal Disease Active                              M

ethodi



       obstructio obstructio               8-06                               st



       n      n                    00:00:                             Hospita



                                   00                                 l

 

       Slow   Slow   Disease Active                              Methodi



       transit transit               8-06                               st



       constipati constipati               00:00:                             Ho

spita



       on     on                   00                                 l

 

       TIA    TIA    Disease Active                              Methodi



       (transient (transient               6-17                               st



       ischemic ischemic               00:00:                             Hospit

a



       attack) attack)               00                                 l

 

       Acute  Acute  Disease Active                              Methodi



       cystitis cystitis               6-17                               st



       with   with                 00:00:                             Hospita



       hematuria hematuria               00                                 l

 

       CKD    CKD    Disease Active 2021-0                             Methodi



       (chronic (chronic               



       kidney kidney               00:00:                             Hospita



       disease) disease)               00                                 l



       stage 4, stage 4,                                                  



       GFR 15-29 GFR 15-29                                                  



       ml/min ml/min                                                  

 

       Hyperkalem Hyperkalem Disease Active                              M

ethodi



       ia     ia                   



                                   00:00:                             Hospita



                                   00                                 l

 

       Anemia due Anemia due Disease Active                              M

ethodi



       to stage 4 to stage 4               



       chronic chronic               00:00:                             Hospita



       kidney kidney               00                                 l



       disease disease                                                  

 

       Recurrent Recurrent Disease Active                              Met

hodi



       UTI    UTI                  



                                   00:00:                             Hospita



                                   00                                 l

 

       Cachexia Cachexia Disease Active                              [facility]

di



                                   



                                   00:00:                             Hospita



                                   00                                 l

 

       Venous Venous Disease Active                              Methodi



       stasis stasis               



       dermatitis dermatitis               00:00:                             Ho

spita



       of left of left               00                                 l



       lower  lower                                                   



       extremity extremity                                                  

 

       Acute  Acute  Disease Active 2019                             Methodi



       renal  renal                



       failure failure               00:00:                             Hospita



                                   00                                 l

 

       Hydronephr Hydronephr Disease Active 2019                             M

ethodi



       osis with osis with               



       ureteral ureteral               00:00:                             Hospit

a



       stricture, stricture,               00                                 l



       not    not                                                     



       elsewhere elsewhere                                                  



       classified classified                                                  

 

       Sepsis Sepsis Disease Active 2019                             Methodi



       without without               



       acute  acute                00:00:                             Hospita



       organ  organ                00                                 l



       dysfunctio dysfunctio                                                  



       n      n                                                       

 

       Ingrown Ingrown Disease Active 2019                             Methodi



       toenail toenail               0



                                   00:00:                             Hospita



                                   00                                 l

 

       Urine  Urine  Disease Active 2019                             Methodi



       retention retention               0



                                   00:00:                             Hospita



                                   00                                 l

 

       Anemia Anemia Disease Active                              Methodi



                                   



                                   00:00:                             Hospita



                                   00                                 l

 

       Does not Does not Disease Active                              [facility]

di



       transfer transfer               



       from bed from bed               00:00:                             Hospit

a



       to     to                   00                                 l



       wheelchair wheelchair                                                  

 

       Hallucinat Hallucinat Disease Active                              M

ethodi



       ions   ions                 



                                   00:00:                             Hospita



                                   00                                 l

 

       Tremor of Tremor of Disease Active                              Met

hodi



       both hands both hands               



                                   00:00:                             Hospita



                                   00                                 l

 

       Administra Administra Disease Active 2017                             M

ethodi



       tive   tive                 



       encounter encounter               00:00:                             Hosp

edgardo



                                   00                                 l

 

       Fall   Fall   Disease Active                              Methodi



                                   



                                   00:00:                             Hospita



                                   00                                 l

 

       Leg    Leg    Disease Active                              Methodi



       injuries injuries               



                                   00:00:                             Hospita



                                   00                                 l

 

       Abrasion Abrasion Disease Active                              [facility]

di



                                   



                                   00:00:                             Hospita



                                   00                                 l

 

       Cellulitis Cellulitis Disease Active 2016                             M

ethodi



       of left of left               



       lower  lower                00:00:                             Hospita



       extremity extremity               00                                 l

 

       Acute  Acute  Disease Active                              Methodi



       cystitis cystitis               



       without without               00:00:                             Hospita



       hematuria hematuria               00                                 l

 

       Urinary Urinary Disease Active                              Methodi



       tract  tract                



       infection infection               00:00:                             Hosp

edgardo



       associated associated               00                                 l



       with   with                                                    



       catheteriz catheteriz                                                  



       ation of ation of                                                  



       urinary urinary                                                  



       tract  tract                                                   

 

       Anxiety Anxiety Disease Active                              Methodi



                                   



                                   00:00:                             Hospita



                                   00                                 l

 

       Arthritis Arthritis Disease Active                              Met

hodi



       of knee of knee               



                                   00:00:                             Hospita



                                   00                                 l

 

       Dysuria Dysuria Disease Active                              Methodi



                                   



                                   00:00:                             Hospita



                                   00                                 l

 

       Edema of Edema of Disease Active                              [facility]

di



       lower  lower                



       extremity extremity               00:00:                             Hosp

edgardo



                                   00                                 l

 

       Hip pain Hip pain Disease Active                              [facility]

di



                                   



                                   00:00:                             Hospita



                                   00                                 l

 

       Hypertensi Hypertensi Disease Active                              M

ethodi



       on     on                   



                                   00:00:                             Hospita



                                   00                                 l

 

       Knee pain Knee pain Disease Active                              Met

hodi



                                   



                                   00:00:                             Hospita



                                   00                                 l

 

       Neuropathy Neuropathy Disease Active                              M

ethodi



                                   



                                   00:00:                             Hospita



                                   00                                 l

 

       Polyuria Polyuria Disease Active                              [facility]

di



                                   



                                   00:00:                             Hospita



                                   00                                 l

 

       Toothache Toothache Disease Active                              Met

hodi



                                   



                                   00:00:                             Hospita



                                   00                                 l

 

       Type 2 Type 2 Disease Active                              Methodi



       diabetes diabetes               



       mellitus mellitus               00:00:                             Hospit

a



       with stage with stage               00                                 l



       3 chronic 3 chronic                                                  



       kidney kidney                                                  



       disease disease                                                  

 

       Unsteady Unsteady Disease Active                              [facility]

di



       gait   gait                 



                                   00:00:                             Hospita



                                   00                                 l

 

       Vitamin D Vitamin D Disease Active                              Met

hodi



       deficiency deficiency               



                                   00:00:                             Hospita



                                   00                                 l

 

       Murmur Murmur Disease Active                       Overview: Method

i



                                                           Formattin st



                                   00:00:                      g of this Hospita



                                   00                          note   l



                                                               might be 



                                                               different 



                                                               from the 



                                                               original. 



                                                               "aortic 



                                                               aneurysm" 







Allergies, Adverse Reactions, Alerts







       Allergy Allergy Status Severity Reaction(s) Onset  Inactive Treating Comm

ents 

Source



       Name   Type                        Date   Date   Clinician        

 

       Levoflox Propensi Active                              Other  Method

i



       acin   ty to                                        reaction( st



              adverse                      00:00:               s):    Hospita



              reaction                      00                   Anaphylax l



              s to                                             is     



              drug                                                    

 

       No Known Propensi Active        Other (See                       Me

thodi



       Drug   ty to                Comments)                         st



       Allergie adverse                      00:00:                      Hospita



       s      reaction                      00                          l



              s to                                                    



              drug                                                    







Family History







           Family Member Diagnosis  Comments   Start Date Stop Date  Source

 

           [facility]

 

           Natural father Hypertension                                  [facility]

 

           Maternal grandmother Diabetes                                    [facility]







Social History







           Social Habit Start Date Stop Date  Quantity   Comments   Source

 

           Exposure to                       Not sure              [facility]



           SARS-CoV-2                                             Hospital



           (event)                                                

 

           Tobacco use and 2021 Never used            [facility]



           exposure   00:00:00   00:00:00                         Hospital

 

           Alcohol intake 2021 Current               [facility]



                      00:00:00   00:00:00   non-drinker of            Hospital



                                            alcohol               



                                            (finding)             

 

           Sex Assigned At 1940                       [facility]



           Birth      00:00:00   00:00:00                         Hospital









                Smoking Status  Start Date      Stop Date       Source

 

                Never smoker                                    [facility]

al







Medications







       Ordered Filled Start  Stop   Current Ordering Indication Dosage Frequency

 Signature

                    Comments            Components          Source



     Medication Medication Date Date Medication? Clinician                (SIG) 

          



     Name Name                                                   

 

     nitrofurant      2021- Yes            100mg Q.5D Take 1           Me

thodi



     oin,       08                          capsule           st



     macrocrysta      00:00: 04:59                          (100 mg           Ho

spita



     l-monohydra      00   :00                           total) by           l



     te,                                          mouth 2           



     (Macrobid)                                         (two)           



     100 MG                                         times a           



     capsule                                         day for 7           



                                                  days.           

 

     insulin            Yes                      Inject           Methodi



     ASPART                                     under the           st



     (NovoLOG)      19:55:                               skin.           Hospita



     100 unit/mL      35                                 Sliding           l



     (3 mL)                                         scale           



     insulin pen                                                        

 

     INSULIN            Yes                      Inject           Methodi



     SUBCUTANEOU      8-06                               under the           st



     S PUMP,      19:55:                               skin           Hospita



     NOVOLOG,SANAM      35                                 continuous           l



     SP, 100                                         ly.            



     UNIT/ML                                         Sliding           



     INSULIN                                         scale           



     PUMP                                                        



     INFUSION                                                        



     (NovoLOG)                                                        

 

     docusate            Yes            100mg QD   Take 100           Meth

gabriella



     sodium 100      8-06                               mg by           st



     mg capsule      19:55:                               mouth           Hospit

a



               35                                 daily.           l

 

     aspirin            Yes            81mg QD   Take 81 mg           Meth

gabriella



     (ECOTRIN)      8-06                               by mouth           st



     81 MG      19:55:                               daily.           Hospita



     enteric      35                                                l



     coated                                                        



     tablet                                                        

 

     DULoxetine            Yes                      Take 1           [facility]

di



     (CYMBALTA)                                     capsule by           st



     20 MG      00:00:                               mouth           Hospita



     capsule      00                                 daily           l

 

     atorvastati            Yes                      Take 1           Meth

gabriella



     n (LIPITOR)                                     tablet by           st



     10 mg      00:00:                               mouth           Hospita



     tablet      00                                 daily           l

 

     tamsulosin      2021- No             .4mg QD   Take 0.4           Me

thodi



     (FLOMAX)      6-17 06-17                          mg by           st



     0.4 mg      20:10: 00:00                          mouth           Hospita



     capsule      35   :00                           daily with           l



                                                  dinner.           

 

     thiamine      2021- No             100mg Q.5D Take 100           Met

hodi



     mononitrate      6 06-17                          mg by           st



     , vit B1,      20:10: 00:00                          mouth 2           Hosp

edgardo



     (B-1) 100      35   :00                           (two)           l



     mg tablet                                         times a           



                                                  day.           

 

     cyanocobala      2021- No                       Take by           Me

thodi



     min,      6-17 06-17                          mouth. 1           st



     vitamin      20:10: 00:00                          capsule po           Hos

michelle



     B-12, 5,000      35   :00                           Qd             l



     mcg capsule                                                        

 

     calcitrioL      2021- No             .25ug QD   Take 0.25           

Methodi



     (ROCALTROL)      6- 06-17                          mcg by           st



     0.25 MCG      20:10: 00:00                          mouth           [facility]



     capsule      35   :00                           daily.           l

 

     sevelamer      2021- No             1600mg Q.[id number] Take 1,600    

       [facility]



     (RENAGEL)      6-17 06-17                     [id number] mg by           st



     800 MG      20:10: 00:00                     3D   mouth 3           [facility]



     tablet      35   :00                           (three)           l



                                                  times a           



                                                  day with           



                                                  meals.           

 

     sodium      2021- No             650mg Q.5D Take 650           [facility]

di



     bicarbonate       06-17                          mg by           st



     650 mg      20:10: 00:00                          mouth 2           [facility]



     tablet      35   :00                           (two)           l



                                                  times a           



                                                  day.           

 

     calcitrioL            Yes            .25ug QD   Take 1           [facility]

gab[facility]



     (ROCALTROL)      6-17                               capsule           st



     0.25 MCG      00:00:                               (0.25 mcg           Hosp

edgardo



     capsule      00                                 total) by           l



                                                  mouth           



                                                  daily.           

 

     carvediloL            Yes            6.25mg Q.5D Take 1           Met

hodi



     (COREG)      6-17                               tablet           st



     6.25 MG      00:00:                               (6.25 mg           Hospit

a



     tablet      00                                 total) by           l



                                                  mouth 2           



                                                  (two)           



                                                  times a           



                                                  day with           



                                                  meals.           

 

     gabapentin            Yes            400mg Q.[id number] Take 1         

  [facility]



     (NEURONTIN)                                [id number] capsule          

 st



     400 mg      00:00:                          3D   (400 mg           [facility]



     capsule      00                                 total) by           l



                                                  mouth 3           



                                                  (three)           



                                                  times a           



                                                  day.           

 

     hydrALAZINE            Yes                      Two P.O.           Me

thodi



     (APRESOLINE      -17                               T.I.D.           st



     ) 10 MG      00:00:                                              [facility]



     tablet      00                                                l

 

     sevelamer            Yes                      Two P.O. -           Me

thodi



     (RENAGEL)      6-17                               T.I.D.           st



     800 MG      00:00:                                              [facility]



     tablet      00                                                l

 

     tamsulosin            Yes            .4mg QD   Take 1           [facility]

di



     (FLOMAX)      17                               capsule           st



     0.4 mg      00:00:                               (0.4 mg           [facility]



     capsule      00                                 total) by           l



                                                  mouth           



                                                  daily with           



                                                  dinner.           

 

     cyanocobala      2021- Yes            1{capsu QD   Take 1           

[facility]



     min,                      le}       capsule by           st



     vitamin      00:00: 04:59                          mouth           [facility]



     B-12, 5,000      00   :00                           daily for           l



     mcg capsule                                         92 days. 1           



                                                  capsule po           



                                                  Qd             

 

     sodium      2021- Yes            650mg Q.5D Take 1           [facility]



     bicarbonate                                tablet           st



     650 mg      00:00: 04:59                          (650 mg           [facility]



     tablet      00   :00                           total) by           l



                                                  mouth 2           



                                                  (two)           



                                                  times a           



                                                  day for 92           



                                                  days.           

 

     thiamine      2021- Yes            100mg Q.5D Take 1           [facility]

di



     mononitrate      -18                          tablet           st



     , vit B1,      00:00: 04:59                          (100 mg           Hosp

edgardo



     (B-1) 100      00   :00                           total) by           l



     mg tablet                                         mouth 2           



                                                  (two)           



                                                  times a           



                                                  day for 92           



                                                  days.           

 

     atorvastati      2021- No             10mg QD   Take 1           Met

hodi



     n (LIPITOR)                                tablet (10           s

t



     10 mg      00:00: 00:00                          mg total)           Hospit

a



     tablet      00   :00                           by mouth           l



                                                  daily.           

 

     lancets       Yes                      Check 4           [facility]

gabriella



     gauge misc      -                               times           st



               00:00:                               daily           [facility]



               00                                                l

 

     pen needle,            Yes                      Use once           Me

thodi



     diabetic                                     daily           st



     (BD       00:00:                                              [facility]



     Ultra-Fine      00                                                l



     Short Pen                                                        



     Needle) 31                                                        



     gauge x                                                        



     5/16"                                                        



     needle                                                        

 

     insulin            Yes            4U   Q.5D Inject 4           Method

i



     aspart,      5-27                               Units           st



     niacinamide      00:00:                               under the           H

ospita



     , (Fiasp      00                                 skin 2           l



     Penfill                                         (two)           



     U-100                                         times a           



     Insulin)                                         day.           



     100 unit/mL                                         Sliding           



     (3 mL)                                         scale.           



     cartridge                                                        

 

     acetaminoph      2021- No                       Take 1           Met

hodi



     en-codeine                                tablet by           st



     (TYLENOL      00:00: 04:59                          mouth           [facility]



     WITH      00   :00                           every 8           l



     CODEINE #3)                                         hours as           



     300-30 mg                                         needed for           



     per tablet                                         moderate           



                                                  pain           

 

     atorvastati      2021- No                       Take 1           [facility]

h[facility]



     n (LIPITOR)      -                          tablet by           st



     10 mg      00:00: 00:00                          mouth           [facility]



     tablet      00   :00                           daily           l

 

     predniSONE      2021- No             10mg Q.5D Take 10 mg           

[facility]



     (DELTASONE)                                by mouth 2           s

t



     10 mg      20:13: 00:00                          (two)           [facility]



     tablet      31   :00                           times a           l



                                                  day.           

 

     INSULIN      2021- No                       Inject           [facility]



     SUBCUTANEOU      -21                          under the           st



     S PUMP,      20:13: 00:00                          skin           [facility]



     NOVOLOG,SANAM      13   :00                           continuous           l



     SP, 100                                         ly.            



     UNIT/ML                                         Sliding           



     INSULIN                                         scale           



     PUMP                                                        



     INFUSION                                                        



     (NovoLOG)                                                        

 

     gabapentin      2021- No             400mg Q.[id number] Take 1        

   [facility]



     (NEURONTIN)                           [id number] capsule         

  st



     400 mg      00:00: 00:00                     3D   (400 mg           [facility]



     capsule      00   :00                           total) by           l



                                                  mouth 3           



                                                  (three)           



                                                  times a           



                                                  day.           

 

     hydrALAZINE      2021- No                       Two P.O.           M

[facility]



     (APRESOLINE                                T.I.D.           st



     ) 10 MG      00:00: 00:00                                         [facility]



     tablet      00   :00                                          l

 

     carvediloL      2021- No             6.25mg Q.5D Take 1           Me

thodi



     (COREG)                                tablet           st



     6.25 MG      00:00: 00:00                          (6.25 mg           [facility]os[facility]

ta



     tablet      00   :00                           total) by           l



                                                  mouth 2           



                                                  (two)           



                                                  times a           



                                                  day with           



                                                  meals.           

 

     ciprofloxac      2021- No             500mg Q.5D Take 1           Me

thodi



     in (Cipro)                                tablet           st



     500 MG      00:00: 04:59                          (500 mg           [facility]



     tablet      00   :00                           total) by           l



                                                  mouth 2           



                                                  (two)           



                                                  times a           



                                                  day for 3           



                                                  days.           

 

     fluconazole      2021- No             150mg      Take 1           Me

thodi



     (Diflucan)                                tablet           st



     150 MG      00:00: 04:59                          (150 mg           [facility]



     tablet      00   :00                           total) by           l



                                                  mouth once           



                                                  for 1           



                                                  dose.           

 

     ciprofloxac      2021- No             500mg Q.5D Take 1           Me

thodi



     in (Cipro)                                tablet           st



     500 MG      00:00: 00:00                          (500 mg           [facility]



     tablet      00   :00                           total) by           l



                                                  mouth 2           



                                                  (two)           



                                                  times a           



                                                  day for 3           



                                                  days.           

 

     fluconazole      2021- No             150mg      Take 1           Me

thodi



     (Diflucan)                                tablet           st



     150 MG      00:00: 00:00                          (150 mg           [facility]



     tablet      00   :00                           total) by           l



                                                  mouth once           



                                                  for 1           



                                                  dose.           

 

     ciprofloxac      2021- No             500mg Q.5D Take 1           Me

thodi



     in (Cipro)                                tablet           st



     500 MG      00:00: 00:00                          (500 mg           [facility]



     tablet      00   :00                           total) by           l



                                                  mouth 2           



                                                  (two)           



                                                  times a           



                                                  day for 3           



                                                  days.           

 

     nitrofurant       No             100mg Q.5D Take 1           Me

thodi



     oin,      3-30 04-07                          capsule           st



     macrocrysta      00:00: 04:59                          (100 mg           [facility]

[facility]ita



     l-monohydra      00   :00                           total) by           l



     te,                                          mouth 2           



     (Macrobid)                                         (two)           



     100 MG                                         times a           



     capsule                                         day for 7           



                                                  days.           

 

     fluconazole       No             150mg      Take 1           Me

[facility]



     (Diflucan)                                tablet           st



     150 MG      00:00: 05:59                          (150 mg           [facility]



     tablet      00   :00                           total) by           l



                                                  mouth once           



                                                  for 1           



                                                  dose.           

 

     hydrALAZINE       No                       Two P.O.           M

[facility]



     (APRESOLINE                                T.I.D.           st



     ) 10 MG      00:00: 00:00                                         [facility]



     tablet      00   :00                                          l

 

     insulin       No                       Inject           [facility]



     aspart,                                under the           st



     niacinamide      20:31: 00:00                          skin.           Hosp

[facility]



     , (Fiasp      43   :00                           Sliding           l



     Penfill                                         scale.           



     U-100                                                        



     Insulin)                                                        



     100 unit/mL                                                        



     (3 mL)                                                        



     cartridge                                                        

 

     insulin       No             4U   Q.5D Inject 4           [facility]

di



     aspart,                                Units           st



     niacinamide      00:00: 00:00                          under the           

[facility]ospita



     , (Fiasp      00   :00                           skin 2           l



     Penfill                                         (two)           



     U-100                                         times a           



     Insulin)                                         day.           



     100 unit/mL                                         Sliding           



     (3 mL)                                         scale.           



     cartridge                                                        

 

     metFORMIN       No             500mg Q.[id number] Take 1         

  [facility]



     (GLUCOPHAGE                           [id number] tablet          

 st



     ) 500 mg      00:00: 00:00                     3D   (500 mg           [facility]

ta



     tablet      00   :00                           total) by           l



                                                  mouth 3           



                                                  (three)           



                                                  times a           



                                                  day.           

 

     acetaminoph      2021- No        [id number] 1{tbl} Q8H  Take 1           

[facility]



     en-codeine                                tablet by           st



     (TYLENOL      00:00: 00:00                          mouth           [facility]



     WITH      00   :00                           every 8           l



     CODEINE #3)                                         (eight)           



     300-30 mg                                         hours as           



     per tablet                                         needed for           



                                                  moderate           



                                                  pain for           



                                                  up to 30           



                                                  days           



                                                  .chronic           



                                                  pain.           

 

     atorvastati       No             10mg QD   Take 1           [facility]

[facility]



     n (LIPITOR)                                tablet (10           s

t



     10 mg      00:00: 00:00                          mg total)           Hospit

a



     tablet      00   :00                           by mouth           l



                                                  daily.           

 

     carvediloL       No             6.25mg Q.5D Take 1           Me

[facility]



     (COREG)                                tablet           st



     6.25 MG      00:00: 00:00                          (6.25 mg           [facility]

ta



     tablet      00   :00                           total) by           l



                                                  mouth 2           



                                                  (two)           



                                                  times a           



                                                  day with           



                                                  meals.           

 

     DULoxetine       No             20mg QD   Take 1           [facility]

gab[facility]ella



     (CYMBALTA)                                capsule           st



     20 MG      00:00: 00:00                          (20 mg           [facility]



     capsule      00   :00                           total) by           l



                                                  mouth           



                                                  daily.           

 

     gabapentin       No             400mg Q.[id number] Take 1        

   [facility]



     (NEURONTIN)                           [id number] capsule         

  st



     400 mg      00:00: 00:00                     3D   (400 mg           [facility]



     capsule      00   :00                           total) by           l



                                                  mouth 3           



                                                  (three)           



                                                  times a           



                                                  day.           

 

     hydrALAZINE      2021- No             100mg Q.[id number] Take 10      

     [facility]



     (APRESOLINE                           [id number] tablets         

  st



     ) 10 MG      00:00: 00:00                     3D   (100 mg           [facility]ospit

a



     tablet      00   :00                           total) by           l



                                                  mouth 3           



                                                  (three)           



                                                  times a           



                                                  day.           

 

     nitrofurant      2020 No        [id number] 100mg Q.5D Take 1         

  [facility]



     oin,      0-28 -05                          capsule           st



     macrocrysta      00:00: 05:59                          (100 mg           [facility]

[facility]



     l-monohydra      00   :00                           total) by           l



     te,                                          mouth 2           



     (Macrobid)                                         (two)           



     100 MG                                         times a           



     capsule                                         day for 7           



                                                  days.           

 

     gabapentin      2020- No                       Take 1           [facility]

gab[facility]



     (NEURONTIN)      008                          capsule by           s

t



     400 mg      00:00: 00:00                          mouth           [facility]



     capsule      00   :00                           three           l



                                                  times           



                                                  daily           

 

     hydrALAZINE      2020- No                       Take 2           [facility]

[facility]



     (APRESOLINE                                tablets by           s

t



     ) 10 MG      00:00: 00:00                          mouth           [facility]



     tablet      00   :00                           three           l



                                                  times           



                                                  daily           

 

     atorvastati      2020- No                       Take 1           [facility]

[facility]



     n (LIPITOR)                                tablet by           st



     10 mg      00:00: 00:00                          mouth           [facility]



     tablet      00   :00                           daily           l

 

     DULoxetine      2020- No                       Take 1           [facility]

gabriella



     (CYMBALTA)                                capsule by           st



     20 MG      00:00: 00:00                          mouth           [facility]



     capsule      00   :00                           daily           l

 

     metFORMIN      2020- No                       Take 1           [facility]

di



     (GLUCOPHAGE                                tablet by           st



     ) 500 mg      00:00: 00:00                          mouth           [facility]osp[facility]



     tablet      00   :00                           three           l



                                                  times           



                                                  daily           

 

     carvediloL      2020- No                       Take 1           Meth

gabriella



     (COREG)                                tablet by           st



     6.25 MG      00:00: 00:00                          mouth           [facility]ospita



     tablet      00   :00                           twice           l



                                                  daily with           



                                                  meals           

 

     acetaminoph      2020- No                       Take 1           Met

hodi



     en-codeine                                tablet by           st



     (TYLENOL      00:00: 00:00                          mouth           [facility]ospita



     WITH      00   :00                           every 8           l



     CODEINE #3)                                         hours as           



     300-30 mg                                         needed for           



     per tablet                                         moderate           



                                                  pain           

 

     hydrALAZINE       No                       Take 2           Met

hodi



     (APRESOLINE      6-23 10-22                          tablets by           s

t



     ) 10 MG      00:00: 00:00                          mouth           [facility]ospita



     tablet      00   :00                           three           l



                                                  times           



                                                  daily           

 

     atorvastati      2020- No                       Take 1           Met

hodi



     n (LIPITOR)      6-23 10-22                          tablet by           st



     10 mg      00:00: 00:00                          mouth           [facility]ospita



     tablet      00   :00                           daily           l

 

     DULoxetine      2020- No                       Take 1           Meth

gabriella



     (CYMBALTA)      6-23 10-22                          capsule by           st



     20 MG      00:00: 00:00                          mouth           [facility]ospita



     capsule      00   :00                           daily           l

 

     gabapentin      2020- No                       Take 1           Meth

gabriella



     (NEURONTIN)      6-23 10-22                          capsule by           s

t



     400 mg      00:00: 00:00                          mouth           [facility]ospita



     capsule      00   :00                           three           l



                                                  times           



                                                  daily           

 

     metFORMIN      2020- No                       Take 1           [facility]

di



     (GLUCOPHAGE      6-23 10-22                          tablet by           st



     ) 500 mg      00:00: 00:00                          mouth           [facility]osp[facility]



     tablet      00   :00                           three           l



                                                  times           



                                                  daily           

 

     carvediloL      2020- No                       Take 1           Meth

gabriella



     (COREG)      6-23 10-22                          tablet by           st



     6.25 MG      00:00: 00:00                          mouth           [facility]ospita



     tablet      00   :00                           twice           l



                                                  daily with           



                                                  meals           

 

     acetaminoph      2020- No                       Take 1           Met

hodi



     en-codeine                                tablet by           st



     (TYLENOL      00:00: 04:59                          mouth           [facility]ospita



     WITH      00   :00                           every 8           l



     CODEINE #3)                                         hours as           



     300-30 mg                                         needed for           



     per tablet                                         moderate           



                                                  pain           

 

     furosemide      2019- No             40mg Q.5D Take 1           Meth

gabriella



     (LASIX) 40      0-01 10-01                          tablet (40           st



     mg tablet      00:00: 04:59                          mg total)           Ho

spita



               00   :00                           by mouth 2           l



                                                  (two)           



                                                  times a           



                                                  day.           

 

     hydrocolloi            Yes            1{packa Q.5D Apply 1           

[facility]



     d dressing      5-20                     ge}       Package           st



     (DUODERM      00:00:                               topically           Hosp

edgardo



     CGF BORDER      00                                 2 (two)           l



     DRESSING) 4                                         times a           



     X 4 "                                         day.           



     bandage                                                        

 

     soft lens            Yes            20mL QD   20 mL           [facility]



     rinse,store      4-25                               daily.           st



     solution      00:00:                                              Hospita



     (SALINE      00                                                l



     SOLUTION)                                                        



     solution                                                        

 

     insulin      2021- No             30U  QD   Inject 30           Meth

gabriella



     glargine-li      4-25 04-21                          Units           st



     xisenatide      00:00: 00:00                          under the           H

ospita



     (SOLIQUA      00   :00                           skin           l



     100/33) 100                                         daily.           



     unit-33                                                        



     mcg/mL                                                        



     insulin pen                                                        

 

     pen needle,      2021- No                       Use once           M

ethodi



     diabetic       05-27                          daily           st



     (BD INSULIN      00:00: 00:00                                         Hospi

ta



     PEN NEEDLE      00   :00                                          l



     UF SHORT)                                                        



     31 gauge x                                                        



     5/16"                                                        



     needle                                                        

 

     blood sugar            Yes                      Check four           

Method[facility]



     diagnostic      2-07                               strips           st



     strips      00:00:                               daily           Hospita



     (FREESTYLE      00                                                l



     LITE                                                        



     STRIPS)                                                        



     strip test                                                        



     strips                                                        

 

     lancets 28      2021- No                       Check 4           Met

hodi



     gauge misc      2-07 05-27                          times           st



               00:00: 00:00                          daily           Hospita



               00   :00                                          l







Immunizations







           Ordered Immunization Filled Immunization Date       Status     Commen

ts   Source



           Name       Name                                        

 

           Pneumococcal            2020 Completed             [facility]



           Conjugate 13-Valent            00:00:00                         Hospi

farhana

 

           Influenza (IM)            2019 Completed             [facility]



           Preservative Free            00:00:00                         Hospita

l

 

           Pneumococcal            2019 Completed             [facility]



           Polysaccharide            00:00:00                         Hospital

 

           Influenza (IM)            2017-10-01 Completed             [facility]



           Preservative Free            00:00:00                         Hospita

l

 

           FLUZONE HIGH-DOSE PF            2016-10-06 Completed             [facility]

[facility]



                                 00:00:00                         Hospital

 

           FLUZONE HIGH-DOSE PF            2015-10-15 Completed             [facility]

[facility]



                                 00:00:00                         Hospital







Vital Signs







             Vital Name   Observation Time Observation Value Comments     Source

 

             Systolic blood 2021 19:58:00 137 mm[Hg]                [facility]



             pressure                                            

 

             Diastolic blood 2021 19:58:00 59 mm[Hg]                 [facility]



             pressure                                            

 

             Heart rate   2021 19:58:00 80 /min                   [facility]

 

             Body temperature 2021 19:58:00 36.44 Meredith                 [facility]

 

             Body height  2021 19:58:00 160 cm                    [facility]

 

             Oxygen saturation in 2021 19:58:00 95 /min                   

[facility]



             Arterial blood by                                        



             Pulse oximetry                                        

 

             Respiratory rate 2021 21:20:00 24 /min                   [facility]

 

             Body weight  2021 19:34:00 95.255 kg                 [facility]

 

             BMI          2021 19:34:00 37.20 kg/m2               [facility]







Procedures







                Procedure       Date / Time     Performing Clinician Source



                                Performed                       

 

                CBC WITH PLATELET AND [facility]12[facility]



                DIFFERENTIAL                                    

 

                COMPREHENSIVE METABOLIC 2021 20:12:00 [facility] MELLO [facility]



                PANEL                                           

 

                LIPID PANEL     2021 [facility]12[facility]00 [facility]

 

                HEMOGLOBIN A1C  2021 22:11:00 [facility]

 

                COM[facility]



                PANEL                                           

 

                CBC WITH PLATELET AND 2021 22:11:00 MELLO [facility]



                DIFFERENTIAL                                    

 

                LIPID PANEL     2021 22:11:00 MELLO Dennis [facility]







Plan of Care







             Planned Activity Planned Date Details      Comments     Source

 

             Future Scheduled Test              DIABETES: RETINAL EYE           

   [facility]



                                       EXAM [code = DIABETES:              



                                       RETINAL EYE EXAM]              

 

             Future Scheduled Test              COVID-19 VACCINE (1)            

  [facility]



                                       [code = COVID-19              



                                       VACCINE (1)]              

 

             Future Scheduled Test              SHINGLES VACCINES (#1)          

    [facility]



                                       [code = SHINGLES              



                                       VACCINES (#1)]              

 

             Future Scheduled Test              INFLUENZA VACCINE              [facility]



                                       [code = INFLUENZA              



                                       VACCINE]                  

 

             Future Scheduled Test              DIABETIC FOOT EXAM              

[facility]



                                       [code = DIABETIC FOOT              



                                       EXAM]                     







Encounters







        Start   End     Encounter Admission Attending Care    Care    Encounter 

Source



        Date/Time Date/Time Type    Type    Clinicians Facility Department ID   

   

 

        2021 Telephone         Oleksandr   1.2.840.1 [id number]

[id number] [facility]



        00:00:00 00:00:00                 Koki 20757.1.1         523     st



                                                3.430.2.7                 Hospit

a



                                                .3.028810                 l



                                                .8                      

 

        2021 Office          Cory 1.2.840.1 [id number] 21

[id number] [facility]



        14:15:07 15:34:25 Visit           MELLO sewell 42484.1.1         289     st



                                                3.430.2.7                 Hospit

a



                                                .3.933005                 l



                                                .8                      

 

        2021 Outpatient         RACHELFEE [facility]     [address]

[id number] [place]



        00:00:00 00:00:00                 MELLO SEWELL                   289     Method

i



                                                                        st

 

        2021 Travel                  1.2.840.1 1.2.495.241 [id number]

[id number] [facility]



        00:00:00 00:00:00                         46092.1.1 350.1.13.43 287     

st



                                                3.430.2.7 0.2.7.3.698         Ho

spita



                                                .3.944684 084.8           l



                                                .8                      

 

        2021 Transcjessica Edwards, 1.2.840.1 [id number] 21

[id number] 

Method[facility]



        00:00:00 00:00:00 Orders          Ralph Pagan 64518.1.1         493     

st



                                                3.430.2.7                 Hospit

a



                                                .3.212767                 l



                                                .8                      

 

        2021 Refill          Rachelfee 1.2.840.1 [id number] 21

[id number] [facility]



        00:00:00 00:00:00                 donato RAmor Rangel 14118.1.1         397     st



                                                3.430.2.7                 Hospit

a



                                                .3.745429                 l



                                                .8                      

 

        2021 Travel                  1.2.840.1 1.2.490.869 8518

[id number] [facility]



        00:00:00 00:00:00                         75642.1.1 350.1.13.43 157     

st



                                                3.430.2.7 0.2.7.3.698         Ho

spita



                                                .3.536542 084.8           l



                                                .8                      

 

        2021 Travel                  1.2.840.1 1.2.958.962 7869

[id number] [facility]



        00:00:00 00:00:00                         25532.1.1 350.1.13.43 276     

st



                                                3.430.2.7 0.2.7.3.698         Ho

spita



                                                .3.691221 084.8           l



                                                .8                      

 

        2021 Telephone         Wilder, 1.2.840.1 [id number] 

[id number] Method[facility]



        00:00:00 00:00:00                 Christie BURKETT 08214.1.1         050     st



                                                3.430.2.7                 Hospit

a



                                                .3.352980                 l



                                                .8                      

 

        2021 Telephone         Oleksandr,   1.2.840.1 [id number] 

[id number] Method[facility]



        00:00:00 00:00:00                 Koki 73732.1.1         244     st



                                                3.430.2.7                 Hospit

a



                                                .3.376734                 l



                                                .8                      

 

        2021 Office          Lingamfee 1.2.840.1 [id number] 21

[id number] Methodi



        14:10:29 15:29:59 Visit           MELLO sewell Juan Carlos 51594.1.1         467     st



                                                3.430.2.7                 Hospit

a



                                                .3.591586                 l



                                                .8                      

 

        2021 Outpatient         LINGAMFEE [facility]     210

[id number] [place]



        00:00:00 00:00:00                 DONATOMELLO                   467     Method

i



                                                                        st

 

        2021 Travel                  1.2.840.1 1.2.058.494 2507

[id number] Method[facility]



        00:00:00 00:00:00                         87260.1.1 350.1.13.43 028     

st



                                                3.430.2.7 0.2.7.3.698         Ho

spita



                                                .3.098906 084.8           l



                                                .8                      

 

        2021 Telephone         Cory 1.2.840.1 [id number] 

[id number] 

Methodi



        00:00:00 00:00:00                 MELLO sewell 41313.1.1         635     st



                                                3.430.2.7                 Hospit

a



                                                .3.922666                 l



                                                .8                      

 

        2021 Telephone         Dave,  1.2.840.1 [id number] 

[id number] Method[facility]



        00:00:00 00:00:00                 Meena 37820.1.1         632     st



                                                3.430.2.7                 Hospit

a



                                                .3.607523                 l



                                                .8                      

 

        2021 Telephone         Dave,  1.2.840.1 [id number] [facility]



        00:00:00 00:00:00                 Meena 56179.1.1         576     st



                                                3.430.2.7                 Hospit

a



                                                .3.339841                 l



                                                .8                      

 

        2021 Telephone         Oleksandr,   1.2.840.1 [id number] 

[id number] Method[facility]



        00:00:00 00:00:00                 Koki 81990.1.1         880     st



                                                3.430.2.7                 Hospit

a



                                                .3.343950                 l



                                                .8                      

 

        2021 Office          Lingolyfelte 1.2.840.1 [id number] 21

[id number] Method[facility]



        13:38:39 14:30:03 Visit           r RAmor Juan Carlos 90300.1.1         937     st



                                                3.430.2.7                 Hospit

a



                                                .3.854822                 l



                                                .8                      

 

        2021 Travel                  1.2.840.1 1.2.912.223 2088

[id number] [facility]



        00:00:00 00:00:00                         09782.1.1 350.1.13.43 891     

st



                                                3.430.2.7 0.2.7.3.698         Ho

spita



                                                .3.656621 084.8           l



                                                .8                      

 

        2021 Outpatient         LINGAMFELTE [facility]     210

[id number] [place]



        00:00:00 00:00:00                 RMELLO                   937     Method

i



                                                                        st

 

        2021 Travel                  1.2.840.1 1.2.115.915 7585

[id number] Method[facility]



        00:00:00 00:00:00                         69246.1.1 350.1.13.43 829     

st



                                                3.430.2.7 0.2.7.3.698         Ho

spita



                                                .3.343413 084.8           l



                                                .8                      

 

        2021 Orders          Lingamfelte 1.2.840.1 [id number] 

[id number] Method[facility]



        00:00:00 00:00:00 Only            r RAmor Juan Carlos 68308.1.1         430     st



                                                3.430.2.7                 Hospit

a



                                                .3.168303                 l



                                                .8                      

 

        2021 Refill          Leggett,   1.2.840.1 [id number]

0018 Method[facility]



        00:00:00 00:00:00                 Koki 04552.1.1         043     st



                                                3.430.2.7                 Hospit

a



                                                .3.779404                 l



                                                .8                      

 

        2021 Orders          Lingamfelte 1.2.840.1 [id number] 21

[id number] Method[facility]



        00:00:00 00:00:00 Only            r, R. Juan Carlos 50468.1.1         960     st



                                                3.430.2.7                 Hospit

a



                                                .3.513148                 l



                                                .8                      

 

        2021 Telephone         [place],   1.2.840.1 [id number] 

[id number] Method[facility]



        00:00:00 00:00:00                 Koki 30172.1.1         754     st



                                                3.430.2.7                 Hospit

a



                                                .3.939185                 l



                                                .8                      

 

        2021 Refill          Lingamfelte 1.2.840.1 [id number] 

[id number] Method[facility]



        00:00:00 00:00:00                 r, R. Juan Carlos 41670.1.1         400     st



                                                3.430.2.7                 Hospit

a



                                                .3.523252                 l



                                                .8                      

 

        2021 Office          Lingamfelte 1.2.840.1 [id number] 

[id number] Methodi



        14:12:08 16:11:59 Visit           r, R. Juan Carlos 82045.1.1         [address]     [address]



                                                3.430.2.7                 Hospit

a



                                                .3.476313                 l



                                                .8                      

 

        2021 Travel                  1.2.840.1 1.2.058.144 0268

[id number] [facility]



        00:00:00 00:00:00                         94117.1.1 350.1.13.43 402     

st



                                                3.430.2.7 0.2.7.3.698         

spita



                                                .3.038562 084.8           l



                                                .8                      

 

        2021 Outpatient         [facility]     210

[id number] [place]



        00:00:00 00:00:00                 R, R.                   970     Method

i



                                                                        st

 

        2021 Telephone         [place],   1.2.840.1 [id number] 

[id number] Methodi



        00:00:00 00:00:00                 Koki 59265.1.1         652     st



                                                3.430.2.7                 Hospit

a



                                                .3.139501                 l



                                                .8                      

 

        2021 Travel                  1.2.840.1 1.2.662.670 2335

[id number] [facility]



        00:00:00 00:00:00                         72091.1.1 350.1.13.43 776     

st



                                                3.430.2.7 0.2.7.3.698         Ho

spita



                                                .3.849468 084.8           l



                                                .8                      

 

        2021 Orders          [place] 1.2.840.1 [id number] 

[id number] Methodi



        00:00:00 00:00:00 Only            r, R. Juan Carlos 08360.1.1         710     st



                                                3.430.2.7                 Hospit

a



                                                .3.286423                 l



                                                .8                      

 

        2021 Telephone         [facility],   1.2.840.1 [id number] Methodi



        00:00:00 00:00:00                 Koki 94879.1.1         609     st



                                                3.430.2.7                 Hospit

a



                                                .3.542612                 l



                                                .8                      

 

        2021 Travel                  1.2.840.1 1.2.032.713 4974

[id number] [facility]



        00:00:00 00:00:00                         15475.1.1 350.1.13.43 288     

st



                                                3.430.2.7 0.2.7.3.698         Ho

spita



                                                .3.491528 084.8           l



                                                .8                      

 

        2021 Travel                  1.2.840.1 1.2.298.714 7454

102242 Methodi



        00:00:00 00:00:00                         87489.1.1 350.1.13.43 121     

st



                                                3.430.2.7 0.2.7.3.698         Ho

spita



                                                .3.739941 084.8           l



                                                .8                      

 

        2021 Orders          [place] 1.2.840.1 [id number] 

[id number] Methodi



        00:00:00 00:00:00 Only            r, R. Juan Carlos 97756.1.1         995     st



                                                3.430.2.7                 Hospit

a



                                                .3.105279                 l



                                                .8                      

 

        2021 Office          [place] 1.2.840.1 [id number]

[id number] Methodi



        15:02:47 15:31:01 Visit           MELLO sewell 86842.1.1         861     st



                                                3.430.2.7                 Hospit

a



                                                .3.889796                 l



                                                .8                      

 

        2021 Travel                  1.2.840.1 1.2.593.800 6927

538014 [facility]



        00:00:00 00:00:00                         78395.1.1 350.1.13.43 895     

st



                                                3.430.2.7 0.2.7.3.698         Ho

spita



                                                .3.540315 084.8           l



                                                .8                      

 

        2021 Outpatient         [facility]     210

[id number] [place]



        00:00:00 00:00:00                 MELLO SEWELL                   861     Method

i



                                                                        st

 

        2021 Travel                  1.2.840.1 1.2.319.339 6280

821078 Method[facility]



        00:00:00 00:00:00                         76786.1.1 350.1.13.43 677     

st



                                                3.430.2.7 0.2.7.3.698         Ho

spita



                                                .3.535417 084.8           l



                                                .8                      

 

        2021 Orders          [place] 1.2.840.1 [id number] 

[id number] Methodi



        00:00:00 00:00:00 Only            MELLO sewell 90434.1.1         917     st



                                                3.430.2.7                 Hospit

a



                                                .3.213718                 l



                                                .8                      

 

        2021 Telephone         [place] 1.2.840.1 [id number] 

[id number] 

Methodi



        14:00:00 14:30:00 Consult         MELLO sewell 15235.1.1         318     st



                                                3.430.2.7                 Hospit

a



                                                .3.262195                 l



                                                .8                      

 

        2021 Telephone         [place] 1.2.840.1 [id number] 

[id number] 

Methodi



        00:00:00 00:00:00                 MELLO sewell 13003.1.1         844     st



                                                3.430.2.7                 Hospit

a



                                                .3.862169                 l



                                                .8                      

 

        2021 Travel                  1.2.840.1 1.2.371.762 4007

467584 Methodi



        00:00:00 00:00:00                         96552.1.1 350.1.13.43 713     

st



                                                3.430.2.7 0.2.7.3.698         Ho

spita



                                                .3.384161 084.8           l



                                                .8                      

 

        2021 Telephone         [facility],   1.2.840.1 [id number] Methodi



        00:00:00 00:00:00                 Koki 62840.1.1         168     st



                                                3.430.2.7                 Hospit

a



                                                .3.238191                 l



                                                .8                      

 

        2021 Travel                  1.2.840.1 1.2.701.219 1548

091405 Methodi



        00:00:00 00:00:00                         91205.1.1 350.1.13.43 017     

st



                                                3.430.2.7 0.2.7.3.698         Ho

spita



                                                .3.461210 084.8           l



                                                .8                      

 

        2021 Refill          [place] 1.2.840.1 [id number] 

[id number] Methodi



        00:00:00 00:00:00                 MELLO sewell 01939         972     st



                                                3.430.2.7                 Hospit

a



                                                .3.110048                 l



                                                .8                      

 

        2021 Refill          Aniyah Sanchez 1.2.840.1 [id number] 

[id number] Methodi



        00:00:00 00:00:00                         23264.1.1         969     st



                                                3.430.2.7                 Hospit

a



                                                .3.390017                 l



                                                .8                      

 

        2020-10-30 2020-10-30 Orders          [place] 1.2.840.1 [id number] 

[id number] Methodi



        00:00:00 00:00:00 Only            rMELLO 34740.1.1         745     st



                                                3.430.2.7                 Hospit

a



                                                .3.091768                 l



                                                .8                      

 

        2020-10-28 2020-10-28 Telephone         [facility],   1.2.840.1 [id number] 

[id number] Methodi



        00:00:00 00:00:00                 Koki 95478.1.1         409     st



                                                3.430.2.7                 Hospit

a



                                                .3.533696                 l



                                                .8                      

 

        2020-10-23 2020-10-23 Orders          [facility],   1.2.840.1 [id number]

7199 Methodi



        00:00:00 00:00:00 Only            Koki 81888.1.1         783     st



                                                3.430.2.7                 Hospit

a



                                                .3.742348                 l



                                                .8                      

 

        2020-10-22 2020-10-22 Refill          [place] 1.2.840.1 [id number] 

[id number] Methodi



        00:00:00 00:00:00                 r, R. Juan Carlos 67418.1.1         468     st



                                                3.430.2.7                 Hospit

a



                                                .3.330734                 l



                                                .8                      

 

        2020-10-09 2020-10-09 Orders          [place] 1.2.840.1 [id number] 

[id number] Methodi



        00:00:00 00:00:00 Only            r, R. Juan Carlos 97138.1.1         602     st



                                                3.430.2.7                 Hospit

a



                                                .3.087897                 l



                                                .8                      

 

        2020-10-09 2020-10-09 Orders          [facility],   1.2.840.1 [id number]

[id number] Methodi



        00:00:00 00:00:00 Only            Koki 28273.1.1         815     st



                                                3.430.2.7                 Hospit

a



                                                .3.665836                 l



                                                .8                      

 

        2020-10-08 2020-10-08 Telephone         [facility],   1.2.840.1 [id number] 

[id number] Methodi



        00:00:00 00:00:00                 Koki 67322.1.1         821     st



                                                3.430.2.7                 Hospit

a



                                                .3.577806                 l



                                                .8                      

 

        2020 Orders          [place] 1.2.840.1 [id number] 

[id number] Methodi



        00:00:00 00:00:00 Only            r, R. Juan Carlos 09309.1.1         286     st



                                                3.430.2.7                 Hospit

a



                                                .3.763853                 l



                                                .8                      

 

        2020 Orders          [place] 1.2.840.1 [id number] 

[id number] Methodi



        00:00:00 00:00:00 Only            r, R. Juan Carlos 60158.1.1         174     st



                                                3.430.2.7                 Hospit

a



                                                .3.551698                 l



                                                .8                      

 

        2020 Telephone         [facility],   1.2.840.1 [id number] 

[id number] Methodi



        00:00:00 00:00:00                 Koki 18311.1.1         262     st



                                                3.430.2.7                 Hospit

a



                                                .3.171383                 l



                                                .8                      

 

        2020 Orders          [facility],   1.2.840.1 [id number] 131147

5539 Methodi



        00:00:00 00:00:00 Only            Koki 90309.1.1         116     st



                                                3.430.2.7                 Hospit

a



                                                .3.933418                 l



                                                .8                      

 

        2020 Orders          [facility],   1.2.840.1 [id number] [id number]

4692 Methodi



        00:00:00 00:00:00 Only            Koki 81831.1.1         815     st



                                                3.430.2.7                 Hospit

a



                                                .3.030803                 l



                                                .8                      

 

        2020 Orders          [place] 1.2.840.1 [id number] 

[id number] Methodi



        00:00:00 00:00:00 Only            r, R. Juan Carlos 47802.1.1         358     st



                                                3.430.2.7                 Hospit

a



                                                .3.017504                 l



                                                .8                      

 

        2020 Orders          [place] 1.2.840.1 [id number] 

[id number] Methodi



        00:00:00 00:00:00 Only            r, R. Juan Carlos 30715.1.1         240     st



                                                3.430.2.7                 Hospit

a



                                                .3.247897                 l



                                                .8                      

 

        2020 Orders          [facility],   1.2.840.1 [id number]

3981 Methodi



        00:00:00 00:00:00 Only            Koki 64040.1.1         999     st



                                                3.430.2.7                 Hospit

a



                                                .3.028410                 l



                                                .8                      

 

        2020 Orders          [place] 1.2.840.1 [id number] 21

[id number] Methodi



        00:00:00 00:00:00 Only            r, R. Juan Carlos 73415.1.1         516     st



                                                3.430.2.7                 Hospit

a



                                                .3.928116                 l



                                                .8                      

 

        2020 Outpatient         [facility]     210

[id number] [place]



        00:00:00 00:00:00                 R, R                    757     Method

i



                                                                        st







Results







           Test Description Test Time  Test Comments Results    Result Comments 

Source









                    Comprehensive metabolic panel 2021 11:28:00 









                      Test Item  Value      Reference Range Interpretation Comme

nts









             Glucose (test code = 2345-7) 88 mg/dL                        

 

 

             BUN (test code = 3094-0) 66 mg/dL     7-25         H            

 

             Creatinine (test code = 2160-0) 2.06 mg/dL   0.60-0.88    H        

    

 

             EGFR [employer] (test              See_Comment  L           

  [Automated message] The



             code = 2775)                                        system which Pose

nerated this



                                                                 result transmit

onofre reference



                                                                 range: > OR = 6

0



                                                                 mL/min/1.73m2. 

The reference



                                                                 range was not u

sed to



                                                                 interpret this 

result as



                                                                 normal/abnormal

.

 

             EGFR  (test code              See_Comment  L       

      [Automated message] The



             = 2774)                                             system which Pose

nerated this



                                                                 result transmit

onofre reference



                                                                 range: > OR = 6

0



                                                                 mL/min/1.73m2. 

The reference



                                                                 range was not u

sed to



                                                                 interpret this 

result as



                                                                 normal/abnormal

.

 

             BUN/creatinine ratio (test code              See_Comment  H        

     [Automated message] The



             = 3097-3)                                           system which Pose

nerated this



                                                                 result transmit

onofre reference



                                                                 range: 6 - 22 (

calc). The



                                                                 reference range

 was not used



                                                                 to interpret th

is result as



                                                                 normal/abnormal

.

 

             Sodium (test code = 2951-2) 138 mmol/L   135-146                   

 

             Potassium (test code = 2823-3) 6.0 mmol/L   3.5-5.3      H         

   

 

             Chloride (test code = 2075-0) 107 mmol/L                     

  

 

             CO2 (test code = -) 24 mmol/L    20-32                     

 

             Calcium (test code = 02081-9) 8.7 mg/dL    8.6-10.4                

  

 

             Protein (test code = 2885-2) 6.9 g/dL     6.1-8.1                  

 

 

             Albumin, S (test code = 1751-7) 3.5 g/dL     3.6-5.1      L        

    

 

             Globulin, total (test code =              See_Comment              

  [Automated message] The



             00399-6)                                            system which ge

nerated this



                                                                 result transmit

onofre reference



                                                                 range: 1.9 - 3.

7 g/dL



                                                                 (calc). The ref

erence range



                                                                 was not used to

 interpret



                                                                 this result as



                                                                 normal/abnormal

.

 

             Albumin/globulin ratio (test              See_Comment              

  [Automated message] The



             code = 1759-0)                                        system which 

generated this



                                                                 result transmit

onofre reference



                                                                 range: 1.0 - 2.

5 (calc). The



                                                                 reference range

 was not used



                                                                 to interpret th

is result as



                                                                 normal/abnormal

.

 

             Total bilirubin (test code = 0.3 mg/dL    0.2-1.2                  

 



             1975)                                             

 

             Alkaline phosphatase (test code 69 U/L                       

    



             = 6768-6)                                           

 

             AST (test code = 1920-8) 13 U/L       10-35                     

 

             ALT (test code = 1742-6) 9 U/L        6-29                      

 

             ELADIO (test code = ELADIO)                                        

 

             RAC (test code = RAC)                                        

 

             Lab Interpretation (test code = Abnormal                           

    



             75204-3)                                            



[id number]28[facility]00





             Test Item    Value        Reference Range Interpretation Comments

 

             Cholesterol, total (test 97 mg/dL     <200                      



             code = 3-3)                                        

 

             HDL cholesterol (test 49 mg/dL     See_Comment  L             [Auto

mated message]



             code = -9)                                        The system 

ich



                                                                 generated this 

result



                                                                 transmitted ref

erence



                                                                 range: > OR = 5

0. The



                                                                 reference range

 was



                                                                 not used to int

erpret



                                                                 this result as



                                                                 normal/abnormal

.

 

             Triglycerides (test code 94 mg/dL     <150                      



             = 2571-8)                                           

 

             LDL cholesterol              mg/dL (calc)              



             calculated (test code =                                        



             23754-3)                                            

 

             Cholesterol/HDL ratio              See_Comment                [Auto

mated message]



             (test code = 9830-1)                                        [facility]

tem which



                                                                 generated this 

result



                                                                 transmitted ref

erence



                                                                 range: <5.0 (ca

lc).



                                                                 The reference r

gómez



                                                                 was not used to



                                                                 interpret this 

result



                                                                 as normal/abnor

mal.

 

             Non-HDL cholesterol              See_Comment                [Automa

onofre message]



             (test code = 18934-4)                                        The sy

stem which



                                                                 generated this 

result



                                                                 transmitted ref

erence



                                                                 range: <130 mg/

dL



                                                                 (calc). The ref

erence



                                                                 range was not u

sed to



                                                                 interpret this 

result



                                                                 as normal/abnor

mal.

 

             ELADIO (test code = ELADIO)                                        

 

             RAC (test code = RAC)                                        

 

             Lab Interpretation (test Abnormal                               



             code = 72756-9)                                        



[facility] with platelet and [id number] 11:28:00





             Test Item    Value        Reference Range Interpretation Comments

 

             WBC (test code = 6690-2)              See_Comment                [A

utomated message]



                                                                 The system [employer]



                                                                 generated this



                                                                 result transmit

onofre



                                                                 reference range

: 3.8



                                                                 - 10.8 Thousand

/uL.



                                                                 The reference r

gómez



                                                                 was not used to



                                                                 interpret this



                                                                 result as



                                                                 normal/abnormal

.

 

             RBC (test code = 789-8)              See_Comment  L             [Au

tomated message]



                                                                 The system [employer]



                                                                 generated this



                                                                 result transmit

onofre



                                                                 reference range

:



                                                                 3.80 - 5.10



                                                                 Million/uL. The



                                                                 reference range

 was



                                                                 not used to



                                                                 interpret this



                                                                 result as



                                                                 normal/abnormal

.

 

             HGB (test code = 718-7) 9.1 g/dL     11.7-15.5    L            

 

             HCT (test code = 4544-3) 28.8 %       35.0-45.0    L            

 

             MCV (test code = 787-2) 92.9 fL      80.0-100.0                

 

             MCH (test code = 785-6) 29.4 pg      27.0-33.0                 

 

             MCHC (test code = 786-4) 31.6 g/dL    32.0-36.0    L            

 

             RDW (test code = 788-0) 12.8 %       11.0-15.0                 

 

             Platelet count (test              See_Comment                [Autom

ated message]



             code = 777-3)                                        The system [employer]



                                                                 generated this



                                                                 result transmit

onofre



                                                                 reference range

: 140



                                                                 - 400 Thousand/

uL.



                                                                 The reference r

gómez



                                                                 was not used to



                                                                 interpret this



                                                                 result as



                                                                 normal/abnormal

.

 

             MPV (test code = 776-5) 10.7 fL      7.5-12.5                  

 

             Neutrophils, absolute              See_Comment                [Auto

mated message]



             (test code = 751-8)                                        The syst

em which



                                                                 generated this



                                                                 result transmit

onofre



                                                                 reference range

:



                                                                 1,500 - 7,800



                                                                 cells/uL. The



                                                                 reference range

 was



                                                                 not used to



                                                                 interpret this



                                                                 result as



                                                                 normal/abnormal

.

 

             Lymphocytes, absolute              See_Comment                [Auto

mated message]



             (test code = 731-0)                                        The syst

em which



                                                                 generated this



                                                                 result transmit

onofre



                                                                 reference range

: 850



                                                                 - 3,900 cells/u

L.



                                                                 The reference r

gómez



                                                                 was not used to



                                                                 interpret this



                                                                 result as



                                                                 normal/abnormal

.

 

             Monocytes, absolute              See_Comment                [Automa

onofre message]



             (test code = 742-7)                                        The syst

em which



                                                                 generated this



                                                                 result transmit

onofre



                                                                 reference range

: 200



                                                                 - 950 cells/uL.

 The



                                                                 reference range

 was



                                                                 not used to



                                                                 interpret this



                                                                 result as



                                                                 normal/abnormal

.

 

             Eosinophils, absolute              See_Comment                [Auto

mated message]



             (test code = 711-2)                                        The syst

em which



                                                                 generated this



                                                                 result transmit

onofre



                                                                 reference range

: 15



                                                                 - 500 cells/uL.

 The



                                                                 reference range

 was



                                                                 not used to



                                                                 interpret this



                                                                 result as



                                                                 normal/abnormal

.

 

             Basophils, absolute              See_Comment                [Automa

onofre message]



             (test code = 704-7)                                        The syst

em which



                                                                 generated this



                                                                 result transmit

onofre



                                                                 reference range

: 0 -



                                                                 200 cells/uL. T

he



                                                                 reference range

 was



                                                                 not used to



                                                                 interpret this



                                                                 result as



                                                                 normal/abnormal

.

 

             Neutrophils (test code = 77.1 %                                 



             770-8)                                              

 

             Lymphocytes (test code = 11.8 %                                 



             736-9)                                              

 

             Monocytes (test code = 5.9 %                                  



             5905-5)                                             

 

             Eosinophils (test code = 4.6 %                                  



             713-8)                                              

 

             Basophils + RC (test 0.6 %                                  



             code = 706-2)                                        

 

             ELADIO (test code = ELADIO)                                        

 

             RAC (test code = RAC)                                        

 

             Lab Interpretation (test Abnormal                               



             code = 23646-1)                                        



[id number]





             Test Item    Value        Reference Range Interpretation Comments

 

             Hemoglobin A1C (test              See_Comment                [Autom

ated message] The



             code = 4548-4)                                        system which 

generated



                                                                 this result tra

nsmitted



                                                                 reference range

: <5.7 %



                                                                 of total Hgb. T

he



                                                                 reference range

 was not



                                                                 used to interpr

et this



                                                                 result as



                                                                 normal/abnormal

.

 

             ELADIO (test code = ELADIO)                                        

 

             RAC (test code = RAC)                                        



[facility]

## 2021-08-15 NOTE — ER
Nurse's Notes                                                                                     

 [facility]                                                                 

Name: Gladys Ramos                                                                                    

Age: 81 yrs                                                                                       

Sex: Female                                                                                       

: 1940                                                                                   

MRN: [id number]                                                                                   

Arrival Date: 08/15/2021                                                                          

Time: 08:45                                                                                       

Account#: [id number]                                                                            

Bed 20                                                                                            

Private MD:                                                                                       

Diagnosis: Chronic kidney disease, stage 4 (severe);Hyperkalemia;Acute pulmonary edema            

                                                                                                  

Presentation:                                                                                     

08/15                                                                                             

08:47 Chief complaint: Patient's son or daughter states: Decreased urinary output over the    ss  

      past 48 hours. Patient c/o fatigue. Coronavirus screen: Client denies travel out of the     

      [place] in the last 14 days. Ebola Screen: Patient denies exposure to infectious person.       

      Patient denies travel to an Ebola-affected area in the 21 days before illness onset.        

      Initial Sepsis Screen: Does the patient meet any 2 criteria? No. Patient's initial          

      sepsis screen is negative. Does the patient have a suspected source of infection? Yes:      

      Dysuria/Frequency/Urgency/UTI. Risk Assessment: Do you want to hurt yourself or someone     

      else? Patient reports no desire to harm self or others. Note Sewell changed out two days     

      ago. Daughter noted decrease in urinary output, so [facility] home health came out to adjust       

      sewell yesterday and noted small, but increased return. Onset of symptoms is unknown.        

08:47 Method Of Arrival: EMS: [facility]                                                  

08:47 Acuity: ANATOLY 3                                                                           ss  

                                                                                                  

Historical:                                                                                       

- Allergies:                                                                                      

09:00 ambien;                                                                                 ss  

09:00 Levaquin;                                                                               ss  

- PMHx:                                                                                           

09:00 CHF; AAA; Diabetes - IDDM; Hyperlipidemia; Hypertension; Pneumonia; OA;                 ss  

                                                                                                  

- Immunization history:: Client reports receiving the 2nd dose of the Covid vaccine.              

- Social history:: Smoking status: Patient denies any tobacco usage or history of.                

                                                                                                  

                                                                                                  

Screenin:31 Abuse screen: Denies threats or abuse. Denies injuries from another. Nutritional        ae4 

      screening: No deficits noted. Tuberculosis screening: No symptoms or risk factors           

      identified. Fall Risk No fall in past 12 months (0 pts). Secondary diagnosis (15            

      points) Patient is confused.. IV access (20 points). Ambulatory Aid-                        

      Crutches/Cane/Walker (15 pts). Gait- Weak (10 pts.). Mental Status-                         

      Overestimates/Forgets Limitations (15 pts.).                                                

                                                                                                  

Assessment:                                                                                       

09:21 General: Appears in no apparent distress. comfortable, Behavior is calm, cooperative.   ae4 

      Pain: Denies pain. Neuro: Level of Consciousness is awake, obeys commands, Quiet.           

      Oriented to person, place. Cardiovascular: Patient's skin is warm and dry. Respiratory:     

      Airway is patent Respiratory effort is even, unlabored, shallow, Respiratory pattern is     

      regular. GI: Abdomen is round. : Sewell in place Urine is cloudy.                          

09:50 Reassessment: Patient appears in no apparent distress at this time. Patient and/or      ae4 

      family updated on plan of care and expected duration. Pain level reassessed.                

12:06 Reassessment: Patient appears in no apparent distress at this time. Patient and/or      ae4 

      family updated on plan of care and expected duration. Pain level reassessed.                

13:31 Reassessment: Patient appears in no apparent distress at this time. Patient and/or      ae4 

      family updated on plan of care and expected duration. Pain level reassessed. Patient        

      denies pain at this time.                                                                   

16:00 Reassessment: Potassium level drawn and sent to lab.                                    ae4 

16:01 Reassessment: Patient appears in no apparent distress at this time. Patient and/or      ae4 

      family updated on plan of care and expected duration. Pain level reassessed. Patient is     

      resting with eyes closed. Patient awakens to verbal stimuli. Respirations even and          

      unlabored.                                                                                  

16:42 Reassessment: FSBS 113.                                                                 ae4 

17:15 Reassessment: Patient's daughter at bedside.                                            ae4 

18:48 Reassessment: Patient appears in no apparent distress at this time. Patient and/or      ae4 

      family updated on plan of care and expected duration. Pain level reassessed.                

19:29 Reassessment: Patient appears in no apparent distress at this time. Patient and/or      ae4 

      family updated on plan of care and expected duration. Pain level reassessed.                

19:29 Reassessment: 100 mls of yellow urine in Sewell drainage bag.                            ae4 

19:41 Reassessment: FSBS 111.                                                                 ae4 

20:30 Reassessment: Spoke with BUZZ Barrett about patient; no orders in [employer] for patient      lp1 

      admission.                                                                                  

22:15 Reassessment: Transferred patient to hospital bed; linens changed, no BM noted; Sewell   lp1 

      with about 100ml of urine noted;. Cardiovascular: Edema is 2+ to left knee, left            

      midcalf, left foot, right knee, right midcalf and right foot. Derm: Wound noted Wound       

      is Abrasion noted to anterior left lower shin, dressed with Neosporin and 4x4's, foam       

      tape.                                                                                       

22:15 Neuro: Level of Consciousness is lethargic, Oriented to person.                         lp1 

                                                                                                  

Vital Signs:                                                                                      

08:47  / 57; Pulse 96; Resp 16; Temp 97.0(TE); Pulse Ox 95% on R/A; Weight 92.53 kg;    ss  

      Pain 0/10;                                                                                  

09:45  / 61; Pulse 65; Resp 13; Pulse Ox 92% on R/A;                                    ae4 

10:30  / 88; Pulse 73; Resp 15; Pulse Ox 97% on R/A;                                    ae4 

11:15  / 58; Pulse 83; Resp 13; Pulse Ox 95% on R/A;                                    ae4 

12:03  / 76; Pulse 72; Resp 13; Pulse Ox 98% on R/A; Pain 0/10;                         ae4 

13:36  / 74; Pulse 67; Resp 13; Pulse Ox 99% on 8% Nebulizer Mask;                      ae4 

16:44  / 60; Pulse 66; Resp 21; Pulse Ox 95% on R/A;                                    ae4 

17:15  / 59; Pulse 67; Resp 15; Pulse Ox 99% on R/A;                                    ae4 

18:45  / 68; Pulse 69; Resp 15; Pulse Ox 99% on R/A;                                    ae4 

19:30  / 58; Pulse 65; Resp 16; Pulse Ox 99% on R/A;                                    lp1 

20:00  / 81; Pulse 65; Resp 13; Pulse Ox 98% on R/A;                                    lp1 

20:45  / 59; Pulse 66; Resp 16; Pulse Ox 98% on R/A;                                    lp1 

22:00  / 42; Pulse 65; Resp 13; Pulse Ox 100% on R/A;                                   lp1 

                                                                                                  

ED Course:                                                                                        

08:45 Patient arrived in ED.                                                                  ss  

08:55 Rafiq Hugo MD is Attending Physician.                                            tw4 

08:58 Adan Escalante RN is Primary Nurse.                                                    fu  

09:00 Triage completed.                                                                       ss  

09:00 Arm band placed on right wrist.                                                         ss  

09:31 Inserted saline lock: 22 gauge in left antecubital area, using aseptic technique. Blood ae4 

      collected.                                                                                  

10:42 Patient has correct armband on for positive identification. Bed in low position. Call   mh5 

      light in reach. Side rails up X2. Warm blanket given. Pillow given. Cardiac monitor on.     

      Pulse ox on. NIBP on.                                                                       

10:42 Initial lab(s) drawn, by ED staff, sent to lab. EKG done, by ED staff, reviewed by      5 

      Rafiq Hugo MD COVID swab sent to lab.                                                  

11:38 CXR XRAY In Process Unspecified.                                                        EDMS

13:18 Deepak Frances is Hospitalizing Provider.                                               tw4 

13:30 Primary Nurse role handed off by Adan Escalante, KEVYN                                       

13:36 Jayden Price, RN is Primary Nurse.                                                   ae4 

18:25 patient's daughters Ирина [phone] and Ling [phone].                      mw2 

19:58 No provider procedures requiring assistance completed. Patient admitted, IV remains in  lp1 

      place.                                                                                      

21:00 Inserted saline lock: 20 gauge in right wrist, using aseptic technique.                 lp1 

21:08 Repeat lab(s) drawn. by me, sent to lab.                                                lp1 

                                                                                             

06:11 Primary Nurse role handed off by Jayden Price, KEVYN                                    mw2 

07:22 Inés Acosta, RN is Primary Nurse.                                                     ll1 

10:25 Assist provider with chest tube insertion in right GROIN Tray was set up. Chest tube    mh5 

      inserted by Kwesi Ricardo MD Placement verified by Dressed with Patient tolerated well.       

10:26 Assisted provider with central line placement. Set up central line tray. Double lumen   mh5 

      lined placed in right GROIN Line placed by Kwesi Ricardo MD Placement verified by Dressed     

      with.                                                                                       

                                                                                                  

Administered Medications:                                                                         

08/15                                                                                             

07:30 Drug: Sodium Bicarbonate 50 mEq Route: IVP; Site: left antecubital;                     ae4 

18:20 Follow up: Response: No adverse reaction                                                ae4 

09:21 Drug: NS 0.9% 500 ml Route: IV; Rate: bolus; Site: left antecubital;                    ae4 

10:30 Follow up: IV Intake: 500ml                                                             ae4 

10:30 Follow up: IV Status: Completed infusion                                                ae4 

10:43 Follow up: Response: No adverse reaction; IV Intake: 500ml                              ae4 

11:09 Drug: D50W 50 ml Route: IVP; Site: left antecubital;                                    ae4 

12:03 Follow up: Response: No adverse reaction                                                ae4 

11:14 Drug: Insulin Regular Human 10 units {Co-Signature: ae4 (Jayden Price RN).} Route:    ss  

      IVP; Site: left antecubital;                                                                

18:16 Follow up: Response: FSBS 113                                                           ae4 

11:17 Drug: Sodium Bicarbonate 50 mEq Route: IVP; Site: left antecubital;                     ae4 

12:03 Follow up: Response: No adverse reaction                                                ae4 

11:21 Drug: Calcium Chloride 1 grams Route: IVP; Site: left antecubital;                      ae4 

12:03 Follow up: Response: No adverse reaction                                                ae4 

13:31 Drug: Albuterol 2.5 mg Route: Inhalation;                                               ae4 

17:27 Drug: D50W 50 ml Route: IVP; Site: left antecubital;                                    ae4 

18:20 Follow up: Response: No adverse reaction                                                ae4 

17:38 Drug: Kayexalate (polystyrene) 60 grams Route: PO;                                      ae4 

22:05 Follow up: Response: No adverse reaction                                                lp1 

17:39 Drug: Insulin Regular Human 10 units {Co-Signature: ss (Kelly Dunbar RN).} Route: IVP; ae4 

      Site: left antecubital;                                                                     

19:41 Follow up: FSBS 111                                                                     ae4 

17:50 Drug: Albuterol 2.5 mg Route: Inhalation;                                               ae4 

18:14 Drug: Albuterol 2.5 mg Route: Inhalation;                                               ae4 

19:29 Drug: Albuterol 2.5 mg Route: Inhalation;                                               ae4 

21:00 Drug: Calcium Gluconate 1 grams Route: IVPB; Infused Over: 60 mins; Site: left          lp1 

      antecubital;                                                                                

22:20 Follow up: IV Status: Completed infusion; IV Intake: 100ml                              lp1 

21:30 Drug: Fleet Enema (sodium phosphate) 133 ml Route: OH;                                  lp1 

22:36 Follow up: Response: No adverse reaction                                                lp1 

22:00 Drug: Albuterol 2.5 mg Route: Inhalation;                                               lp1 

22:00 Drug: Insulin Regular Human 10 units {Co-Signature: ea (Mitzy Newsome RN).} Route: IVP; lp1 

      Site: right wrist;                                                                          

22:36 Follow up: Response: No adverse reaction                                                lp1 

22:00 Drug: D50W 50 ml Route: IVP; Site: right wrist;                                         lp1 

22:36 Follow up: Response: No adverse reaction                                                lp1 

22:05 Drug: Kayexalate (polystyrene) 60 grams Route: PO;                                      lp1 

22:36 Follow up: Response: No adverse reaction                                                lp1 

                                                                                                  

                                                                                                  

Intake:                                                                                           

10:30 IV: 500ml; Total: 500ml.                                                                ae4 

10:43 IV: 500ml; Total: 1000ml.                                                               ae4 

22:20 IV: 100ml; Total: 1100ml.                                                               lp1 

                                                                                                  

Outcome:                                                                                          

13:18 Decision to Hospitalize by Provider.                                                    tw4 

19:58 Admitted to ER Hold.  Please see [facility] for further documentation.                    lp1 

19:58 Condition: stable                                                                           

19:58 Instructed on the need for admit.                                                           

                                                                                             

16:41 Patient left the ED.                                                                    ll1 

                                                                                                  

Signatures:                                                                                       

Dispatcher [employer]                           EDMS                                                 

Kelly Dunbar RN RN                                                      

Lizzy Sanchez RN RN   1                                                  

Cherry Bonner                              [facility]                                                  

Adan Escalante RN                      KEVYN                                                      

Rafiq Hugo MD MD   4                                                  

Edwin Hooker                            [facility]                                                  

Rupa Alvarado Andrea, RN RN   ae4                                                  

Inés Acosta RN RN   [facility]                                                  

Jayden Price RN                            ae4                                                  

Kelly Dunbar RN                                                                                

Mitzy Newsome RN                                                                                

                                                                                                  

**************************************************************************************************

## 2021-08-15 NOTE — P.HP
Certification for Inpatient


Patient admitted to: Inpatient


With expected LOS: >2 Midnights


Patient will require the following post-hospital care: None


Practitioner: I am a practitioner with admitting privileges, knowledge of 

patient current condition, hospital course, and medical plan of care.


Services: Services provided to patient in accordance with Admission requirements

found in Title 42 Section 412.3 of the Code of Federal Regulations





Patient History


Date of Service: 08/15/21


Reason for admission: hyperkalemia, renal failure


History of Present Illness: 


Ms. Ramos is an 82 yo F with HTN, DM, HLD, chronic hydronephrosis and hydroureter 

with sewell catheter who presents with one day of increased lethargy, weakness 

and confusion. Daughter says she took her morning pills and coffee, but her 

mother was not responding and was unable to tell her the correct year. She asked

her mom to hold up a box of bandaids, which she was too weak to do, so the shantelle

[facility] called EMS. She is currently AOx1. Daughter says that patient has also had

decreased urine output from her sewell catheter. She was given antibiotics for a 

UTI about a week ago. Na 133, K 6.3, Cl 111, CO3 17. BUN 65, Cr 2.35, GFR 20. 

CXR shows pulmonary edema vs pneumonia. Received kayexalate, fleet enema, 

albuterol, insulin and glucose, lasix, and calcium gluconate in the ED. 


Allergies





levofloxacin [From Levaquin] Allergy (Verified 21 22:11)


   Anaphylaxis


zolpidem [From Ambien] Allergy (Verified 21 22:11)


   Unknown





Home Medications: 








Atorvastatin Calcium [Lipitor*] 1 tab PO BEDTIME 21 


Duloxetine [Cymbalta *] 1 tab PO DAILY 21 


Gabapentin [Neurontin*] 1 cap PO TID 21 


Hydralazine [Apresoline*] 2 tab PO TID 21 


Insulin Aspart (Niacinamide) [Fiasp 100 Unit/ml Flextouch] See Protocol SQ ACHS 

21 


carvediloL [Carvedilol] 1 tab PO BID 21 


Docusate [Colace Cap*] 100 mg PO DAILY #30 cap 21 


Tamsulosin [Flomax*] 0.4 mg PO BEDTIME #30 cap 21 


Thiamine HCl [Vitamin B-1*] 100 mg PO BID #60 tablet 21 


Aspirin [Aspirin EC 81 MG] 81 mg PO DAILY #30 tablet. 21 


Calcitrol [Rocaltrol*] 0.25 mcg PO DAILY #30 cap 21 


Sevelamer Carbonate [Renvela*] 1,600 mg PO TIDWM #180 tablet 21 


Sodium Bicarbonate 1,300 mg PO BID #120 tablet 21 


Acetaminophen/Diphenhydramine [Tylenol Pm Ex-Strength Caplet] 1 each PO BEDTIME 

PRN 21 


Iron 27 Mg 1 tab PO DAILY 21 


Mineral Oil 30 ml PO DAILY #1000 ml 21 


Sulfamethoxazole/Trimethoprim [Bactrim Ds Tablet] 1 each PO DAILY #5 tablet 

21 


metroNIDAZOLE [Flagyl] 500 mg PO Q8H #20 tablet 21 








- Past Medical/Surgical History


Diabetic: Yes


-: HTN


-: Diabetes mellitus type 2


-: Chronic hydronephrosis/hydroureter with prior stents


-: Arthritis to the knees and hips


-: Abdominal aortic aneurysm


-: Hyperlipidemia


-: Diabetic neuropathy


-: Iron deficiency anemia


-: Degenerative disc and joint disease


-: Hysterectomy


-: 


-: Hernia repair


-: Cataract surgery


Psychosocial/ Personal History: Patient lives with daughter.  She is a .





- Family History


  ** Father


-: Heart disease


Notes: MI





  ** Mother


-: Kidney disease





- Social History


Smoking Status: Unknown if ever smoked


Alcohol use: No


CD- Drugs: No


Caffeine use: Yes


Place of Residence: Home





Review of Systems


is unable to be obtained


General: Weakness, As per HPI


Neurological: Weakness, Confusion, As per HPI





Physical Examination





- Physical Exam


General: In no apparent distress, Oriented x1, Confused


HEENT: Atraumatic, PERRLA, Mucous membr. moist/pink, EOMI, Sclerae nonicteric


Neck: Supple, 2+ carotid pulse no bruit, No LAD, Without JVD or thyroid 

abnormality


Respiratory: Diminished, Crackles/rales


Cardiovascular: Regular rate/rhythm, Normal S1 S2, Edema


Capillary refill: <2 Seconds


Gastrointestinal: Normal bowel sounds, No tenderness


Musculoskeletal: No tenderness


Integumentary: No rashes


Neurological: Normal tone, Sensation intact, Abnormal speech, Abnormal strength,

 Abnormal affect


Lymphatics: No axilla or inguinal lymphadenopathy


Urinary: Sewell catheter





- Studies


Laboratory Data (last 24 hrs)





08/15/21 21:54: PT 13.6 H, INR 1.18


08/15/21 21:05: Troponin I < 0.02


08/15/21 21:05: Sodium 135 L, Potassium 6.3 H*, BUN 66 H, Creatinine 2.37 H, 

Glucose 97


08/15/21 20:54: Sodium Cancelled, Potassium Cancelled, BUN Cancelled, Creatinine

 Cancelled, Glucose Cancelled


08/15/21 20:53: Sodium Cancelled, Potassium Cancelled, BUN Cancelled, Creatinine

 Cancelled, Glucose Cancelled


08/15/21 15:54: Potassium 6.4 H*


08/15/21 09:21: WBC 11.60 H, Hgb 10.1 L, Hct 32.3 L, Plt Count 194


08/15/21 09:21: Sodium 133 L, Potassium 6.3 H*, BUN 65 H, Creatinine 2.35 H, 

Glucose 128 H, Total Bilirubin 0.2, AST 5 L, ALT 12, Alkaline Phosphatase 98, 

Lipase 27 L








Assessment and Plan





- Problems (Diagnosis)


(1) Acute kidney injury


Current Visit: No   Status: Acute   





(2) Altered mental status


Onset Date: 01/14/15   Current Visit: No   Status: Acute   


Qualifiers: 


   Altered mental status type: unspecified   Qualified Code(s): R41.82 - Altered

 mental status, unspecified   





(3) Chronic anemia


Current Visit: No   Status: Acute   





(4) Complicated UTI (urinary tract infection)


Current Visit: No   Status: Acute   





(5) History of urinary retention


Current Visit: No   Status: Chronic   





(6) Hyperkalemia


Current Visit: No   Status: Acute   





(7) Lymphedema


Current Visit: No   Status: Chronic   





(8) Neuropathy


Current Visit: No   Status: Chronic   





(9) Obstructive uropathy


Current Visit: No   Status: Chronic   





(10) Renal insufficiency


Onset Date: 18   Current Visit: No   Status: Chronic   





(11) Weakness generalized


Onset Date: 01/14/15   Current Visit: No   Status: Acute   





(12) Diabetes mellitus


Onset Date: 11/30/15   Current Visit: No   Status: Chronic   


Qualifiers: 


   Diabetes mellitus type: type 2   Diabetes mellitus long term insulin use: 

unspecified long term insulin use status   Diabetes mellitus complication 

status: with kidney complications   Diabetes mellitus complication detail: with 

chronic kidney disease   Chronic kidney disease stage: stage 5, not on chronic 

dialysis   Qualified Code(s): E11.22 - Type 2 diabetes mellitus with diabetic 

chronic kidney disease; N18.5 - Chronic kidney disease, stage 5   





(13) Hyperlipidemia


Onset Date: 11/30/15   Current Visit: No   Status: Chronic   


Qualifiers: 


   Hyperlipidemia type: unspecified   Qualified Code(s): E78.5 - Hyperlipidemia,

 unspecified   





(14) Hypertension


Onset Date: 18   Current Visit: No   Status: Chronic   


Qualifiers: 


   Hypertension type: primary hypertension   Qualified Code(s): I10 - Essential 

(primary) hypertension   





- Plan


nephrology consulted, surgery consulted for dialysis catheter


on telemetry, ECHO pending


repeat BMP in the AM 


continue bicarb drip, continue IV thiamine daily, continue fludrocortisone PO 

daily 


continue IV antibiotics - vancomycin and zosyn


BP stable, continue to monitor 


sliding scale insulin and accuchecks


reconcile and continue home medications


DVT ppx 


Discharge Plan: Home


Plan to discharge in: 72 Hours





- Advance Directives


Does patient have a Living Will: Yes


Does patient have a Durable POA for Healthcare: Yes





- Code Status/Comfort Care


Code Status Assessed: Yes (full code )


Critical Care: No


Time Spent Managing Pts Care (In Minutes): 70

## 2021-08-16 VITALS — SYSTOLIC BLOOD PRESSURE: 199 MMHG | DIASTOLIC BLOOD PRESSURE: 81 MMHG | TEMPERATURE: 97.6 F

## 2021-08-16 VITALS — OXYGEN SATURATION: 94 %

## 2021-08-16 LAB
ALBUMIN SERPL BCP-MCNC: 2.9 G/DL (ref 3.4–5)
ALP SERPL-CCNC: 79 U/L (ref 45–117)
ALT SERPL W P-5'-P-CCNC: 12 U/L (ref 12–78)
AST SERPL W P-5'-P-CCNC: 6 U/L (ref 15–37)
BUN BLD-MCNC: 51 MG/DL (ref 7–18)
BUN BLD-MCNC: 66 MG/DL (ref 7–18)
BUN BLD-MCNC: 66 MG/DL (ref 7–18)
GLUCOSE SERPLBLD-MCNC: 121 MG/DL (ref 74–106)
GLUCOSE SERPLBLD-MCNC: 130 MG/DL (ref 74–106)
GLUCOSE SERPLBLD-MCNC: 95 MG/DL (ref 74–106)
HCT VFR BLD CALC: 28 % (ref 36–45)
LYMPHOCYTES # SPEC AUTO: 0.6 K/UL (ref 0.7–4.9)
MAGNESIUM SERPL-MCNC: 2 MG/DL (ref 1.8–2.4)
PMV BLD: 9 FL (ref 7.6–11.3)
POTASSIUM SERPL-SCNC: 4 MMOL/L (ref 3.5–5.1)
POTASSIUM SERPL-SCNC: 5.5 MMOL/L (ref 3.5–5.1)
POTASSIUM SERPL-SCNC: 5.6 MMOL/L (ref 3.5–5.1)
RBC # BLD: 2.96 M/UL (ref 3.86–4.86)

## 2021-08-16 PROCEDURE — 5A1D70Z PERFORMANCE OF URINARY FILTRATION, INTERMITTENT, LESS THAN 6 HOURS PER DAY: ICD-10-PCS

## 2021-08-16 PROCEDURE — 0BH17EZ INSERTION OF ENDOTRACHEAL AIRWAY INTO TRACHEA, VIA NATURAL OR ARTIFICIAL OPENING: ICD-10-PCS

## 2021-08-16 PROCEDURE — 06HY33Z INSERTION OF INFUSION DEVICE INTO LOWER VEIN, PERCUTANEOUS APPROACH: ICD-10-PCS

## 2021-08-16 RX ADMIN — SODIUM CHLORIDE SCH MLS: 0.45 INJECTION, SOLUTION INTRAVENOUS at 20:42

## 2021-08-16 RX ADMIN — SODIUM CHLORIDE SCH: 0.45 INJECTION, SOLUTION INTRAVENOUS at 12:20

## 2021-08-16 RX ADMIN — TAZOBACTAM SODIUM AND PIPERACILLIN SODIUM SCH MLS: 250; 2 INJECTION, SOLUTION INTRAVENOUS at 00:55

## 2021-08-16 RX ADMIN — HUMAN INSULIN SCH: 100 INJECTION, SOLUTION SUBCUTANEOUS at 21:00

## 2021-08-16 RX ADMIN — ALBUTEROL SULFATE SCH MG: 2.5 SOLUTION RESPIRATORY (INHALATION) at 04:05

## 2021-08-16 RX ADMIN — HUMAN INSULIN SCH: 100 INJECTION, SOLUTION SUBCUTANEOUS at 11:30

## 2021-08-16 RX ADMIN — THIAMINE HYDROCHLORIDE SCH MG: 100 INJECTION, SOLUTION INTRAMUSCULAR; INTRAVENOUS at 08:02

## 2021-08-16 RX ADMIN — ALBUTEROL SULFATE SCH MG: 2.5 SOLUTION RESPIRATORY (INHALATION) at 01:35

## 2021-08-16 RX ADMIN — HUMAN INSULIN SCH: 100 INJECTION, SOLUTION SUBCUTANEOUS at 16:30

## 2021-08-16 RX ADMIN — HUMAN INSULIN SCH: 100 INJECTION, SOLUTION SUBCUTANEOUS at 07:30

## 2021-08-16 RX ADMIN — Medication SCH ML: at 08:02

## 2021-08-16 RX ADMIN — TAZOBACTAM SODIUM AND PIPERACILLIN SODIUM SCH: 250; 2 INJECTION, SOLUTION INTRAVENOUS at 01:00

## 2021-08-16 RX ADMIN — ALBUTEROL SULFATE SCH: 2.5 SOLUTION RESPIRATORY (INHALATION) at 08:00

## 2021-08-16 RX ADMIN — THIAMINE HYDROCHLORIDE SCH MG: 100 INJECTION, SOLUTION INTRAMUSCULAR; INTRAVENOUS at 00:40

## 2021-08-16 RX ADMIN — Medication SCH ML: at 20:43

## 2021-08-16 RX ADMIN — FLUDROCORTISONE ACETATE SCH: 0.1 TABLET ORAL at 09:00

## 2021-08-16 NOTE — CON
Date of Consultation:  08/15/2021



Chief Complaint:  Acute on chronic kidney injury, hyperkalemia, metabolic acidosis.



History Of Present Illness:  The patient is an 81-year-old woman with history of diabetes mellitus, h
ypertension, chronic kidney disease stage 4, chronic hydronephrosis and hydroureter, chronic indwelli
ng Dinero catheter, recurrent urinary tract infection.  She was brought to emergency room because of p
rogressively worse weakness, confusion.  The patient was taking Bactrim prior to this admission and s
he was found to have metabolic acidosis secondary to chronic and acute kidney injury and hyperkalemia
.  She was started on sodium bicarbonate drip and received Kayexalate with multiple dosages to contro
l hyperkalemia.  The patient received albuterol as well as IV dextrose and insulin and calcium glucon
ate to treat hyperkalemia.  Primarily potassium was 6.3, sodium 133, chloride 111, CO2 17, BUN 65, cr
eatinine 2.35.  The patient was started on bicarbonate drip to control acidosis related to kidney ins
ufficiency.  Chest x-ray shows pulmonary edema.  The patient received IV Lasix as well.  The patient 
has borderline oliguria and she will have dialysis as soon as possible.  She was started on Lasix, al
though she did not respond to Lasix.  Urine output has not increased significantly.  The patient has 
history of secondary hyperparathyroid.  She was taking calcitriol prior to this admission.  Calcium l
evel is within normal limits. 



The patient has chronic metabolic acidosis related to complicated history of obstructive uropathy and
 chronic kidney disease.  At home, she was taking sodium bicarbonate tablet.



Review of Systems:

Unobtainable.  The patient is lethargic.



Past Medical History:  Diabetes mellitus, chronic hydronephrosis, hydroureter with prior stent, arthr
itis to the knees and hips, abdominal aortic aneurysm, hyperlipidemia, diabetic nephropathy, neuropat
hy, retinopathy, iron deficiency anemia, degenerated disk and joint disease, hysterectomy, ,
 hernia repair, cataract surgery, hypertension.



Family History:  Father with heart disease.  Mother with kidney disease.



Social History:  No history of tobacco, alcohol, or recreational drugs.



Physical Examination:

General:  The patient is not in acute distress, oriented x1, confused, encephalopathic. 

Eyes:  Anicteric sclerae.  EOMI. 

Neck:  Supple.  No bruits. 

Lungs:  Diminished crackles present at bases. 

Cardiovascular:  S1, S2.  Peripheral edema present in lower extremity and upper extremity. 

Abdomen:  Soft, benign, obese.  No rebound.  No guarding. 

Neurologic:  The patient is moving extremities.  Cranial nerves intact. 

Urinary:  Dinero catheter in place.



Laboratory Data:  Sodium 135, potassium 6.3, BUN 66, creatinine 2.37, glucose 97.  Potassium repeat 6
.4, creatinine 2.35, BUN 65.



Impression And Plan:  

1.Acute on chronic kidney injury.  The patient has advanced chronic kidney disease.  She presented w
ith generalized weakness, encephalopathy.  The patient is undergoing workup for possible cerebrovascu
lar accident.  The patient will need treatment for acute kidney injury and plan is to initiate dialys
is as soon as the patient has dialysis catheter placement.  The patient will require temporary dialys
is catheter and subsequently when stable, she may have tunneled dialysis catheter to continue dialysi
s.

2.Complicated urinary tract infection.  Reassess the culture and continue broad-spectrum antibiotic 
as well as Bactrim in view of acute kidney injury on chronic kidney disease.

3.Hyperkalemia, persistent.  Continue treatment with Kayexalate and 

sodium bicarbonate drip to control metabolic acidosis.

4.Chronic anemia.  The patient may benefit from MYA.





KEILY/RAMYA

DD:  2021 21:40:57Voice ID:  [id number]

DT:  2021 23:27:24Report ID:  [id number]

## 2021-08-16 NOTE — OP
Date of Procedure:  08/16/2021



Surgeon:  Kwesi Ricardo MD, MD



Preoperative Diagnosis:  Acute renal failure.



Postoperative Diagnosis:  Acute renal failure.



Procedure Performed:  Placement of right femoral temporary non-tunneled hemodialysis catheter.



Anesthesia:  Local, 1% lidocaine used.



Estimated Blood Loss:  Less than 10 mL.



Specimen:  None.



Findings:  Dark nonpulsatile blood was returned.



Complications:  None.



Implants:  Double-lumen Mahurkar temporary hemodialysis catheter.



Procedure In Detail:  After informed consent was obtained from the patient's family, the patient was 
prepped and draped in the usual sterile fashion after adequate anesthesia was achieved using anatomic
 landmarks.  I cannulated the femoral vein on the first attempt using a microintroducer needle after 
appropriately anesthetizing the skin with 1% lidocaine.  I then advanced a microwire into the femoral
 vein without evidence of complication.  Dark red nonpulsatile blood was returned.  After the wire wa
s advanced appropriately, the needle was removed and a small nick incision was made in the overlying 
skin.  At this point, the microintroducer sheath was placed and the microwire was removed.  At this p
oint, the inner cannula was removed.  The standard wire was advanced in the appropriate location at t
his point.  Once again, only dark nonpulsatile blood was returned.  I then using Seldinger technique 
performed sequential dilatation over the wire and then advanced the catheter into the femoral vein wi
thout evidence of complication.  The standard wire was then removed.  Both ports withdrew back dark r
ed nonpulsatile blood and returned quite easily, and they flushed and were packed with initially sali
ne followed by heparin super flush in both ports, 2 mL per 3000 units per port.  At this point, the a
kary was cleansed and the catheter was secured to the skin with the attached nylon suture.  Sterile dr
isabellaing placed over top.  The patient tolerated the procedure well without evidence of complication an
d remained in the ICU in serious condition throughout the procedure.  All counts were correct at the 
end of the case.





TK/MODL

DD:  08/16/2021 13:23:01Voice ID:  [id number]

DT:  08/16/2021 15:27:50Report ID:  [id number]

## 2021-08-16 NOTE — RAD REPORT
EXAM DESCRIPTION:  US - Renal Ultrasound-Complete - 8/15/2021 10:51 pm

 

CLINICAL HISTORY:  arf

 

COMPARISON:  Abdomen   Pelvis Wo Contrast dated 7/4/2021

 

FINDINGS:  Unremarkable right kidney without hydronephrosis. The right kidney measures 9.2 centimeter
s in long axis. The left kidney was not visualized. On prior CT, it was severely atrophic. The patien
t's body habitus limited evaluation, however. There is some ascites incidentally noted in the left lo
wer quadrant. No right renal masses or stones identified.

 

IMPRESSION:  Unremarkable appearance of the right kidney. No right-sided hydronephrosis per Left kidn
ey was not visualized, in part due to patient's body habitus. The kidney was also severely atrophic o
n the prior CT dated 07/04/2021.

## 2021-08-16 NOTE — ECHO
HEIGHT: 5 ft 5 in   WEIGHT: 203 lb 14.841 oz   DATE OF STUDY: 8/16/21   REFER DR: Arnold Hdz

2-DIMENSIONAL: YES

     M.MODE: YES

 DOPPLER: YES

COLOR FLOW: YES



                    TDS:  YES

PORTABLE: NO

 DEFINITY:  NO

BUBBLE STUDY: [*]





DIAGNOSIS:  VOLUME OVERLOAD



CARDIAC HISTORY:  

CATHERIZATION: 

SURGERY: 

PROSTHETIC VALVE: 

PACEMAKER: 





MEASUREMENTS (cm)

    DIASTOLIC (NORMALS)                 SYSTOLIC (NORMALS)

IVSd                 1.2 (0.6-1.2)                    LA Diam 3.5 (1.9-4.0)     LVEF       
  79%  

LVIDd               3.3 (3.5-5.7)                        LVIDs      1.8 (2.0-3.5)     %FS  
        46%

LVPWd             1.2 (0.6-1.2)

Ao Diam           3.1 (2.0-3.7)



2 DIMENSIONAL ASSESSMENT:

RIGHT ATRIUM:                   NORMAL

LEFT ATRIUM:       NORMAL



RIGHT VENTRICLE:            NORMAL

LEFT VENTRICLE: NORMAL



TRICUSPID VALVE:             MILD TRICUSPID REGURGITATION

MITRAL VALVE:     MILD MITRAL REGURGITATION, MITRAL ANNULAR CALCIFICATION



PULMONIC VALVE:             MILD PULMONIC REGURGITATION

AORTIC VALVE:     AORTIC STENOSIS



PERICARDIAL EFFUSION: NONE

AORTIC ROOT:      NORMAL





LEFT VENTRICULAR WALL MOTION:     NORMAL.



DOPPLER/COLOR FLOW:     SEE BELOW.



COMMENTS:      NORMAL LEFT VENTRICULAR EJECTION FRACTION 55-60% WITH NORMAL WALL MOTION. 
MILD TRICUSPID REGURGITATION, MILD PULMONIC REGURGITATION. MITRAL ANNULAR CALCIFICATION 
WITH MILD MITRAL STENOSIS, AND MILD MITRAL REGURGITATION. HEAVILY CALCIFIED AORTIC VALVE 
WITH AT LEAST MODERATE AORTIC STENOSIS. SEVERE PULMONARY HYPERTENSION WITH RIGHT 
VENTRICULAR SYSTOLIC PRESSURE >60mmHg.



TECHNOLOGIST:   LONNIE SOLIS

## 2021-08-16 NOTE — RAD REPORT
EXAM DESCRIPTION:  CT - Head Brain Wo Cont - 8/16/2021 7:15 am

 

CLINICAL HISTORY:  The patient is 81 years old and is Female; AMS

 

TECHNIQUE:  Axial computed tomography images of the head/brain without intravenous contrast.   Sagitt
al and coronal reformatted images were created and reviewed.   This CT exam was performed using one o
r more of the following dose reduction techniques:   automated exposure control, adjustment of the mA
 and/or kV according to patient size, and/or use of iterative reconstruction technique.

 

COMPARISON:  CT head June 5, 2021

 

FINDINGS:  Brain:   Mild cerebral atrophy.

         No hemorrhage.   No significant white matter disease.

   Ventricles:   Unremarkable.   No ventriculomegaly.

   Bones/joints:   Unremarkable.   No acute fracture.

   Soft tissues:   Unremarkable.

   Sinuses:   Unremarkable as visualized.

   Mastoid air cells:   Unremarkable as visualized.   No mastoid effusion.

 

IMPRESSION:  No acute intracranial abnormality.

 

Electronically signed by:   Lalo Jimenez MD   8/16/2021 12:17 AM CDT Workstation: [id number]

 

 

Due to temporary technical issues with the PACS/Fluency reporting system, reports are being signed by
 the in house radiologist without review as a courtesy to ensure prompt reporting. The interpreting r
adiologist is fully responsible for the content of the report.

## 2021-08-16 NOTE — P.PN
Subjective


Date of Service: 08/16/21


Chief Complaint: hyperkalemia, renal failure


Patient seen after temporary dialysis catheter placed.  She was somnolent and 

presumed sister under the influence of anesthesia.


Patient is bradycardic with heart rate ranging from 35-45.








Physical Examination





- Vital Signs


Temperature: 97.0 F


Blood Pressure: 129/71


Pulse: 50


Respirations: 16


Pulse Ox (%): 95





- Physical Exam


General: Confused


HEENT: Mucous membr. moist/pink


Neck: JVD not distended


Respiratory: Clear to auscultation bilaterally, Normal air movement


Cardiovascular: No edema, Regular rate/rhythm, Normal S1 S2


Gastrointestinal: Soft and benign, Non-distended, No tenderness


Musculoskeletal: No swelling, No tenderness


Integumentary: No rashes, No erythema


Neurological: Other (No focal motor deficit)





- Studies


Laboratory Data (last 24 hrs)





08/15/21 21:54: PT 13.6 H, INR 1.18


08/15/21 21:05: Troponin I < 0.02


08/15/21 21:05: Sodium 135 L, Potassium 6.3 H*, BUN 66 H, Creatinine 2.37 H, 

Glucose 97


08/15/21 20:54: Sodium Cancelled, Potassium Cancelled, BUN Cancelled, Creatinine

Cancelled, Glucose Cancelled


08/15/21 20:53: Sodium Cancelled, Potassium Cancelled, BUN Cancelled, Creatinine

Cancelled, Glucose Cancelled


08/15/21 15:54: Potassium 6.4 H*





Microbiology Data (last 24 hrs): 








08/15/21 21:54   Blood  - Blood   Anaerobic Blood Culture - Final








Assessment And Plan





- Current Problems (Diagnosis)


(1) Acute encephalopathy


Current Visit: No   Status: Acute   





(2) Acute worsening of stage 4 chronic kidney disease


Current Visit: Yes   Status: Acute   





(3) Chronic anemia


Current Visit: No   Status: Acute   





(4) Hydronephrosis


Current Visit: No   Status: Acute   





(5) Hyperkalemia


Current Visit: No   Status: Acute   





(6) Weakness generalized


Onset Date: 01/14/15   Current Visit: No   Status: Acute   





(7) Thrombocytopenia


Current Visit: Yes   Status: Acute   





- Plan


Altered mental status related to uremia versus UTI


Temporary dialysis catheter placed fractures hemodialysis.


Nephrology is following and managing.


IV cefepime and vancomycin


Follow urine culture and blood cultures.


Neuro checks.


Monitor H&H and transfuse p.r.n. for hemoglobin less than 7.


Monitor thrombocytopenia.


SCD for DVT prophylaxis.

## 2021-08-17 NOTE — PN
Date of Progress Note:  08/16/2021



Chief Complaint:  Acute kidney injury, severe oliguric associated with electrolyte abnormalities, hyp
erkalemia, treated with medication, metabolic acidosis patient was started on a bicarbonate drip and 
she remained borderline oliguric.  Patient developed bradycardia today.  Blood pressure remain stable
, heart rate was 45 and 60 and PO2 of 95%.  Patient is on nasal cannula.



Review of Systems:

Patient is confused and arousable, cannot answer questions.



Objective:  Lungs:  Crackles bilaterally. 

Heart:  S1, S2. 

Abdomen:  Soft.  No rebound, no guarding. 

Extremities:  Edema present.



Impression And Plan:  Hyperkalemia, potassium level somewhat stabilizing, bicarbonate drip and status
 post Kayexalate __________ treatment of hyperkalemia.  Potassium level improved from 6.4 to 5.4.  Me
tabolic acidosis has improved, but the patient remains encephalopathic.  She has acute kidney injury 
and was oliguria.  Patient will start dialysis for metabolic clearance.  There is fluid overload.  Pa
mallika did not respond to Lasix.  Continue treatment with dialysis and next dialysis will be arranged 
tomorrow.  Today, patient underwent a tunneled dialysis catheter for hemodialysis access. 

Continue cefepime and vancomycin for possible pneumonia.





EB/MODL

DD:  08/16/2021 21:44:13Voice ID:  [id number]

DT:  08/17/2021 02:34:58Report ID:  [id number]

## 2021-08-17 NOTE — P.DS
Admission Date: 08/15/21


Discharge Date: 21


Disposition: 


Discharge Condition: 


Reason for Admission: hyperkalemia, renal failure


Consultations: 


General surgeryDr. Ricardo


NephrologyDr Tamayo





Procedures: 


Right femoral dialysis access





Brief History of Present Illness: 


Ms. Ramos is an 80 yo F with HTN, DM, HLD, chronic hydronephrosis and hydroureter 

with sewell catheter who presents with one day of increased lethargy, weakness 

and confusion. Daughter says she took her morning pills and coffee, but her 

mother was not responding and was unable to tell her the correct year. She asked

her mom to hold up a box of bandaids, which she was too weak to do, so the 

daughter called EMS. She is currently AOx1. Daughter says that patient has also 

had decreased urine output from her sewell catheter. She was given antibiotics 

for a UTI about a week ago. Na 133, K 6.3, Cl 111, CO3 17. BUN 65, Cr 2.35, GFR 

20. CXR shows pulmonary edema vs pneumonia. Received kayexalate, fleet enema, 

albuterol, insulin and glucose, lasix, and calcium gluconate in the ED. 





Hospital Course: 


Patient was treated for hyperkalemia, worsening renal function had temporary 

dialysis catheter placed to the right femoral.  Patient received dialysis this 

evening, after patient received dialysis while in her exam room patient was 

cleaned by staff, after patient was cleaned she became apneic, staff checked for

pulse which was not present.  [facility] was called and CPR was initiated, upon 

arrival to room patient was receiving CPR, ED provider arrived shortly after and

intubated the patient on first pass, at first rhythm check patient had a week 

pulse it was bradycardic in the 30s to 40s, patient was given a dose of 

atropine.  I called the daughter to inform you that patient had wishes for DNR 

and requested that we perform no additional life-saving interventions.  Shortly 

after this patient became apneic and pulseless and was in PEA.  This was again 

discussed with the daughter who preferred that we did not continue with CPR and 

allow for her to pass peacefully.  Time of death was called at [id number].  Family 

came to visit her shortly after her passing.  





Vital Signs/Physical Exam: 














Temp Pulse Resp BP Pulse Ox


 


 97.6 F   48 L  19   199/81 H  94 


 


 21 20:00  08/16/21 20:00  21 21:16  21 20:00  21 21:16








General: Unresponsive


Neck: Other (No carotid or apical pulse detected)


Respiratory: Other (Apneic)


Laboratory Data at Discharge: 














WBC  8.30 K/uL (4.3-10.9)  D 21  02:37    


 


Hgb  8.8 g/dL (12.0-15.0)  L  21  02:37    


 


Hct  28.0 % (36.0-45.0)  L  21  02:37    


 


Plt Count  145 K/uL (152-406)  L D 21  02:37    


 


PT  13.6 SECONDS (9.5-12.5)  H  08/15/21  21:54    


 


INR  1.18   08/15/21  21:54    


 


Sodium  139 mmol/L (136-145)   21  21:46    


 


Potassium  4.0 mmol/L (3.5-5.1)   21  21:46    


 


BUN  51 mg/dL (7-18)  H  21  21:46    


 


Creatinine  1.87 mg/dL (0.55-1.3)  H  21  21:46    


 


Glucose  121 mg/dL ()  H  21  21:46    


 


Phosphorus  5.2 mg/dL (2.5-4.9)  H  21  02:37    


 


Magnesium  2.0 mg/dL (1.8-2.4)   21  02:37    


 


Total Bilirubin  0.2 mg/dL (0.2-1.0)   21  02:37    


 


AST  6 U/L (15-37)  L  21  02:37    


 


ALT  12 U/L (12-78)   21  02:37    


 


Alkaline Phosphatase  79 U/L ()   21  02:37    


 


Troponin I  < 0.02 ng/mL (0.0-0.045)   08/15/21  21:05    


 


Lipase  27 U/L ()  L  08/15/21  09:21    








Home Medications: 








Atorvastatin Calcium [Lipitor*] 1 tab PO BEDTIME 21 


Duloxetine [Cymbalta *] 1 tab PO DAILY 21 


Gabapentin [Neurontin*] 1 cap PO TID 21 


Hydralazine [Apresoline*] 2 tab PO TID 21 


Insulin Aspart (Niacinamide) [Fiasp 100 Unit/ml Flextouch] See Protocol SQ ACHS 

21 


carvediloL [Carvedilol] 1 tab PO BID 21 


Docusate [Colace Cap*] 100 mg PO DAILY #30 cap 21 


Tamsulosin [Flomax*] 0.4 mg PO BEDTIME #30 cap 21 


Thiamine HCl [Vitamin B-1*] 100 mg PO BID #60 tablet 21 


Aspirin [Aspirin EC 81 MG] 81 mg PO DAILY #30 tablet. 21 


Calcitrol [Rocaltrol*] 0.25 mcg PO DAILY #30 cap 21 


Sevelamer Carbonate [Renvela*] 1,600 mg PO TIDWM #180 tablet 21 


Sodium Bicarbonate 1,300 mg PO BID #120 tablet 21 


Acetaminophen/Diphenhydramine [Tylenol Pm Ex-Strength Caplet] 1 each PO BEDTIME 

PRN 21 


Iron 27 Mg 1 tab PO DAILY 21 


Mineral Oil 30 ml PO DAILY #1000 ml 21 


Sulfamethoxazole/Trimethoprim [Bactrim Ds Tablet] 1 each PO DAILY #5 tablet 

21 


metroNIDAZOLE [Flagyl] 500 mg PO Q8H #20 tablet 21 





Followup: 


NOAH MATSONOT [Primary Care Provider] - 


Time spent managing pt's care (in minutes): 35

## 2022-10-24 NOTE — RAD REPORT
EXAM DESCRIPTION:  US - Renal Ultrasound-Complete - 6/2/2021 4:58 pm

 

CLINICAL HISTORY:  Hydronephrosis. Abdominal pain

 

COMPARISON:  May 2, 2021

 

FINDINGS:  The right kidney measures 11 cm with a normal echotexture. The evaluation of the right kid
martínez is limited. It appears that there has been some improvement in the hydronephrosis however. Mild-t
o-moderate right hydronephrosis suspected

 

The left kidney measures 12 cm with marked cortical thinning. Moderate hydronephrosis unchanged

 

A Dinero catheter present within a collapsed bladder

 

IMPRESSION:  Limited evaluation of the right kidney. There appears to be mild to moderate hydronephro
sis mildly improved from the prior exam

 

Marked left renal cortical thinning. Moderate hydronephrosis unchanged No

## 2024-03-18 NOTE — P.PN
Subjective


Date of Service: 11/10/19


Primary Care Provider: Dr. Lugo


Chief Complaint: Acute kidney injury/prerenal azotemia


Subjective: Improving (Patient is improving denies any pain fever or chills)





Review of Systems


Unremarkable





Physical Examination





- Vital Signs


Temperature: 96.8 F


Blood Pressure: 149/65


Pulse: 65


Respirations: 18


Pulse Ox (%): 98





- Physical Exam


General: Alert, In no apparent distress, Oriented x3


Respiratory: Clear to auscultation bilaterally


Cardiovascular: No edema, Normal pulses


Gastrointestinal: Normal bowel sounds, No tenderness





- Studies


Medications List Reviewed: Yes





Assessment & Plan





- Problems (Diagnosis)


(1) Complicated UTI (urinary tract infection)


Current Visit: No   Status: Acute   


Plan: 


Patient admitted with a post obstructive uropathy status post stent placement 

still has significant polyuria will check her labs vital signs stable 

oxygenation satisfactory patient has no new complaints will continue to monitor 

her renal function and electrolytes still has significant urine output no 

clinical evidence of active ongoing sepsis





Physician Review Additional Text: 





I <--- Click to Launch ICDx for PreOp, PostOp and Procedure